# Patient Record
Sex: FEMALE | Race: WHITE | NOT HISPANIC OR LATINO | Employment: OTHER | URBAN - METROPOLITAN AREA
[De-identification: names, ages, dates, MRNs, and addresses within clinical notes are randomized per-mention and may not be internally consistent; named-entity substitution may affect disease eponyms.]

---

## 2017-05-12 ENCOUNTER — OFFICE VISIT (OUTPATIENT)
Dept: CARDIOLOGY | Facility: CLINIC | Age: 77
End: 2017-05-12

## 2017-05-12 VITALS
DIASTOLIC BLOOD PRESSURE: 68 MMHG | SYSTOLIC BLOOD PRESSURE: 164 MMHG | BODY MASS INDEX: 34.84 KG/M2 | WEIGHT: 203 LBS | HEART RATE: 59 BPM

## 2017-05-12 DIAGNOSIS — I10 BENIGN HYPERTENSION: Primary | ICD-10-CM

## 2017-05-12 DIAGNOSIS — I25.10 CHRONIC CORONARY ARTERY DISEASE: ICD-10-CM

## 2017-05-12 PROCEDURE — 99213 OFFICE O/P EST LOW 20 MIN: CPT | Performed by: INTERNAL MEDICINE

## 2017-05-12 PROCEDURE — 93000 ELECTROCARDIOGRAM COMPLETE: CPT | Performed by: INTERNAL MEDICINE

## 2017-05-12 RX ORDER — ALBUTEROL SULFATE 90 UG/1
1 AEROSOL, METERED RESPIRATORY (INHALATION) AS NEEDED
COMMUNITY
Start: 2017-04-27 | End: 2022-10-28

## 2017-12-12 ENCOUNTER — CONVERSION ENCOUNTER (OUTPATIENT)
Dept: GENERAL RADIOLOGY | Facility: HOSPITAL | Age: 77
End: 2017-12-12

## 2019-02-19 ENCOUNTER — HOSPITAL ENCOUNTER (OUTPATIENT)
Dept: GENERAL RADIOLOGY | Facility: HOSPITAL | Age: 79
Discharge: HOME | End: 2019-02-19

## 2019-02-22 ENCOUNTER — OFFICE VISIT CONVERTED (OUTPATIENT)
Dept: CARDIOLOGY | Facility: CLINIC | Age: 79
End: 2019-02-22
Attending: SPECIALIST

## 2019-02-22 ENCOUNTER — CONVERSION ENCOUNTER (OUTPATIENT)
Dept: CARDIOLOGY | Facility: CLINIC | Age: 79
End: 2019-02-22

## 2019-09-13 ENCOUNTER — HOSPITAL ENCOUNTER (OUTPATIENT)
Dept: OTHER | Facility: HOSPITAL | Age: 79
Discharge: HOME | End: 2019-09-13
Attending: SPECIALIST

## 2019-11-01 ENCOUNTER — CONVERSION ENCOUNTER (OUTPATIENT)
Dept: CARDIOLOGY | Facility: CLINIC | Age: 79
End: 2019-11-01

## 2019-11-01 ENCOUNTER — OFFICE VISIT CONVERTED (OUTPATIENT)
Dept: CARDIOLOGY | Facility: CLINIC | Age: 79
End: 2019-11-01
Attending: SPECIALIST

## 2020-04-30 ENCOUNTER — TELEMEDICINE CONVERTED (OUTPATIENT)
Dept: CARDIOLOGY | Facility: CLINIC | Age: 80
End: 2020-04-30
Attending: SPECIALIST

## 2020-10-02 ENCOUNTER — OFFICE VISIT CONVERTED (OUTPATIENT)
Dept: CARDIOLOGY | Facility: CLINIC | Age: 80
End: 2020-10-02
Attending: SPECIALIST

## 2020-10-12 ENCOUNTER — HOSPITAL ENCOUNTER (OUTPATIENT)
Dept: OTHER | Facility: HOSPITAL | Age: 80
Discharge: HOME | End: 2020-10-12
Attending: NURSE PRACTITIONER

## 2020-10-15 ENCOUNTER — OFFICE VISIT CONVERTED (OUTPATIENT)
Dept: OTOLARYNGOLOGY | Facility: CLINIC | Age: 80
End: 2020-10-15
Attending: OTOLARYNGOLOGY
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
## 2021-05-13 NOTE — PROGRESS NOTES
Quick Note      Patient Name: Mallory Franco   Patient ID: [id number]   Sex: Female   YOB: 1940    Primary Care Provider: Johan Escudero MD   Referring Provider: Parris Serrato    Visit Date: April 30, 2020    Provider: Migue Nguyen MD   Location: [facility]   Location Address: [address] [place] [address], [address]   [place]  [id number]   Location Phone: [phone]          History Of Present Illness  TELEHEALTH TELEPHONE VISIT  Chief Complaint: Shortness of breath, Coronary artery disease   Mallory Franco is an 79 year old /White female with a history of coronary artery disease status post PTCA/stent. Denies any chest pain. She was recently in the hospital with vertigo and dizziness. Blood pressure was running high at that time, has since normalized. She is presenting for evaluation via telehealth telephone visit. Verbal consent obtained before beginning visit. Telehealth visit due to COVID-19.   Provider spent 5 minutes with the patient during telehealth visit.   The following staff were present during this visit: Provider & Hoda Arnold MA   Past Medical History/Overview of Patient Symptoms     CURRENT MEDICATIONS:  Trelegy 100-62.5-25 mg daily; Coreg 12.5 mg b.i.d.; hydrochlorothiazide 25 mg daily; simvastatin 20 mg daily; Mobic 15 mg p.r.n.; Singulair 10 mg daily; Zoloft 50 mg daily; aspirin 81 mg b.i.d.; meclizine 25 mg daily; vitamin B12; Protonix 40 mg daily; Flonase.  Dosage and frequency of the medications were reviewed with the patient.      PAST MEDICAL HISTORY:  Coronary artery disease status post PTCA/stent; Essential Hypertension; GERD.      FAMILY HISTORY:  Negative for diabetes mellitus, hypertension, or heart disease.      PSYCHOSOCIAL HISTORY:  Denies mood changes or depression.  Denies alcohol or tobacco use.  Does not get much exercise.      REVIEW OF SYSTEMS:   Cardiovascular:  Admits shortness of breath while walking or lying flat, swelling  (feet, ankles, hands); Denies palpitations (fast, fluttering, or skipping beats), chest pain or angina pectoris   Respiratory:  Admits chronic or frequent cough, asthma or wheezing       Vitals     Per patient, at-home vitals:   Blood pressure 139/69.  Heart rate 59.           Assessment     ASSESSMENT & PLAN:    1.  Coronary artery disease status post PTCA/stent, stable.  Continue current dose of aspirin.  Continue current        dose of carvedilol.    2.  Essential hypertension, controlled.  Continue current dose of carvedilol.  3.  Shortness of breath.  Continue current dose of bronchodilators, managed by her PMD.  4.  See me back in 6 months.             Electronically Signed by: Shandra Velazquez-, Other -Author on May 5, 2020 08:23:34 AM  Electronically Co-signed by: Migue Nguyen MD -Reviewer on May 7, 2020 08:51:01 AM

## 2021-05-13 NOTE — PROGRESS NOTES
"   Progress Note      Patient Name: Mallory Franco   Patient ID: [id number]   Sex: Female   YOB: 1940    Primary Care Provider: Johan Escudero MD   Referring Provider: Parris Serrato    Visit Date: October 2, 2020    Provider: Migue Nguyen MD   Location: [facility]   Location Address: [address] [place] [address]  [id number]   Location Phone: [phone]          Chief Complaint  · Coronary artery disease       History Of Present Illness  Mallory Franco is an 80 year old /White female with a history of coronary artery disease, s/p PTCA/stent, who denies any chest pain or shortness of breath.   CURRENT MEDICATIONS: include HCTZ 25 mg daily; Simvastatin 20 mg daily; ASA 81 mg daily; Coreg b.i.d.; Protonix 40 mg daily; Singulair 10 mg daily; Zoloft 50 mg daily; Mobic 15 mg daily; Trilogy. The dosage and frequency of the medications were reviewed with the patient.   PAST MEDICAL HISTORY: Positive for coronary artery disease, s/p PTCA/stent; hyperlipidemia.   FAMILY HISTORY: Positive for hypertension and heart disease. Negative for diabetes.   PSYCHOSOCIAL HISTORY: No history of mood changes or depression. She never used alcohol or tobacco.       Review of Systems  · Cardiovascular  o Admits  o : swelling (feet, ankles, hands), shortness of breath while walking or lying flat  o Denies  o : palpitations (fast, fluttering, or skipping beats), chest pain or angina pectoris   · Respiratory  o Admits  o : chronic or frequent cough, asthma or wheezing      Vitals  Date Time BP Position Site L\R Cuff Size HR RR TEMP (F) WT  HT  BMI kg/m2 BSA m2 O2 Sat FR L/min FiO2 HC       10/02/2020 10:10 /74 Sitting    66 - R   205lbs 6oz 5'  4\" 35.25 2.05             Physical Examination  · Constitutional  o Appearance  o : Awake, alert, cooperative, pleasant.  · Respiratory  o Inspection of Chest  o : No chest wall deformities, moving equal.  o Auscultation of Lungs  o : Good air " entry with vesicular breath sounds.  · Cardiovascular  o Heart  o :   § Auscultation of Heart  § : S1 and S2 regular. No S3. No S4. No murmurs.  o Peripheral Vascular System  o :   § Extremities  § : Peripheral pulses were well felt. No edema. No cyanosis.  · Gastrointestinal  o Abdominal Examination  o : No masses or organomegaly noted.          Assessment     ASSESSMENT AND PLAN:    1.  Coronary artery disease, s/p PTCA/stent, stable:  Continue current dose of ASA and Carvedilol.  2.  Essential hypertension controlled:  Blood pressure is well controlled at home.  Continue current dose of        Carvedilol.  3.  See me back in 6 months.    Migue Nguyen MD, [facility]  DAYANARA/adama           This note was transcribed by Graciela Carr.  adama/DAYANARA  The above service was transcribed by Graciela Carr, and I attest to the accuracy of the note.  DAYANARA               Electronically Signed by: Graciela Carr-, -Author on October 7, 2020 06:58:39 AM  Electronically Co-signed by: Migue Nguyen MD -Reviewer on October 7, 2020 11:35:23 AM

## 2021-05-14 VITALS — BODY MASS INDEX: 35 KG/M2 | HEIGHT: 64 IN | WEIGHT: 205 LBS | TEMPERATURE: 97.7 F

## 2021-05-14 VITALS
SYSTOLIC BLOOD PRESSURE: 145 MMHG | BODY MASS INDEX: 35.06 KG/M2 | DIASTOLIC BLOOD PRESSURE: 74 MMHG | WEIGHT: 205.37 LBS | HEIGHT: 64 IN | HEART RATE: 66 BPM

## 2021-05-15 VITALS
SYSTOLIC BLOOD PRESSURE: 157 MMHG | HEIGHT: 64 IN | WEIGHT: 202 LBS | BODY MASS INDEX: 34.49 KG/M2 | DIASTOLIC BLOOD PRESSURE: 88 MMHG | HEART RATE: 60 BPM

## 2021-05-16 VITALS
DIASTOLIC BLOOD PRESSURE: 75 MMHG | HEIGHT: 64 IN | WEIGHT: 207 LBS | BODY MASS INDEX: 35.34 KG/M2 | SYSTOLIC BLOOD PRESSURE: 150 MMHG | HEART RATE: 67 BPM

## 2021-05-16 VITALS — SYSTOLIC BLOOD PRESSURE: 166 MMHG | HEART RATE: 57 BPM | DIASTOLIC BLOOD PRESSURE: 84 MMHG

## 2021-07-20 ENCOUNTER — OFFICE VISIT (OUTPATIENT)
Dept: ORTHOPEDIC SURGERY | Facility: CLINIC | Age: 81
End: 2021-07-20

## 2021-07-20 VITALS — BODY MASS INDEX: 34.15 KG/M2 | TEMPERATURE: 96.9 F | WEIGHT: 200 LBS | HEIGHT: 64 IN

## 2021-07-20 DIAGNOSIS — Z96.651 PAIN DUE TO TOTAL RIGHT KNEE REPLACEMENT, INITIAL ENCOUNTER (HCC): ICD-10-CM

## 2021-07-20 DIAGNOSIS — T84.84XA PAIN DUE TO TOTAL RIGHT KNEE REPLACEMENT, INITIAL ENCOUNTER (HCC): ICD-10-CM

## 2021-07-20 DIAGNOSIS — R52 PAIN: Primary | ICD-10-CM

## 2021-07-20 PROCEDURE — 99204 OFFICE O/P NEW MOD 45 MIN: CPT | Performed by: ORTHOPAEDIC SURGERY

## 2021-07-20 PROCEDURE — 73562 X-RAY EXAM OF KNEE 3: CPT | Performed by: ORTHOPAEDIC SURGERY

## 2021-07-20 RX ORDER — MELOXICAM 15 MG/1
15 TABLET ORAL ONCE
Status: CANCELLED | OUTPATIENT
Start: 2021-07-28 | End: 2021-07-20

## 2021-07-20 RX ORDER — CHLORHEXIDINE GLUCONATE 500 MG/1
CLOTH TOPICAL 2 TIMES DAILY
Status: CANCELLED | OUTPATIENT
Start: 2021-07-20

## 2021-07-20 RX ORDER — CEFAZOLIN SODIUM 2 G/100ML
2 INJECTION, SOLUTION INTRAVENOUS ONCE
Status: CANCELLED | OUTPATIENT
Start: 2021-07-28 | End: 2021-07-20

## 2021-07-20 RX ORDER — PREGABALIN 75 MG/1
150 CAPSULE ORAL ONCE
Status: CANCELLED | OUTPATIENT
Start: 2021-07-28 | End: 2021-07-20

## 2021-07-20 NOTE — PROGRESS NOTES
"Mallory Franco : 1940 MRN: [id number] DATE: 2021    Chief Complaint:  Follow up right total knee      SUBJECTIVE:Patient returns today for  follow up of right total knee replacement. Patient reports Dr. Avila did a right total knee replacement on approximately 15 years ago.  Patient states she has been doing well up until the last month when she rolled over in the bed and felt a pop in her knee.  Patient states when she put her foot on the floor her knee gave out and she fell onto the floor.  Patient states she has been unable to fully put weight on this knee ever since.  Patient is currently ambulating around with a walker.  Patient denies any significant pain with the knee but does report some discomfort with going from extension to flexion of the knee.  Patient states her knee feels like it shifts out of place when she goes past midline of her knee flexed.  Patient denies any known injury to her knee.  Patient is without any other significant complaints today.    OBJECTIVE:    Temp 96.9 °F (36.1 °C) (Temporal)   Ht 162.6 cm (64\")   Wt 90.7 kg (200 lb)   BMI 34.33 kg/m²   Family History   Problem Relation Age of Onset   • Breast cancer Mother    • Bone cancer Father    • Breast cancer Sister    • Bone cancer Sister    • Lung cancer Brother      Past Medical History:   Diagnosis Date   • BPPV (benign paroxysmal positional vertigo), right    • Coronary artery disease    • Ear itching    • Enlarged heart    • Gastroesophageal reflux disease    • Hyperlipidemia    • Hypertension    • Tinnitus of left ear    • Vertigo      Past Surgical History:   Procedure Laterality Date   • BREAST BIOPSY Bilateral    •  SECTION     • CHOLECYSTECTOMY     • CORONARY STENT PLACEMENT     • EYE SURGERY     • GALLBLADDER SURGERY     • KNEE SURGERY       Social History     Socioeconomic History   • Marital status:      Spouse name: Not on file   • Number of children: Not on file   • Years of education: Not on " file   • Highest education level: Not on file   Tobacco Use   • Smoking status: Never Smoker   Substance and Sexual Activity   • Alcohol use: No       Review of Systems: 14 point review of systems performed pertinent positives and negatives discussed above, all other systems are negative    Exam:. The incision is well healed. Range of motion is measured at 5 to 125. The calf is soft and nontender with a negative Homans sign. Alignment is neutral. Good quad strength. There is evidence of flexion instability, with a palpable shift in the knee going from extension to flexion. No effusion. Intact to light touch with palpable distal pulses.     DIAGNOSTIC STUDIES  Xrays: 3 views(AP bilateral knees, lateral right, and sunrise bilateral knees) were ordered and reviewed for evaluation of right knee replacement. They demonstrate a well positioned, well aligned knee replacement without complicating factors noted. In comparison with previous films there has been no change.    ASSESSMENT:   General follow up right knee replacement       PLAN:    Treatment options as well as imaging results were discussed in detail with the patient.  After evaluation it is likely the patient is having a malfunction of her posterior stabilization polyethylene liner.  Dr. Avila is also evaluated the patient and agrees that the patient needs to proceed forward with having a polyethylene exchange.  The risk benefits and alternatives of surgery have been discussed with the patient and she is agreeing to proceed forward with surgery.  We will arrange all the necessary preoperative testing and proceed forward with surgery in the near future.    Continuation of conservative management vs. polyethylene exchange was discussed.  The patient wishes to proceed with right knee polyethylene exchange with possible revision.  At this point the patient has failed the full compliment of conservative treatment and stating complete understanding of the  risks/benefits/ anternatives wishes to proceed with surgical treatment.    Risk and benefits of surgery were reviewed.  Including, but not limited to, blood clots or pulmonary embolism, anesthesia risk, infection, fracture, skin/leg numbness, persistent pain/crepitance/popping/catching, failure of the implant, need for future surgeries, hematoma, possible nerve or blood vessel injury, need for transfusion, and potential risk of stroke,heart attack or death, among others.  The patient understands and wishes to proceed.     It was explained that if tissue has been repaired or reconstructed, there is also an increased chance of failure which may require further management.  Following the completion of the discussion, the patient expressed understanding of this planned course of care, all their questions were answered and consent will be obtained preoperatively.    Operative Plan: [employer] polyethylene polymer change an overnight staywith home health rehab.        YOVANA Hernandez  7/20/2021     This patient was seen in conjunction today with Dr. Ky Avila.  Dr. Avila agrees with the above-stated assessment and plan.

## 2021-07-20 NOTE — H&P
Patient: Mallory Franco    Date of Admission: 7/28/2021    YOB: 1940    Medical Record Number: [id number]    Admitting Physician: Dr. Ky Avila    Reason for Admission: Painful right total knee replacement    History of Present Illness: 80 y.o. female presents with complaints of instability and pain from a right total knee replacement which has not been responsive to the full compliment of conservative measures. Despite conservative attempts, there is still severe, constant activity limiting pain. Given the severity of the pain, the patient has elected to proceed with right knee polyethylene swap with possible revision.    Allergies:   Allergies   Allergen Reactions   • Codeine    • Morphine          Current Medications:  Home Medications:    Current Outpatient Medications on File Prior to Visit   Medication Sig   • aspirin 81 MG tablet Take  by mouth.   • carvedilol (COREG) 12.5 MG tablet Take  by mouth 2 (Two) Times a Day.   • fluticasone (FLONASE) 50 MCG/ACT nasal spray into each nostril.   • hydrochlorothiazide (HYDRODIURIL) 25 MG tablet Take  by mouth.   • loratadine (LORATADINE ALLERGY RELIEF) 10 MG disintegrating tablet Take  by mouth.   • meclizine (ANTIVERT) 25 MG tablet Take  by mouth.   • montelukast (SINGULAIR) 10 MG tablet Take  by mouth.   • Multiple Vitamins-Minerals (EYE VITAMINS) capsule Take  by mouth.   • pantoprazole (PROTONIX) 40 MG EC tablet Take  by mouth.   • sertraline (ZOLOFT) 50 MG tablet Take  by mouth Daily.   • simvastatin (ZOCOR) 20 MG tablet Take  by mouth.   • VENTOLIN  (90 BASE) MCG/ACT inhaler      No current facility-administered medications on file prior to visit.     PRN Meds:.    PMH:     Past Medical History:   Diagnosis Date   • BPPV (benign paroxysmal positional vertigo), right    • Coronary artery disease    • Ear itching    • Enlarged heart    • Gastroesophageal reflux disease    • Hyperlipidemia    • Hypertension    • Tinnitus of left ear    •  "Vertigo        PF/Surg/Soc Hx:     Past Surgical History:   Procedure Laterality Date   • BREAST BIOPSY Bilateral    •  SECTION     • CHOLECYSTECTOMY     • CORONARY STENT PLACEMENT     • EYE SURGERY     • GALLBLADDER SURGERY     • KNEE SURGERY          Social History     Occupational History   • Not on file   Tobacco Use   • Smoking status: Never Smoker   Substance and Sexual Activity   • Alcohol use: No   • Drug use: Not on file   • Sexual activity: Not on file      Social History     Social History Narrative   • Not on file        Family History   Problem Relation Age of Onset   • Breast cancer Mother    • Bone cancer Father    • Breast cancer Sister    • Bone cancer Sister    • Lung cancer Brother          Review of Systems:   A 14 point review of systems was performed, pertinent positives discussed above, all other systems are negative    Physical Exam: 80 y.o. female  Vital Signs :   Vitals:    21 1419   Temp: 96.9 °F (36.1 °C)   TempSrc: Temporal   Weight: 90.7 kg (200 lb)   Height: 162.6 cm (64\")     General: Alert and Oriented x 3, No acute distress.  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  -A full length AP xray was ordered today for purposes of operative alignment demonstrating end stage arthritic findings. There are no previous full length films for review    Assessment: Painful right total knee replacement. Conservative measures have failed.      Plan:  The plan is to proceed with Right knee polyethylene swap with possible revision. The patient voiced understanding of the " risks, benefits, and alternative forms of treatment that were discussed with Dr Avila at the time of scheduling.  23 hours with home health.    Francesco Grewal, APRN  7/20/2021

## 2021-07-21 ENCOUNTER — TRANSCRIBE ORDERS (OUTPATIENT)
Dept: PREADMISSION TESTING | Facility: HOSPITAL | Age: 81
End: 2021-07-21

## 2021-07-22 ENCOUNTER — PRE-ADMISSION TESTING (OUTPATIENT)
Dept: PREADMISSION TESTING | Facility: HOSPITAL | Age: 81
End: 2021-07-22

## 2021-07-22 VITALS
HEART RATE: 68 BPM | DIASTOLIC BLOOD PRESSURE: 76 MMHG | RESPIRATION RATE: 16 BRPM | TEMPERATURE: 97 F | SYSTOLIC BLOOD PRESSURE: 160 MMHG | OXYGEN SATURATION: 98 % | WEIGHT: 202 LBS | HEIGHT: 64 IN | BODY MASS INDEX: 34.49 KG/M2

## 2021-07-22 DIAGNOSIS — T84.84XA PAIN DUE TO TOTAL RIGHT KNEE REPLACEMENT, INITIAL ENCOUNTER (HCC): ICD-10-CM

## 2021-07-22 DIAGNOSIS — Z96.651 PAIN DUE TO TOTAL RIGHT KNEE REPLACEMENT, INITIAL ENCOUNTER (HCC): ICD-10-CM

## 2021-07-22 LAB
ANION GAP SERPL CALCULATED.3IONS-SCNC: 10.4 MMOL/L (ref 5–15)
BILIRUB UR QL STRIP: NEGATIVE
BUN SERPL-MCNC: 21 MG/DL (ref 8–23)
BUN/CREAT SERPL: 22.8 (ref 7–25)
CALCIUM SPEC-SCNC: 9.8 MG/DL (ref 8.6–10.5)
CHLORIDE SERPL-SCNC: 102 MMOL/L (ref 98–107)
CLARITY UR: CLEAR
CO2 SERPL-SCNC: 25.6 MMOL/L (ref 22–29)
COLOR UR: YELLOW
CREAT SERPL-MCNC: 0.92 MG/DL (ref 0.57–1)
DEPRECATED RDW RBC AUTO: 47.4 FL (ref 37–54)
ERYTHROCYTE [DISTWIDTH] IN BLOOD BY AUTOMATED COUNT: 14.1 % (ref 12.3–15.4)
GFR SERPL CREATININE-BSD FRML MDRD: 59 ML/MIN/1.73
GLUCOSE SERPL-MCNC: 96 MG/DL (ref 65–99)
GLUCOSE UR STRIP-MCNC: NEGATIVE MG/DL
HCT VFR BLD AUTO: 36.6 % (ref 34–46.6)
HGB BLD-MCNC: 11.6 G/DL (ref 12–15.9)
HGB UR QL STRIP.AUTO: NEGATIVE
KETONES UR QL STRIP: NEGATIVE
LEUKOCYTE ESTERASE UR QL STRIP.AUTO: NEGATIVE
MCH RBC QN AUTO: 28.9 PG (ref 26.6–33)
MCHC RBC AUTO-ENTMCNC: 31.7 G/DL (ref 31.5–35.7)
MCV RBC AUTO: 91 FL (ref 79–97)
NITRITE UR QL STRIP: NEGATIVE
PH UR STRIP.AUTO: 6.5 [PH] (ref 5–8)
PLATELET # BLD AUTO: 256 10*3/MM3 (ref 140–450)
PMV BLD AUTO: 10.2 FL (ref 6–12)
POTASSIUM SERPL-SCNC: 4.4 MMOL/L (ref 3.5–5.2)
PROT UR QL STRIP: NEGATIVE
QT INTERVAL: 457 MS
RBC # BLD AUTO: 4.02 10*6/MM3 (ref 3.77–5.28)
SODIUM SERPL-SCNC: 138 MMOL/L (ref 136–145)
SP GR UR STRIP: 1.01 (ref 1–1.03)
UROBILINOGEN UR QL STRIP: NORMAL
WBC # BLD AUTO: 7.37 10*3/MM3 (ref 3.4–10.8)

## 2021-07-22 PROCEDURE — 36415 COLL VENOUS BLD VENIPUNCTURE: CPT

## 2021-07-22 PROCEDURE — 93005 ELECTROCARDIOGRAM TRACING: CPT

## 2021-07-22 PROCEDURE — 93010 ELECTROCARDIOGRAM REPORT: CPT | Performed by: INTERNAL MEDICINE

## 2021-07-22 PROCEDURE — 85027 COMPLETE CBC AUTOMATED: CPT

## 2021-07-22 PROCEDURE — 81003 URINALYSIS AUTO W/O SCOPE: CPT

## 2021-07-22 PROCEDURE — 80048 BASIC METABOLIC PNL TOTAL CA: CPT

## 2021-07-22 RX ORDER — AMLODIPINE BESYLATE 5 MG/1
TABLET ORAL
COMMUNITY
Start: 2021-04-15 | End: 2021-07-22

## 2021-07-22 RX ORDER — MELOXICAM 15 MG/1
TABLET ORAL
COMMUNITY
End: 2021-07-22

## 2021-07-22 RX ORDER — LOSARTAN POTASSIUM 25 MG/1
TABLET ORAL
COMMUNITY
Start: 2021-05-05 | End: 2021-07-22

## 2021-07-22 RX ORDER — MELOXICAM 7.5 MG/1
7.5 TABLET ORAL
COMMUNITY
Start: 2021-07-07 | End: 2021-07-29 | Stop reason: HOSPADM

## 2021-07-22 RX ORDER — CHLORHEXIDINE GLUCONATE 500 MG/1
CLOTH TOPICAL 2 TIMES DAILY
Status: ACTIVE | OUTPATIENT
Start: 2021-07-22

## 2021-07-22 RX ORDER — VALSARTAN 160 MG/1
160 TABLET ORAL
COMMUNITY
Start: 2021-06-02 | End: 2022-10-30 | Stop reason: HOSPADM

## 2021-07-22 RX ORDER — LANOLIN ALCOHOL/MO/W.PET/CERES
1000 CREAM (GRAM) TOPICAL EVERY MORNING
COMMUNITY
End: 2021-07-29 | Stop reason: HOSPADM

## 2021-07-22 RX ORDER — TRAZODONE HYDROCHLORIDE 50 MG/1
TABLET ORAL
COMMUNITY
Start: 2021-06-02 | End: 2021-07-22

## 2021-07-22 RX ORDER — ATORVASTATIN CALCIUM 40 MG/1
TABLET, FILM COATED ORAL
COMMUNITY
Start: 2021-04-22 | End: 2021-07-22

## 2021-07-22 RX ORDER — GABAPENTIN 300 MG/1
300 CAPSULE ORAL NIGHTLY
COMMUNITY
Start: 2021-06-07 | End: 2022-10-28

## 2021-07-22 RX ORDER — ESOMEPRAZOLE MAGNESIUM 40 MG/1
40 CAPSULE, DELAYED RELEASE ORAL
COMMUNITY
End: 2022-10-28

## 2021-07-22 RX ORDER — VALACYCLOVIR HYDROCHLORIDE 1 G/1
TABLET, FILM COATED ORAL
COMMUNITY
Start: 2021-05-05 | End: 2021-07-22

## 2021-07-22 RX ORDER — LORATADINE 10 MG/1
10 CAPSULE, LIQUID FILLED ORAL EVERY MORNING
COMMUNITY
End: 2022-10-28

## 2021-07-22 NOTE — DISCHARGE INSTRUCTIONS
Take the following medications the morning of surgery:  CARVEDILOL, NEXIUM, BREO ELLIPTA, BRING INHALER    ARRIVE 12:00  7/28      If you are on prescription narcotic pain medication to control your pain you may also take that medication the morning of surgery.    General Instructions:  • Do not eat solid food after midnight the night before surgery.  • You may drink clear liquids day of surgery but must stop at least one hour before your hospital arrival time.  • It is beneficial for you to have a clear drink that contains carbohydrates the day of surgery.  We suggest a 12 to 20 ounce bottle of Gatorade or Powerade for non-diabetic patients or a 12 to 20 ounce bottle of G2 or Powerade Zero for diabetic patients. (Pediatric patients, are not advised to drink a 12 to 20 ounce carbohydrate drink)    Clear liquids are liquids you can see through.  Nothing red in color.     Plain water                               Sports drinks  Sodas                                   Gelatin (Jell-O)  Fruit juices without pulp such as white grape juice and apple juice  Popsicles that contain no fruit or yogurt  Tea or coffee (no cream or milk added)  Gatorade / Powerade  G2 / Powerade Zero    • Infants may have breast milk up to four hours before surgery.  • Infants drinking formula may drink formula up to six hours before surgery.   • Patients who avoid smoking, chewing tobacco and alcohol for 4 weeks prior to surgery have a reduced risk of post-operative complications.  Quit smoking as many days before surgery as you can.  • Do not smoke, use chewing tobacco or drink alcohol the day of surgery.   • If applicable bring your C-PAP/ BI-PAP machine.  • Bring any papers given to you in the doctor’s office.  • Wear clean comfortable clothes.  • Do not wear contact lenses, false eyelashes or make-up.  Bring a case for your glasses.   • Bring crutches or walker if applicable.  • Remove all piercings.  Leave jewelry and any other valuables  at home.  • Hair extensions with metal clips must be removed prior to surgery.  • The Pre-Admission Testing nurse will instruct you to bring medications if unable to obtain an accurate list in Pre-Admission Testing.          Preventing a Surgical Site Infection:  • For 2 to 3 days before surgery, avoid shaving with a razor because the razor can irritate skin and make it easier to develop an infection.    • Any areas of open skin can increase the risk of a post-operative wound infection by allowing bacteria to enter and travel throughout the body.  Notify your surgeon if you have any skin wounds / rashes even if it is not near the expected surgical site.  The area will need assessed to determine if surgery should be delayed until it is healed.  • The night prior to surgery shower using a fresh bar of anti-bacterial soap (such as Dial) and clean washcloth.  Sleep in a clean bed with clean clothing.  Do not allow pets to sleep with you.  • Shower on the morning of surgery using a fresh bar of anti-bacterial soap (such as Dial) and clean washcloth.  Dry with a clean towel and dress in clean clothing.  • Ask your surgeon if you will be receiving antibiotics prior to surgery.  • Make sure you, your family, and all healthcare providers clean their hands with soap and water or an alcohol based hand  before caring for you or your wound.    Day of surgery:  Your arrival time is approximately two hours before your scheduled surgery time.  Upon arrival, a Pre-op nurse and Anesthesiologist will review your health history, obtain vital signs, and answer questions you may have.  The only belongings needed at this time will be a list of your home medications and if applicable your C-PAP/BI-PAP machine.  A Pre-op nurse will start an IV and you may receive medication in preparation for surgery, including something to help you relax.     Please be aware that surgery does come with discomfort.  We want to make every effort to  control your discomfort so please discuss any uncontrolled symptoms with your nurse.   Your doctor will most likely have prescribed pain medications.      If you are going home after surgery you will receive individualized written care instructions before being discharged.  A responsible adult must drive you to and from the hospital on the day of your surgery and stay with you for 24 hours.  Discharge prescriptions can be filled by the hospital pharmacy during regular pharmacy hours.  If you are having surgery late in the day/evening your prescription may be e-prescribed to your pharmacy.  Please verify your pharmacy hours or chose a 24 hour pharmacy to avoid not having access to your prescription because your pharmacy has closed for the day.    If you are staying overnight following surgery, you will be transported to your hospital room following the recovery period.  [facility] has all private rooms.    If you have any questions please call Pre-Admission Testing at [phone].  Deductibles and co-payments are collected on the day of service. Please be prepared to pay the required co-pay, deductible or deposit on the day of service as defined by your plan.    Patient Education for Self-Quarantine Process    • Following your COVID testing, we strongly recommend that you wear a mask when you are with other people and practice social distancing.   • Limit your activities to only required outings.  • Wash your hands with soap and water frequently for at least 20 seconds.   • Avoid touching your eyes, nose and mouth with unwashed hands.  • Do not share anything - utensils, drinking glasses, food from the same bowl.   • Sanitize household surfaces daily. Include all high touch areas (door handles, light switches, phones, countertops, etc.)    Call your surgeon immediately if you experience any of the following symptoms:  • Sore Throat  • Shortness of Breath or difficulty  breathing  • Cough  • Chills  • Body soreness or muscle pain  • Headache  • Fever  • New loss of taste or smell  • Do not arrive for your surgery ill.  Your procedure will need to be rescheduled to another time.  You will need to call your physician before the day of surgery to avoid any unnecessary exposure to hospital staff as well as other patients.    CHLORHEXIDINE CLOTH INSTRUCTIONS  The morning of surgery follow these instructions using the Chlorhexidine cloths you've been given.  These steps reduce bacteria on the body.  Do not use the cloths near your eyes, ears mouth, genitalia or on open wounds.  Throw the cloths away after use but do not try to flush them down a toilet.      • Open and remove one cloth at a time from the package.    • Leave the cloth unfolded and begin the bathing.  • Massage the skin with the cloths using gentle pressure to remove bacteria.  Do not scrub harshly.   • Follow the steps below with one 2% CHG cloth per area (6 total cloths).  • One cloth for neck, shoulders and chest.  • One cloth for both arms, hands, fingers and underarms (do underarms last).  • One cloth for the abdomen followed by groin.  • One cloth for right leg and foot including between the toes.  • One cloth for left leg and foot including between the toes.  • The last cloth is to be used for the back of the neck, back and buttocks.    Allow the CHG to air dry 3 minutes on the skin which will give it time to work and decrease the chance of irritation.  The skin may feel sticky until it is dry.  Do not rinse with water or any other liquid or you will lose the beneficial effects of the CHG.  If mild skin irritation occurs, do rinse the skin to remove the CHG.  Report this to the nurse at time of admission.  Do not apply lotions, creams, ointments, deodorants or perfumes after using the clothes. Dress in clean clothes before coming to the hospital.    BACTROBAN NASAL OINTMENT  There are many germs normally in your  nose. Bactroban is an ointment that will help reduce these germs. Please follow these instructions for Bactroban use:      ___1_The day before surgery in the morning  Date__7/27______    ___2_The day before surgery in the evening              Date__7/27______    __3__The day of surgery in the morning    Date___7/28_____    **Squirt ½ package of Bactroban Ointment onto a cotton applicator and apply to inside of 1st nostril.  Squirt the remaining Bactroban and apply to the inside of the other nostril.

## 2021-07-23 ENCOUNTER — TELEPHONE (OUTPATIENT)
Dept: ORTHOPEDIC SURGERY | Facility: CLINIC | Age: 81
End: 2021-07-23

## 2021-07-23 ENCOUNTER — TELEPHONE (OUTPATIENT)
Dept: CARDIOLOGY | Facility: CLINIC | Age: 81
End: 2021-07-23

## 2021-07-23 NOTE — TELEPHONE ENCOUNTER
Patient called asking if she needs cardiac clearance for her upcoming knee surgery.   I advised that decision is left up to the surgeon and they would need to request clearance.  Patient is calling surgeons office.    Patient has not been seen since October 2020.

## 2021-07-23 NOTE — TELEPHONE ENCOUNTER
Caller:   PATIENT    Reason for call:  PATIENT WAS DOING PRE SX TESTING AND WAS INFORMED SHE MIGHT NEED SX CLEARANCE FROM CARDIOLOGIST.       PLEASE ADVISE,     Caller# [phone]

## 2021-07-26 NOTE — TELEPHONE ENCOUNTER
Informed pt of MLL message below. Pt also had some SX questions as far as time she should arrive I told her I will send a message back to RBB sx  and she could expect a call from her at some point today.

## 2021-07-27 ENCOUNTER — LAB (OUTPATIENT)
Dept: LAB | Facility: HOSPITAL | Age: 81
End: 2021-07-27

## 2021-07-27 LAB — SARS-COV-2 ORF1AB RESP QL NAA+PROBE: NOT DETECTED

## 2021-07-27 PROCEDURE — U0004 COV-19 TEST NON-CDC HGH THRU: HCPCS

## 2021-07-27 PROCEDURE — U0005 INFEC AGEN DETEC AMPLI PROBE: HCPCS

## 2021-07-27 PROCEDURE — C9803 HOPD COVID-19 SPEC COLLECT: HCPCS

## 2021-07-28 ENCOUNTER — APPOINTMENT (OUTPATIENT)
Dept: GENERAL RADIOLOGY | Facility: HOSPITAL | Age: 81
End: 2021-07-28

## 2021-07-28 ENCOUNTER — ANESTHESIA (OUTPATIENT)
Dept: PERIOP | Facility: HOSPITAL | Age: 81
End: 2021-07-28

## 2021-07-28 ENCOUNTER — ANESTHESIA EVENT (OUTPATIENT)
Dept: PERIOP | Facility: HOSPITAL | Age: 81
End: 2021-07-28

## 2021-07-28 ENCOUNTER — HOSPITAL ENCOUNTER (OUTPATIENT)
Facility: HOSPITAL | Age: 81
Discharge: HOME HEALTH | End: 2021-07-29
Attending: ORTHOPAEDIC SURGERY | Admitting: ORTHOPAEDIC SURGERY

## 2021-07-28 DIAGNOSIS — T84.84XA PAIN DUE TO TOTAL RIGHT KNEE REPLACEMENT, INITIAL ENCOUNTER (HCC): ICD-10-CM

## 2021-07-28 DIAGNOSIS — Z96.651 PAIN DUE TO TOTAL RIGHT KNEE REPLACEMENT, INITIAL ENCOUNTER (HCC): ICD-10-CM

## 2021-07-28 PROCEDURE — 25010000003 CEFAZOLIN IN DEXTROSE 2-4 GM/100ML-% SOLUTION: Performed by: NURSE PRACTITIONER

## 2021-07-28 PROCEDURE — 25010000002 VANCOMYCIN 10 G RECONSTITUTED SOLUTION: Performed by: NURSE PRACTITIONER

## 2021-07-28 PROCEDURE — 63710000001 POLYETHYLENE GLYCOL 17 G PACK: Performed by: NURSE PRACTITIONER

## 2021-07-28 PROCEDURE — A9270 NON-COVERED ITEM OR SERVICE: HCPCS | Performed by: NURSE PRACTITIONER

## 2021-07-28 PROCEDURE — C1776 JOINT DEVICE (IMPLANTABLE): HCPCS | Performed by: ORTHOPAEDIC SURGERY

## 2021-07-28 PROCEDURE — 63710000001 MELATONIN 1 MG TABLET: Performed by: NURSE PRACTITIONER

## 2021-07-28 PROCEDURE — 25010000002 EPINEPHRINE 30 MG/30ML SOLUTION

## 2021-07-28 PROCEDURE — C1889 IMPLANT/INSERT DEVICE, NOC: HCPCS | Performed by: ORTHOPAEDIC SURGERY

## 2021-07-28 PROCEDURE — 25010000002 NEOSTIGMINE 5 MG/10ML SOLUTION: Performed by: STUDENT IN AN ORGANIZED HEALTH CARE EDUCATION/TRAINING PROGRAM

## 2021-07-28 PROCEDURE — 27486 REVISE/REPLACE KNEE JOINT: CPT | Performed by: ORTHOPAEDIC SURGERY

## 2021-07-28 PROCEDURE — 25010000002 ONDANSETRON PER 1 MG: Performed by: STUDENT IN AN ORGANIZED HEALTH CARE EDUCATION/TRAINING PROGRAM

## 2021-07-28 PROCEDURE — 25010000002 ROPIVACAINE PER 1 MG

## 2021-07-28 PROCEDURE — G0378 HOSPITAL OBSERVATION PER HR: HCPCS

## 2021-07-28 PROCEDURE — 25010000002 DEXAMETHASONE PER 1 MG: Performed by: STUDENT IN AN ORGANIZED HEALTH CARE EDUCATION/TRAINING PROGRAM

## 2021-07-28 PROCEDURE — 63710000001 MUPIROCIN 2 % OINTMENT: Performed by: NURSE PRACTITIONER

## 2021-07-28 PROCEDURE — 63710000001 SERTRALINE 50 MG TABLET: Performed by: NURSE PRACTITIONER

## 2021-07-28 PROCEDURE — 25010000002 KETOROLAC TROMETHAMINE PER 15 MG

## 2021-07-28 PROCEDURE — 25010000002 FENTANYL CITRATE (PF) 50 MCG/ML SOLUTION: Performed by: STUDENT IN AN ORGANIZED HEALTH CARE EDUCATION/TRAINING PROGRAM

## 2021-07-28 PROCEDURE — 63710000001 CARVEDILOL 12.5 MG TABLET: Performed by: NURSE PRACTITIONER

## 2021-07-28 PROCEDURE — 63710000001 ONDANSETRON PER 8 MG: Performed by: NURSE PRACTITIONER

## 2021-07-28 PROCEDURE — 25010000002 PROPOFOL 10 MG/ML EMULSION: Performed by: STUDENT IN AN ORGANIZED HEALTH CARE EDUCATION/TRAINING PROGRAM

## 2021-07-28 PROCEDURE — 73560 X-RAY EXAM OF KNEE 1 OR 2: CPT

## 2021-07-28 PROCEDURE — 25010000002 MORPHINE PER 10 MG: Performed by: ORTHOPAEDIC SURGERY

## 2021-07-28 DEVICE — VIOLET ANTIBACTERIAL POLYDIOXANONE, KNOTLESS TISSUE CONTROL DEVICE
Type: IMPLANTABLE DEVICE | Site: KNEE | Status: FUNCTIONAL
Brand: STRATAFIX

## 2021-07-28 DEVICE — KNOTLESS TISSUE CONTROL DEVICE, UNDYED UNIDIRECTIONAL (ANTIBACTERIAL) SYNTHETIC ABSORBABLE DEVICE
Type: IMPLANTABLE DEVICE | Site: KNEE | Status: FUNCTIONAL
Brand: STRATAFIX

## 2021-07-28 DEVICE — LEGION POSTERIOR STABILIZED HIGH                                    FLEX HIGHLY CROSS LINKED                                    POLYETHYLENE SIZE 3-4 11MM
Type: IMPLANTABLE DEVICE | Site: KNEE | Status: FUNCTIONAL
Brand: LEGION

## 2021-07-28 RX ORDER — HYDROCODONE BITARTRATE AND ACETAMINOPHEN 7.5; 325 MG/1; MG/1
1 TABLET ORAL ONCE AS NEEDED
Status: DISCONTINUED | OUTPATIENT
Start: 2021-07-28 | End: 2021-07-28 | Stop reason: HOSPADM

## 2021-07-28 RX ORDER — ONDANSETRON 4 MG/1
4 TABLET, FILM COATED ORAL EVERY 6 HOURS PRN
Status: DISCONTINUED | OUTPATIENT
Start: 2021-07-28 | End: 2021-07-29 | Stop reason: HOSPADM

## 2021-07-28 RX ORDER — FENTANYL CITRATE 50 UG/ML
50 INJECTION, SOLUTION INTRAMUSCULAR; INTRAVENOUS
Status: DISCONTINUED | OUTPATIENT
Start: 2021-07-28 | End: 2021-07-28 | Stop reason: HOSPADM

## 2021-07-28 RX ORDER — VALSARTAN 160 MG/1
160 TABLET ORAL
Status: DISCONTINUED | OUTPATIENT
Start: 2021-07-29 | End: 2021-07-29 | Stop reason: HOSPADM

## 2021-07-28 RX ORDER — CEFAZOLIN SODIUM 2 G/100ML
2 INJECTION, SOLUTION INTRAVENOUS ONCE
Status: COMPLETED | OUTPATIENT
Start: 2021-07-28 | End: 2021-07-28

## 2021-07-28 RX ORDER — ONDANSETRON 2 MG/ML
4 INJECTION INTRAMUSCULAR; INTRAVENOUS ONCE AS NEEDED
Status: DISCONTINUED | OUTPATIENT
Start: 2021-07-28 | End: 2021-07-28 | Stop reason: HOSPADM

## 2021-07-28 RX ORDER — ROCURONIUM BROMIDE 10 MG/ML
INJECTION, SOLUTION INTRAVENOUS AS NEEDED
Status: DISCONTINUED | OUTPATIENT
Start: 2021-07-28 | End: 2021-07-28 | Stop reason: SURG

## 2021-07-28 RX ORDER — HYDROCHLOROTHIAZIDE 25 MG/1
25 TABLET ORAL EVERY MORNING
Status: DISCONTINUED | OUTPATIENT
Start: 2021-07-29 | End: 2021-07-29 | Stop reason: HOSPADM

## 2021-07-28 RX ORDER — GLYCOPYRROLATE 0.2 MG/ML
INJECTION INTRAMUSCULAR; INTRAVENOUS AS NEEDED
Status: DISCONTINUED | OUTPATIENT
Start: 2021-07-28 | End: 2021-07-28 | Stop reason: SURG

## 2021-07-28 RX ORDER — ASPIRIN 81 MG/1
TABLET ORAL
Qty: 60 TABLET | Refills: 0 | Status: SHIPPED | OUTPATIENT
Start: 2021-07-29 | End: 2021-09-09

## 2021-07-28 RX ORDER — PROMETHAZINE HYDROCHLORIDE 25 MG/1
25 SUPPOSITORY RECTAL ONCE AS NEEDED
Status: DISCONTINUED | OUTPATIENT
Start: 2021-07-28 | End: 2021-07-28 | Stop reason: HOSPADM

## 2021-07-28 RX ORDER — SODIUM CHLORIDE 0.9 % (FLUSH) 0.9 %
3-10 SYRINGE (ML) INJECTION AS NEEDED
Status: DISCONTINUED | OUTPATIENT
Start: 2021-07-28 | End: 2021-07-28 | Stop reason: HOSPADM

## 2021-07-28 RX ORDER — PROPOFOL 10 MG/ML
VIAL (ML) INTRAVENOUS AS NEEDED
Status: DISCONTINUED | OUTPATIENT
Start: 2021-07-28 | End: 2021-07-28 | Stop reason: SURG

## 2021-07-28 RX ORDER — POLYETHYLENE GLYCOL 3350 17 G/17G
17 POWDER, FOR SOLUTION ORAL 2 TIMES DAILY
Status: DISCONTINUED | OUTPATIENT
Start: 2021-07-28 | End: 2021-07-29 | Stop reason: HOSPADM

## 2021-07-28 RX ORDER — SODIUM CHLORIDE, SODIUM LACTATE, POTASSIUM CHLORIDE, CALCIUM CHLORIDE 600; 310; 30; 20 MG/100ML; MG/100ML; MG/100ML; MG/100ML
9 INJECTION, SOLUTION INTRAVENOUS CONTINUOUS
Status: DISCONTINUED | OUTPATIENT
Start: 2021-07-28 | End: 2021-07-28 | Stop reason: HOSPADM

## 2021-07-28 RX ORDER — ASPIRIN 81 MG/1
81 TABLET ORAL EVERY 12 HOURS SCHEDULED
Status: DISCONTINUED | OUTPATIENT
Start: 2021-07-29 | End: 2021-07-29 | Stop reason: HOSPADM

## 2021-07-28 RX ORDER — HYDROCODONE BITARTRATE AND ACETAMINOPHEN 7.5; 325 MG/1; MG/1
TABLET ORAL
Qty: 42 TABLET | Refills: 0 | Status: SHIPPED | OUTPATIENT
Start: 2021-07-28 | End: 2022-04-08

## 2021-07-28 RX ORDER — NEOSTIGMINE METHYLSULFATE 0.5 MG/ML
INJECTION, SOLUTION INTRAVENOUS AS NEEDED
Status: DISCONTINUED | OUTPATIENT
Start: 2021-07-28 | End: 2021-07-28 | Stop reason: SURG

## 2021-07-28 RX ORDER — DIPHENHYDRAMINE HYDROCHLORIDE 50 MG/ML
12.5 INJECTION INTRAMUSCULAR; INTRAVENOUS
Status: DISCONTINUED | OUTPATIENT
Start: 2021-07-28 | End: 2021-07-28 | Stop reason: HOSPADM

## 2021-07-28 RX ORDER — MAGNESIUM HYDROXIDE 1200 MG/15ML
LIQUID ORAL AS NEEDED
Status: DISCONTINUED | OUTPATIENT
Start: 2021-07-28 | End: 2021-07-28 | Stop reason: HOSPADM

## 2021-07-28 RX ORDER — FENTANYL CITRATE 50 UG/ML
INJECTION, SOLUTION INTRAMUSCULAR; INTRAVENOUS AS NEEDED
Status: DISCONTINUED | OUTPATIENT
Start: 2021-07-28 | End: 2021-07-28 | Stop reason: SURG

## 2021-07-28 RX ORDER — ALBUTEROL SULFATE 90 UG/1
1 AEROSOL, METERED RESPIRATORY (INHALATION) AS NEEDED
Status: DISCONTINUED | OUTPATIENT
Start: 2021-07-28 | End: 2021-07-29 | Stop reason: HOSPADM

## 2021-07-28 RX ORDER — PREGABALIN 75 MG/1
150 CAPSULE ORAL ONCE
Status: COMPLETED | OUTPATIENT
Start: 2021-07-28 | End: 2021-07-28

## 2021-07-28 RX ORDER — MULTIPLE VITAMINS W/ MINERALS TAB 9MG-400MCG
1 TAB ORAL DAILY
COMMUNITY
End: 2021-07-29 | Stop reason: HOSPADM

## 2021-07-28 RX ORDER — PROMETHAZINE HYDROCHLORIDE 25 MG/1
25 TABLET ORAL ONCE AS NEEDED
Status: DISCONTINUED | OUTPATIENT
Start: 2021-07-28 | End: 2021-07-28 | Stop reason: HOSPADM

## 2021-07-28 RX ORDER — ONDANSETRON 2 MG/ML
4 INJECTION INTRAMUSCULAR; INTRAVENOUS EVERY 6 HOURS PRN
Status: DISCONTINUED | OUTPATIENT
Start: 2021-07-28 | End: 2021-07-29 | Stop reason: HOSPADM

## 2021-07-28 RX ORDER — FAMOTIDINE 10 MG/ML
20 INJECTION, SOLUTION INTRAVENOUS ONCE
Status: COMPLETED | OUTPATIENT
Start: 2021-07-28 | End: 2021-07-28

## 2021-07-28 RX ORDER — MELOXICAM 7.5 MG/1
7.5 TABLET ORAL DAILY
Qty: 14 TABLET | Refills: 0 | Status: SHIPPED | OUTPATIENT
Start: 2021-07-28 | End: 2021-08-12

## 2021-07-28 RX ORDER — DEXAMETHASONE SODIUM PHOSPHATE 10 MG/ML
INJECTION INTRAMUSCULAR; INTRAVENOUS AS NEEDED
Status: DISCONTINUED | OUTPATIENT
Start: 2021-07-28 | End: 2021-07-28 | Stop reason: SURG

## 2021-07-28 RX ORDER — HYDRALAZINE HYDROCHLORIDE 20 MG/ML
5 INJECTION INTRAMUSCULAR; INTRAVENOUS
Status: DISCONTINUED | OUTPATIENT
Start: 2021-07-28 | End: 2021-07-28 | Stop reason: HOSPADM

## 2021-07-28 RX ORDER — IBUPROFEN 600 MG/1
600 TABLET ORAL ONCE AS NEEDED
Status: DISCONTINUED | OUTPATIENT
Start: 2021-07-28 | End: 2021-07-28 | Stop reason: HOSPADM

## 2021-07-28 RX ORDER — LIDOCAINE HYDROCHLORIDE 20 MG/ML
INJECTION, SOLUTION INFILTRATION; PERINEURAL AS NEEDED
Status: DISCONTINUED | OUTPATIENT
Start: 2021-07-28 | End: 2021-07-28 | Stop reason: SURG

## 2021-07-28 RX ORDER — PANTOPRAZOLE SODIUM 40 MG/1
40 TABLET, DELAYED RELEASE ORAL EVERY MORNING
Status: DISCONTINUED | OUTPATIENT
Start: 2021-07-29 | End: 2021-07-29 | Stop reason: HOSPADM

## 2021-07-28 RX ORDER — SODIUM CHLORIDE 0.9 % (FLUSH) 0.9 %
3 SYRINGE (ML) INJECTION EVERY 12 HOURS SCHEDULED
Status: DISCONTINUED | OUTPATIENT
Start: 2021-07-28 | End: 2021-07-28 | Stop reason: HOSPADM

## 2021-07-28 RX ORDER — MIDAZOLAM HYDROCHLORIDE 1 MG/ML
0.5 INJECTION INTRAMUSCULAR; INTRAVENOUS
Status: DISCONTINUED | OUTPATIENT
Start: 2021-07-28 | End: 2021-07-28 | Stop reason: HOSPADM

## 2021-07-28 RX ORDER — LIDOCAINE HYDROCHLORIDE 10 MG/ML
0.5 INJECTION, SOLUTION EPIDURAL; INFILTRATION; INTRACAUDAL; PERINEURAL ONCE AS NEEDED
Status: DISCONTINUED | OUTPATIENT
Start: 2021-07-28 | End: 2021-07-28 | Stop reason: HOSPADM

## 2021-07-28 RX ORDER — CARVEDILOL 12.5 MG/1
12.5 TABLET ORAL EVERY 12 HOURS SCHEDULED
Status: DISCONTINUED | OUTPATIENT
Start: 2021-07-28 | End: 2021-07-29 | Stop reason: HOSPADM

## 2021-07-28 RX ORDER — EPHEDRINE SULFATE 50 MG/ML
5 INJECTION, SOLUTION INTRAVENOUS ONCE AS NEEDED
Status: DISCONTINUED | OUTPATIENT
Start: 2021-07-28 | End: 2021-07-28 | Stop reason: HOSPADM

## 2021-07-28 RX ORDER — MECLIZINE HYDROCHLORIDE 25 MG/1
25 TABLET ORAL EVERY MORNING
Status: DISCONTINUED | OUTPATIENT
Start: 2021-07-29 | End: 2021-07-29 | Stop reason: HOSPADM

## 2021-07-28 RX ORDER — FLUMAZENIL 0.1 MG/ML
0.2 INJECTION INTRAVENOUS AS NEEDED
Status: DISCONTINUED | OUTPATIENT
Start: 2021-07-28 | End: 2021-07-28 | Stop reason: HOSPADM

## 2021-07-28 RX ORDER — TRANEXAMIC ACID 100 MG/ML
INJECTION, SOLUTION INTRAVENOUS AS NEEDED
Status: DISCONTINUED | OUTPATIENT
Start: 2021-07-28 | End: 2021-07-28 | Stop reason: SURG

## 2021-07-28 RX ORDER — HYDROCODONE BITARTRATE AND ACETAMINOPHEN 7.5; 325 MG/1; MG/1
2 TABLET ORAL EVERY 4 HOURS PRN
Status: DISCONTINUED | OUTPATIENT
Start: 2021-07-28 | End: 2021-07-29 | Stop reason: HOSPADM

## 2021-07-28 RX ORDER — DIPHENHYDRAMINE HCL 25 MG
25 CAPSULE ORAL
Status: DISCONTINUED | OUTPATIENT
Start: 2021-07-28 | End: 2021-07-28 | Stop reason: HOSPADM

## 2021-07-28 RX ORDER — PROMETHAZINE HYDROCHLORIDE 12.5 MG/1
12.5 TABLET ORAL EVERY 4 HOURS PRN
Status: DISCONTINUED | OUTPATIENT
Start: 2021-07-28 | End: 2021-07-29 | Stop reason: HOSPADM

## 2021-07-28 RX ORDER — UREA 10 %
3 LOTION (ML) TOPICAL NIGHTLY PRN
Status: DISCONTINUED | OUTPATIENT
Start: 2021-07-28 | End: 2021-07-29 | Stop reason: HOSPADM

## 2021-07-28 RX ORDER — CEFAZOLIN SODIUM 2 G/100ML
2 INJECTION, SOLUTION INTRAVENOUS EVERY 8 HOURS
Status: COMPLETED | OUTPATIENT
Start: 2021-07-28 | End: 2021-07-29

## 2021-07-28 RX ORDER — BUDESONIDE AND FORMOTEROL FUMARATE DIHYDRATE 160; 4.5 UG/1; UG/1
2 AEROSOL RESPIRATORY (INHALATION)
Status: DISCONTINUED | OUTPATIENT
Start: 2021-07-28 | End: 2021-07-29 | Stop reason: HOSPADM

## 2021-07-28 RX ORDER — NALOXONE HCL 0.4 MG/ML
0.2 VIAL (ML) INJECTION AS NEEDED
Status: DISCONTINUED | OUTPATIENT
Start: 2021-07-28 | End: 2021-07-28 | Stop reason: HOSPADM

## 2021-07-28 RX ORDER — POLYETHYLENE GLYCOL 3350 17 G/17G
17 POWDER, FOR SOLUTION ORAL 2 TIMES DAILY
Qty: 238 G | Refills: 0 | Status: SHIPPED | OUTPATIENT
Start: 2021-07-28 | End: 2021-08-05

## 2021-07-28 RX ORDER — MELOXICAM 15 MG/1
15 TABLET ORAL ONCE
Status: COMPLETED | OUTPATIENT
Start: 2021-07-28 | End: 2021-07-28

## 2021-07-28 RX ORDER — ONDANSETRON 2 MG/ML
INJECTION INTRAMUSCULAR; INTRAVENOUS AS NEEDED
Status: DISCONTINUED | OUTPATIENT
Start: 2021-07-28 | End: 2021-07-28 | Stop reason: SURG

## 2021-07-28 RX ORDER — HYDROMORPHONE HYDROCHLORIDE 1 MG/ML
0.5 INJECTION, SOLUTION INTRAMUSCULAR; INTRAVENOUS; SUBCUTANEOUS
Status: DISCONTINUED | OUTPATIENT
Start: 2021-07-28 | End: 2021-07-28 | Stop reason: HOSPADM

## 2021-07-28 RX ORDER — OXYCODONE AND ACETAMINOPHEN 10; 325 MG/1; MG/1
1 TABLET ORAL EVERY 4 HOURS PRN
Status: DISCONTINUED | OUTPATIENT
Start: 2021-07-28 | End: 2021-07-28 | Stop reason: HOSPADM

## 2021-07-28 RX ORDER — MELOXICAM 7.5 MG/1
7.5 TABLET ORAL DAILY
Status: DISCONTINUED | OUTPATIENT
Start: 2021-07-29 | End: 2021-07-29 | Stop reason: HOSPADM

## 2021-07-28 RX ORDER — ONDANSETRON 4 MG/1
4 TABLET, FILM COATED ORAL EVERY 8 HOURS PRN
Qty: 10 TABLET | Refills: 0 | Status: SHIPPED | OUTPATIENT
Start: 2021-07-28 | End: 2022-04-08

## 2021-07-28 RX ORDER — GABAPENTIN 300 MG/1
300 CAPSULE ORAL NIGHTLY
Status: DISCONTINUED | OUTPATIENT
Start: 2021-07-28 | End: 2021-07-29 | Stop reason: HOSPADM

## 2021-07-28 RX ORDER — HYDROCODONE BITARTRATE AND ACETAMINOPHEN 7.5; 325 MG/1; MG/1
1 TABLET ORAL EVERY 4 HOURS PRN
Status: DISCONTINUED | OUTPATIENT
Start: 2021-07-28 | End: 2021-07-29 | Stop reason: HOSPADM

## 2021-07-28 RX ORDER — LABETALOL HYDROCHLORIDE 5 MG/ML
5 INJECTION, SOLUTION INTRAVENOUS
Status: DISCONTINUED | OUTPATIENT
Start: 2021-07-28 | End: 2021-07-28 | Stop reason: HOSPADM

## 2021-07-28 RX ADMIN — CARVEDILOL 12.5 MG: 12.5 TABLET, FILM COATED ORAL at 21:22

## 2021-07-28 RX ADMIN — SERTRALINE HYDROCHLORIDE 50 MG: 50 TABLET, FILM COATED ORAL at 21:22

## 2021-07-28 RX ADMIN — POLYETHYLENE GLYCOL 3350 17 G: 17 POWDER, FOR SOLUTION ORAL at 21:22

## 2021-07-28 RX ADMIN — TRANEXAMIC ACID 1000 MG: 1 INJECTION, SOLUTION INTRAVENOUS at 15:05

## 2021-07-28 RX ADMIN — MUPIROCIN 1 APPLICATION: 20 OINTMENT TOPICAL at 21:21

## 2021-07-28 RX ADMIN — Medication 3 MG: at 21:22

## 2021-07-28 RX ADMIN — SODIUM CHLORIDE, POTASSIUM CHLORIDE, SODIUM LACTATE AND CALCIUM CHLORIDE 9 ML/HR: 600; 310; 30; 20 INJECTION, SOLUTION INTRAVENOUS at 13:00

## 2021-07-28 RX ADMIN — SUGAMMADEX 200 MG: 100 INJECTION, SOLUTION INTRAVENOUS at 15:37

## 2021-07-28 RX ADMIN — VANCOMYCIN HYDROCHLORIDE 1250 MG: 10 INJECTION, POWDER, LYOPHILIZED, FOR SOLUTION INTRAVENOUS at 13:03

## 2021-07-28 RX ADMIN — FENTANYL CITRATE 25 MCG: 50 INJECTION INTRAMUSCULAR; INTRAVENOUS at 15:00

## 2021-07-28 RX ADMIN — CEFAZOLIN SODIUM 2 G: 2 INJECTION, SOLUTION INTRAVENOUS at 13:39

## 2021-07-28 RX ADMIN — FENTANYL CITRATE 25 MCG: 50 INJECTION INTRAMUSCULAR; INTRAVENOUS at 14:46

## 2021-07-28 RX ADMIN — ONDANSETRON HYDROCHLORIDE 4 MG: 4 TABLET, FILM COATED ORAL at 21:21

## 2021-07-28 RX ADMIN — LIDOCAINE HYDROCHLORIDE 60 MG: 20 INJECTION, SOLUTION INFILTRATION; PERINEURAL at 14:34

## 2021-07-28 RX ADMIN — FAMOTIDINE 20 MG: 10 INJECTION INTRAVENOUS at 13:04

## 2021-07-28 RX ADMIN — ONDANSETRON 4 MG: 2 INJECTION INTRAMUSCULAR; INTRAVENOUS at 15:18

## 2021-07-28 RX ADMIN — MELOXICAM 15 MG: 15 TABLET ORAL at 12:55

## 2021-07-28 RX ADMIN — PREGABALIN 150 MG: 75 CAPSULE ORAL at 12:55

## 2021-07-28 RX ADMIN — GLYCOPYRROLATE 0.6 MG: 0.2 INJECTION INTRAMUSCULAR; INTRAVENOUS at 15:18

## 2021-07-28 RX ADMIN — ROCURONIUM BROMIDE 30 MG: 50 INJECTION INTRAVENOUS at 14:35

## 2021-07-28 RX ADMIN — CEFAZOLIN SODIUM 2 G: 2 INJECTION, SOLUTION INTRAVENOUS at 21:22

## 2021-07-28 RX ADMIN — PROPOFOL 130 MG: 10 INJECTION, EMULSION INTRAVENOUS at 14:34

## 2021-07-28 RX ADMIN — NEOSTIGMINE METHYLSULFATE 4 MG: 0.5 INJECTION INTRAVENOUS at 15:18

## 2021-07-28 RX ADMIN — DEXAMETHASONE SODIUM PHOSPHATE 8 MG: 10 INJECTION INTRAMUSCULAR; INTRAVENOUS at 14:42

## 2021-07-28 RX ADMIN — FENTANYL CITRATE 25 MCG: 50 INJECTION INTRAMUSCULAR; INTRAVENOUS at 15:42

## 2021-07-28 NOTE — ANESTHESIA PREPROCEDURE EVALUATION
Anesthesia Evaluation     Patient summary reviewed and Nursing notes reviewed   history of anesthetic complications:  NPO Solid Status: > 8 hours  NPO Liquid Status: > 4 hours           Airway   Mallampati: II  No difficulty expected  Dental - normal exam     Pulmonary     breath sounds clear to auscultation  (+) COPD, asthma,shortness of breath,   Cardiovascular     Rhythm: regular    (+) hypertension, CAD, cardiac stents hyperlipidemia,       Neuro/Psych  (+) dizziness/light headedness,     GI/Hepatic/Renal/Endo    (+) obesity,  GERD,      Musculoskeletal     Abdominal   (+) obese,    Substance History      OB/GYN          Other   arthritis,                      Anesthesia Plan    ASA 3     spinal       Anesthetic plan, all risks, benefits, and alternatives have been provided, discussed and informed consent has been obtained with: patient.

## 2021-07-28 NOTE — ANESTHESIA PROCEDURE NOTES
Airway  Urgency: elective    Date/Time: 7/28/2021 2:37 PM  Airway not difficult    General Information and Staff    Patient location during procedure: OR  Anesthesiologist: Katina Green MD  CRNA: Jeremiah Ruvalcaba CRNA    Indications and Patient Condition  Indications for airway management: airway protection    Preoxygenated: yes  MILS not maintained throughout  Mask difficulty assessment: 2 - vent by mask + OA or adjuvant +/- NMBA    Final Airway Details  Final airway type: endotracheal airway      Successful airway: ETT  Cuffed: yes   Successful intubation technique: direct laryngoscopy  Facilitating devices/methods: anterior pressure/BURP  Endotracheal tube insertion site: oral  Blade: Claudio  Blade size: 3  ETT size (mm): 7.0  Cormack-Lehane Classification: grade IIb - view of arytenoids or posterior of glottis only  Placement verified by: chest auscultation and capnometry   Cuff volume (mL): 7  Measured from: lips  ETT/EBT  to lips (cm): 22  Number of attempts at approach: 1  Assessment: lips, teeth, and gum same as pre-op and atraumatic intubation

## 2021-07-29 ENCOUNTER — HOME HEALTH ADMISSION (OUTPATIENT)
Dept: HOME HEALTH SERVICES | Facility: HOME HEALTHCARE | Age: 81
End: 2021-07-29

## 2021-07-29 VITALS
HEART RATE: 59 BPM | DIASTOLIC BLOOD PRESSURE: 74 MMHG | OXYGEN SATURATION: 94 % | TEMPERATURE: 97.3 F | BODY MASS INDEX: 33.87 KG/M2 | RESPIRATION RATE: 16 BRPM | WEIGHT: 198.41 LBS | SYSTOLIC BLOOD PRESSURE: 130 MMHG | HEIGHT: 64 IN

## 2021-07-29 LAB
HCT VFR BLD AUTO: 29.9 % (ref 34–46.6)
HGB BLD-MCNC: 9.5 G/DL (ref 12–15.9)

## 2021-07-29 PROCEDURE — A9270 NON-COVERED ITEM OR SERVICE: HCPCS | Performed by: NURSE PRACTITIONER

## 2021-07-29 PROCEDURE — G0378 HOSPITAL OBSERVATION PER HR: HCPCS

## 2021-07-29 PROCEDURE — 63710000001 PANTOPRAZOLE 40 MG TABLET DELAYED-RELEASE: Performed by: NURSE PRACTITIONER

## 2021-07-29 PROCEDURE — 63710000001 MECLIZINE 25 MG TABLET: Performed by: NURSE PRACTITIONER

## 2021-07-29 PROCEDURE — 63710000001 CARVEDILOL 12.5 MG TABLET: Performed by: NURSE PRACTITIONER

## 2021-07-29 PROCEDURE — 63710000001 HYDROCHLOROTHIAZIDE 25 MG TABLET: Performed by: NURSE PRACTITIONER

## 2021-07-29 PROCEDURE — 25010000003 CEFAZOLIN IN DEXTROSE 2-4 GM/100ML-% SOLUTION: Performed by: NURSE PRACTITIONER

## 2021-07-29 PROCEDURE — 63710000001 MELOXICAM 7.5 MG TABLET: Performed by: NURSE PRACTITIONER

## 2021-07-29 PROCEDURE — 63710000001 ASPIRIN 81 MG TABLET DELAYED-RELEASE: Performed by: NURSE PRACTITIONER

## 2021-07-29 PROCEDURE — 85014 HEMATOCRIT: CPT | Performed by: NURSE PRACTITIONER

## 2021-07-29 PROCEDURE — 63710000001 HYDROCODONE-ACETAMINOPHEN 7.5-325 MG TABLET: Performed by: NURSE PRACTITIONER

## 2021-07-29 PROCEDURE — 97530 THERAPEUTIC ACTIVITIES: CPT

## 2021-07-29 PROCEDURE — 27486 REVISE/REPLACE KNEE JOINT: CPT | Performed by: NURSE PRACTITIONER

## 2021-07-29 PROCEDURE — 63710000001 VALSARTAN 160 MG TABLET: Performed by: NURSE PRACTITIONER

## 2021-07-29 PROCEDURE — 97161 PT EVAL LOW COMPLEX 20 MIN: CPT

## 2021-07-29 PROCEDURE — 97110 THERAPEUTIC EXERCISES: CPT

## 2021-07-29 PROCEDURE — 85018 HEMOGLOBIN: CPT | Performed by: NURSE PRACTITIONER

## 2021-07-29 PROCEDURE — 99024 POSTOP FOLLOW-UP VISIT: CPT | Performed by: NURSE PRACTITIONER

## 2021-07-29 PROCEDURE — 63710000001 POLYETHYLENE GLYCOL 17 G PACK: Performed by: NURSE PRACTITIONER

## 2021-07-29 PROCEDURE — 63710000001 MUPIROCIN 2 % OINTMENT: Performed by: NURSE PRACTITIONER

## 2021-07-29 RX ADMIN — VALSARTAN 160 MG: 160 TABLET, FILM COATED ORAL at 12:12

## 2021-07-29 RX ADMIN — CEFAZOLIN SODIUM 2 G: 2 INJECTION, SOLUTION INTRAVENOUS at 06:09

## 2021-07-29 RX ADMIN — POLYETHYLENE GLYCOL 3350 17 G: 17 POWDER, FOR SOLUTION ORAL at 08:15

## 2021-07-29 RX ADMIN — MUPIROCIN 1 APPLICATION: 20 OINTMENT TOPICAL at 08:16

## 2021-07-29 RX ADMIN — HYDROCODONE BITARTRATE AND ACETAMINOPHEN 1 TABLET: 7.5; 325 TABLET ORAL at 00:49

## 2021-07-29 RX ADMIN — PANTOPRAZOLE SODIUM 40 MG: 40 TABLET, DELAYED RELEASE ORAL at 06:09

## 2021-07-29 RX ADMIN — ASPIRIN 81 MG: 81 TABLET, COATED ORAL at 08:15

## 2021-07-29 RX ADMIN — HYDROCODONE BITARTRATE AND ACETAMINOPHEN 1 TABLET: 7.5; 325 TABLET ORAL at 05:34

## 2021-07-29 RX ADMIN — MELOXICAM 7.5 MG: 7.5 TABLET ORAL at 08:15

## 2021-07-29 RX ADMIN — HYDROCHLOROTHIAZIDE 25 MG: 25 TABLET ORAL at 06:09

## 2021-07-29 RX ADMIN — MECLIZINE HYDROCHLORIDE 25 MG: 25 TABLET ORAL at 06:09

## 2021-07-29 RX ADMIN — CARVEDILOL 12.5 MG: 12.5 TABLET, FILM COATED ORAL at 08:15

## 2021-07-29 NOTE — ANESTHESIA POSTPROCEDURE EVALUATION
"Patient: Mallory Franco    Procedure Summary     Date: 07/28/21 Room / Location: [facility] OR  / [facility] MAIN OR    Anesthesia Start: 1411 Anesthesia Stop: 1557    Procedure: KNEE POLY INSERT EXCHANGE (Right Knee) Diagnosis:       Pain due to total right knee replacement, initial encounter (CMS/[facility])      (Pain due to total right knee replacement, initial encounter (CMS/[facility]) [T84.84XA, Z96.651])    Surgeons: Ky Avila MD Provider: Candido Mock MD    Anesthesia Type: spinal ASA Status: 3          Anesthesia Type: spinal    Vitals  Vitals Value Taken Time   /75 07/28/21 1720   Temp 36.4 °C (97.6 °F) 07/28/21 1711   Pulse 50 07/28/21 1721   Resp 16 07/28/21 1720   SpO2 95 % 07/28/21 1721   Vitals shown include unvalidated device data.        Post Anesthesia Care and Evaluation    Pain management: adequate  Airway patency: patent  Anesthetic complications: No anesthetic complications    Cardiovascular status: acceptable  Respiratory status: acceptable  Hydration status: acceptable    Comments: /72 (BP Location: Left arm, Patient Position: Lying)   Pulse 57   Temp 36.4 °C (97.6 °F) (Oral)   Resp 16   Ht 162.6 cm (64\")   Wt 90 kg (198 lb 6.6 oz)   SpO2 96%   BMI 34.06 kg/m²         "

## 2021-07-30 ENCOUNTER — HOME CARE VISIT (OUTPATIENT)
Dept: HOME HEALTH SERVICES | Facility: CLINIC | Age: 81
End: 2021-07-30

## 2021-07-30 PROCEDURE — G0151 HHCP-SERV OF PT,EA 15 MIN: HCPCS

## 2021-08-01 VITALS
DIASTOLIC BLOOD PRESSURE: 60 MMHG | BODY MASS INDEX: 36.82 KG/M2 | OXYGEN SATURATION: 94 % | SYSTOLIC BLOOD PRESSURE: 98 MMHG | WEIGHT: 195 LBS | HEIGHT: 61 IN | HEART RATE: 60 BPM

## 2021-08-02 ENCOUNTER — HOME CARE VISIT (OUTPATIENT)
Dept: HOME HEALTH SERVICES | Facility: CLINIC | Age: 81
End: 2021-08-02

## 2021-08-02 VITALS — HEART RATE: 61 BPM | SYSTOLIC BLOOD PRESSURE: 168 MMHG | DIASTOLIC BLOOD PRESSURE: 75 MMHG | OXYGEN SATURATION: 95 %

## 2021-08-02 PROCEDURE — G0151 HHCP-SERV OF PT,EA 15 MIN: HCPCS

## 2021-08-03 NOTE — HOME HEALTH
PATIENT IS SEEN FOR AFTERCARE FOLLOWING TKR ON RIGHT. SHE HAS 1+ EDEMA IN HER RIGHT LE.  DAUGHTER IS WITH HER AND IS ASSISTING.  SHE IS KEEPING AN ICE PACK ON HER KNEE AND HAS NOT BEEN TAKING HER PAIN PILLS SHE REPORTS

## 2021-08-04 ENCOUNTER — HOME CARE VISIT (OUTPATIENT)
Dept: HOME HEALTH SERVICES | Facility: CLINIC | Age: 81
End: 2021-08-04

## 2021-08-04 PROCEDURE — G0151 HHCP-SERV OF PT,EA 15 MIN: HCPCS

## 2021-08-05 VITALS — HEART RATE: 62 BPM | DIASTOLIC BLOOD PRESSURE: 82 MMHG | SYSTOLIC BLOOD PRESSURE: 158 MMHG | OXYGEN SATURATION: 98 %

## 2021-08-06 ENCOUNTER — HOME CARE VISIT (OUTPATIENT)
Dept: HOME HEALTH SERVICES | Facility: CLINIC | Age: 81
End: 2021-08-06

## 2021-08-06 PROCEDURE — G0151 HHCP-SERV OF PT,EA 15 MIN: HCPCS

## 2021-08-09 ENCOUNTER — HOME CARE VISIT (OUTPATIENT)
Dept: HOME HEALTH SERVICES | Facility: HOME HEALTHCARE | Age: 81
End: 2021-08-09

## 2021-08-09 VITALS — SYSTOLIC BLOOD PRESSURE: 178 MMHG | OXYGEN SATURATION: 97 % | DIASTOLIC BLOOD PRESSURE: 86 MMHG | HEART RATE: 59 BPM

## 2021-08-09 PROCEDURE — G0151 HHCP-SERV OF PT,EA 15 MIN: HCPCS

## 2021-08-09 NOTE — HOME HEALTH
PATIENT IS BEING SEEN FOR STRENGTHENING AND MOBILITY TRAINING FOLLOWING TKR R.  SHE SAYS SHE DOESN'T FEEL WELL TODAY.  HER LEG IS MORE SWELLED AND SHE IS MORE TIRED.  P.T. ASKED IF SHE HAS BEEN EATING MORE SALT AND SHE SAID YES SHE HAS AND WILLNEED TO CUT BACK TO GET THE SWELLING DOWN.

## 2021-08-10 VITALS — HEART RATE: 61 BPM | OXYGEN SATURATION: 97 % | SYSTOLIC BLOOD PRESSURE: 160 MMHG | DIASTOLIC BLOOD PRESSURE: 76 MMHG

## 2021-08-10 PROCEDURE — G0180 MD CERTIFICATION HHA PATIENT: HCPCS | Performed by: ORTHOPAEDIC SURGERY

## 2021-08-10 NOTE — HOME HEALTH
PATIENT IS BEING SEEN FOR AFTERCARE P.T. OF TKR RIGHT.  SHE IS FEELING MUCH BETTER TODAY, NOT EATING SALT, EDEMA IS DOWN AND BLOOD PRESSURE IS DOWN.  SHE IS PLEASED.

## 2021-08-11 ENCOUNTER — HOME CARE VISIT (OUTPATIENT)
Dept: HOME HEALTH SERVICES | Facility: HOME HEALTHCARE | Age: 81
End: 2021-08-11

## 2021-08-11 PROCEDURE — G0151 HHCP-SERV OF PT,EA 15 MIN: HCPCS

## 2021-08-12 ENCOUNTER — OFFICE VISIT (OUTPATIENT)
Dept: ORTHOPEDIC SURGERY | Facility: CLINIC | Age: 81
End: 2021-08-12

## 2021-08-12 VITALS — HEIGHT: 64 IN | BODY MASS INDEX: 34.15 KG/M2 | TEMPERATURE: 97.8 F | WEIGHT: 200 LBS

## 2021-08-12 VITALS — OXYGEN SATURATION: 95 % | SYSTOLIC BLOOD PRESSURE: 162 MMHG | DIASTOLIC BLOOD PRESSURE: 71 MMHG | HEART RATE: 59 BPM

## 2021-08-12 DIAGNOSIS — Z96.651 STATUS POST RIGHT KNEE REPLACEMENT: Primary | ICD-10-CM

## 2021-08-12 PROCEDURE — 99024 POSTOP FOLLOW-UP VISIT: CPT | Performed by: ORTHOPAEDIC SURGERY

## 2021-08-12 RX ORDER — ATORVASTATIN CALCIUM 40 MG/1
TABLET, FILM COATED ORAL
COMMUNITY
Start: 2021-07-23 | End: 2022-04-08

## 2021-08-12 NOTE — HOME HEALTH
PATIENT HAS BEEN SEEN FOR AFTERCARE FOLLOWING R TKR.  SANTOSHAS RECEIVED THERAPEUTIC EXERCISE, HEP, STRETCHING AND MEASURING OF R KNEE, MOBILITY TRAINING AND SAFETY TRAINING..  SHE IS INDEPENDENT WITH HEP, BED MOBILITY, TRANSFERS, GAIT THROUGHOUT HER HOME WITHR/W,  DEGREES OF R KNEE FLEXION AND FULL EXTENSION

## 2021-08-12 NOTE — PROGRESS NOTES
Mallory Franco : 1940 MRN: [id number] DATE: 2021    DIAGNOSIS: 2 week follow up right total knee  Poly change    SUBJECTIVE:Patient returns today for 2 week follow up of right total knee replacement. Patient reports doing well with no unusual complaints. Appears to be progressing appropriately.    OBJECTIVE:   Exam:. The incision is healing appropriately. No sign of infection. Range of motion is progressing as expected. The calf is soft and nontender with a negative Homans sign.    ASSESSMENT: 2 week status post right knee replacement poly change.    PLAN: 1) Staples removed and steri strips applied   2) Order given for PT   3) Discontinue MAINE hose   4) Continue ice PRN   5) aspirin 81 mg orally every day for 1 month   6) Follow up in 6 weeks with repeat Xrays of right knee (3views)    Ky Avila MD  2021

## 2021-08-20 ENCOUNTER — TELEPHONE (OUTPATIENT)
Dept: ORTHOPEDIC SURGERY | Facility: HOSPITAL | Age: 81
End: 2021-08-20

## 2021-08-20 NOTE — TELEPHONE ENCOUNTER
Called and spoke with Ms. Franco as she is SP Knee Poly Exchange. She states she is doing well,. She has graduated from  and is starting OP PT next week. Pain is controlled. Incision looks good. BM's have returned to normal. She is ambulating well. Ms. Franco has no concerns for me at this time. My contact information was given should she need anything. She verbalized understanding.

## 2021-12-06 ENCOUNTER — IMMUNIZATION (OUTPATIENT)
Dept: VACCINE CLINIC | Facility: HOSPITAL | Age: 81
End: 2021-12-06

## 2021-12-06 DIAGNOSIS — Z23 NEED FOR VACCINATION: Primary | ICD-10-CM

## 2021-12-06 PROCEDURE — 91306 HC SARSCOV2 VAC 50MCG/0.25ML IM: CPT | Performed by: INTERNAL MEDICINE

## 2021-12-06 PROCEDURE — 0064A HC ADM SARSCOV2 50MCG/0.25ML BOOSTER: CPT | Performed by: INTERNAL MEDICINE

## 2022-04-07 PROBLEM — Z95.5 HISTORY OF CORONARY ANGIOPLASTY WITH INSERTION OF STENT: Status: ACTIVE | Noted: 2022-04-07

## 2022-04-07 NOTE — PROGRESS NOTES
[facility]  Cardiology progress Note    Patient Name: Mallory Franco  : 1940    CHIEF COMPLAINT  CORONARY ARTERY DISEASE      Subjective   Subjective     HISTORY OF PRESENT ILLNESS    Mallory Franco is a 81 y.o. female CORONARY ARTERY DISEASE s/p PTCA/stent.  No chest pain or shortness of breath.    Review of Systems:   Constitutional no fever,  no weight loss   Skin no rash   Otolaryngeal no difficulty swallowing   Cardiovascular See HPI   Pulmonary no cough, no sputum production   Gastrointestinal no constipation, no diarrhea   Genitourinary no dysuria, no hematuria   Hematologic no easy bruisability, no abnormal bleeding   Musculoskeletal no muscle pain   Neurologic no dizziness, no falls         Personal History     Social History:  reports that she has never smoked. She has never used smokeless tobacco. She reports that she does not drink alcohol and does not use drugs.    Home Medications:  Current Outpatient Medications on File Prior to Visit   Medication Sig   • Breo Ellipta 200-25 MCG/INH inhaler Inhale 1 puff Every Morning.   • carvedilol (COREG) 12.5 MG tablet Take 12.5 mg by mouth 2 (Two) Times a Day.   • cyanocobalamin (VITAMIN B-12) 1000 MCG tablet Take 100 mcg by mouth Daily.   • esomeprazole (nexIUM) 40 MG capsule Take 40 mg by mouth Every Morning Before Breakfast.   • fluticasone (FLONASE) 50 MCG/ACT nasal spray 1 spray into the nostril(s) as directed by provider Every Morning.   • gabapentin (NEURONTIN) 300 MG capsule Take 300 mg by mouth Every Night.   • hydrochlorothiazide (HYDRODIURIL) 25 MG tablet Take 25 mg by mouth Every Morning.   • Loratadine 10 MG capsule Take 10 mg by mouth Every Morning.   • meclizine (ANTIVERT) 25 MG tablet Take 25 mg by mouth Every Morning.   • montelukast (SINGULAIR) 10 MG tablet Take 10 mg by mouth Every Morning.   • sertraline (ZOLOFT) 50 MG tablet Take 50 mg by mouth Every Night.   • simvastatin (ZOCOR) 20 MG tablet Take 20 mg by mouth Every  Night.   • valsartan (DIOVAN) 160 MG tablet Take 160 mg by mouth Daily With Lunch.   • VENTOLIN  (90 BASE) MCG/ACT inhaler Inhale 1 puff As Needed.   • atorvastatin (LIPITOR) 40 MG tablet    • HYDROcodone-acetaminophen (NORCO) 7.5-325 MG per tablet Take 1-2 tablets by mouth every 4-6 hours as needed for pain (Patient taking differently: Take 1-2 tablets by mouth every 4-6 hours as needed for pain)   • ondansetron (Zofran) 4 MG tablet Take 1 tablet by mouth Every 8 (Eight) Hours As Needed for Nausea or Vomiting for up to 10 doses.     Current Facility-Administered Medications on File Prior to Visit   Medication   • Chlorhexidine Gluconate Cloth 2 % pads     Allergies:  Allergies   Allergen Reactions   • Codeine Nausea And Vomiting   • Contrast Dye Hives   • Morphine Nausea And Vomiting       Objective    Objective       Vitals:   Heart Rate:  [] 140  BP: (185-199)/(68-71) 185/68  Body mass index is 32.44 kg/m².     Physical Exam:   Constitutional: Awake, alert, No acute distress    Eyes: PERRLA, sclerae anicteric, no conjunctival injection   HENT: NCAT, mucous membranes moist   Neck: Supple, no thyromegaly, no lymphadenopathy, trachea midline   Respiratory: Clear to auscultation bilaterally, nonlabored respirations    Cardiovascular: RRR, no murmurs or rubs. Palpable pedal pulses bilaterally   Musculoskeletal: No bilateral ankle edema, no cyanosis to extremities   Psychiatric: Appropriate affect, cooperative   Neurologic: Oriented x 3, strength symmetric in all extremities, Cranial Nerves grossly intact to confrontation, speech clear   Skin: No rashes.    Result Review    Result Review:  I have personally reviewed the available results from  [x]  Laboratory  [x]  EKG  [x]  Cardiology  [x]  Medications  [x]  Old records  []  Other:   Procedures  Results for orders placed during the hospital encounter of 11/17/16    Adult Transthoracic Echo Complete    Interpretation Summary  · All left ventricular wall  segments contract normally.  · Left ventricular function is normal.  · Left ventricular diastolic dysfunction (grade I) consistent with impaired relaxation.  · Left atrial cavity size is borderline dilated.  · Left ventricular function is normal. Calculated EF = 70.8%. Estimated EF was in agreement with the calculated EF. Global strain = -20%. Normal left ventricular cavity size and wall thickness noted. All left ventricular wall segments contract normally. Septal wall motion is normal. Left ventricular diastolic dysfunction is noted (grade I) consistent with impaired relaxation.     Impression/Plan:  1.  Coronary artery s/p PTCA/stent stable: Continue aspirin 81 mg a day.  Sestamibi stress test to evaluate for ischemia.  Is been several years since her last stress test.  Echocardiogram to evaluate left ventricular systolic function.  2.  Essential hypertension controlled: Continue carvedilol 12.5 mg twice daily.             Migue Nguyen MD   04/08/22   11:17 EDT

## 2022-04-08 ENCOUNTER — OFFICE VISIT (OUTPATIENT)
Dept: CARDIOLOGY | Facility: CLINIC | Age: 82
End: 2022-04-08

## 2022-04-08 VITALS
BODY MASS INDEX: 32.27 KG/M2 | SYSTOLIC BLOOD PRESSURE: 185 MMHG | DIASTOLIC BLOOD PRESSURE: 68 MMHG | HEART RATE: 140 BPM | HEIGHT: 64 IN | WEIGHT: 189 LBS

## 2022-04-08 DIAGNOSIS — Z95.5 HISTORY OF CORONARY ANGIOPLASTY WITH INSERTION OF STENT: ICD-10-CM

## 2022-04-08 DIAGNOSIS — R06.02 SHORTNESS OF BREATH: ICD-10-CM

## 2022-04-08 DIAGNOSIS — I10 HYPERTENSION, ESSENTIAL: ICD-10-CM

## 2022-04-08 DIAGNOSIS — I25.10 CORONARY ARTERY DISEASE INVOLVING NATIVE CORONARY ARTERY OF NATIVE HEART WITHOUT ANGINA PECTORIS: Primary | ICD-10-CM

## 2022-04-08 PROCEDURE — 99214 OFFICE O/P EST MOD 30 MIN: CPT | Performed by: SPECIALIST

## 2022-05-06 ENCOUNTER — TELEPHONE (OUTPATIENT)
Dept: CARDIOLOGY | Facility: CLINIC | Age: 82
End: 2022-05-06

## 2022-05-06 NOTE — TELEPHONE ENCOUNTER
----- Message from YOVANA Bonilla sent at 5/6/2022 12:57 PM EDT -----  Notify pt echo result: No change from previous echo, EF 63%, mild aortic regurgitation (murmur) will monitor with repeat echo in 1 year. Continue with follow up 11/11/22

## 2022-05-13 ENCOUNTER — HOSPITAL ENCOUNTER (OUTPATIENT)
Dept: NUCLEAR MEDICINE | Facility: HOSPITAL | Age: 82
Discharge: HOME | End: 2022-05-13

## 2022-05-13 DIAGNOSIS — I25.10 CORONARY ARTERY DISEASE INVOLVING NATIVE CORONARY ARTERY OF NATIVE HEART WITHOUT ANGINA PECTORIS: ICD-10-CM

## 2022-05-13 LAB
BH CV IMMEDIATE POST TECH DATA BLOOD PRESSURE: NORMAL MMHG
BH CV IMMEDIATE POST TECH DATA HEART RATE: 78 BPM
BH CV IMMEDIATE POST TECH DATA OXYGEN SATS: 98 %
BH CV REST NUCLEAR ISOTOPE DOSE: 9.6 MCI
BH CV SIX MINUTE RECOVERY TECH DATA BLOOD PRESSURE: NORMAL
BH CV SIX MINUTE RECOVERY TECH DATA HEART RATE: 76 BPM
BH CV SIX MINUTE RECOVERY TECH DATA OXYGEN SATURATION: 97 %
BH CV STRESS BP STAGE 1: NORMAL
BH CV STRESS COMMENTS STAGE 1: NORMAL
BH CV STRESS DOSE REGADENOSON STAGE 1: 0.4
BH CV STRESS DURATION MIN STAGE 1: 0
BH CV STRESS DURATION SEC STAGE 1: 10
BH CV STRESS HR STAGE 1: 76
BH CV STRESS NUCLEAR ISOTOPE DOSE: 37.7 MCI
BH CV STRESS O2 STAGE 1: 98
BH CV STRESS PROTOCOL 1: NORMAL
BH CV STRESS RECOVERY BP: NORMAL MMHG
BH CV STRESS RECOVERY HR: 76 BPM
BH CV STRESS RECOVERY O2: 97 %
BH CV STRESS STAGE 1: 1
BH CV THREE MINUTE POST TECH DATA BLOOD PRESSURE: NORMAL MMHG
BH CV THREE MINUTE POST TECH DATA HEART RATE: 75 BPM
BH CV THREE MINUTE POST TECH DATA OXYGEN SATURATION: 98 %
LV EF NUC BP: 71 %
MAXIMAL PREDICTED HEART RATE: 138 BPM
PERCENT MAX PREDICTED HR: 56.52 %
STRESS BASELINE BP: NORMAL MMHG
STRESS BASELINE HR: 63 BPM
STRESS O2 SAT REST: 96 %
STRESS PERCENT HR: 66 %
STRESS POST O2 SAT PEAK: 96 %
STRESS POST PEAK BP: NORMAL MMHG
STRESS POST PEAK HR: 78 BPM
STRESS TARGET HR: 117 BPM

## 2022-05-13 PROCEDURE — 0 TECHNETIUM TETROFOSMIN KIT: Performed by: SPECIALIST

## 2022-05-13 PROCEDURE — 93017 CV STRESS TEST TRACING ONLY: CPT

## 2022-05-13 PROCEDURE — 25010000002 REGADENOSON 0.4 MG/5ML SOLUTION

## 2022-05-13 PROCEDURE — 93018 CV STRESS TEST I&R ONLY: CPT | Performed by: SPECIALIST

## 2022-05-13 PROCEDURE — A9502 TC99M TETROFOSMIN: HCPCS | Performed by: SPECIALIST

## 2022-05-13 PROCEDURE — 78452 HT MUSCLE IMAGE SPECT MULT: CPT

## 2022-05-13 PROCEDURE — 78452 HT MUSCLE IMAGE SPECT MULT: CPT | Performed by: SPECIALIST

## 2022-05-13 RX ADMIN — TETROFOSMIN 1 DOSE: 1.38 INJECTION, POWDER, LYOPHILIZED, FOR SOLUTION INTRAVENOUS at 07:17

## 2022-05-13 RX ADMIN — TETROFOSMIN 1 DOSE: 1.38 INJECTION, POWDER, LYOPHILIZED, FOR SOLUTION INTRAVENOUS at 08:30

## 2022-05-13 RX ADMIN — REGADENOSON: 0.08 INJECTION, SOLUTION INTRAVENOUS at 08:30

## 2022-05-19 ENCOUNTER — TELEPHONE (OUTPATIENT)
Dept: CARDIOLOGY | Facility: CLINIC | Age: 82
End: 2022-05-19

## 2022-05-19 NOTE — TELEPHONE ENCOUNTER
----- Message from YOVANA Bonilla sent at 5/14/2022  8:47 PM EDT -----  Notify pt stress test is negative for ischemia, continue with follow up in November. Notify office if chest pain persists/worsens so further testing can be done if needed.

## 2022-09-16 ENCOUNTER — TELEPHONE (OUTPATIENT)
Dept: CARDIOLOGY | Facility: CLINIC | Age: 82
End: 2022-09-16

## 2022-09-16 NOTE — TELEPHONE ENCOUNTER
Procedure: Eye surgery    Med Directive: Aspirin 14 days prior    PMH: CAD s/p PTCA/stent, HTN    Last Seen:  04/08/2022

## 2022-10-11 ENCOUNTER — TRANSCRIBE ORDERS (OUTPATIENT)
Dept: ADMINISTRATIVE | Facility: HOSPITAL | Age: 82
End: 2022-10-11

## 2022-10-11 DIAGNOSIS — Z78.0 POST-MENOPAUSAL: ICD-10-CM

## 2022-10-11 DIAGNOSIS — Z12.31 VISIT FOR SCREENING MAMMOGRAM: Primary | ICD-10-CM

## 2022-10-28 ENCOUNTER — HOSPITAL ENCOUNTER (OUTPATIENT)
Facility: HOSPITAL | Age: 82
Setting detail: OBSERVATION
Discharge: HOME | End: 2022-10-29
Attending: EMERGENCY MEDICINE | Admitting: STUDENT IN AN ORGANIZED HEALTH CARE EDUCATION/TRAINING PROGRAM

## 2022-10-28 ENCOUNTER — APPOINTMENT (OUTPATIENT)
Dept: GENERAL RADIOLOGY | Facility: HOSPITAL | Age: 82
End: 2022-10-28

## 2022-10-28 DIAGNOSIS — R07.9 CHEST PAIN, UNSPECIFIED TYPE: Primary | ICD-10-CM

## 2022-10-28 LAB
ALBUMIN SERPL-MCNC: 4.6 G/DL (ref 3.5–5.2)
ALBUMIN/GLOB SERPL: 1.7 G/DL
ALP SERPL-CCNC: 92 U/L (ref 39–117)
ALT SERPL W P-5'-P-CCNC: 14 U/L (ref 1–33)
ANION GAP SERPL CALCULATED.3IONS-SCNC: 10.6 MMOL/L (ref 5–15)
AST SERPL-CCNC: 18 U/L (ref 1–32)
BASOPHILS # BLD AUTO: 0.01 10*3/MM3 (ref 0–0.2)
BASOPHILS NFR BLD AUTO: 0.1 % (ref 0–1.5)
BILIRUB SERPL-MCNC: 0.5 MG/DL (ref 0–1.2)
BUN SERPL-MCNC: 22 MG/DL (ref 8–23)
BUN/CREAT SERPL: 22.4 (ref 7–25)
CALCIUM SPEC-SCNC: 10.2 MG/DL (ref 8.6–10.5)
CHLORIDE SERPL-SCNC: 102 MMOL/L (ref 98–107)
CO2 SERPL-SCNC: 25.4 MMOL/L (ref 22–29)
CREAT SERPL-MCNC: 0.98 MG/DL (ref 0.57–1)
DEPRECATED RDW RBC AUTO: 48 FL (ref 37–54)
EGFRCR SERPLBLD CKD-EPI 2021: 57.4 ML/MIN/1.73
EOSINOPHIL # BLD AUTO: 0.23 10*3/MM3 (ref 0–0.4)
EOSINOPHIL NFR BLD AUTO: 2.7 % (ref 0.3–6.2)
ERYTHROCYTE [DISTWIDTH] IN BLOOD BY AUTOMATED COUNT: 14.7 % (ref 12.3–15.4)
GLOBULIN UR ELPH-MCNC: 2.7 GM/DL
GLUCOSE SERPL-MCNC: 109 MG/DL (ref 65–99)
HCT VFR BLD AUTO: 34 % (ref 34–46.6)
HGB BLD-MCNC: 10.7 G/DL (ref 12–15.9)
HOLD SPECIMEN: NORMAL
IMM GRANULOCYTES # BLD AUTO: 0.02 10*3/MM3 (ref 0–0.05)
IMM GRANULOCYTES NFR BLD AUTO: 0.2 % (ref 0–0.5)
LIPASE SERPL-CCNC: 22 U/L (ref 13–60)
LYMPHOCYTES # BLD AUTO: 2.28 10*3/MM3 (ref 0.7–3.1)
LYMPHOCYTES NFR BLD AUTO: 26.9 % (ref 19.6–45.3)
MAGNESIUM SERPL-MCNC: 1.2 MG/DL (ref 1.6–2.4)
MCH RBC QN AUTO: 28.4 PG (ref 26.6–33)
MCHC RBC AUTO-ENTMCNC: 31.5 G/DL (ref 31.5–35.7)
MCV RBC AUTO: 90.2 FL (ref 79–97)
MONOCYTES # BLD AUTO: 0.65 10*3/MM3 (ref 0.1–0.9)
MONOCYTES NFR BLD AUTO: 7.7 % (ref 5–12)
NEUTROPHILS NFR BLD AUTO: 5.29 10*3/MM3 (ref 1.7–7)
NEUTROPHILS NFR BLD AUTO: 62.4 % (ref 42.7–76)
NRBC BLD AUTO-RTO: 0 /100 WBC (ref 0–0.2)
NT-PROBNP SERPL-MCNC: 1384 PG/ML (ref 0–1800)
PLATELET # BLD AUTO: 292 10*3/MM3 (ref 140–450)
PMV BLD AUTO: 9.7 FL (ref 6–12)
POTASSIUM SERPL-SCNC: 3.9 MMOL/L (ref 3.5–5.2)
PROT SERPL-MCNC: 7.3 G/DL (ref 6–8.5)
RBC # BLD AUTO: 3.77 10*6/MM3 (ref 3.77–5.28)
SODIUM SERPL-SCNC: 138 MMOL/L (ref 136–145)
TROPONIN I SERPL-MCNC: 0.02 NG/ML (ref 0–0.08)
TROPONIN T SERPL-MCNC: <0.01 NG/ML (ref 0–0.03)
WBC NRBC COR # BLD: 8.48 10*3/MM3 (ref 3.4–10.8)
WHOLE BLOOD HOLD COAG: NORMAL
WHOLE BLOOD HOLD SPECIMEN: NORMAL

## 2022-10-28 PROCEDURE — 71045 X-RAY EXAM CHEST 1 VIEW: CPT

## 2022-10-28 PROCEDURE — G0378 HOSPITAL OBSERVATION PER HR: HCPCS

## 2022-10-28 PROCEDURE — 85025 COMPLETE CBC W/AUTO DIFF WBC: CPT

## 2022-10-28 PROCEDURE — 93005 ELECTROCARDIOGRAM TRACING: CPT

## 2022-10-28 PROCEDURE — 99212 OFFICE O/P EST SF 10 MIN: CPT | Performed by: INTERNAL MEDICINE

## 2022-10-28 PROCEDURE — 25010000002 MAGNESIUM SULFATE 2 GM/50ML SOLUTION: Performed by: INTERNAL MEDICINE

## 2022-10-28 PROCEDURE — 93005 ELECTROCARDIOGRAM TRACING: CPT | Performed by: EMERGENCY MEDICINE

## 2022-10-28 PROCEDURE — 96374 THER/PROPH/DIAG INJ IV PUSH: CPT

## 2022-10-28 PROCEDURE — 99220 PR INITIAL OBSERVATION CARE/DAY 70 MINUTES: CPT | Performed by: INTERNAL MEDICINE

## 2022-10-28 PROCEDURE — 83735 ASSAY OF MAGNESIUM: CPT | Performed by: EMERGENCY MEDICINE

## 2022-10-28 PROCEDURE — 80053 COMPREHEN METABOLIC PANEL: CPT | Performed by: EMERGENCY MEDICINE

## 2022-10-28 PROCEDURE — 83690 ASSAY OF LIPASE: CPT | Performed by: EMERGENCY MEDICINE

## 2022-10-28 PROCEDURE — 83880 ASSAY OF NATRIURETIC PEPTIDE: CPT | Performed by: EMERGENCY MEDICINE

## 2022-10-28 PROCEDURE — 99285 EMERGENCY DEPT VISIT HI MDM: CPT

## 2022-10-28 PROCEDURE — 84484 ASSAY OF TROPONIN QUANT: CPT

## 2022-10-28 PROCEDURE — 36415 COLL VENOUS BLD VENIPUNCTURE: CPT

## 2022-10-28 PROCEDURE — 84484 ASSAY OF TROPONIN QUANT: CPT | Performed by: INTERNAL MEDICINE

## 2022-10-28 RX ORDER — HYDROCHLOROTHIAZIDE 25 MG/1
25 TABLET ORAL EVERY MORNING
Status: DISCONTINUED | OUTPATIENT
Start: 2022-10-29 | End: 2022-10-30 | Stop reason: HOSPADM

## 2022-10-28 RX ORDER — SODIUM CHLORIDE 0.9 % (FLUSH) 0.9 %
10 SYRINGE (ML) INJECTION AS NEEDED
Status: DISCONTINUED | OUTPATIENT
Start: 2022-10-28 | End: 2022-10-30 | Stop reason: HOSPADM

## 2022-10-28 RX ORDER — ONDANSETRON 2 MG/ML
4 INJECTION INTRAMUSCULAR; INTRAVENOUS EVERY 6 HOURS PRN
Status: DISCONTINUED | OUTPATIENT
Start: 2022-10-28 | End: 2022-10-30 | Stop reason: HOSPADM

## 2022-10-28 RX ORDER — VALSARTAN 80 MG/1
160 TABLET ORAL
Status: DISCONTINUED | OUTPATIENT
Start: 2022-10-28 | End: 2022-10-29

## 2022-10-28 RX ORDER — CHOLECALCIFEROL (VITAMIN D3) 125 MCG
5 CAPSULE ORAL NIGHTLY PRN
Status: DISCONTINUED | OUTPATIENT
Start: 2022-10-28 | End: 2022-10-30 | Stop reason: HOSPADM

## 2022-10-28 RX ORDER — BISACODYL 5 MG/1
5 TABLET, DELAYED RELEASE ORAL DAILY PRN
Status: DISCONTINUED | OUTPATIENT
Start: 2022-10-28 | End: 2022-10-30 | Stop reason: HOSPADM

## 2022-10-28 RX ORDER — SODIUM CHLORIDE 0.9 % (FLUSH) 0.9 %
10 SYRINGE (ML) INJECTION EVERY 12 HOURS SCHEDULED
Status: DISCONTINUED | OUTPATIENT
Start: 2022-10-28 | End: 2022-10-30 | Stop reason: HOSPADM

## 2022-10-28 RX ORDER — LIDOCAINE HYDROCHLORIDE 20 MG/ML
15 SOLUTION OROPHARYNGEAL ONCE
Status: COMPLETED | OUTPATIENT
Start: 2022-10-28 | End: 2022-10-28

## 2022-10-28 RX ORDER — TRAZODONE HYDROCHLORIDE 50 MG/1
50 TABLET ORAL NIGHTLY
Status: ON HOLD | COMMUNITY
End: 2023-03-16

## 2022-10-28 RX ORDER — CALCIUM CARBONATE 200(500)MG
2 TABLET,CHEWABLE ORAL 3 TIMES DAILY PRN
Status: DISCONTINUED | OUTPATIENT
Start: 2022-10-28 | End: 2022-10-30 | Stop reason: HOSPADM

## 2022-10-28 RX ORDER — ATORVASTATIN CALCIUM 10 MG/1
10 TABLET, FILM COATED ORAL NIGHTLY
Status: DISCONTINUED | OUTPATIENT
Start: 2022-10-28 | End: 2022-10-30 | Stop reason: HOSPADM

## 2022-10-28 RX ORDER — HYDRALAZINE HYDROCHLORIDE 20 MG/ML
10 INJECTION INTRAMUSCULAR; INTRAVENOUS EVERY 6 HOURS PRN
Status: DISCONTINUED | OUTPATIENT
Start: 2022-10-28 | End: 2022-10-30 | Stop reason: HOSPADM

## 2022-10-28 RX ORDER — FAMOTIDINE 10 MG/ML
20 INJECTION, SOLUTION INTRAVENOUS ONCE
Status: COMPLETED | OUTPATIENT
Start: 2022-10-28 | End: 2022-10-28

## 2022-10-28 RX ORDER — ALUMINA, MAGNESIA, AND SIMETHICONE 2400; 2400; 240 MG/30ML; MG/30ML; MG/30ML
15 SUSPENSION ORAL ONCE
Status: COMPLETED | OUTPATIENT
Start: 2022-10-28 | End: 2022-10-28

## 2022-10-28 RX ORDER — BISACODYL 10 MG
10 SUPPOSITORY, RECTAL RECTAL DAILY PRN
Status: DISCONTINUED | OUTPATIENT
Start: 2022-10-28 | End: 2022-10-30 | Stop reason: HOSPADM

## 2022-10-28 RX ORDER — POLYETHYLENE GLYCOL 3350 17 G/17G
17 POWDER, FOR SOLUTION ORAL DAILY PRN
Status: DISCONTINUED | OUTPATIENT
Start: 2022-10-28 | End: 2022-10-30 | Stop reason: HOSPADM

## 2022-10-28 RX ORDER — IPRATROPIUM BROMIDE AND ALBUTEROL SULFATE 2.5; .5 MG/3ML; MG/3ML
3 SOLUTION RESPIRATORY (INHALATION) EVERY 6 HOURS PRN
Status: DISCONTINUED | OUTPATIENT
Start: 2022-10-28 | End: 2022-10-30 | Stop reason: HOSPADM

## 2022-10-28 RX ORDER — PANTOPRAZOLE SODIUM 40 MG/1
40 TABLET, DELAYED RELEASE ORAL DAILY
Status: ON HOLD | COMMUNITY
End: 2022-10-29 | Stop reason: SDUPTHER

## 2022-10-28 RX ORDER — ATORVASTATIN CALCIUM 40 MG/1
40 TABLET, FILM COATED ORAL DAILY
Status: ON HOLD | COMMUNITY
End: 2023-03-16

## 2022-10-28 RX ORDER — MAGNESIUM SULFATE HEPTAHYDRATE 40 MG/ML
2 INJECTION, SOLUTION INTRAVENOUS ONCE
Status: COMPLETED | OUTPATIENT
Start: 2022-10-28 | End: 2022-10-28

## 2022-10-28 RX ORDER — ASPIRIN 81 MG/1
81 TABLET ORAL DAILY
Status: DISCONTINUED | OUTPATIENT
Start: 2022-10-28 | End: 2022-10-30 | Stop reason: HOSPADM

## 2022-10-28 RX ORDER — CARVEDILOL 12.5 MG/1
12.5 TABLET ORAL 2 TIMES DAILY WITH MEALS
Status: DISCONTINUED | OUTPATIENT
Start: 2022-10-28 | End: 2022-10-30 | Stop reason: HOSPADM

## 2022-10-28 RX ORDER — AMOXICILLIN 250 MG
2 CAPSULE ORAL 2 TIMES DAILY
Status: DISCONTINUED | OUTPATIENT
Start: 2022-10-28 | End: 2022-10-30 | Stop reason: HOSPADM

## 2022-10-28 RX ORDER — SODIUM CHLORIDE 9 MG/ML
40 INJECTION, SOLUTION INTRAVENOUS AS NEEDED
Status: DISCONTINUED | OUTPATIENT
Start: 2022-10-28 | End: 2022-10-30 | Stop reason: HOSPADM

## 2022-10-28 RX ORDER — ASPIRIN 81 MG/1
324 TABLET, CHEWABLE ORAL ONCE
Status: DISCONTINUED | OUTPATIENT
Start: 2022-10-28 | End: 2022-10-28

## 2022-10-28 RX ORDER — PANTOPRAZOLE SODIUM 40 MG/1
40 TABLET, DELAYED RELEASE ORAL EVERY MORNING
Status: DISCONTINUED | OUTPATIENT
Start: 2022-10-29 | End: 2022-10-30 | Stop reason: HOSPADM

## 2022-10-28 RX ORDER — MULTIPLE VITAMINS W/ MINERALS TAB 9MG-400MCG
1 TAB ORAL DAILY
Status: DISCONTINUED | OUTPATIENT
Start: 2022-10-28 | End: 2022-10-30 | Stop reason: HOSPADM

## 2022-10-28 RX ADMIN — Medication 10 ML: at 20:13

## 2022-10-28 RX ADMIN — LIDOCAINE HYDROCHLORIDE 15 ML: 20 SOLUTION ORAL at 11:52

## 2022-10-28 RX ADMIN — VALSARTAN 160 MG: 80 TABLET, FILM COATED ORAL at 17:03

## 2022-10-28 RX ADMIN — ATORVASTATIN CALCIUM 10 MG: 10 TABLET, FILM COATED ORAL at 21:39

## 2022-10-28 RX ADMIN — NITROGLYCERIN 0.5 INCH: 20 OINTMENT TOPICAL at 12:11

## 2022-10-28 RX ADMIN — CALCIUM CARBONATE 2 TABLET: 500 TABLET, CHEWABLE ORAL at 20:12

## 2022-10-28 RX ADMIN — ASPIRIN 81 MG: 81 TABLET, COATED ORAL at 20:12

## 2022-10-28 RX ADMIN — ALUMINUM HYDROXIDE, MAGNESIUM HYDROXIDE, AND DIMETHICONE 15 ML: 400; 400; 40 SUSPENSION ORAL at 11:51

## 2022-10-28 RX ADMIN — FAMOTIDINE 20 MG: 10 INJECTION INTRAVENOUS at 11:52

## 2022-10-28 RX ADMIN — MULTIPLE VITAMINS W/ MINERALS TAB 1 TABLET: TAB at 16:23

## 2022-10-28 RX ADMIN — MAGNESIUM SULFATE HEPTAHYDRATE 2 G: 2 INJECTION, SOLUTION INTRAVENOUS at 16:23

## 2022-10-28 RX ADMIN — CARVEDILOL 12.5 MG: 12.5 TABLET, FILM COATED ORAL at 17:04

## 2022-10-29 ENCOUNTER — APPOINTMENT (OUTPATIENT)
Dept: CT IMAGING | Facility: HOSPITAL | Age: 82
End: 2022-10-29

## 2022-10-29 VITALS
TEMPERATURE: 97.7 F | OXYGEN SATURATION: 97 % | HEIGHT: 64 IN | SYSTOLIC BLOOD PRESSURE: 132 MMHG | BODY MASS INDEX: 32.41 KG/M2 | HEART RATE: 59 BPM | DIASTOLIC BLOOD PRESSURE: 52 MMHG | WEIGHT: 189.82 LBS | RESPIRATION RATE: 18 BRPM

## 2022-10-29 LAB
ALBUMIN SERPL-MCNC: 4.1 G/DL (ref 3.5–5.2)
ALBUMIN/GLOB SERPL: 1.3 G/DL
ALP SERPL-CCNC: 90 U/L (ref 39–117)
ALT SERPL W P-5'-P-CCNC: 13 U/L (ref 1–33)
ANION GAP SERPL CALCULATED.3IONS-SCNC: 10.3 MMOL/L (ref 5–15)
AST SERPL-CCNC: 21 U/L (ref 1–32)
BASOPHILS # BLD AUTO: 0.02 10*3/MM3 (ref 0–0.2)
BASOPHILS NFR BLD AUTO: 0.3 % (ref 0–1.5)
BILIRUB SERPL-MCNC: 0.6 MG/DL (ref 0–1.2)
BUN SERPL-MCNC: 19 MG/DL (ref 8–23)
BUN/CREAT SERPL: 20.9 (ref 7–25)
CALCIUM SPEC-SCNC: 9.9 MG/DL (ref 8.6–10.5)
CHLORIDE SERPL-SCNC: 100 MMOL/L (ref 98–107)
CK MB SERPL-CCNC: 3.71 NG/ML
CK SERPL-CCNC: 119 U/L (ref 20–180)
CO2 SERPL-SCNC: 25.7 MMOL/L (ref 22–29)
CREAT SERPL-MCNC: 0.91 MG/DL (ref 0.57–1)
DEPRECATED RDW RBC AUTO: 46.4 FL (ref 37–54)
EGFRCR SERPLBLD CKD-EPI 2021: 62.7 ML/MIN/1.73
EOSINOPHIL # BLD AUTO: 0.18 10*3/MM3 (ref 0–0.4)
EOSINOPHIL NFR BLD AUTO: 2.5 % (ref 0.3–6.2)
ERYTHROCYTE [DISTWIDTH] IN BLOOD BY AUTOMATED COUNT: 14.6 % (ref 12.3–15.4)
GLOBULIN UR ELPH-MCNC: 3.1 GM/DL
GLUCOSE SERPL-MCNC: 114 MG/DL (ref 65–99)
HCT VFR BLD AUTO: 32.7 % (ref 34–46.6)
HGB BLD-MCNC: 10.6 G/DL (ref 12–15.9)
IMM GRANULOCYTES # BLD AUTO: 0.02 10*3/MM3 (ref 0–0.05)
IMM GRANULOCYTES NFR BLD AUTO: 0.3 % (ref 0–0.5)
LYMPHOCYTES # BLD AUTO: 1.94 10*3/MM3 (ref 0.7–3.1)
LYMPHOCYTES NFR BLD AUTO: 27.4 % (ref 19.6–45.3)
MAGNESIUM SERPL-MCNC: 1.6 MG/DL (ref 1.6–2.4)
MCH RBC QN AUTO: 28.6 PG (ref 26.6–33)
MCHC RBC AUTO-ENTMCNC: 32.4 G/DL (ref 31.5–35.7)
MCV RBC AUTO: 88.1 FL (ref 79–97)
MONOCYTES # BLD AUTO: 0.69 10*3/MM3 (ref 0.1–0.9)
MONOCYTES NFR BLD AUTO: 9.8 % (ref 5–12)
NEUTROPHILS NFR BLD AUTO: 4.22 10*3/MM3 (ref 1.7–7)
NEUTROPHILS NFR BLD AUTO: 59.7 % (ref 42.7–76)
NRBC BLD AUTO-RTO: 0 /100 WBC (ref 0–0.2)
PHOSPHATE SERPL-MCNC: 2.8 MG/DL (ref 2.5–4.5)
PLATELET # BLD AUTO: 285 10*3/MM3 (ref 140–450)
PMV BLD AUTO: 9.7 FL (ref 6–12)
POTASSIUM SERPL-SCNC: 3.7 MMOL/L (ref 3.5–5.2)
PROT SERPL-MCNC: 7.2 G/DL (ref 6–8.5)
RBC # BLD AUTO: 3.71 10*6/MM3 (ref 3.77–5.28)
SODIUM SERPL-SCNC: 136 MMOL/L (ref 136–145)
TROPONIN T SERPL-MCNC: <0.01 NG/ML (ref 0–0.03)
WBC NRBC COR # BLD: 7.07 10*3/MM3 (ref 3.4–10.8)

## 2022-10-29 PROCEDURE — 82550 ASSAY OF CK (CPK): CPT | Performed by: INTERNAL MEDICINE

## 2022-10-29 PROCEDURE — 99213 OFFICE O/P EST LOW 20 MIN: CPT | Performed by: INTERNAL MEDICINE

## 2022-10-29 PROCEDURE — 80053 COMPREHEN METABOLIC PANEL: CPT | Performed by: INTERNAL MEDICINE

## 2022-10-29 PROCEDURE — 25010000002 HYDRALAZINE PER 20 MG: Performed by: INTERNAL MEDICINE

## 2022-10-29 PROCEDURE — 83735 ASSAY OF MAGNESIUM: CPT | Performed by: INTERNAL MEDICINE

## 2022-10-29 PROCEDURE — 82553 CREATINE MB FRACTION: CPT | Performed by: INTERNAL MEDICINE

## 2022-10-29 PROCEDURE — G0378 HOSPITAL OBSERVATION PER HR: HCPCS

## 2022-10-29 PROCEDURE — 74176 CT ABD & PELVIS W/O CONTRAST: CPT

## 2022-10-29 PROCEDURE — 85025 COMPLETE CBC W/AUTO DIFF WBC: CPT | Performed by: INTERNAL MEDICINE

## 2022-10-29 PROCEDURE — 96375 TX/PRO/DX INJ NEW DRUG ADDON: CPT

## 2022-10-29 PROCEDURE — 99217 PR OBSERVATION CARE DISCHARGE MANAGEMENT: CPT | Performed by: STUDENT IN AN ORGANIZED HEALTH CARE EDUCATION/TRAINING PROGRAM

## 2022-10-29 PROCEDURE — 84484 ASSAY OF TROPONIN QUANT: CPT | Performed by: INTERNAL MEDICINE

## 2022-10-29 PROCEDURE — 84100 ASSAY OF PHOSPHORUS: CPT | Performed by: INTERNAL MEDICINE

## 2022-10-29 RX ORDER — ACETAMINOPHEN 325 MG/1
650 TABLET ORAL EVERY 6 HOURS PRN
Status: DISCONTINUED | OUTPATIENT
Start: 2022-10-29 | End: 2022-10-30 | Stop reason: HOSPADM

## 2022-10-29 RX ORDER — VALSARTAN 320 MG/1
320 TABLET ORAL
Qty: 30 TABLET | Refills: 0 | Status: ON HOLD | OUTPATIENT
Start: 2022-10-29 | End: 2023-03-16

## 2022-10-29 RX ORDER — VALSARTAN 80 MG/1
320 TABLET ORAL
Status: DISCONTINUED | OUTPATIENT
Start: 2022-10-29 | End: 2022-10-30 | Stop reason: HOSPADM

## 2022-10-29 RX ORDER — PANTOPRAZOLE SODIUM 40 MG/1
40 TABLET, DELAYED RELEASE ORAL DAILY
Qty: 30 TABLET | Refills: 0 | Status: SHIPPED | OUTPATIENT
Start: 2022-10-29 | End: 2022-11-28

## 2022-10-29 RX ORDER — ASPIRIN 81 MG/1
81 TABLET ORAL DAILY
Qty: 30 TABLET | Refills: 0 | Status: SHIPPED | OUTPATIENT
Start: 2022-10-30 | End: 2022-11-29

## 2022-10-29 RX ADMIN — ASPIRIN 81 MG: 81 TABLET, COATED ORAL at 09:42

## 2022-10-29 RX ADMIN — Medication 10 ML: at 09:43

## 2022-10-29 RX ADMIN — PANTOPRAZOLE SODIUM 40 MG: 40 TABLET, DELAYED RELEASE ORAL at 06:13

## 2022-10-29 RX ADMIN — SENNOSIDES AND DOCUSATE SODIUM 2 TABLET: 8.6; 5 TABLET ORAL at 09:42

## 2022-10-29 RX ADMIN — ACETAMINOPHEN 325MG 650 MG: 325 TABLET ORAL at 06:12

## 2022-10-29 RX ADMIN — MULTIPLE VITAMINS W/ MINERALS TAB 1 TABLET: TAB at 09:42

## 2022-10-29 RX ADMIN — HYDROCHLOROTHIAZIDE 25 MG: 25 TABLET ORAL at 06:13

## 2022-10-29 RX ADMIN — CARVEDILOL 12.5 MG: 12.5 TABLET, FILM COATED ORAL at 09:43

## 2022-10-29 RX ADMIN — HYDRALAZINE HYDROCHLORIDE 10 MG: 20 INJECTION INTRAMUSCULAR; INTRAVENOUS at 03:12

## 2022-10-30 ENCOUNTER — READMISSION MANAGEMENT (OUTPATIENT)
Dept: CALL CENTER | Facility: HOSPITAL | Age: 82
End: 2022-10-30

## 2022-10-30 NOTE — OUTREACH NOTE
Prep Survey    Flowsheet Row Responses   [facility] facility patient discharged from? [facility]   Is LACE score < 7 ? Yes   Emergency Room discharge w/ pulse ox? No   Eligibility Not Eligible   What are the reasons patient is not eligible? Other  [Low risk for readmission]   Does the patient have one of the following disease processes/diagnoses(primary or secondary)? Other   Prep survey completed? Yes          ANIYAH GOSS - Registered Nurse

## 2022-11-07 LAB
QT INTERVAL: 441 MS
QT INTERVAL: 458 MS

## 2022-11-09 NOTE — PROGRESS NOTES
[facility]  Cardiology progress Note    Patient Name: Mallory Franco  : 1940    CHIEF COMPLAINT  CORONARY ARTERY DISEASE        Subjective   Subjective     HISTORY OF PRESENT ILLNESS    Mallory Franco is a 82 y.o. female with coronary artery s/p PTCA/stent.  No chest pain or shortness of breath.    REVIEW OF SYSTEMS    Constitutional:    No fever, no weight loss  Skin:     No rash  Otolaryngeal:    No difficulty swallowing  Cardiovascular: See HPI.  Pulmonary:    No cough, no sputum production    Personal History     Social History:    reports that she has never smoked. She has never used smokeless tobacco. She reports that she does not drink alcohol and does not use drugs.    Home Medications:  Current Outpatient Medications on File Prior to Visit   Medication Sig   • aspirin 81 MG EC tablet Take 1 tablet by mouth Daily for 30 days.   • atorvastatin (LIPITOR) 40 MG tablet Take 1 tablet by mouth Daily.   • Breo Ellipta 200-25 MCG/INH inhaler Inhale 1 puff Daily.   • carvedilol (COREG) 25 MG tablet Take 12.5 mg by mouth 2 (Two) Times a Day.   • fluticasone (FLONASE) 50 MCG/ACT nasal spray 1 spray into the nostril(s) as directed by provider Every Morning.   • hydrochlorothiazide (HYDRODIURIL) 25 MG tablet Take 25 mg by mouth Every Morning.   • meclizine (ANTIVERT) 25 MG tablet Take 1 tablet by mouth 2 (Two) Times a Day As Needed.   • montelukast (SINGULAIR) 10 MG tablet Take 1 tablet by mouth Every Night.   • pantoprazole (PROTONIX) 40 MG EC tablet Take 1 tablet by mouth Daily for 30 days.   • sertraline (ZOLOFT) 50 MG tablet Take 1.5 tablets by mouth Every Night.   • traZODone (DESYREL) 50 MG tablet Take 1 tablet by mouth Every Night.   • valsartan (DIOVAN) 320 MG tablet Take 1 tablet by mouth Daily With Lunch for 30 days.     Current Facility-Administered Medications on File Prior to Visit   Medication   • Chlorhexidine Gluconate Cloth 2 % pads       Past Medical History:   Diagnosis Date   •  Arthritis    • Asthma    • BPPV (benign paroxysmal positional vertigo), right    • COPD (chronic obstructive pulmonary disease) ([facility])    • Coronary artery disease    • Coronary artery disease    • Ear itching     HX   • Enlarged heart    • Gastroesophageal reflux disease    • Hyperlipidemia    • Hypertension    • Knee pain    • Macular degeneration    • PONV (postoperative nausea and vomiting)    • Shingles     X2   • SOB (shortness of breath) on exertion    • Tinnitus of left ear    • Urinary frequency    • Vertigo        Allergies:  Allergies   Allergen Reactions   • Codeine Nausea And Vomiting   • Contrast Dye Hives   • Morphine Nausea And Vomiting       Objective    Objective       Vitals:   Heart Rate:  [56-60] 56  BP: (168-192)/(76-80) 168/80  Body mass index is 32.44 kg/m².     PHYSICAL EXAM:    General Appearance:   · well developed  · well nourished  HENT:   · oropharynx moist  · lips not cyanotic  Neck:  · thyroid not enlarged  · supple  Respiratory:  · no respiratory distress  · normal breath sounds  · no rales  Cardiovascular:  · no jugular venous distention  · regular rhythm  · apical impulse normal  · S1 normal, S2 normal  · no S3, no S4   · no murmur  · no rub, no thrill  · carotid pulses normal; no bruit  · pedal pulses normal  · lower extremity edema: none    Skin:   · warm, dry  Psychiatric:  · judgement and insight appropriate  · normal mood and affect        Result Review:  I have personally reviewed the available results from  [x]  Laboratory  [x]  EKG  [x]  Cardiology  [x]  Medications  [x]  Old records  []  Other:     Procedures  Results for orders placed in visit on 05/05/22    Adult Transthoracic Echo Complete W/ Cont if Necessary Per Protocol    Interpretation Summary  Fibrocalcific mitral and aortic valves.  Normal left ventricular systolic function.  Mild aortic regurgitation.  Mild mitral regurgitation.  Mild tricuspid regurgitation.  Mild pulmonary hypertension.     Impression/Plan:  1.   Essential hypertension controlled: Continue carvedilol 12.5 mg twice a day.  Continue valsartan 320 mg a day.  blood pressure controlled at home.  2.  Coronary disease s/p PTCA/stent stable: Continue aspirin 81 mg a day.  No chest pain.  3.  Hyperlipidemia: Continue Lipitor 40 mg a day.  Monitor lipid profile        Migue Nguyen MD   11/11/22   10:46 EST

## 2022-11-11 ENCOUNTER — OFFICE VISIT (OUTPATIENT)
Dept: CARDIOLOGY | Facility: CLINIC | Age: 82
End: 2022-11-11

## 2022-11-11 VITALS
DIASTOLIC BLOOD PRESSURE: 80 MMHG | SYSTOLIC BLOOD PRESSURE: 168 MMHG | HEART RATE: 56 BPM | WEIGHT: 189 LBS | HEIGHT: 64 IN | BODY MASS INDEX: 32.27 KG/M2

## 2022-11-11 DIAGNOSIS — I10 HYPERTENSION, ESSENTIAL: ICD-10-CM

## 2022-11-11 DIAGNOSIS — I25.10 CORONARY ARTERY DISEASE INVOLVING NATIVE CORONARY ARTERY OF NATIVE HEART WITHOUT ANGINA PECTORIS: Primary | ICD-10-CM

## 2022-11-11 DIAGNOSIS — Z95.5 HISTORY OF CORONARY ANGIOPLASTY WITH INSERTION OF STENT: ICD-10-CM

## 2022-11-11 PROCEDURE — 99214 OFFICE O/P EST MOD 30 MIN: CPT | Performed by: SPECIALIST

## 2022-11-14 ENCOUNTER — TRANSCRIBE ORDERS (OUTPATIENT)
Dept: ADMINISTRATIVE | Facility: HOSPITAL | Age: 82
End: 2022-11-14

## 2022-11-14 DIAGNOSIS — Z12.31 ENCOUNTER FOR SCREENING MAMMOGRAM FOR MALIGNANT NEOPLASM OF BREAST: Primary | ICD-10-CM

## 2022-11-22 ENCOUNTER — HOSPITAL ENCOUNTER (OUTPATIENT)
Dept: MAMMOGRAPHY | Facility: HOSPITAL | Age: 82
Discharge: HOME | End: 2022-11-22

## 2022-11-22 ENCOUNTER — HOSPITAL ENCOUNTER (OUTPATIENT)
Dept: BONE DENSITY | Facility: HOSPITAL | Age: 82
Discharge: HOME | End: 2022-11-22

## 2022-11-22 ENCOUNTER — APPOINTMENT (OUTPATIENT)
Dept: MAMMOGRAPHY | Facility: HOSPITAL | Age: 82
End: 2022-11-22

## 2022-11-22 DIAGNOSIS — Z12.31 ENCOUNTER FOR SCREENING MAMMOGRAM FOR MALIGNANT NEOPLASM OF BREAST: ICD-10-CM

## 2022-11-22 DIAGNOSIS — Z78.0 POST-MENOPAUSAL: ICD-10-CM

## 2022-11-22 PROCEDURE — 77067 SCR MAMMO BI INCL CAD: CPT

## 2022-11-22 PROCEDURE — 77063 BREAST TOMOSYNTHESIS BI: CPT

## 2022-11-22 PROCEDURE — 77080 DXA BONE DENSITY AXIAL: CPT

## 2023-03-16 ENCOUNTER — APPOINTMENT (OUTPATIENT)
Dept: GENERAL RADIOLOGY | Facility: HOSPITAL | Age: 83
End: 2023-03-16
Payer: MEDICARE

## 2023-03-16 ENCOUNTER — APPOINTMENT (OUTPATIENT)
Dept: CARDIOLOGY | Facility: HOSPITAL | Age: 83
End: 2023-03-16
Payer: MEDICARE

## 2023-03-16 ENCOUNTER — HOSPITAL ENCOUNTER (OUTPATIENT)
Facility: HOSPITAL | Age: 83
Setting detail: OBSERVATION
Discharge: HOME | End: 2023-03-17
Attending: EMERGENCY MEDICINE | Admitting: STUDENT IN AN ORGANIZED HEALTH CARE EDUCATION/TRAINING PROGRAM
Payer: MEDICARE

## 2023-03-16 DIAGNOSIS — R00.1 BRADYCARDIA: ICD-10-CM

## 2023-03-16 DIAGNOSIS — R55 NEAR SYNCOPE: ICD-10-CM

## 2023-03-16 DIAGNOSIS — R77.8 ELEVATED TROPONIN: Primary | ICD-10-CM

## 2023-03-16 PROBLEM — R42 POSTURAL DIZZINESS WITH PRESYNCOPE: Status: ACTIVE | Noted: 2023-03-16

## 2023-03-16 PROBLEM — R79.89 ELEVATED TROPONIN: Status: ACTIVE | Noted: 2023-03-16

## 2023-03-16 LAB
ALBUMIN SERPL-MCNC: 3.7 G/DL (ref 3.5–5.2)
ALBUMIN/GLOB SERPL: 1.3 G/DL
ALP SERPL-CCNC: 84 U/L (ref 39–117)
ALT SERPL W P-5'-P-CCNC: 20 U/L (ref 1–33)
ANION GAP SERPL CALCULATED.3IONS-SCNC: 13.8 MMOL/L (ref 5–15)
AST SERPL-CCNC: 26 U/L (ref 1–32)
BASOPHILS # BLD AUTO: 0.02 10*3/MM3 (ref 0–0.2)
BASOPHILS NFR BLD AUTO: 0.2 % (ref 0–1.5)
BH CV ECHO MEAS - AO ROOT DIAM: 2.8 CM
BH CV ECHO MEAS - EDV(MOD-SP2): 68 ML
BH CV ECHO MEAS - EDV(MOD-SP4): 66 ML
BH CV ECHO MEAS - EF(MOD-SP2): 62 %
BH CV ECHO MEAS - EF(MOD-SP4): 56 %
BH CV ECHO MEAS - ESV(MOD-SP2): 26 ML
BH CV ECHO MEAS - ESV(MOD-SP4): 29 ML
BH CV ECHO MEAS - IVSD: 1.5 CM
BH CV ECHO MEAS - LA DIMENSION: 3.9 CM
BH CV ECHO MEAS - LAT PEAK E' VEL: 5.2 CM/SEC
BH CV ECHO MEAS - LVIDD: 4.7 CM
BH CV ECHO MEAS - LVIDS: 3.2 CM
BH CV ECHO MEAS - LVOT DIAM: 1.8 CM
BH CV ECHO MEAS - LVPWD: 1.2 CM
BH CV ECHO MEAS - MED PEAK E' VEL: 5.9 CM/SEC
BH CV ECHO MEAS - MR MAX PG: 166 MMHG
BH CV ECHO MEAS - MR MAX VEL: 644 CM/SEC
BH CV ECHO MEAS - MR MEAN PG: 96 MMHG
BH CV ECHO MEAS - MR MEAN VEL: 452 CM/SEC
BH CV ECHO MEAS - MR VTI: 270 CM
BH CV ECHO MEAS - MV A MAX VEL: 116 CM/SEC
BH CV ECHO MEAS - MV DEC TIME: 80 MSEC
BH CV ECHO MEAS - MV E MAX VEL: 81 CM/SEC
BH CV ECHO MEAS - MV E/A: 0.7
BH CV ECHO MEAS - RAP SYSTOLE: 3 MMHG
BH CV ECHO MEAS - RVDD: 2.6 CM
BH CV ECHO MEAS - RVSP: 63 MMHG
BH CV ECHO MEAS - TR MAX PG: 60 MMHG
BH CV ECHO MEAS - TR MAX VEL: 387 CM/SEC
BH CV ECHO MEASUREMENTS AVERAGE E/E' RATIO: 14.59
BILIRUB SERPL-MCNC: 0.3 MG/DL (ref 0–1.2)
BUN SERPL-MCNC: 29 MG/DL (ref 8–23)
BUN/CREAT SERPL: 27.4 (ref 7–25)
CALCIUM SPEC-SCNC: 9.7 MG/DL (ref 8.6–10.5)
CHLORIDE SERPL-SCNC: 103 MMOL/L (ref 98–107)
CK MB SERPL-CCNC: 4.96 NG/ML
CK SERPL-CCNC: 88 U/L (ref 20–180)
CO2 SERPL-SCNC: 22.2 MMOL/L (ref 22–29)
CREAT SERPL-MCNC: 1.06 MG/DL (ref 0.57–1)
DEPRECATED RDW RBC AUTO: 49.6 FL (ref 37–54)
EGFRCR SERPLBLD CKD-EPI 2021: 52.2 ML/MIN/1.73
EOSINOPHIL # BLD AUTO: 0.26 10*3/MM3 (ref 0–0.4)
EOSINOPHIL NFR BLD AUTO: 2.8 % (ref 0.3–6.2)
ERYTHROCYTE [DISTWIDTH] IN BLOOD BY AUTOMATED COUNT: 15.6 % (ref 12.3–15.4)
FERRITIN SERPL-MCNC: 36.97 NG/ML (ref 13–150)
GEN 5 2HR TROPONIN T REFLEX: 166 NG/L
GLOBULIN UR ELPH-MCNC: 2.9 GM/DL
GLUCOSE SERPL-MCNC: 110 MG/DL (ref 65–99)
HCT VFR BLD AUTO: 31.4 % (ref 34–46.6)
HGB BLD-MCNC: 9.9 G/DL (ref 12–15.9)
HOLD SPECIMEN: NORMAL
HOLD SPECIMEN: NORMAL
IMM GRANULOCYTES # BLD AUTO: 0.03 10*3/MM3 (ref 0–0.05)
IMM GRANULOCYTES NFR BLD AUTO: 0.3 % (ref 0–0.5)
IRON 24H UR-MRATE: 75 MCG/DL (ref 37–145)
IRON SATN MFR SERPL: 16 % (ref 20–50)
IVRT: 90 MSEC
LEFT ATRIUM VOLUME INDEX: 26.4 ML/M2
LEFT ATRIUM VOLUME: 53.1 ML
LIPASE SERPL-CCNC: 23 U/L (ref 13–60)
LYMPHOCYTES # BLD AUTO: 2.81 10*3/MM3 (ref 0.7–3.1)
LYMPHOCYTES NFR BLD AUTO: 30.1 % (ref 19.6–45.3)
MAGNESIUM SERPL-MCNC: 1.5 MG/DL (ref 1.6–2.4)
MAXIMAL PREDICTED HEART RATE: 137 BPM
MCH RBC QN AUTO: 27.9 PG (ref 26.6–33)
MCHC RBC AUTO-ENTMCNC: 31.5 G/DL (ref 31.5–35.7)
MCV RBC AUTO: 88.5 FL (ref 79–97)
MONOCYTES # BLD AUTO: 0.97 10*3/MM3 (ref 0.1–0.9)
MONOCYTES NFR BLD AUTO: 10.4 % (ref 5–12)
NEUTROPHILS NFR BLD AUTO: 5.25 10*3/MM3 (ref 1.7–7)
NEUTROPHILS NFR BLD AUTO: 56.2 % (ref 42.7–76)
NRBC BLD AUTO-RTO: 0 /100 WBC (ref 0–0.2)
NT-PROBNP SERPL-MCNC: 785.3 PG/ML (ref 0–1800)
PISA AR MAX VEL: 336 M/S
PLATELET # BLD AUTO: 297 10*3/MM3 (ref 140–450)
PMV BLD AUTO: 10.3 FL (ref 6–12)
POTASSIUM SERPL-SCNC: 4 MMOL/L (ref 3.5–5.2)
PROT SERPL-MCNC: 6.6 G/DL (ref 6–8.5)
QT INTERVAL: 449 MS
QT INTERVAL: 488 MS
QT INTERVAL: 495 MS
RBC # BLD AUTO: 3.55 10*6/MM3 (ref 3.77–5.28)
SODIUM SERPL-SCNC: 139 MMOL/L (ref 136–145)
STRESS TARGET HR: 116 BPM
TIBC SERPL-MCNC: 459 MCG/DL (ref 298–536)
TRANSFERRIN SERPL-MCNC: 308 MG/DL (ref 200–360)
TROPONIN T DELTA: 106 NG/L
TROPONIN T SERPL HS-MCNC: 60 NG/L
WBC NRBC COR # BLD: 9.34 10*3/MM3 (ref 3.4–10.8)
WHOLE BLOOD HOLD COAG: NORMAL
WHOLE BLOOD HOLD SPECIMEN: NORMAL

## 2023-03-16 PROCEDURE — 94799 UNLISTED PULMONARY SVC/PX: CPT

## 2023-03-16 PROCEDURE — 99285 EMERGENCY DEPT VISIT HI MDM: CPT

## 2023-03-16 PROCEDURE — 83690 ASSAY OF LIPASE: CPT

## 2023-03-16 PROCEDURE — 84466 ASSAY OF TRANSFERRIN: CPT | Performed by: STUDENT IN AN ORGANIZED HEALTH CARE EDUCATION/TRAINING PROGRAM

## 2023-03-16 PROCEDURE — 84484 ASSAY OF TROPONIN QUANT: CPT | Performed by: STUDENT IN AN ORGANIZED HEALTH CARE EDUCATION/TRAINING PROGRAM

## 2023-03-16 PROCEDURE — 96372 THER/PROPH/DIAG INJ SC/IM: CPT

## 2023-03-16 PROCEDURE — 25010000002 MAGNESIUM SULFATE 2 GM/50ML SOLUTION: Performed by: STUDENT IN AN ORGANIZED HEALTH CARE EDUCATION/TRAINING PROGRAM

## 2023-03-16 PROCEDURE — 82550 ASSAY OF CK (CPK): CPT | Performed by: INTERNAL MEDICINE

## 2023-03-16 PROCEDURE — 93306 TTE W/DOPPLER COMPLETE: CPT | Performed by: INTERNAL MEDICINE

## 2023-03-16 PROCEDURE — 83880 ASSAY OF NATRIURETIC PEPTIDE: CPT

## 2023-03-16 PROCEDURE — 82553 CREATINE MB FRACTION: CPT | Performed by: INTERNAL MEDICINE

## 2023-03-16 PROCEDURE — 85025 COMPLETE CBC W/AUTO DIFF WBC: CPT

## 2023-03-16 PROCEDURE — 99223 1ST HOSP IP/OBS HIGH 75: CPT | Performed by: FAMILY MEDICINE

## 2023-03-16 PROCEDURE — 84484 ASSAY OF TROPONIN QUANT: CPT

## 2023-03-16 PROCEDURE — 93306 TTE W/DOPPLER COMPLETE: CPT

## 2023-03-16 PROCEDURE — 94640 AIRWAY INHALATION TREATMENT: CPT

## 2023-03-16 PROCEDURE — 25010000002 NA FERRIC GLUC CPLX PER 12.5 MG: Performed by: STUDENT IN AN ORGANIZED HEALTH CARE EDUCATION/TRAINING PROGRAM

## 2023-03-16 PROCEDURE — 93005 ELECTROCARDIOGRAM TRACING: CPT | Performed by: FAMILY MEDICINE

## 2023-03-16 PROCEDURE — G0378 HOSPITAL OBSERVATION PER HR: HCPCS

## 2023-03-16 PROCEDURE — 83735 ASSAY OF MAGNESIUM: CPT

## 2023-03-16 PROCEDURE — 93005 ELECTROCARDIOGRAM TRACING: CPT

## 2023-03-16 PROCEDURE — 36415 COLL VENOUS BLD VENIPUNCTURE: CPT | Performed by: STUDENT IN AN ORGANIZED HEALTH CARE EDUCATION/TRAINING PROGRAM

## 2023-03-16 PROCEDURE — 99222 1ST HOSP IP/OBS MODERATE 55: CPT | Performed by: INTERNAL MEDICINE

## 2023-03-16 PROCEDURE — 83540 ASSAY OF IRON: CPT | Performed by: STUDENT IN AN ORGANIZED HEALTH CARE EDUCATION/TRAINING PROGRAM

## 2023-03-16 PROCEDURE — 71045 X-RAY EXAM CHEST 1 VIEW: CPT

## 2023-03-16 PROCEDURE — 82728 ASSAY OF FERRITIN: CPT | Performed by: STUDENT IN AN ORGANIZED HEALTH CARE EDUCATION/TRAINING PROGRAM

## 2023-03-16 PROCEDURE — 25010000002 HEPARIN (PORCINE) PER 1000 UNITS: Performed by: FAMILY MEDICINE

## 2023-03-16 PROCEDURE — 80053 COMPREHEN METABOLIC PANEL: CPT

## 2023-03-16 RX ORDER — SODIUM CHLORIDE 0.9 % (FLUSH) 0.9 %
10 SYRINGE (ML) INJECTION AS NEEDED
Status: DISCONTINUED | OUTPATIENT
Start: 2023-03-16 | End: 2023-03-17 | Stop reason: HOSPADM

## 2023-03-16 RX ORDER — ASPIRIN 81 MG/1
324 TABLET, CHEWABLE ORAL ONCE
Status: DISCONTINUED | OUTPATIENT
Start: 2023-03-16 | End: 2023-03-16

## 2023-03-16 RX ORDER — ONDANSETRON 2 MG/ML
4 INJECTION INTRAMUSCULAR; INTRAVENOUS EVERY 6 HOURS PRN
Status: DISCONTINUED | OUTPATIENT
Start: 2023-03-16 | End: 2023-03-17 | Stop reason: HOSPADM

## 2023-03-16 RX ORDER — VALSARTAN 80 MG/1
160 TABLET ORAL DAILY
Status: DISCONTINUED | OUTPATIENT
Start: 2023-03-16 | End: 2023-03-17 | Stop reason: HOSPADM

## 2023-03-16 RX ORDER — ASPIRIN 81 MG/1
81 TABLET ORAL DAILY
COMMUNITY

## 2023-03-16 RX ORDER — SODIUM CHLORIDE 0.9 % (FLUSH) 0.9 %
10 SYRINGE (ML) INJECTION EVERY 12 HOURS SCHEDULED
Status: DISCONTINUED | OUTPATIENT
Start: 2023-03-16 | End: 2023-03-17 | Stop reason: HOSPADM

## 2023-03-16 RX ORDER — ACETAMINOPHEN 325 MG/1
650 TABLET ORAL EVERY 4 HOURS PRN
Status: DISCONTINUED | OUTPATIENT
Start: 2023-03-16 | End: 2023-03-17 | Stop reason: HOSPADM

## 2023-03-16 RX ORDER — ALBUTEROL SULFATE 90 UG/1
2 AEROSOL, METERED RESPIRATORY (INHALATION) EVERY 4 HOURS PRN
COMMUNITY
Start: 2023-03-13

## 2023-03-16 RX ORDER — SODIUM CHLORIDE 9 MG/ML
100 INJECTION, SOLUTION INTRAVENOUS CONTINUOUS
Status: DISCONTINUED | OUTPATIENT
Start: 2023-03-16 | End: 2023-03-16

## 2023-03-16 RX ORDER — MAGNESIUM SULFATE HEPTAHYDRATE 40 MG/ML
2 INJECTION, SOLUTION INTRAVENOUS ONCE
Status: COMPLETED | OUTPATIENT
Start: 2023-03-16 | End: 2023-03-16

## 2023-03-16 RX ORDER — FAMOTIDINE 40 MG/1
40 TABLET, FILM COATED ORAL
COMMUNITY
Start: 2023-01-14

## 2023-03-16 RX ORDER — PROCHLORPERAZINE EDISYLATE 5 MG/ML
5 INJECTION INTRAMUSCULAR; INTRAVENOUS EVERY 6 HOURS PRN
Status: DISCONTINUED | OUTPATIENT
Start: 2023-03-16 | End: 2023-03-17 | Stop reason: HOSPADM

## 2023-03-16 RX ORDER — VALSARTAN 160 MG/1
320 TABLET ORAL DAILY
COMMUNITY
Start: 2022-12-07

## 2023-03-16 RX ORDER — ASPIRIN 81 MG/1
81 TABLET ORAL DAILY
Status: DISCONTINUED | OUTPATIENT
Start: 2023-03-16 | End: 2023-03-17 | Stop reason: HOSPADM

## 2023-03-16 RX ORDER — ATORVASTATIN CALCIUM 40 MG/1
40 TABLET, FILM COATED ORAL DAILY
Status: DISCONTINUED | OUTPATIENT
Start: 2023-03-16 | End: 2023-03-17 | Stop reason: HOSPADM

## 2023-03-16 RX ORDER — SODIUM CHLORIDE 9 MG/ML
40 INJECTION, SOLUTION INTRAVENOUS AS NEEDED
Status: DISCONTINUED | OUTPATIENT
Start: 2023-03-16 | End: 2023-03-17 | Stop reason: HOSPADM

## 2023-03-16 RX ORDER — ALBUTEROL SULFATE 2.5 MG/3ML
2.5 SOLUTION RESPIRATORY (INHALATION) EVERY 4 HOURS PRN
COMMUNITY
End: 2023-03-25

## 2023-03-16 RX ORDER — ALBUTEROL SULFATE 2.5 MG/3ML
2.5 SOLUTION RESPIRATORY (INHALATION) EVERY 4 HOURS PRN
Status: DISCONTINUED | OUTPATIENT
Start: 2023-03-16 | End: 2023-03-17 | Stop reason: HOSPADM

## 2023-03-16 RX ORDER — HEPARIN SODIUM 5000 [USP'U]/ML
5000 INJECTION, SOLUTION INTRAVENOUS; SUBCUTANEOUS EVERY 8 HOURS SCHEDULED
Status: DISCONTINUED | OUTPATIENT
Start: 2023-03-16 | End: 2023-03-17 | Stop reason: HOSPADM

## 2023-03-16 RX ORDER — MONTELUKAST SODIUM 10 MG/1
10 TABLET ORAL NIGHTLY
Status: DISCONTINUED | OUTPATIENT
Start: 2023-03-16 | End: 2023-03-17 | Stop reason: HOSPADM

## 2023-03-16 RX ORDER — PANTOPRAZOLE SODIUM 40 MG/1
40 TABLET, DELAYED RELEASE ORAL DAILY
COMMUNITY
Start: 2023-01-06

## 2023-03-16 RX ORDER — HYDROCHLOROTHIAZIDE 25 MG/1
25 TABLET ORAL EVERY MORNING
Status: DISCONTINUED | OUTPATIENT
Start: 2023-03-16 | End: 2023-03-17 | Stop reason: HOSPADM

## 2023-03-16 RX ORDER — ASPIRIN 81 MG/1
81 TABLET ORAL DAILY
Status: DISCONTINUED | OUTPATIENT
Start: 2023-03-16 | End: 2023-03-16

## 2023-03-16 RX ORDER — NITROGLYCERIN 0.4 MG/1
0.4 TABLET SUBLINGUAL
Status: DISCONTINUED | OUTPATIENT
Start: 2023-03-16 | End: 2023-03-17 | Stop reason: HOSPADM

## 2023-03-16 RX ADMIN — ASPIRIN 81 MG: 81 TABLET, COATED ORAL at 21:15

## 2023-03-16 RX ADMIN — Medication 10 ML: at 21:16

## 2023-03-16 RX ADMIN — ALBUTEROL SULFATE 2.5 MG: 2.5 SOLUTION RESPIRATORY (INHALATION) at 21:33

## 2023-03-16 RX ADMIN — HYDROCHLOROTHIAZIDE 25 MG: 25 TABLET ORAL at 09:52

## 2023-03-16 RX ADMIN — HEPARIN SODIUM 5000 UNITS: 5000 INJECTION, SOLUTION INTRAVENOUS; SUBCUTANEOUS at 14:54

## 2023-03-16 RX ADMIN — SODIUM CHLORIDE 125 MG: 9 INJECTION, SOLUTION INTRAVENOUS at 18:02

## 2023-03-16 RX ADMIN — VALSARTAN 160 MG: 80 TABLET, FILM COATED ORAL at 21:15

## 2023-03-16 RX ADMIN — ASPIRIN 81 MG: 81 TABLET, COATED ORAL at 09:52

## 2023-03-16 RX ADMIN — Medication 10 ML: at 10:26

## 2023-03-16 RX ADMIN — MAGNESIUM SULFATE HEPTAHYDRATE 2 G: 2 INJECTION, SOLUTION INTRAVENOUS at 09:52

## 2023-03-16 RX ADMIN — MONTELUKAST 10 MG: 10 TABLET, FILM COATED ORAL at 21:15

## 2023-03-16 RX ADMIN — HEPARIN SODIUM 5000 UNITS: 5000 INJECTION, SOLUTION INTRAVENOUS; SUBCUTANEOUS at 21:15

## 2023-03-16 NOTE — ED NOTES
"Triage Note:     Patient to ed via EMS with c/o \"slow heart rate\"; according to MS patient was found to have a heart beat in the 20's.  Upon their arrival patient was pale, dusky and acting strange.     Patient states that when she woke up and tried to get up everything went dark, she denies any syncope, but states that she could not see.     Patient only admits to having a single stent and other than that no pertinent cardiac history.     No medications were given in route to the ED.   "

## 2023-03-16 NOTE — SIGNIFICANT NOTE
03/16/23 1358   Plan   Plan Patient reports lives by herself.  Son and daughter in law are close by.  Patient reports family provides transportation.  Patient is able to provide own ADL's.  Reports possible pacemaker.  Good family support.  Patient plans to return home at discharge.  Will continue to monitor.

## 2023-03-16 NOTE — PLAN OF CARE
Goal Outcome Evaluation:  Plan of Care Reviewed With: patient        Progress: no change  Outcome Evaluation: Patient denies pain/discomfort at this time. Patient has not had any synopal episodes or c/o dizziness. Patients HR dropped down to 30's x1 this shift, patient was sitting in bed talking to family. Patient had echo done today. Call light within reach, bed in lowest position.

## 2023-03-16 NOTE — PROGRESS NOTES
" [facility]   Hospitalist Progress Note  Date: 3/16/2023  Patient Name: Mallory Franco  : 1940  MRN: [id number]  Date of admission: 3/16/2023      Subjective   Subjective     Chief Complaint: Bradycardia, near syncope    Summary: Patient is a 82-year-old female with past medical history of CAD, GERD, hyperlipidemia, hypertension, and \"enlarged heart\".  Patient says that she woke up from sleep and tried to get up out of a chair when she felt everything \"going dark\".  She denies losing consciousness but said she felt like she could not see out of either eye.  It resolved very quickly however EMS was called.  The EMS workers said that they documented her pulse in the 20s both at her residence as well as on the ambulance ride in.  Since coming into the hospital however her pulse rates have been in the 60s and 70s and she has had no further instances of near syncope or vision disturbances.  Patient does have a slightly elevated troponin and because of this the ER doctor asked that we admit for NSTEMI rule out and further evaluation.    Interval Followup: Patient heart rate has improved.  She remains off beta-blocker therapy.  Received message from nursing that troponin was elevated this morning.  Patient denies any fevers or chills.  She is on room air.    Review of Systems  All systems reviewed and negative septa as above in HPI.    Objective   Objective     Vitals:   Temp:  [98.2 °F (36.8 °C)] 98.2 °F (36.8 °C)  Heart Rate:  [59-73] 63  Resp:  [15] 15  BP: (165-199)/() 171/67  Physical Exam   Constitutional: Alert, awake, laying in bed  HEENT:  PERRLA, EOMI; No Scleral icterus  Neck:  Supple; No Mass, No Lymphadenopathy  Cardiovascular:  No Rubs, No Edema, No JVD  Respiratory: No wheezing, no rhonchi, no rales  Abdomen:  Normal BS all 4 Quadrants; No Guarding, No Tenderness  Musculoskeletal:  Pulses Positive all 4 Ext; No Cyanosis, No Edema  Neurological: Answers questions, follows commands, no slurred " speech   Skin:  No Rash, No Cellulitis, No Erythema    Result Review    Result Review:  I have personally reviewed the results from the time of this admission to 3/16/2023 09:13 EDT and agree with these findings:  []  Laboratory  []  Microbiology  []  Radiology  []  EKG/Telemetry   []  Cardiology/Vascular   []  Pathology  []  Old records  []  Other:    Assessment & Plan   Assessment / Plan     Assessment:  Near syncope suspect secondary to bradycardia  Bradycardia in the setting of beta-blocker use  Hypomagnesemia  Hypertension  COPD exacerbated  GERD  Hyperlipidemia  Vertigo    Plan:  Continue monitor on telemetry  Discussed with cardiology given patient's bradycardia, appreciate assistance with this patient  Continue to hold beta-blocker for now  Echocardiogram reviewed showing ejection fraction 51-55%, grade 1 diastolic dysfunction, mild MR, trace AI  Patient blood pressure elevated, will restart her HCTZ back, titrate as needed  Renal function is stable, continue to monitor CMP, recheck in a.m.  CBC reviewed, hemoglobin is low, will recheck and follow-up in a.m.  Hemoglobin decreased, checked iron panel and shows low ferritin, will give dose of Ferrlecit today, continue monitor CBC  Continue with aspirin and statin therapy daily  Continue with heparin for DVT prophylaxis  Continue patient's Singulair  Added PT/OT when appropriate  Further recommendations pending clinical course        Discussed plan with cardiology, RN.    DVT prophylaxis:  Medical DVT prophylaxis orders are present.    CODE STATUS:   Level Of Support Discussed With: Patient  Code Status (Patient has no pulse and is not breathing): CPR (Attempt to Resuscitate)  Medical Interventions (Patient has pulse or is breathing): Full Support        Electronically signed by Niles Iqbal DO, 03/16/23, 9:13 AM EDT.

## 2023-03-16 NOTE — CONSULTS
"  [facility]   Cardiology Consult Note    Patient Name: Mallory Franco  : 1940  MRN: [id number]  Primary Care Physician:  Johan Escudero MD   Primary cardiologist : Migue Nguyen MD  Referring Physician: Niles Iqbal DO  Date of admission: 3/16/2023    Subjective   Subjective     Reason for Consultation : Bradycardia    Chief Complaint : Presyncope, reported bradycardia    HPI:    Mallory Franco is a 82 y.o. female  with coronary artery disease, previous angioplasty, hypertension, hyperlipidemia.  She was admitted to the hospital overnight because of near syncopal episodes.  Per patient, she woke up from sleep when she suddenly felt dizzy and everything \"going dark \".  Symptoms lasted for several minutes and eventually subsided.  When she tried to get up she felt really weak and tired and was about to fall.  She checked her blood pressure and heart rate.  Heart rate was documented as 20 bpm.  EMS was called who took her to the hospital.    In the emergency room, patient appeared comfortable.  Heart rate was in 60s and 70s.  Blood pressure stable.  Initial EKG showed underlying left bundle branch block with isolated PVCs.  Cardiac markers including troponin was mildly elevated.  Throughout the day today patient remained chest pain-free.  Telemetry showed no significant problems or high-grade conduction blocks.  Patient reports dizziness and per  she has chronic vertigo.      Review of Systems   All systems were reviewed and negative except for dizziness, fatigue, blurry vision.  Negative for chest pain or palpitations    Personal History     Past Medical History:   Diagnosis Date   • Arthritis    • Asthma    • BPPV (benign paroxysmal positional vertigo), right    • COPD (chronic obstructive pulmonary disease) ([facility])    • Coronary artery disease    • Coronary artery disease    • Ear itching     HX   • Enlarged heart    • Gastroesophageal reflux disease    • Hyperlipidemia    • " Hypertension    • Knee pain    • Macular degeneration    • PONV (postoperative nausea and vomiting)    • Shingles     X2   • SOB (shortness of breath) on exertion    • Tinnitus of left ear    • Urinary frequency    • Vertigo             Family History: family history includes Bone cancer in her father and sister; Breast cancer in her mother and sister; Lung cancer in her brother. Otherwise pertinent FHx was reviewed and not pertinent to current issue.    Social History:  reports that she has never smoked. She has never used smokeless tobacco. She reports that she does not drink alcohol and does not use drugs.    Home Medications:  Fluticasone Furoate-Vilanterol, albuterol, albuterol sulfate HFA, aspirin, carvedilol, famotidine, fluticasone, hydroCHLOROthiazide, meclizine, montelukast, pantoprazole, sertraline, and valsartan    Allergies:  Allergies   Allergen Reactions   • Codeine Nausea And Vomiting   • Contrast Dye (Echo Or Unknown Ct/Mr) Hives   • Morphine Nausea And Vomiting       Objective    Objective     Vitals:   Temp:  [97.5 °F (36.4 °C)-98.2 °F (36.8 °C)] 97.7 °F (36.5 °C)  Heart Rate:  [59-75] 64  Resp:  [15-16] 16  BP: (148-199)/() 148/62      Physical Exam:   Constitutional: Awake, alert, No acute distress    Eyes: PERRLA, sclerae anicteric, no conjunctival injection   HENT: NCAT, mucous membranes moist   Neck: Supple, no thyromegaly, no lymphadenopathy, trachea midline   Respiratory: Clear to auscultation bilaterally, nonlabored respirations    Cardiovascular: RRR, no murmurs, rubs, or gallops, palpable pedal pulses bilaterally   Gastrointestinal: Positive bowel sounds, soft, nontender, nondistended   Musculoskeletal: Trace edema bilaterally, no clubbing or cyanosis to extremities   Psychiatric: Appropriate affect, cooperative   Neurologic: Oriented x 3, strength symmetric in all extremities, speech clear   Skin: No rashes     Result Review    Result Review:  I have personally reviewed the  results from the time of this admission to 3/16/2023 18:39 EDT and agree with these findings:  [x]  Laboratory  []  Microbiology  [x]  Radiology  [x]  EKG/Telemetry   [x]  Cardiology/Vascular   []  Pathology  [x]  Old records  []  Other:  Most notable findings include:     CMP    CMP 10/28/22 10/29/22 3/16/23   Glucose 109 (A) 114 (A) 110 (A)   BUN 22 19 29 (A)   Creatinine 0.98 0.91 1.06 (A)   eGFR 57.4 (A) 62.7 52.2 (A)   Sodium 138 136 139   Potassium 3.9 3.7 4.0   Chloride 102 100 103   Calcium 10.2 9.9 9.7   Total Protein 7.3 7.2 6.6   Albumin 4.60 4.10 3.7   Globulin 2.7 3.1 2.9   Total Bilirubin 0.5 0.6 0.3   Alkaline Phosphatase 92 90 84   AST (SGOT) 18 21 26   ALT (SGPT) 14 13 20   Albumin/Globulin Ratio 1.7 1.3 1.3   BUN/Creatinine Ratio 22.4 20.9 27.4 (A)   Anion Gap 10.6 10.3 13.8   (A) Abnormal value       Comments are available for some flowsheets but are not being displayed.            CBC    CBC 10/28/22 10/29/22 3/16/23   WBC 8.48 7.07 9.34   RBC 3.77 3.71 (A) 3.55 (A)   Hemoglobin 10.7 (A) 10.6 (A) 9.9 (A)   Hematocrit 34.0 32.7 (A) 31.4 (A)   MCV 90.2 88.1 88.5   MCH 28.4 28.6 27.9   MCHC 31.5 32.4 31.5   RDW 14.7 14.6 15.6 (A)   Platelets 292 285 297   (A) Abnormal value             Lab Results   Component Value Date    TROPONINT 166 (C) 03/16/2023      Latest Reference Range & Units 03/16/23 03:37 03/16/23 04:49 03/16/23 09:28   Creatine Kinase 20 - 180 U/L   88   CKMB <=5.30 ng/mL   4.96   HS Troponin T <10 ng/L  60 (C) 166 (C)   Troponin T Delta >=-4 - <+4 ng/L   106 (C)   proBNP 0.0 - 1,800.0 pg/mL 785.3       Results for orders placed during the hospital encounter of 03/16/23    Adult Transthoracic Echo Complete W/ Cont if Necessary Per Protocol    Interpretation Summary  •  Left ventricular systolic function is normal. Left ventricular ejection fraction appears to be 51 - 55%.  •  Left ventricular wall thickness is consistent with mild concentric hypertrophy.  •  Left ventricular diastolic  function is consistent with (grade I) impaired relaxation.  •  There is mild to moderate mitral regurgitation.  •  There is trace aortic insufficiency.  •  There is mild tricuspid regurgitation.  Estimated right ventricular systolic pressure from tricuspid regurgitation is markedly elevated (>55 mmHg).      EKG done after admission showed sinus rhythm, left bundle branch block, ventricular premature complexes.      Assessment & Plan   Assessment / Plan     Brief Patient Summary:  Mallory Franco is a 82 y.o. female coronary artery disease, previous angioplasty, hypertension, hyperlipidemia.  She was admitted with presyncopal episode, reported bradycardia.    Active Hospital Problems:  Active Hospital Problems    Diagnosis    • **Postural dizziness with presyncope    • Elevated troponin      Presyncopal episode : Reported presyncopal episode at home, orthostatics negative.  Also reported to have bradycardia.  Currently telemetry shows sinus bradycardia with isolated PVCs with underlying baseline left bundle branch block.    Elevated troponin : No chest pain, mild elevation of high-sensitivity troponin.  Other cardiac markers negative.  Echocardiogram with no regional wall motion abnormalities.  Clinical picture is not suggestive of acute coronary syndrome.    Coronary artery disease : Remote angioplasty and stent placement    Essential hypertension : On multiple antihypertensive medications at home.    Plan:     Continue aspirin  Will hold home carvedilol due to bradycardia  Restarting home valsartan at the lower dose of 160 mg daily    We will continue to trend cardiac enzymes and serial EKGs.  Patient likely need event monitor after discharge.    Patient requesting to be restarted on home bronchodilators.    Further management plans based on clinical course and test results  Dr Nguyen to assume care tomorrow    Electronically signed by Shashank Manuel MD, 03/16/23, 6:23 PM EDT.

## 2023-03-16 NOTE — ED PROVIDER NOTES
"Time: 3:32 AM EDT  Date of encounter:  3/16/2023  Independent Historian/Clinical History and Information was obtained by:   Patient  Chief Complaint: Bradycardia    History is limited by: N/A    History of Present Illness:      Patient is a 82 y.o. year old female who presents to the emergency department for evaluation of bradycardia.  Pt notes that she got up this morning \"feeling funny\" and everything went dark. Pt denies syncope but notes that she couldn't see anything. Pt denies any pain but reports a slight headache, heartburn and dizziness. Pt reports chronic hand numbness. Pt denies any cardiac history except for a stent. Pt denies taking a double dose of her night medication. Pt notes that she was seen by her PCP because of insomnia yesterday. Pt reports that her heart rate was in the 30's when she contacted EMS. Pt also reports chills.      History provided by:  Patient   used: No        Patient Care Team  Primary Care Provider: Johan Escudero MD    Past Medical History:     Allergies   Allergen Reactions   • Codeine Nausea And Vomiting   • Contrast Dye (Echo Or Unknown Ct/Mr) Hives   • Morphine Nausea And Vomiting     Past Medical History:   Diagnosis Date   • Arthritis    • Asthma    • BPPV (benign paroxysmal positional vertigo), right    • COPD (chronic obstructive pulmonary disease) ([facility])    • Coronary artery disease    • Coronary artery disease    • Ear itching     HX   • Enlarged heart    • Gastroesophageal reflux disease    • Hyperlipidemia    • Hypertension    • Knee pain    • Macular degeneration    • PONV (postoperative nausea and vomiting)    • Shingles     X2   • SOB (shortness of breath) on exertion    • Tinnitus of left ear    • Urinary frequency    • Vertigo      Past Surgical History:   Procedure Laterality Date   • BREAST BIOPSY Bilateral    • CARDIAC CATHETERIZATION     •  SECTION     • CHOLECYSTECTOMY     • COLONOSCOPY     • CORONARY STENT PLACEMENT     • " EYE SURGERY     • GALLBLADDER SURGERY     • KNEE MINI REVISION Right 7/28/2021    Procedure: KNEE POLY INSERT EXCHANGE;  Surgeon: Ky Avila MD;  Location: [facility];  Service: Orthopedics;  Laterality: Right;   • KNEE SURGERY     • REPLACEMENT TOTAL KNEE Right 2005   • REPLACEMENT TOTAL KNEE Left 2004     Family History   Problem Relation Age of Onset   • Breast cancer Mother    • Bone cancer Father    • Breast cancer Sister    • Bone cancer Sister    • Lung cancer Brother    • Malig Hyperthermia Neg Hx        Home Medications:  Prior to Admission medications    Medication Sig Start Date End Date Taking? Authorizing Provider   atorvastatin (LIPITOR) 40 MG tablet Take 1 tablet by mouth Daily.    ProviderBrittany MD Breo Ellipta 200-25 MCG/INH inhaler Inhale 1 puff Daily. 7/7/21   Brittany Garner MD   carvedilol (COREG) 25 MG tablet Take 12.5 mg by mouth 2 (Two) Times a Day. 5/18/15   Brittany Garner MD   fluticasone (FLONASE) 50 MCG/ACT nasal spray 1 spray into the nostril(s) as directed by provider Every Morning. 9/30/14   Brittany Garner MD   hydrochlorothiazide (HYDRODIURIL) 25 MG tablet Take 25 mg by mouth Every Morning. 5/18/15   Brittany Garner MD   meclizine (ANTIVERT) 25 MG tablet Take 1 tablet by mouth 2 (Two) Times a Day As Needed. 5/18/15   Brittany Ganrer MD   montelukast (SINGULAIR) 10 MG tablet Take 1 tablet by mouth Every Night. 5/18/15   Brittany Garner MD   sertraline (ZOLOFT) 50 MG tablet Take 1.5 tablets by mouth Every Night. 5/18/15   Brittany Garner MD   traZODone (DESYREL) 50 MG tablet Take 1 tablet by mouth Every Night.    ProviderBrittany MD   valsartan (DIOVAN) 320 MG tablet Take 1 tablet by mouth Daily With Lunch for 30 days. 10/29/22 11/28/22  Niles Iqbal DO        Social History:   Social History     Tobacco Use   • Smoking status: Never   • Smokeless tobacco: Never   Vaping Use   • Vaping Use: Never used   Substance Use  "Topics   • Alcohol use: No   • Drug use: Never         Review of Systems:  Review of Systems   Constitutional: Negative for chills and fever.   HENT: Negative for congestion, rhinorrhea and sore throat.    Eyes: Negative for pain and visual disturbance.   Respiratory: Negative for apnea, cough, chest tightness and shortness of breath.    Cardiovascular: Negative for chest pain and palpitations.   Gastrointestinal: Negative for abdominal pain, diarrhea, nausea and vomiting.   Genitourinary: Negative for difficulty urinating and dysuria.   Musculoskeletal: Negative for joint swelling and myalgias.   Skin: Negative for color change.   Neurological: Positive for dizziness and headaches. Negative for seizures and syncope.   Psychiatric/Behavioral: Negative.    All other systems reviewed and are negative.       Physical Exam:  /70   Pulse 59   Temp 98.2 °F (36.8 °C) (Oral)   Resp 15   Ht 162.6 cm (64\")   Wt 97.5 kg (214 lb 15.2 oz)   SpO2 97%   BMI 36.90 kg/m²     Physical Exam  Vitals and nursing note reviewed.   Constitutional:       General: She is not in acute distress.     Appearance: Normal appearance. She is not toxic-appearing.   HENT:      Head: Normocephalic and atraumatic.      Jaw: There is normal jaw occlusion.   Eyes:      General: Lids are normal.      Extraocular Movements: Extraocular movements intact.      Conjunctiva/sclera: Conjunctivae normal.      Pupils: Pupils are equal, round, and reactive to light.   Cardiovascular:      Rate and Rhythm: Bradycardia present. Rhythm irregular.      Pulses: Normal pulses.      Heart sounds: Normal heart sounds.   Pulmonary:      Effort: Pulmonary effort is normal. No respiratory distress.      Breath sounds: Normal breath sounds. No wheezing or rhonchi.   Abdominal:      General: Abdomen is flat.      Palpations: Abdomen is soft.      Tenderness: There is no abdominal tenderness. There is no guarding or rebound.   Musculoskeletal:         General: " Normal range of motion.      Cervical back: Normal range of motion and neck supple.      Right lower leg: No edema.      Left lower leg: No edema.   Skin:     General: Skin is warm and dry.   Neurological:      Mental Status: She is alert and oriented to person, place, and time. Mental status is at baseline.   Psychiatric:         Mood and Affect: Mood normal.                  Procedures:  Procedures      Medical Decision Making:      Comorbidities that affect care:    GERD, Hyperlipidemia, COPD, Coronary Artery Disease, Hypertension    External Notes reviewed:    None      The following orders were placed and all results were independently analyzed by me:  Orders Placed This Encounter   Procedures   • XR Chest 1 View   • Harlan Draw   • High Sensitivity Troponin T   • Comprehensive Metabolic Panel   • Lipase   • BNP   • Magnesium   • CBC Auto Differential   • High Sensitivity Troponin T 2Hr   • NPO Diet NPO Type: Strict NPO   • Undress & Gown   • Cardiac Monitoring   • Continuous Pulse Oximetry   • Code Status and Medical Interventions:   • Hospitalist (on-call MD unless specified)   • Oxygen Therapy- Nasal Cannula; 2 LPM; Titrate for SPO2: equal to or greater than, 92%   • ECG 12 Lead ED Triage Standing Order; Chest Pain   • ECG 12 Lead ED Triage Standing Order; Chest Pain   • Insert Peripheral IV   • Initiate Observation Status   • CBC & Differential   • Green Top (Gel)   • Lavender Top   • Gold Top - SST   • Light Blue Top       Medications Given in the Emergency Department:  Medications   sodium chloride 0.9 % flush 10 mL (has no administration in time range)   aspirin chewable tablet 324 mg (324 mg Oral Not Given 3/16/23 0319)        ED Course:    ED Course as of 03/16/23 0745   Thu Mar 16, 2023   9752 My interpretation of EKG: Sinus rhythm 61, sinus pause, left bundle branch block, nonspecific ST change, normal QT, unchanged from previous. [JS]      ED Course User Index  [JS] Shashank Powell MD        Labs:    Lab Results (last 24 hours)     Procedure Component Value Units Date/Time    CBC & Differential [[id number]]  (Abnormal) Collected: 03/16/23 0337    Specimen: Blood Updated: 03/16/23 0353    Narrative:      The following orders were created for panel order CBC & Differential.  Procedure                               Abnormality         Status                     ---------                               -----------         ------                     CBC Auto Differential[[id number]]        Abnormal            Final result                 Please view results for these tests on the individual orders.    Lipase [[id number]]  (Normal) Collected: 03/16/23 0337    Specimen: Blood Updated: 03/16/23 0415     Lipase 23 U/L     BNP [[id number]]  (Normal) Collected: 03/16/23 0337    Specimen: Blood Updated: 03/16/23 0413     proBNP 785.3 pg/mL     Narrative:      Among patients with dyspnea, NT-proBNP is highly sensitive for the detection of acute congestive heart failure. In addition NT-proBNP of <300 pg/ml effectively rules out acute congestive heart failure with 99% negative predictive value.      Magnesium [[id number]]  (Abnormal) Collected: 03/16/23 0337    Specimen: Blood Updated: 03/16/23 0415     Magnesium 1.5 mg/dL     CBC Auto Differential [[id number]]  (Abnormal) Collected: 03/16/23 0337    Specimen: Blood Updated: 03/16/23 0353     WBC 9.34 10*3/mm3      RBC 3.55 10*6/mm3      Hemoglobin 9.9 g/dL      Hematocrit 31.4 %      MCV 88.5 fL      MCH 27.9 pg      MCHC 31.5 g/dL      RDW 15.6 %      RDW-SD 49.6 fl      MPV 10.3 fL      Platelets 297 10*3/mm3      Neutrophil % 56.2 %      Lymphocyte % 30.1 %      Monocyte % 10.4 %      Eosinophil % 2.8 %      Basophil % 0.2 %      Immature Grans % 0.3 %      Neutrophils, Absolute 5.25 10*3/mm3      Lymphocytes, Absolute 2.81 10*3/mm3      Monocytes, Absolute 0.97 10*3/mm3      Eosinophils, Absolute 0.26 10*3/mm3      Basophils, Absolute 0.02 10*3/mm3      Immature  Grans, Absolute 0.03 10*3/mm3      nRBC 0.0 /100 WBC     High Sensitivity Troponin T [[id number]]  (Abnormal) Collected: 03/16/23 0449    Specimen: Blood Updated: 03/16/23 0554     HS Troponin T 60 ng/L     Narrative:      High Sensitive Troponin T Reference Range:  <10.0 ng/L- Negative Female for AMI  <15.0 ng/L- Negative Male for AMI  >=10 - Abnormal Female indicating possible myocardial injury.  >=15 - Abnormal Male indicating possible myocardial injury.   Clinicians would have to utilize clinical acumen, EKG, Troponin, and serial changes to determine if it is an Acute Myocardial Infarction or myocardial injury due to an underlying chronic condition.         Comprehensive Metabolic Panel [[id number]]  (Abnormal) Collected: 03/16/23 0449    Specimen: Blood Updated: 03/16/23 0548     Glucose 110 mg/dL      BUN 29 mg/dL      Creatinine 1.06 mg/dL      Sodium 139 mmol/L      Potassium 4.0 mmol/L      Chloride 103 mmol/L      CO2 22.2 mmol/L      Calcium 9.7 mg/dL      Total Protein 6.6 g/dL      Albumin 3.7 g/dL      ALT (SGPT) 20 U/L      AST (SGOT) 26 U/L      Alkaline Phosphatase 84 U/L      Total Bilirubin 0.3 mg/dL      Globulin 2.9 gm/dL      A/G Ratio 1.3 g/dL      BUN/Creatinine Ratio 27.4     Anion Gap 13.8 mmol/L      eGFR 52.2 mL/min/1.73     Narrative:      GFR Normal >60  Chronic Kidney Disease <60  Kidney Failure <15    The GFR formula is only valid for adults with stable renal function between ages 18 and 70.           Imaging:    XR Chest 1 View    Result Date: 3/16/2023  PROCEDURE: XR CHEST 1 VW  COMPARISON: 10/28/2022.  INDICATIONS: Chest pain.  FINDINGS:  A single AP (or PA) upright portable chest radiograph was performed.  Mild cardiac enlargement is seen.  No acute infiltrate is appreciated.  No pleural effusion or pneumothorax is identified.  Chronic calcified granulomatous disease involves the chest.  External artifacts obscure detail.  The thoracic aorta is atherosclerotic.  Surgical clips are  projected over the right breast.  Degenerative changes involve the imaged spine and the bilateral shoulders.  No significant interval change is seen since the prior study (or studies).        No acute infiltrate is appreciated.     Please note that portions of this note were completed with a voice recognition program.  DARIA CERON JR, MD       Electronically Signed and Approved By: DARIA CERON JR, MD on 3/16/2023 at 3:40                  Differential Diagnosis and Discussion:    Bradycardia  Dizziness: Based on the patient's history, signs, and symptoms, the diffential diagnosis includes but is not limited to meningitis, stroke, sepsis, subarachnoid hemorrhage, intracranial bleeding, encephalitis, vertigo, electrolyte imbalance, and metabolic disorders.  Headache: Differential diagnosis includes but is not limited to migraine, cluster headache, hypertension, tumor, subarachnoid bleeding, pseudotumor cerebri, temporal arteritis, infections, tension headache, and TMJ syndrome.    All labs were reviewed and interpreted by me.  All X-rays were independently reviewed by me.  EKG was interpreted by me.    MDM         Patient Care Considerations:    CONSULT: I considered consulting cardiology, however no chest pain at this time      Consultants/Shared Management Plan:    Hospitalist: I have discussed the case with hospitalist who agrees to accept the patient for admission.    Social Determinants of Health:    Patient is independent, reliable, and has access to care.       Disposition and Care Coordination:    Admit:   Through independent evaluation of the patient's history, physical, and imperical data, the patient meets criteria for observation/admission to the hospital.        Final diagnoses:   Elevated troponin   Bradycardia   Near syncope        ED Disposition     ED Disposition   Decision to Admit    Condition   --    Comment   Level of Care: Telemetry [5]   Diagnosis: Elevated troponin [[id number]]   Admitting  Physician: DOMI CHIU [[id number]]               This medical record created using voice recognition software.        Documentation assistance provided by Michael Brito acting as scribe for Shashank Powell MD. Information recorded by the scribe was done at my direction and has been verified and validated by me.        Michael Brito  03/16/23 0427       Shashank Powell MD  03/16/23 5751

## 2023-03-16 NOTE — H&P
"[facility]   HISTORY AND PHYSICAL    Patient Name: Mallory Franco  : 1940  MRN: [id number]  Primary Care Physician:  Johan Escudero MD  Date of admission: 3/16/2023    Subjective   Subjective     Chief Complaint:   Near syncope    History of Present Illness  Patient is a 82-year-old female with past medical history of CAD, GERD, hyperlipidemia, hypertension, and \"enlarged heart\".  Patient says that she woke up from sleep and tried to get up out of a chair when she felt everything \"going dark\".  She denies losing consciousness but said she felt like she could not see out of either eye.  It resolved very quickly however EMS was called.  The EMS workers said that they documented her pulse in the 20s both at her residence as well as on the ambulance ride in.  Since coming into the hospital however her pulse rates have been in the 60s and 70s and she has had no further instances of near syncope or vision disturbances.  Patient does have a slightly elevated troponin and because of this the ER doctor asked that we admit for NSTEMI rule out and further evaluation.  Review of Systems   Constitutional: Positive for fatigue.   Eyes: Positive for visual disturbance.        Personal History     Past Medical History:   Diagnosis Date   • Arthritis    • Asthma    • BPPV (benign paroxysmal positional vertigo), right    • COPD (chronic obstructive pulmonary disease) ([facility])    • Coronary artery disease    • Coronary artery disease    • Ear itching     HX   • Enlarged heart    • Gastroesophageal reflux disease    • Hyperlipidemia    • Hypertension    • Knee pain    • Macular degeneration    • PONV (postoperative nausea and vomiting)    • Shingles     X2   • SOB (shortness of breath) on exertion    • Tinnitus of left ear    • Urinary frequency    • Vertigo        Past Surgical History:   Procedure Laterality Date   • BREAST BIOPSY Bilateral    • CARDIAC CATHETERIZATION     •  SECTION     • CHOLECYSTECTOMY     • " COLONOSCOPY     • CORONARY STENT PLACEMENT     • EYE SURGERY     • GALLBLADDER SURGERY     • KNEE MINI REVISION Right 7/28/2021    Procedure: KNEE POLY INSERT EXCHANGE;  Surgeon: Ky Avila MD;  Location: [facility];  Service: Orthopedics;  Laterality: Right;   • KNEE SURGERY     • REPLACEMENT TOTAL KNEE Right 2005   • REPLACEMENT TOTAL KNEE Left 2004       Family History: family history includes Bone cancer in her father and sister; Breast cancer in her mother and sister; Lung cancer in her brother. Otherwise pertinent FHx was reviewed and not pertinent to current issue.    Social History:  reports that she has never smoked. She has never used smokeless tobacco. She reports that she does not drink alcohol and does not use drugs.    Home Medications:  Fluticasone Furoate-Vilanterol, atorvastatin, carvedilol, fluticasone, hydroCHLOROthiazide, meclizine, montelukast, sertraline, traZODone, and valsartan    Allergies:  Allergies   Allergen Reactions   • Codeine Nausea And Vomiting   • Contrast Dye (Echo Or Unknown Ct/Mr) Hives   • Morphine Nausea And Vomiting       Objective    Objective     Vitals:   Temp:  [98.2 °F (36.8 °C)] 98.2 °F (36.8 °C)  Heart Rate:  [60-70] 60  Resp:  [15] 15  BP: (165-199)/(78-94) 179/78    Physical Exam   Constitutional:  Well-developed and well-nourished.  No acute distress.      HENT:  Head:  Normocephalic and atraumatic.  Mouth:  Moist mucous membranes.    Eyes:  Conjunctivae and EOM are normal. No scleral icterus.    Neck:  Neck supple.  No JVD present.    Cardiovascular:  Normal rate, regular rhythm and normal heart sounds with no murmur.  Pulmonary/Chest:  No respiratory distress, no wheezes, no crackles, with normal breath sounds and good air movement.  Abdominal:  Soft. No distension and no tenderness.   Musculoskeletal:  No tenderness, and no deformity.  No red or swollen joints anywhere.    Neurological:  Alert and oriented to person, place, and time.  No cranial nerve  deficit.    Skin:  Skin is warm and dry. No rash noted. No pallor.   Peripheral vascular:  No clubbing, no cyanosis, no edema.  Psychiatric: Appropriate mood and affect    Result Review    Result Review:  I have personally reviewed the results from the time of this admission to 3/16/2023 06:40 EDT and agree with these findings:  [x]  Laboratory list / accordion  []  Microbiology  [x]  Radiology  []  EKG/Telemetry   []  Cardiology/Vascular   []  Pathology  []  Old records  []  Other:  Most notable findings include: Elevated troponin      Assessment & Plan   Assessment / Plan     Brief Patient Summary:  Mallory Franco is a 82 y.o. female who presented to the emergency department after having darkened vision without syncope at home.  Patient's pulse rate was documented in the 20s by EMS however it has been normal here in the emergency department.  Because of her presenting signs and symptoms as well as her age and past medical history the ER doctor asked that we admit for further evaluation    Active Hospital Problems:  Active Hospital Problems    Diagnosis    • **Elevated troponin    Near syncope  Bradycardia  CAD  Hypertension  Chronic renal insufficiency/failure    Plan:   Admit to the hospitalist service.  We will trend the patient's troponin levels and treat accordingly.  At this point we are unsure if this is a troponin leak versus a true NSTEMI as the patient has no chest pain.  Continue to monitor on telemetry obtaining serial EKGs.  If we do have documented bradycardia the initial thought would be to hold her beta-blocker.  If the patient continues to have bradycardia, especially severe bradycardia with or without symptoms a cardiology consultation would most likely be beneficial    DVT prophylaxis:  No DVT prophylaxis order currently exists.    CODE STATUS:    Level Of Support Discussed With: Patient  Code Status (Patient has no pulse and is not breathing): CPR (Attempt to Resuscitate)  Medical Interventions  (Patient has pulse or is breathing): Full Support    Admission Status:  I believe this patient meets observation status.    Shane Gonzalez, DO

## 2023-03-17 ENCOUNTER — READMISSION MANAGEMENT (OUTPATIENT)
Dept: CALL CENTER | Facility: HOSPITAL | Age: 83
End: 2023-03-17
Payer: MEDICARE

## 2023-03-17 VITALS
BODY MASS INDEX: 36.7 KG/M2 | RESPIRATION RATE: 18 BRPM | SYSTOLIC BLOOD PRESSURE: 156 MMHG | WEIGHT: 214.95 LBS | HEIGHT: 64 IN | TEMPERATURE: 97.6 F | HEART RATE: 57 BPM | OXYGEN SATURATION: 94 % | DIASTOLIC BLOOD PRESSURE: 91 MMHG

## 2023-03-17 LAB
ALBUMIN SERPL-MCNC: 3.5 G/DL (ref 3.5–5.2)
ALBUMIN/GLOB SERPL: 1.3 G/DL
ALP SERPL-CCNC: 77 U/L (ref 39–117)
ALT SERPL W P-5'-P-CCNC: 16 U/L (ref 1–33)
ANION GAP SERPL CALCULATED.3IONS-SCNC: 10.6 MMOL/L (ref 5–15)
AST SERPL-CCNC: 28 U/L (ref 1–32)
BASOPHILS # BLD AUTO: 0.03 10*3/MM3 (ref 0–0.2)
BASOPHILS NFR BLD AUTO: 0.3 % (ref 0–1.5)
BILIRUB SERPL-MCNC: 0.4 MG/DL (ref 0–1.2)
BUN SERPL-MCNC: 23 MG/DL (ref 8–23)
BUN/CREAT SERPL: 24.7 (ref 7–25)
CALCIUM SPEC-SCNC: 9.7 MG/DL (ref 8.6–10.5)
CHLORIDE SERPL-SCNC: 105 MMOL/L (ref 98–107)
CK MB SERPL-CCNC: 5.6 NG/ML
CK SERPL-CCNC: 102 U/L (ref 20–180)
CO2 SERPL-SCNC: 24.4 MMOL/L (ref 22–29)
CREAT SERPL-MCNC: 0.93 MG/DL (ref 0.57–1)
DEPRECATED RDW RBC AUTO: 49.6 FL (ref 37–54)
EGFRCR SERPLBLD CKD-EPI 2021: 61.1 ML/MIN/1.73
EOSINOPHIL # BLD AUTO: 0.28 10*3/MM3 (ref 0–0.4)
EOSINOPHIL NFR BLD AUTO: 3.2 % (ref 0.3–6.2)
ERYTHROCYTE [DISTWIDTH] IN BLOOD BY AUTOMATED COUNT: 15.7 % (ref 12.3–15.4)
GLOBULIN UR ELPH-MCNC: 2.8 GM/DL
GLUCOSE SERPL-MCNC: 107 MG/DL (ref 65–99)
HCT VFR BLD AUTO: 30.1 % (ref 34–46.6)
HGB BLD-MCNC: 9.6 G/DL (ref 12–15.9)
IMM GRANULOCYTES # BLD AUTO: 0.03 10*3/MM3 (ref 0–0.05)
IMM GRANULOCYTES NFR BLD AUTO: 0.3 % (ref 0–0.5)
LYMPHOCYTES # BLD AUTO: 2.97 10*3/MM3 (ref 0.7–3.1)
LYMPHOCYTES NFR BLD AUTO: 33.9 % (ref 19.6–45.3)
MAGNESIUM SERPL-MCNC: 1.7 MG/DL (ref 1.6–2.4)
MCH RBC QN AUTO: 28 PG (ref 26.6–33)
MCHC RBC AUTO-ENTMCNC: 31.9 G/DL (ref 31.5–35.7)
MCV RBC AUTO: 87.8 FL (ref 79–97)
MONOCYTES # BLD AUTO: 0.92 10*3/MM3 (ref 0.1–0.9)
MONOCYTES NFR BLD AUTO: 10.5 % (ref 5–12)
NEUTROPHILS NFR BLD AUTO: 4.54 10*3/MM3 (ref 1.7–7)
NEUTROPHILS NFR BLD AUTO: 51.8 % (ref 42.7–76)
NRBC BLD AUTO-RTO: 0 /100 WBC (ref 0–0.2)
PHOSPHATE SERPL-MCNC: 3.7 MG/DL (ref 2.5–4.5)
PLATELET # BLD AUTO: 259 10*3/MM3 (ref 140–450)
PMV BLD AUTO: 10.1 FL (ref 6–12)
POTASSIUM SERPL-SCNC: 4.3 MMOL/L (ref 3.5–5.2)
PROT SERPL-MCNC: 6.3 G/DL (ref 6–8.5)
RBC # BLD AUTO: 3.43 10*6/MM3 (ref 3.77–5.28)
SODIUM SERPL-SCNC: 140 MMOL/L (ref 136–145)
TROPONIN T SERPL HS-MCNC: 140 NG/L
WBC NRBC COR # BLD: 8.77 10*3/MM3 (ref 3.4–10.8)

## 2023-03-17 PROCEDURE — 84100 ASSAY OF PHOSPHORUS: CPT | Performed by: STUDENT IN AN ORGANIZED HEALTH CARE EDUCATION/TRAINING PROGRAM

## 2023-03-17 PROCEDURE — 99232 SBSQ HOSP IP/OBS MODERATE 35: CPT | Performed by: SPECIALIST

## 2023-03-17 PROCEDURE — G0378 HOSPITAL OBSERVATION PER HR: HCPCS

## 2023-03-17 PROCEDURE — 82553 CREATINE MB FRACTION: CPT | Performed by: INTERNAL MEDICINE

## 2023-03-17 PROCEDURE — 84484 ASSAY OF TROPONIN QUANT: CPT | Performed by: INTERNAL MEDICINE

## 2023-03-17 PROCEDURE — 94799 UNLISTED PULMONARY SVC/PX: CPT

## 2023-03-17 PROCEDURE — 96372 THER/PROPH/DIAG INJ SC/IM: CPT

## 2023-03-17 PROCEDURE — 82550 ASSAY OF CK (CPK): CPT | Performed by: INTERNAL MEDICINE

## 2023-03-17 PROCEDURE — 83735 ASSAY OF MAGNESIUM: CPT | Performed by: STUDENT IN AN ORGANIZED HEALTH CARE EDUCATION/TRAINING PROGRAM

## 2023-03-17 PROCEDURE — 85025 COMPLETE CBC W/AUTO DIFF WBC: CPT | Performed by: STUDENT IN AN ORGANIZED HEALTH CARE EDUCATION/TRAINING PROGRAM

## 2023-03-17 PROCEDURE — 99239 HOSP IP/OBS DSCHRG MGMT >30: CPT | Performed by: STUDENT IN AN ORGANIZED HEALTH CARE EDUCATION/TRAINING PROGRAM

## 2023-03-17 PROCEDURE — 25010000002 HEPARIN (PORCINE) PER 1000 UNITS: Performed by: FAMILY MEDICINE

## 2023-03-17 PROCEDURE — 80053 COMPREHEN METABOLIC PANEL: CPT | Performed by: STUDENT IN AN ORGANIZED HEALTH CARE EDUCATION/TRAINING PROGRAM

## 2023-03-17 RX ADMIN — Medication 10 ML: at 08:47

## 2023-03-17 RX ADMIN — ATORVASTATIN CALCIUM 40 MG: 40 TABLET, FILM COATED ORAL at 08:40

## 2023-03-17 RX ADMIN — HEPARIN SODIUM 5000 UNITS: 5000 INJECTION, SOLUTION INTRAVENOUS; SUBCUTANEOUS at 06:21

## 2023-03-17 RX ADMIN — VALSARTAN 160 MG: 80 TABLET, FILM COATED ORAL at 08:40

## 2023-03-17 RX ADMIN — ASPIRIN 81 MG: 81 TABLET, COATED ORAL at 08:40

## 2023-03-17 RX ADMIN — HYDROCHLOROTHIAZIDE 25 MG: 25 TABLET ORAL at 06:21

## 2023-03-17 NOTE — DISCHARGE SUMMARY
"               [facility]         HOSPITALIST  DISCHARGE SUMMARY    Patient Name: Mallory Franco  : 1940  MRN: [id number]    Date of Admission: 3/16/2023  Date of Discharge: 3/17/2023  Primary Care Physician: Johan Escudero MD    Consults     Date and Time Order Name Status Description    3/16/2023  9:13 AM Inpatient Cardiology Consult Completed     3/16/2023  6:09 AM Hospitalist (on-call MD unless specified)            Active and Resolved Hospital Problems:  Active Hospital Problems    Diagnosis POA   • **Postural dizziness with presyncope [R42, R55] Yes   • Elevated troponin [R77.8] Yes      Resolved Hospital Problems   No resolved problems to display.       Hospital Course     Hospital Course:  Patient is a 82-year-old female with past medical history of CAD, GERD, hyperlipidemia, hypertension, and \"enlarged heart\".  Patient says that she woke up from sleep and tried to get up out of a chair when she felt everything \"going dark\".  She denies losing consciousness but said she felt like she could not see out of either eye.  It resolved very quickly however EMS was called.  The EMS workers said that they documented her pulse in the 20s both at her residence as well as on the ambulance ride in.  Since coming into the hospital however her pulse rates have been in the 60s and 70s and she has had no further instances of near syncope or vision disturbances.  Patient does have a slightly elevated troponin and because of this the ER doctor asked that we admit for NSTEMI rule out and further evaluation.    Ultimately patient admitted and cardiology was consulted.  Cardiology evaluated the patient and her beta-blocker was held.  Ultimately her heart rate improved.  Her valsartan was also decreased to 160 mg daily.  Patient tolerated this change well.  She was not having any chest pain or chest pressure at the time of discharge.  No further cardiac work-up was recommended patient will follow-up with cardiology " as outpatient as scheduled.  Patient will follow-up with her PCP in 1 week.  She was given an updated medication list at discharge.  She was discharged in a stable condition.      Discharge problem list:  Near syncope suspect secondary to bradycardia  Bradycardia in the setting of beta-blocker use  Hypomagnesemia  Hypertension  COPD exacerbated  GERD  Hyperlipidemia  Vertigo         DISCHARGE Follow Up Recommendations for labs and diagnostics: PCP 1 week.  Cardiology as recommended.      Day of Discharge     Vital Signs:  Temp:  [97.5 °F (36.4 °C)-98.3 °F (36.8 °C)] 97.6 °F (36.4 °C)  Heart Rate:  [57-80] 57  Resp:  [16-18] 18  BP: (148-185)/(59-91) 156/91  Physical Exam:   Constitutional: Alert, awake, no acute distress  HEENT:  PERRLA, EOMI; No Scleral icterus  Neck:  Supple; No Mass, No Lymphadenopathy  Cardiovascular:  No Rubs, No Edema, No JVD  Respiratory: No respiratory distress, unlabored breathing, saturating well on room air  Abdomen:  Normal BS all 4 Quadrants; No Guarding, No Tenderness  Musculoskeletal:  Pulses Positive all 4 Ext; No Cyanosis, No Edema  Neurological:  Alert+Ox3, Mental status WNL; No Dysarthria  Skin:  No Rash, No Cellulitis, No Erythema      Discharge Details        Discharge Medications      Continue These Medications      Instructions Start Date   albuterol (2.5 MG/3ML) 0.083% nebulizer solution  Commonly known as: PROVENTIL   2.5 mg, Nebulization, Every 4 Hours PRN      albuterol sulfate  (90 Base) MCG/ACT inhaler  Commonly known as: PROVENTIL HFA;VENTOLIN HFA;PROAIR HFA   2 puffs, Inhalation, Every 4 Hours PRN      aspirin 81 MG EC tablet   81 mg, Oral, Daily      Breo Ellipta 200-25 MCG/ACT inhaler  Generic drug: Fluticasone Furoate-Vilanterol   1 puff, Inhalation, Daily - RT      famotidine 40 MG tablet  Commonly known as: PEPCID   40 mg, Oral, Every Night at Bedtime      fluticasone 50 MCG/ACT nasal spray  Commonly known as: FLONASE   1 spray, Nasal, Every Morning       hydroCHLOROthiazide 25 MG tablet  Commonly known as: HYDRODIURIL   25 mg, Oral, Every Morning      meclizine 25 MG tablet  Commonly known as: ANTIVERT   25 mg, Oral, 2 Times Daily PRN      montelukast 10 MG tablet  Commonly known as: SINGULAIR   10 mg, Oral, Nightly      pantoprazole 40 MG EC tablet  Commonly known as: PROTONIX   40 mg, Oral, Daily      sertraline 50 MG tablet  Commonly known as: ZOLOFT   50 mg, Oral, Nightly      valsartan 160 MG tablet  Commonly known as: DIOVAN   320 mg, Oral, Daily         Stop These Medications    carvedilol 25 MG tablet  Commonly known as: COREG            Allergies   Allergen Reactions   • Codeine Nausea And Vomiting   • Contrast Dye (Echo Or Unknown Ct/Mr) Hives   • Morphine Nausea And Vomiting       Discharge Disposition:  Home or Self Care    Diet:  Hospital:  Diet Order   Procedures   • Diet: Cardiac Diets; Healthy Heart (2-3 Na+); Texture: Regular Texture (IDDSI 7); Fluid Consistency: Thin (IDDSI 0)       Discharge Activity:       CODE STATUS:  Code Status and Medical Interventions:   Ordered at: 03/16/23 0637     Level Of Support Discussed With:    Patient     Code Status (Patient has no pulse and is not breathing):    CPR (Attempt to Resuscitate)     Medical Interventions (Patient has pulse or is breathing):    Full Support         Future Appointments   Date Time Provider Department Center   5/12/2023 10:00 AM Migue Nguyen MD [facility]       Additional Instructions for the Follow-ups that You Need to Schedule     Discharge Follow-up with PCP   As directed       Currently Documented PCP:    Johan Escudero MD    PCP Phone Number:    [phone]     Follow Up Details: 1 wk         Discharge Follow-up with Specified Provider: Cardiology 1-2 wks   As directed      To: Cardiology 1-2 wks               Pertinent  and/or Most Recent Results     PROCEDURES:   Adult Transthoracic Echo Complete W/ Cont if Necessary Per Protocol    Result Date:  3/16/2023  Narrative: •  Left ventricular systolic function is normal. Left ventricular ejection fraction appears to be 51 - 55%. •  Left ventricular wall thickness is consistent with mild concentric hypertrophy. •  Left ventricular diastolic function is consistent with (grade I) impaired relaxation. •  There is mild to moderate mitral regurgitation. •  There is trace aortic insufficiency. •  There is mild tricuspid regurgitation.  Estimated right ventricular systolic pressure from tricuspid regurgitation is markedly elevated (>55 mmHg).     XR Chest 1 View    Result Date: 3/16/2023  Narrative: PROCEDURE: XR CHEST 1 VW  COMPARISON: 10/28/2022.  INDICATIONS: Chest pain.  FINDINGS:  A single AP (or PA) upright portable chest radiograph was performed.  Mild cardiac enlargement is seen.  No acute infiltrate is appreciated.  No pleural effusion or pneumothorax is identified.  Chronic calcified granulomatous disease involves the chest.  External artifacts obscure detail.  The thoracic aorta is atherosclerotic.  Surgical clips are projected over the right breast.  Degenerative changes involve the imaged spine and the bilateral shoulders.  No significant interval change is seen since the prior study (or studies).      Impression:   No acute infiltrate is appreciated.     Please note that portions of this note were completed with a voice recognition program.  DARIA CERON JR, MD       Electronically Signed and Approved By: DARIA CERON JR, MD on 3/16/2023 at 3:40                  LAB RESULTS:      Lab 03/17/23 0411 03/16/23 0337   WBC 8.77 9.34   HEMOGLOBIN 9.6* 9.9*   HEMATOCRIT 30.1* 31.4*   PLATELETS 259 297   NEUTROS ABS 4.54 5.25   IMMATURE GRANS (ABS) 0.03 0.03   LYMPHS ABS 2.97 2.81   MONOS ABS 0.92* 0.97*   EOS ABS 0.28 0.26   MCV 87.8 88.5         Lab 03/17/23  0411 03/16/23  0449 03/16/23 0337   SODIUM 140 139  --    POTASSIUM 4.3 4.0  --    CHLORIDE 105 103  --    CO2 24.4 22.2  --    ANION GAP 10.6 13.8   --    BUN 23 29*  --    CREATININE 0.93 1.06*  --    EGFR 61.1 52.2*  --    GLUCOSE 107* 110*  --    CALCIUM 9.7 9.7  --    MAGNESIUM 1.7  --  1.5*   PHOSPHORUS 3.7  --   --          Lab 03/17/23  0411 03/16/23 0449 03/16/23  0337   TOTAL PROTEIN 6.3 6.6  --    ALBUMIN 3.5 3.7  --    GLOBULIN 2.8 2.9  --    ALT (SGPT) 16 20  --    AST (SGOT) 28 26  --    BILIRUBIN 0.4 0.3  --    ALK PHOS 77 84  --    LIPASE  --   --  23         Lab 03/17/23  0411 03/16/23  0928 03/16/23 0449 03/16/23 0337   PROBNP  --   --   --  785.3   HSTROP T 140* 166* 60*  --              Lab 03/16/23 0928   IRON 75   IRON SATURATION 16*   TIBC 459   TRANSFERRIN 308   FERRITIN 36.97         Brief Urine Lab Results     None        Microbiology Results (last 10 days)     ** No results found for the last 240 hours. **          XR Chest 1 View    Result Date: 3/16/2023  Impression:   No acute infiltrate is appreciated.     Please note that portions of this note were completed with a voice recognition program.  DARIA CERON JR, MD       Electronically Signed and Approved By: DARIA CERON JR, MD on 3/16/2023 at 3:40                        Results for orders placed during the hospital encounter of 03/16/23    Adult Transthoracic Echo Complete W/ Cont if Necessary Per Protocol    Interpretation Summary  •  Left ventricular systolic function is normal. Left ventricular ejection fraction appears to be 51 - 55%.  •  Left ventricular wall thickness is consistent with mild concentric hypertrophy.  •  Left ventricular diastolic function is consistent with (grade I) impaired relaxation.  •  There is mild to moderate mitral regurgitation.  •  There is trace aortic insufficiency.  •  There is mild tricuspid regurgitation.  Estimated right ventricular systolic pressure from tricuspid regurgitation is markedly elevated (>55 mmHg).      Labs Pending at Discharge:        Time spent on Discharge including face to face service:  32 minutes    Electronically  signed by Niles Iqbal DO, 03/17/23, 9:53 AM EDT.

## 2023-03-17 NOTE — PLAN OF CARE
Goal Outcome Evaluation:    Patient to be discharged home today with family. Patient denies c/o pain, discomfort, or dizziness at this time. Patients IV removed. Verbal and written education provided to patient, patient and family verbalized understanding. Follow up appointments made.

## 2023-03-17 NOTE — PROGRESS NOTES
[facility]   Cardiology Progress Note      Patient Name: Mallory Franco  : 1940  MRN: [id number]  Primary Care Physician:  Johan Escudero MD  Referring Physician: No Known Provider  Date of admission: 3/16/2023    Subjective   Subjective     Chief Complaint: Bradycardia    HPI:  Mallory Franco is a 82 y.o. female with history of coronary disease, hypertension admitted with a near syncopal episode no further episodes.  Alert oriented no distress.    REVIEW OF SYSTEMS    Constitutional:    No fever, no weight loss  Skin:     No rash  Otolaryngeal:    No difficulty swallowing  Cardiovascular:  No chest pain or shortness of breath  Pulmonary:    No cough, no sputum production    Objective    Objective     Vitals:   Vitals:    23 2133 23 2305 23 0242 23 0800   BP:  160/67 (!) 185/72 156/91   BP Location:  Right arm Right arm Right arm   Patient Position:  Lying Lying Lying   Pulse: 74 64 70 57   Resp: 18 18 18 18   Temp:  98 °F (36.7 °C) 98.3 °F (36.8 °C) 97.6 °F (36.4 °C)   TempSrc:  Oral Oral Oral   SpO2: 97% 97% 94% 99%   Weight:       Height:                Physical Exam:   Constitutional: Awake, alert, No acute distress    Eyes: PERRLA, sclerae anicteric, no conjunctival injection   HENT: NCAT, mucous membranes moist   Neck: Supple, no thyromegaly, no lymphadenopathy, trachea midline   Respiratory: Clear to auscultation bilaterally, nonlabored respirations    Cardiovascular: RRR, no murmurs, rubs, or gallops, palpable pedal pulses bilaterally   Gastrointestinal: Positive bowel sounds, soft, nontender, nondistended   Musculoskeletal: No bilateral ankle edema, no clubbing or cyanosis to extremities   Psychiatric: Appropriate affect, cooperative   Neurologic: Oriented x 3, strength symmetric in all extremities, Cranial Nerves grossly intact to confrontation, speech clear   Skin: No rashes.      Current medications:  aspirin, 81 mg, Oral, Daily  atorvastatin, 40 mg, Oral,  Daily  heparin (porcine), 5,000 Units, Subcutaneous, Q8H  hydroCHLOROthiazide, 25 mg, Oral, QAM  montelukast, 10 mg, Oral, Nightly  sodium chloride, 10 mL, Intravenous, Q12H  valsartan, 160 mg, Oral, Daily      Current IV drips:       Result Review    Result Review:  I have personally reviewed the results from the time of this admission to 3/17/2023 08:16 EDT and agree with these findings:  []  Laboratory  []  EKG/Telemetry   []  Cardiology/Vascular   []  Radiology         CBC    CBC 10/29/22 3/16/23 3/17/23   WBC 7.07 9.34 8.77   RBC 3.71 (A) 3.55 (A) 3.43 (A)   Hemoglobin 10.6 (A) 9.9 (A) 9.6 (A)   Hematocrit 32.7 (A) 31.4 (A) 30.1 (A)   MCV 88.1 88.5 87.8   MCH 28.6 27.9 28.0   MCHC 32.4 31.5 31.9   RDW 14.6 15.6 (A) 15.7 (A)   Platelets 285 297 259   (A) Abnormal value            CMP    CMP 10/29/22 3/16/23 3/17/23   Glucose 114 (A) 110 (A) 107 (A)   BUN 19 29 (A) 23   Creatinine 0.91 1.06 (A) 0.93   eGFR 62.7 52.2 (A) 61.1   Sodium 136 139 140   Potassium 3.7 4.0 4.3   Chloride 100 103 105   Calcium 9.9 9.7 9.7   Total Protein 7.2 6.6 6.3   Albumin 4.10 3.7 3.5   Globulin 3.1 2.9 2.8   Total Bilirubin 0.6 0.3 0.4   Alkaline Phosphatase 90 84 77   AST (SGOT) 21 26 28   ALT (SGPT) 13 20 16   Albumin/Globulin Ratio 1.3 1.3 1.3   BUN/Creatinine Ratio 20.9 27.4 (A) 24.7   Anion Gap 10.3 13.8 10.6   (A) Abnormal value       Comments are available for some flowsheets but are not being displayed.           Results for orders placed during the hospital encounter of 03/16/23    Adult Transthoracic Echo Complete W/ Cont if Necessary Per Protocol    Interpretation Summary  •  Left ventricular systolic function is normal. Left ventricular ejection fraction appears to be 51 - 55%.  •  Left ventricular wall thickness is consistent with mild concentric hypertrophy.  •  Left ventricular diastolic function is consistent with (grade I) impaired relaxation.  •  There is mild to moderate mitral regurgitation.  •  There is trace  aortic insufficiency.  •  There is mild tricuspid regurgitation.  Estimated right ventricular systolic pressure from tricuspid regurgitation is markedly elevated (>55 mmHg).        Lab Results   Component Value Date    PROBNP 785.3 03/16/2023         Telemetry reviewed shows sinus rhythm.      Assessment / Plan     ASSESSMENT:  1.  Near syncope possibly secondary to bradycardia improved.  2.  CORONARY ARTERY DISEASE stable.        PLAN:  1.  Echocardiogram shows normal left ventricular systolic function.  2.  Hold carvedilol for now.  3. Event monitor as an outpatient.  4.  Continue current home meds.  Electronically signed by Migue Nguyen MD, 03/17/23, 8:16 AM EDT.

## 2023-03-17 NOTE — OUTREACH NOTE
Prep Survey    Flowsheet Row Responses   [facility] facility patient discharged from? [facility]   Is LACE score < 7 ? Yes   Eligibility Not Eligible   What are the reasons patient is not eligible? Other  [Low risk for readmission]   Does the patient have one of the following disease processes/diagnoses(primary or secondary)? Other   Prep survey completed? Yes          Vane GOSS - Registered Nurse

## 2023-03-18 NOTE — PROGRESS NOTES
[facility]  Cardiology progress Note    Patient Name: Mallory Franco  : 1940    CHIEF COMPLAINT  Hypertension        Subjective   Subjective     HISTORY OF PRESENT ILLNESS    Mallory Franco is a 82 y.o. female with history of hypertension recently in the hospital with syncopal episode.  Feels better after stopping carvedilol.    REVIEW OF SYSTEMS    Constitutional:    No fever, no weight loss  Skin:     No rash  Otolaryngeal:    No difficulty swallowing  Cardiovascular: See HPI.  Pulmonary:    No cough, no sputum production    Personal History     Social History:    reports that she has never smoked. She has never used smokeless tobacco. She reports that she does not drink alcohol and does not use drugs.    Home Medications:  Current Outpatient Medications on File Prior to Visit   Medication Sig   • albuterol (PROVENTIL) (2.5 MG/3ML) 0.083% nebulizer solution Take 2.5 mg by nebulization Every 4 (Four) Hours As Needed for Wheezing.   • albuterol sulfate  (90 Base) MCG/ACT inhaler Inhale 2 puffs Every 4 (Four) Hours As Needed.   • aspirin 81 MG EC tablet Take 1 tablet by mouth Daily.   • Breo Ellipta 200-25 MCG/INH inhaler Inhale 1 puff Daily.   • famotidine (PEPCID) 40 MG tablet Take 1 tablet by mouth every night at bedtime.   • fluticasone (FLONASE) 50 MCG/ACT nasal spray 1 spray into the nostril(s) as directed by provider Every Morning.   • hydrochlorothiazide (HYDRODIURIL) 25 MG tablet Take 1 tablet by mouth Every Morning.   • meclizine (ANTIVERT) 25 MG tablet Take 1 tablet by mouth 2 (Two) Times a Day As Needed.   • montelukast (SINGULAIR) 10 MG tablet Take 1 tablet by mouth Every Night.   • pantoprazole (PROTONIX) 40 MG EC tablet Take 1 tablet by mouth Daily.   • sertraline (ZOLOFT) 50 MG tablet Take 1 tablet by mouth Every Night.   • valsartan (DIOVAN) 160 MG tablet Take 2 tablets by mouth Daily.     Current Facility-Administered Medications on File Prior to Visit   Medication   •  Chlorhexidine Gluconate Cloth 2 % pads       Past Medical History:   Diagnosis Date   • Arthritis    • Asthma    • BPPV (benign paroxysmal positional vertigo), right    • COPD (chronic obstructive pulmonary disease) ([facility])    • Coronary artery disease    • Coronary artery disease    • Ear itching     HX   • Enlarged heart    • Gastroesophageal reflux disease    • Hyperlipidemia    • Hypertension    • Knee pain    • Macular degeneration    • PONV (postoperative nausea and vomiting)    • Shingles     X2   • SOB (shortness of breath) on exertion    • Tinnitus of left ear    • Urinary frequency    • Vertigo        Allergies:  Allergies   Allergen Reactions   • Codeine Nausea And Vomiting   • Contrast Dye (Echo Or Unknown Ct/Mr) Hives   • Morphine Nausea And Vomiting       Objective    Objective       Vitals:   Heart Rate:  [88] 88  BP: (201)/(72) 201/72  Body mass index is 32.89 kg/m².     PHYSICAL EXAM:    General Appearance:   · well developed  · well nourished  HENT:   · oropharynx moist  · lips not cyanotic  Neck:  · thyroid not enlarged  · supple  Respiratory:  · no respiratory distress  · normal breath sounds  · no rales  Cardiovascular:  · no jugular venous distention  · regular rhythm  · apical impulse normal  · S1 normal, S2 normal  · no S3, no S4   · no murmur  · no rub, no thrill  · carotid pulses normal; no bruit  · pedal pulses normal  · lower extremity edema: none    Skin:   · warm, dry  Psychiatric:  · judgement and insight appropriate  · normal mood and affect        Result Review:  I have personally reviewed the available results from  [x]  Laboratory  [x]  EKG  [x]  Cardiology  [x]  Medications  [x]  Old records  []  Other:     Procedures  Results for orders placed during the hospital encounter of 03/16/23    Adult Transthoracic Echo Complete W/ Cont if Necessary Per Protocol    Interpretation Summary  •  Left ventricular systolic function is normal. Left ventricular ejection fraction appears to be 51 -  55%.  •  Left ventricular wall thickness is consistent with mild concentric hypertrophy.  •  Left ventricular diastolic function is consistent with (grade I) impaired relaxation.  •  There is mild to moderate mitral regurgitation.  •  There is trace aortic insufficiency.  •  There is mild tricuspid regurgitation.  Estimated right ventricular systolic pressure from tricuspid regurgitation is markedly elevated (>55 mmHg).     Impression/Plan:  1.  Syncopal episode/dizziness: 30-day event monitor to evaluate for any significant bradycardia arrhythmias.  2.  Essential hypertension Uncontrolled: Continue Diovan 320 mg once a day.  Add amlodipine 5 mg once a day.  Monitor blood pressure regularly.  3.  Coronary disease s/p PTCA/stent stable: Continue aspirin 81 mg once a day.           Migue Nguyen MD   03/21/23   14:47 EDT

## 2023-03-21 ENCOUNTER — OFFICE VISIT (OUTPATIENT)
Dept: CARDIOLOGY | Facility: CLINIC | Age: 83
End: 2023-03-21
Payer: MEDICARE

## 2023-03-21 VITALS
DIASTOLIC BLOOD PRESSURE: 72 MMHG | BODY MASS INDEX: 32.71 KG/M2 | HEART RATE: 88 BPM | HEIGHT: 64 IN | WEIGHT: 191.6 LBS | SYSTOLIC BLOOD PRESSURE: 201 MMHG

## 2023-03-21 DIAGNOSIS — R55 SYNCOPE, UNSPECIFIED SYNCOPE TYPE: ICD-10-CM

## 2023-03-21 DIAGNOSIS — I25.10 CORONARY ARTERY DISEASE INVOLVING NATIVE CORONARY ARTERY OF NATIVE HEART WITHOUT ANGINA PECTORIS: Primary | ICD-10-CM

## 2023-03-21 DIAGNOSIS — Z95.5 HISTORY OF CORONARY ANGIOPLASTY WITH INSERTION OF STENT: ICD-10-CM

## 2023-03-21 DIAGNOSIS — I10 HYPERTENSION, ESSENTIAL: ICD-10-CM

## 2023-03-21 PROCEDURE — 1159F MED LIST DOCD IN RCRD: CPT | Performed by: SPECIALIST

## 2023-03-21 PROCEDURE — 3078F DIAST BP <80 MM HG: CPT | Performed by: SPECIALIST

## 2023-03-21 PROCEDURE — 99214 OFFICE O/P EST MOD 30 MIN: CPT | Performed by: SPECIALIST

## 2023-03-21 PROCEDURE — 1160F RVW MEDS BY RX/DR IN RCRD: CPT | Performed by: SPECIALIST

## 2023-03-21 PROCEDURE — 3077F SYST BP >= 140 MM HG: CPT | Performed by: SPECIALIST

## 2023-03-21 RX ORDER — AMLODIPINE BESYLATE 5 MG/1
5 TABLET ORAL DAILY
Qty: 90 TABLET | Refills: 3 | Status: SHIPPED | OUTPATIENT
Start: 2023-03-21

## 2023-03-25 ENCOUNTER — APPOINTMENT (OUTPATIENT)
Dept: GENERAL RADIOLOGY | Facility: HOSPITAL | Age: 83
End: 2023-03-25
Payer: MEDICARE

## 2023-03-25 ENCOUNTER — HOSPITAL ENCOUNTER (OUTPATIENT)
Facility: HOSPITAL | Age: 83
Discharge: HOME | End: 2023-03-28
Attending: EMERGENCY MEDICINE | Admitting: INTERNAL MEDICINE
Payer: MEDICARE

## 2023-03-25 DIAGNOSIS — R26.2 DIFFICULTY IN WALKING: ICD-10-CM

## 2023-03-25 DIAGNOSIS — R00.2 PALPITATIONS: Primary | ICD-10-CM

## 2023-03-25 DIAGNOSIS — I44.1 MOBITZ TYPE 2 SECOND DEGREE ATRIOVENTRICULAR BLOCK: ICD-10-CM

## 2023-03-25 DIAGNOSIS — I49.5 SSS (SICK SINUS SYNDROME): ICD-10-CM

## 2023-03-25 DIAGNOSIS — Z78.9 DECREASED ACTIVITIES OF DAILY LIVING (ADL): ICD-10-CM

## 2023-03-25 LAB
ALBUMIN SERPL-MCNC: 4.3 G/DL (ref 3.5–5.2)
ALBUMIN/GLOB SERPL: 1.5 G/DL
ALP SERPL-CCNC: 89 U/L (ref 39–117)
ALT SERPL W P-5'-P-CCNC: 17 U/L (ref 1–33)
ANION GAP SERPL CALCULATED.3IONS-SCNC: 9.7 MMOL/L (ref 5–15)
AST SERPL-CCNC: 23 U/L (ref 1–32)
BASOPHILS # BLD AUTO: 0.02 10*3/MM3 (ref 0–0.2)
BASOPHILS NFR BLD AUTO: 0.2 % (ref 0–1.5)
BILIRUB SERPL-MCNC: 0.4 MG/DL (ref 0–1.2)
BUN SERPL-MCNC: 38 MG/DL (ref 8–23)
BUN/CREAT SERPL: 35.8 (ref 7–25)
CALCIUM SPEC-SCNC: 9.7 MG/DL (ref 8.6–10.5)
CHLORIDE SERPL-SCNC: 100 MMOL/L (ref 98–107)
CO2 SERPL-SCNC: 25.3 MMOL/L (ref 22–29)
CREAT SERPL-MCNC: 1.06 MG/DL (ref 0.57–1)
DEPRECATED RDW RBC AUTO: 54.6 FL (ref 37–54)
EGFRCR SERPLBLD CKD-EPI 2021: 52.2 ML/MIN/1.73
EOSINOPHIL # BLD AUTO: 0.22 10*3/MM3 (ref 0–0.4)
EOSINOPHIL NFR BLD AUTO: 2.6 % (ref 0.3–6.2)
ERYTHROCYTE [DISTWIDTH] IN BLOOD BY AUTOMATED COUNT: 17 % (ref 12.3–15.4)
GLOBULIN UR ELPH-MCNC: 2.9 GM/DL
GLUCOSE SERPL-MCNC: 95 MG/DL (ref 65–99)
HCT VFR BLD AUTO: 34.5 % (ref 34–46.6)
HGB BLD-MCNC: 10.8 G/DL (ref 12–15.9)
HOLD SPECIMEN: NORMAL
HOLD SPECIMEN: NORMAL
IMM GRANULOCYTES # BLD AUTO: 0.03 10*3/MM3 (ref 0–0.05)
IMM GRANULOCYTES NFR BLD AUTO: 0.3 % (ref 0–0.5)
LYMPHOCYTES # BLD AUTO: 2.26 10*3/MM3 (ref 0.7–3.1)
LYMPHOCYTES NFR BLD AUTO: 26.3 % (ref 19.6–45.3)
MAGNESIUM SERPL-MCNC: 1.5 MG/DL (ref 1.6–2.4)
MCH RBC QN AUTO: 28.1 PG (ref 26.6–33)
MCHC RBC AUTO-ENTMCNC: 31.3 G/DL (ref 31.5–35.7)
MCV RBC AUTO: 89.8 FL (ref 79–97)
MONOCYTES # BLD AUTO: 0.82 10*3/MM3 (ref 0.1–0.9)
MONOCYTES NFR BLD AUTO: 9.6 % (ref 5–12)
NEUTROPHILS NFR BLD AUTO: 5.23 10*3/MM3 (ref 1.7–7)
NEUTROPHILS NFR BLD AUTO: 61 % (ref 42.7–76)
NRBC BLD AUTO-RTO: 0 /100 WBC (ref 0–0.2)
NT-PROBNP SERPL-MCNC: 371.7 PG/ML (ref 0–1800)
PLATELET # BLD AUTO: 300 10*3/MM3 (ref 140–450)
PMV BLD AUTO: 9.3 FL (ref 6–12)
POTASSIUM SERPL-SCNC: 4.4 MMOL/L (ref 3.5–5.2)
PROT SERPL-MCNC: 7.2 G/DL (ref 6–8.5)
RBC # BLD AUTO: 3.84 10*6/MM3 (ref 3.77–5.28)
SODIUM SERPL-SCNC: 135 MMOL/L (ref 136–145)
TROPONIN T SERPL HS-MCNC: 41 NG/L
TSH SERPL DL<=0.05 MIU/L-ACNC: 1.5 UIU/ML (ref 0.27–4.2)
WBC NRBC COR # BLD: 8.58 10*3/MM3 (ref 3.4–10.8)
WHOLE BLOOD HOLD COAG: NORMAL
WHOLE BLOOD HOLD SPECIMEN: NORMAL

## 2023-03-25 PROCEDURE — 85025 COMPLETE CBC W/AUTO DIFF WBC: CPT | Performed by: EMERGENCY MEDICINE

## 2023-03-25 PROCEDURE — 80053 COMPREHEN METABOLIC PANEL: CPT | Performed by: EMERGENCY MEDICINE

## 2023-03-25 PROCEDURE — 99285 EMERGENCY DEPT VISIT HI MDM: CPT

## 2023-03-25 PROCEDURE — 96365 THER/PROPH/DIAG IV INF INIT: CPT

## 2023-03-25 PROCEDURE — G0378 HOSPITAL OBSERVATION PER HR: HCPCS

## 2023-03-25 PROCEDURE — 84484 ASSAY OF TROPONIN QUANT: CPT | Performed by: EMERGENCY MEDICINE

## 2023-03-25 PROCEDURE — 25010000002 MAGNESIUM SULFATE 2 GM/50ML SOLUTION: Performed by: INTERNAL MEDICINE

## 2023-03-25 PROCEDURE — 71045 X-RAY EXAM CHEST 1 VIEW: CPT

## 2023-03-25 PROCEDURE — 99223 1ST HOSP IP/OBS HIGH 75: CPT | Performed by: INTERNAL MEDICINE

## 2023-03-25 PROCEDURE — 25010000002 ENOXAPARIN PER 10 MG: Performed by: INTERNAL MEDICINE

## 2023-03-25 PROCEDURE — 93005 ELECTROCARDIOGRAM TRACING: CPT | Performed by: EMERGENCY MEDICINE

## 2023-03-25 PROCEDURE — 83735 ASSAY OF MAGNESIUM: CPT | Performed by: EMERGENCY MEDICINE

## 2023-03-25 PROCEDURE — 96372 THER/PROPH/DIAG INJ SC/IM: CPT

## 2023-03-25 PROCEDURE — 93010 ELECTROCARDIOGRAM REPORT: CPT | Performed by: INTERNAL MEDICINE

## 2023-03-25 PROCEDURE — 94799 UNLISTED PULMONARY SVC/PX: CPT

## 2023-03-25 PROCEDURE — 83880 ASSAY OF NATRIURETIC PEPTIDE: CPT | Performed by: INTERNAL MEDICINE

## 2023-03-25 PROCEDURE — 84443 ASSAY THYROID STIM HORMONE: CPT | Performed by: EMERGENCY MEDICINE

## 2023-03-25 RX ORDER — ENOXAPARIN SODIUM 100 MG/ML
40 INJECTION SUBCUTANEOUS DAILY
Status: DISCONTINUED | OUTPATIENT
Start: 2023-03-25 | End: 2023-03-28

## 2023-03-25 RX ORDER — ALBUTEROL SULFATE 2.5 MG/3ML
2.5 SOLUTION RESPIRATORY (INHALATION) EVERY 4 HOURS PRN
COMMUNITY

## 2023-03-25 RX ORDER — SODIUM CHLORIDE 0.9 % (FLUSH) 0.9 %
10 SYRINGE (ML) INJECTION AS NEEDED
Status: DISCONTINUED | OUTPATIENT
Start: 2023-03-25 | End: 2023-03-28 | Stop reason: HOSPADM

## 2023-03-25 RX ORDER — ONDANSETRON 4 MG/1
4 TABLET, FILM COATED ORAL EVERY 6 HOURS PRN
Status: DISCONTINUED | OUTPATIENT
Start: 2023-03-25 | End: 2023-03-28 | Stop reason: HOSPADM

## 2023-03-25 RX ORDER — ACETAMINOPHEN 325 MG/1
650 TABLET ORAL EVERY 4 HOURS PRN
Status: DISCONTINUED | OUTPATIENT
Start: 2023-03-25 | End: 2023-03-28 | Stop reason: HOSPADM

## 2023-03-25 RX ORDER — SODIUM CHLORIDE 9 MG/ML
40 INJECTION, SOLUTION INTRAVENOUS AS NEEDED
Status: DISCONTINUED | OUTPATIENT
Start: 2023-03-25 | End: 2023-03-28 | Stop reason: HOSPADM

## 2023-03-25 RX ORDER — MAGNESIUM SULFATE HEPTAHYDRATE 40 MG/ML
2 INJECTION, SOLUTION INTRAVENOUS ONCE
Status: COMPLETED | OUTPATIENT
Start: 2023-03-25 | End: 2023-03-25

## 2023-03-25 RX ORDER — SODIUM CHLORIDE 0.9 % (FLUSH) 0.9 %
10 SYRINGE (ML) INJECTION EVERY 12 HOURS SCHEDULED
Status: DISCONTINUED | OUTPATIENT
Start: 2023-03-25 | End: 2023-03-28 | Stop reason: HOSPADM

## 2023-03-25 RX ADMIN — ENOXAPARIN SODIUM 40 MG: 100 INJECTION SUBCUTANEOUS at 19:57

## 2023-03-25 RX ADMIN — MAGNESIUM SULFATE HEPTAHYDRATE 2 G: 2 INJECTION, SOLUTION INTRAVENOUS at 19:29

## 2023-03-25 RX ADMIN — Medication 10 ML: at 20:58

## 2023-03-25 NOTE — ED PROVIDER NOTES
Time: 1:34 PM EDT  Date of encounter:  3/25/2023  Independent Historian/Clinical History and Information was obtained by:   Patient  Chief Complaint: Rapid Heart Rate    History is limited by: N/A    History of Present Illness:  Patient is a 82 y.o. year old female who presents to the emergency department for evaluation of rapid heart rate onset one week ago. Pt was seen one week ago for similar sx and has also been experiencing some irregularity with her blood pressure. Pt recently started some medication for blood pressure and has been increasingly fatigued in addition to sx. Pt has a hx of COPD, HTN, GERD, and CAD. PT currently denies any dizziness or LOC.     HPI    Patient Care Team  Primary Care Provider: Johan Escudero MD    Past Medical History:     Allergies   Allergen Reactions   • Codeine Nausea And Vomiting   • Contrast Dye (Echo Or Unknown Ct/Mr) Hives   • Morphine Nausea And Vomiting     Past Medical History:   Diagnosis Date   • Arthritis    • Asthma    • BPPV (benign paroxysmal positional vertigo), right    • COPD (chronic obstructive pulmonary disease) (HCC)    • Coronary artery disease    • Coronary artery disease    • Ear itching     HX   • Enlarged heart    • Gastroesophageal reflux disease    • Hyperlipidemia    • Hypertension    • Knee pain    • Macular degeneration    • PONV (postoperative nausea and vomiting)    • Shingles     X2   • SOB (shortness of breath) on exertion    • Tinnitus of left ear    • Urinary frequency    • Vertigo      Past Surgical History:   Procedure Laterality Date   • BREAST BIOPSY Bilateral    • CARDIAC CATHETERIZATION     •  SECTION     • CHOLECYSTECTOMY     • COLONOSCOPY     • CORONARY STENT PLACEMENT     • EYE SURGERY     • GALLBLADDER SURGERY     • KNEE MINI REVISION Right 2021    Procedure: KNEE POLY INSERT EXCHANGE;  Surgeon: Ky Avila MD;  Location: [facility];  Service: Orthopedics;  Laterality: Right;   • KNEE SURGERY     •  REPLACEMENT TOTAL KNEE Right 2005   • REPLACEMENT TOTAL KNEE Left 2004     Family History   Problem Relation Age of Onset   • Breast cancer Mother    • Bone cancer Father    • Breast cancer Sister    • Bone cancer Sister    • Lung cancer Brother    • Malig Hyperthermia Neg Hx        Home Medications:  Prior to Admission medications    Medication Sig Start Date End Date Taking? Authorizing Provider   albuterol (PROVENTIL) (2.5 MG/3ML) 0.083% nebulizer solution Take 2.5 mg by nebulization Every 4 (Four) Hours As Needed for Wheezing.    Brittany Garner MD   albuterol sulfate  (90 Base) MCG/ACT inhaler Inhale 2 puffs Every 4 (Four) Hours As Needed. 3/13/23   Brittany Garner MD   amLODIPine (NORVASC) 5 MG tablet Take 1 tablet by mouth Daily. 3/21/23   Migue Nguyen MD   aspirin 81 MG EC tablet Take 1 tablet by mouth Daily.    Brittany Garner MD   Bresma Ellipta 200-25 MCG/INH inhaler Inhale 1 puff Daily. 7/7/21   Brittany Garner MD   famotidine (PEPCID) 40 MG tablet Take 1 tablet by mouth every night at bedtime. 1/14/23   Brittany Garner MD   fluticasone (FLONASE) 50 MCG/ACT nasal spray 1 spray into the nostril(s) as directed by provider Every Morning. 9/30/14   Brittany Garner MD   hydrochlorothiazide (HYDRODIURIL) 25 MG tablet Take 1 tablet by mouth Every Morning. 5/18/15   Brittany Garner MD   meclizine (ANTIVERT) 25 MG tablet Take 1 tablet by mouth 2 (Two) Times a Day As Needed. 5/18/15   Brittany Garner MD   montelukast (SINGULAIR) 10 MG tablet Take 1 tablet by mouth Every Night. 5/18/15   Brittany Garner MD   pantoprazole (PROTONIX) 40 MG EC tablet Take 1 tablet by mouth Daily. 1/6/23   Brittany Garner MD   sertraline (ZOLOFT) 50 MG tablet Take 1 tablet by mouth Every Night. 5/18/15   Brittany Garner MD   valsartan (DIOVAN) 160 MG tablet Take 2 tablets by mouth Daily. 12/7/22   Brittany Garner MD        Social History:   Social  "History     Tobacco Use   • Smoking status: Never   • Smokeless tobacco: Never   Vaping Use   • Vaping Use: Never used   Substance Use Topics   • Alcohol use: No   • Drug use: Never         Review of Systems:  Review of Systems   Constitutional: Negative for chills and fever.   HENT: Negative for congestion, rhinorrhea and sore throat.    Eyes: Negative for photophobia.   Respiratory: Negative for apnea, cough, chest tightness and shortness of breath.    Cardiovascular: Positive for palpitations. Negative for chest pain.   Gastrointestinal: Negative for abdominal pain, diarrhea, nausea and vomiting.   Endocrine: Negative.    Genitourinary: Negative for difficulty urinating and dysuria.   Musculoskeletal: Negative for back pain, joint swelling and myalgias.   Skin: Negative for color change and wound.   Allergic/Immunologic: Negative.    Neurological: Negative for seizures and headaches.   Psychiatric/Behavioral: Negative.    All other systems reviewed and are negative.       Physical Exam:  /60   Pulse 72   Temp 98.4 °F (36.9 °C)   Resp 18   Ht 162.6 cm (64\")   Wt 88.7 kg (195 lb 8.8 oz)   SpO2 94%   BMI 33.57 kg/m²     Physical Exam  Vitals and nursing note reviewed.   Constitutional:       General: She is awake.      Appearance: Normal appearance.   HENT:      Head: Normocephalic and atraumatic.      Nose: Nose normal.      Mouth/Throat:      Mouth: Mucous membranes are moist.   Eyes:      Extraocular Movements: Extraocular movements intact.      Pupils: Pupils are equal, round, and reactive to light.   Cardiovascular:      Rate and Rhythm: Normal rate and regular rhythm.      Heart sounds: Normal heart sounds.      Comments: Occasional ectopic beats   Pulmonary:      Effort: Pulmonary effort is normal. No respiratory distress.      Breath sounds: Normal breath sounds. No wheezing, rhonchi or rales.   Abdominal:      General: Bowel sounds are normal.      Palpations: Abdomen is soft.      Tenderness: " There is no abdominal tenderness. There is no guarding or rebound.      Comments: No rigidity   Musculoskeletal:         General: No tenderness. Normal range of motion.      Cervical back: Normal range of motion and neck supple.   Skin:     General: Skin is warm and dry.      Coloration: Skin is not jaundiced.   Neurological:      General: No focal deficit present.      Mental Status: She is alert and oriented to person, place, and time. Mental status is at baseline.      Sensory: Sensation is intact.      Motor: Motor function is intact.      Coordination: Coordination is intact.   Psychiatric:         Attention and Perception: Attention and perception normal.         Mood and Affect: Mood and affect normal.         Speech: Speech normal.         Behavior: Behavior normal.         Judgment: Judgment normal.                  Procedures:  Procedures      Medical Decision Making:      Comorbidities that affect care:    GERD, COPD, Coronary Artery Disease, Hypertension    External Notes reviewed:    Admission 3/16/2023 for bradycardia/near syncope.      The following orders were placed and all results were independently analyzed by me:  Orders Placed This Encounter   Procedures   • XR Chest 1 View   • [place] Draw   • Comprehensive Metabolic Panel   • Magnesium   • Single High Sensitivity Troponin T   • CBC Auto Differential   • TSH Rfx On Abnormal To Free T4   • NPO Diet NPO Type: Strict NPO   • Undress & Gown   • Cardiac Monitoring   • Continuous Pulse Oximetry   • Cardiology (on-call MD unless specified)   • Hospitalist (on-call MD unless specified)   • Oxygen Therapy- Nasal Cannula; 2 LPM; Titrate for SPO2: 92%   • ECG 12 Lead ED Triage Standing Order; Dysrhythmia   • ECG 12 Lead ED Triage Standing Order; Chest Pain   • Insert Peripheral IV   • CBC & Differential   • Green Top (Gel)   • Lavender Top   • Gold Top - SST   • Light Blue Top       Medications Given in the Emergency Department:  Medications   sodium  chloride 0.9 % flush 10 mL (has no administration in time range)        ED Course:    ED Course as of 03/25/23 1631   Sat Mar 25, 2023   1332 EKG interpretation: Normal sinus rhythm with PVCs noted every 3-4 beats.  Normal axis.  Borderline first-degree AV block with SD interval of 198, heart rate 70, nonspecific ST segment changes with no acute ischemia. [RP]   1536 Case discussed with Dr. Lema who has interpreted the EKG from today and denies there being any second-degree Mobitz block.  Feels these are PVCs only.  I will send him a rhythm strip as well to ensure there is nothing else going on. [RP]   1628 Case discussed with Dr. Abreu for admission. [RP]      ED Course User Index  [RP] Rajendra Taylor MD       Labs:    Lab Results (last 24 hours)     Procedure Component Value Units Date/Time    CBC & Differential [[id number]]  (Abnormal) Collected: 03/25/23 1327    Specimen: Blood Updated: 03/25/23 1334    Narrative:      The following orders were created for panel order CBC & Differential.  Procedure                               Abnormality         Status                     ---------                               -----------         ------                     CBC Auto Differential[[id number]]        Abnormal            Final result                 Please view results for these tests on the individual orders.    Comprehensive Metabolic Panel [[id number]]  (Abnormal) Collected: 03/25/23 1327    Specimen: Blood Updated: 03/25/23 1401     Glucose 95 mg/dL      BUN 38 mg/dL      Creatinine 1.06 mg/dL      Sodium 135 mmol/L      Potassium 4.4 mmol/L      Chloride 100 mmol/L      CO2 25.3 mmol/L      Calcium 9.7 mg/dL      Total Protein 7.2 g/dL      Albumin 4.3 g/dL      ALT (SGPT) 17 U/L      AST (SGOT) 23 U/L      Alkaline Phosphatase 89 U/L      Total Bilirubin 0.4 mg/dL      Globulin 2.9 gm/dL      A/G Ratio 1.5 g/dL      BUN/Creatinine Ratio 35.8     Anion Gap 9.7 mmol/L      eGFR 52.2 mL/min/1.73      Narrative:      GFR Normal >60  Chronic Kidney Disease <60  Kidney Failure <15    The GFR formula is only valid for adults with stable renal function between ages 18 and 70.    Magnesium [[id number]]  (Abnormal) Collected: 03/25/23 1327    Specimen: Blood Updated: 03/25/23 1401     Magnesium 1.5 mg/dL     Single High Sensitivity Troponin T [[id number]]  (Abnormal) Collected: 03/25/23 1327    Specimen: Blood Updated: 03/25/23 1407     HS Troponin T 41 ng/L     Narrative:      High Sensitive Troponin T Reference Range:  <10.0 ng/L- Negative Female for AMI  <15.0 ng/L- Negative Male for AMI  >=10 - Abnormal Female indicating possible myocardial injury.  >=15 - Abnormal Male indicating possible myocardial injury.   Clinicians would have to utilize clinical acumen, EKG, Troponin, and serial changes to determine if it is an Acute Myocardial Infarction or myocardial injury due to an underlying chronic condition.         CBC Auto Differential [[id number]]  (Abnormal) Collected: 03/25/23 1327    Specimen: Blood Updated: 03/25/23 1334     WBC 8.58 10*3/mm3      RBC 3.84 10*6/mm3      Hemoglobin 10.8 g/dL      Hematocrit 34.5 %      MCV 89.8 fL      MCH 28.1 pg      MCHC 31.3 g/dL      RDW 17.0 %      RDW-SD 54.6 fl      MPV 9.3 fL      Platelets 300 10*3/mm3      Neutrophil % 61.0 %      Lymphocyte % 26.3 %      Monocyte % 9.6 %      Eosinophil % 2.6 %      Basophil % 0.2 %      Immature Grans % 0.3 %      Neutrophils, Absolute 5.23 10*3/mm3      Lymphocytes, Absolute 2.26 10*3/mm3      Monocytes, Absolute 0.82 10*3/mm3      Eosinophils, Absolute 0.22 10*3/mm3      Basophils, Absolute 0.02 10*3/mm3      Immature Grans, Absolute 0.03 10*3/mm3      nRBC 0.0 /100 WBC     TSH Rfx On Abnormal To Free T4 [[id number]]  (Normal) Collected: 03/25/23 1327    Specimen: Blood Updated: 03/25/23 1407     TSH 1.500 uIU/mL            Imaging:    XR Chest 1 View    Result Date: 3/25/2023  PROCEDURE: XR CHEST 1 VW  COMPARISON: [facility]  [facility], CR, CHEST AP/PA 1 VIEW, 1/22/2020, 15:03.  [facility], CR, XR CHEST 1 VW, 10/28/2022, 10:53.  [facility], CR, XR CHEST 1 VW, 3/16/2023, 3:28.  INDICATIONS: Dysrhythmia Triage Protocol  FINDINGS:   Stable mild cardiomegaly.  The lungs are well-expanded. The pulmonary vasculature is within normal limits. No pleural effusions are identified. There are no active appearing infiltrates.  IMPRESSION:  Stable mild cardiomegaly.  No active pulmonary disease.  ESTEFANY MORENO MD       Electronically Signed and Approved By: ESTEFANY MORENO MD on 3/25/2023 at 13:55                 Differential Diagnosis and Discussion:    Palpitations: Differential diagnosis includes but is not limited to anxiety, atrioventricular blocks, mitral valve disease, hypoxia, coronary artery disease, hypokalemia, anemia, fever, COPD, congestive heart failure, pericarditis, Sheng-Parkinson-White syndrome, pulmonary embolism, SVT, atrial fibrillation, atrial flutter, sinus tachycardia, thyrotoxicosis, and pheochromocytoma.    All labs were reviewed and interpreted by me.  All X-rays were independently reviewed by me.  EKG was interpreted by me.    [facility]         Patient Care Considerations:          Consultants/Shared Management Plan:    Hospitalist: I have discussed the case with Dr. Abreu who agrees to accept the patient for admission.  Consultant: I have discussed the case with Dr. Lema who agrees to consult on the patient.    Social Determinants of Health:    Patient has presented with family members who are responsible, reliable and will ensure follow up care.      Disposition and Care Coordination:    Admit:   Through independent evaluation of the patient's history, physical, and imperical data, the patient meets criteria for observation/admission to the hospital.        Final diagnoses:   Palpitations   Mobitz type 2 second degree atrioventricular block        ED Disposition     ED Disposition   Decision to  Admit    Condition   --    Comment   --             This medical record created using voice recognition software.           Mejia Perez Jr.  03/25/23 [id number]       Rajendra Taylor MD  03/25/23 0104

## 2023-03-25 NOTE — H&P
[facility] HISTORY AND PHYSICAL  Date: 3/25/2023   Patient Name: Mallory Franco  : 1940  MRN: [id number]  Primary Care Physician:  Johan Escudero MD  Date of admission: 3/25/2023    Subjective   Subjective     Chief Complaint: Palpitation    HPI:    Mallory Franco is a 82 y.o. female past medical history of COPD/asthma, hypertension, BPV, dyslipidemia, hypertension, coronary disease, diastolic heart failure    Patient states that she was recently admitted to the hospital from 3/16 to 3/17 for similar symptoms.  She feels her heart rate goes fast and then slow.  She feels dizzy and.  Off balance.  She has had presyncope but no actual syncope.  When she was in the hospital previously she was on a beta-blocker but this was discontinued.  Has had no fevers.  No chills.  He has had some nausea.  No vomiting.  As result of her symptoms she came to the ER for further evaluation.      The emergency department the vital signs the temperature was 98.4, pulse 70, respiratory is 18, blood pressure 143/60,98% on room air.  CBC shows hemoglobin of 10.8.  CMP creatinine of 1.06 and a sodium of 135.  TSH was normal.  Magnesium 1.5.  Chest x-ray shows stable Mild cardiomegaly.  No active pulmonary disease.  Rhythm strips and EKG both are suggestive of high-grade heart block.  Patient's been off beta-blocker for 10 days.  Cardiology was consulted.  Patient be admitted telemetry.    All systems reviewed abnormalities noted above    Personal History     Past Medical History:  COPD/asthma  Hypertension  BPPV  GERD  Dyslipidemia   Hypertension  Coronary artery disease  Heart failure with preserved ejection fraction with EF of 51 to 55% grade 1 diastolic dysfunction  Pulmonary hypertension with RVSP of greater than 55 mils per mercury      Past Surgical History:  Breast biopsy  Cardiac catheterization on  section  Cholecystectomy on colonoscopy  Coronary stent placement  Gallbladder surgery  Total knee  replaced    Family History:   Bone cancer and breast cancer    Social History:   No smoking.  No alcohol.    Home Medications:  Fluticasone Furoate-Vilanterol, albuterol, albuterol sulfate HFA, amLODIPine, aspirin, famotidine, fluticasone, hydroCHLOROthiazide, meclizine, montelukast, pantoprazole, sertraline, and valsartan    Allergies:  Allergies   Allergen Reactions   • Codeine Nausea And Vomiting   • Contrast Dye (Echo Or Unknown Ct/Mr) Hives   • Morphine Nausea And Vomiting       Objective   Objective     Vitals:   Temp:  [98.4 °F (36.9 °C)] 98.4 °F (36.9 °C)  Heart Rate:  [58-72] 72  Resp:  [18] 18  BP: (143-175)/(60-83) 143/60    Physical Exam    Constitutional: Awake, alert, no acute distress   Eyes: Pupils equal, sclerae anicteric, no conjunctival injection   HENT: NCAT, mucous membranes moist   Neck: Supple, no thyromegaly, no lymphadenopathy, trachea midline   Respiratory: Clear to auscultation bilaterally, nonlabored respirations    Cardiovascular: Bradycardia, no murmurs, rubs, or gallops, palpable pedal pulses bilaterally   Gastrointestinal: Positive bowel sounds, soft, nontender, nondistended   Musculoskeletal: No bilateral ankle edema, no clubbing or cyanosis to extremities   Psychiatric: Appropriate affect, cooperative   Neurologic: Oriented x 3, strength symmetric in all extremities, Cranial Nerves grossly intact to confrontation, speech clear   Skin: No rashes     Result Review    Result Review:  I have personally reviewed the results from the time of this admission to 3/25/2023 16:30 EDT and agree with these findings:  []  Laboratory  []  Microbiology  []  Radiology  []  EKG/Telemetry   []  Cardiology/Vascular   []  Pathology  []  Old records  []  Other:      Assessment & Plan   Assessment / Plan     Assessment/Plan:  Palpitations  High-grade heart block  CAD  Heart failure with preserved ejection fraction with no exacerbation  Hypomagnesia    Plan:  --Admit to hospitalist service  -- Consult  cardiology  -- Continue telemetry  -- 2 Grams of magnesium  -- Keep potassium and magnesium normal limits  -- Recent echocardiogram so will not repeat  -- We will send BNP although she does not appear to be fluid overloaded    DVT prophylaxis:  Lovenox    CODE STATUS:     Full code    Admission Status:  I believe this patient meets observation status  .    Electronically signed by Mika Abreu MD, 03/25/23, 4:30 PM EDT.

## 2023-03-26 PROBLEM — I49.5 SSS (SICK SINUS SYNDROME): Status: ACTIVE | Noted: 2023-03-26

## 2023-03-26 LAB
ALBUMIN SERPL-MCNC: 3.8 G/DL (ref 3.5–5.2)
ALBUMIN/GLOB SERPL: 1.3 G/DL
ALP SERPL-CCNC: 82 U/L (ref 39–117)
ALT SERPL W P-5'-P-CCNC: 15 U/L (ref 1–33)
ANION GAP SERPL CALCULATED.3IONS-SCNC: 10.4 MMOL/L (ref 5–15)
AST SERPL-CCNC: 24 U/L (ref 1–32)
BASOPHILS # BLD AUTO: 0.02 10*3/MM3 (ref 0–0.2)
BASOPHILS NFR BLD AUTO: 0.3 % (ref 0–1.5)
BILIRUB SERPL-MCNC: 0.4 MG/DL (ref 0–1.2)
BUN SERPL-MCNC: 31 MG/DL (ref 8–23)
BUN/CREAT SERPL: 30.1 (ref 7–25)
CALCIUM SPEC-SCNC: 9.4 MG/DL (ref 8.6–10.5)
CHLORIDE SERPL-SCNC: 105 MMOL/L (ref 98–107)
CO2 SERPL-SCNC: 23.6 MMOL/L (ref 22–29)
CREAT SERPL-MCNC: 1.03 MG/DL (ref 0.57–1)
DEPRECATED RDW RBC AUTO: 53.5 FL (ref 37–54)
EGFRCR SERPLBLD CKD-EPI 2021: 54.1 ML/MIN/1.73
EOSINOPHIL # BLD AUTO: 0.26 10*3/MM3 (ref 0–0.4)
EOSINOPHIL NFR BLD AUTO: 3.8 % (ref 0.3–6.2)
ERYTHROCYTE [DISTWIDTH] IN BLOOD BY AUTOMATED COUNT: 16.6 % (ref 12.3–15.4)
GEN 5 2HR TROPONIN T REFLEX: 47 NG/L
GLOBULIN UR ELPH-MCNC: 2.9 GM/DL
GLUCOSE SERPL-MCNC: 114 MG/DL (ref 65–99)
HCT VFR BLD AUTO: 32.9 % (ref 34–46.6)
HGB BLD-MCNC: 10.6 G/DL (ref 12–15.9)
IMM GRANULOCYTES # BLD AUTO: 0.02 10*3/MM3 (ref 0–0.05)
IMM GRANULOCYTES NFR BLD AUTO: 0.3 % (ref 0–0.5)
LYMPHOCYTES # BLD AUTO: 2.94 10*3/MM3 (ref 0.7–3.1)
LYMPHOCYTES NFR BLD AUTO: 43 % (ref 19.6–45.3)
MAGNESIUM SERPL-MCNC: 2.1 MG/DL (ref 1.6–2.4)
MCH RBC QN AUTO: 28.5 PG (ref 26.6–33)
MCHC RBC AUTO-ENTMCNC: 32.2 G/DL (ref 31.5–35.7)
MCV RBC AUTO: 88.4 FL (ref 79–97)
MONOCYTES # BLD AUTO: 0.65 10*3/MM3 (ref 0.1–0.9)
MONOCYTES NFR BLD AUTO: 9.5 % (ref 5–12)
NEUTROPHILS NFR BLD AUTO: 2.94 10*3/MM3 (ref 1.7–7)
NEUTROPHILS NFR BLD AUTO: 43.1 % (ref 42.7–76)
NRBC BLD AUTO-RTO: 0 /100 WBC (ref 0–0.2)
PLATELET # BLD AUTO: 281 10*3/MM3 (ref 140–450)
PMV BLD AUTO: 9.6 FL (ref 6–12)
POTASSIUM SERPL-SCNC: 4.1 MMOL/L (ref 3.5–5.2)
PROT SERPL-MCNC: 6.7 G/DL (ref 6–8.5)
QT INTERVAL: 459 MS
QT INTERVAL: 470 MS
RBC # BLD AUTO: 3.72 10*6/MM3 (ref 3.77–5.28)
SODIUM SERPL-SCNC: 139 MMOL/L (ref 136–145)
TROPONIN T DELTA: -3 NG/L
TROPONIN T SERPL HS-MCNC: 50 NG/L
WBC NRBC COR # BLD: 6.83 10*3/MM3 (ref 3.4–10.8)

## 2023-03-26 PROCEDURE — 99233 SBSQ HOSP IP/OBS HIGH 50: CPT | Performed by: INTERNAL MEDICINE

## 2023-03-26 PROCEDURE — G0378 HOSPITAL OBSERVATION PER HR: HCPCS

## 2023-03-26 PROCEDURE — 97161 PT EVAL LOW COMPLEX 20 MIN: CPT

## 2023-03-26 PROCEDURE — 25010000002 ENOXAPARIN PER 10 MG: Performed by: INTERNAL MEDICINE

## 2023-03-26 PROCEDURE — 83735 ASSAY OF MAGNESIUM: CPT | Performed by: INTERNAL MEDICINE

## 2023-03-26 PROCEDURE — 99222 1ST HOSP IP/OBS MODERATE 55: CPT | Performed by: INTERNAL MEDICINE

## 2023-03-26 PROCEDURE — 63710000001 ONDANSETRON PER 8 MG: Performed by: INTERNAL MEDICINE

## 2023-03-26 PROCEDURE — 80053 COMPREHEN METABOLIC PANEL: CPT | Performed by: INTERNAL MEDICINE

## 2023-03-26 PROCEDURE — 84484 ASSAY OF TROPONIN QUANT: CPT | Performed by: INTERNAL MEDICINE

## 2023-03-26 PROCEDURE — 94799 UNLISTED PULMONARY SVC/PX: CPT

## 2023-03-26 PROCEDURE — 94640 AIRWAY INHALATION TREATMENT: CPT

## 2023-03-26 PROCEDURE — 85025 COMPLETE CBC W/AUTO DIFF WBC: CPT | Performed by: INTERNAL MEDICINE

## 2023-03-26 PROCEDURE — 96372 THER/PROPH/DIAG INJ SC/IM: CPT

## 2023-03-26 PROCEDURE — 97116 GAIT TRAINING THERAPY: CPT

## 2023-03-26 RX ORDER — ASPIRIN 81 MG/1
81 TABLET ORAL DAILY
Status: DISCONTINUED | OUTPATIENT
Start: 2023-03-26 | End: 2023-03-28 | Stop reason: HOSPADM

## 2023-03-26 RX ORDER — AMLODIPINE BESYLATE 5 MG/1
5 TABLET ORAL DAILY
Status: DISCONTINUED | OUTPATIENT
Start: 2023-03-26 | End: 2023-03-28 | Stop reason: HOSPADM

## 2023-03-26 RX ORDER — VALSARTAN 80 MG/1
160 TABLET ORAL EVERY 12 HOURS SCHEDULED
Status: DISCONTINUED | OUTPATIENT
Start: 2023-03-26 | End: 2023-03-28 | Stop reason: HOSPADM

## 2023-03-26 RX ORDER — BUDESONIDE AND FORMOTEROL FUMARATE DIHYDRATE 160; 4.5 UG/1; UG/1
1 AEROSOL RESPIRATORY (INHALATION)
Status: DISCONTINUED | OUTPATIENT
Start: 2023-03-26 | End: 2023-03-28 | Stop reason: HOSPADM

## 2023-03-26 RX ADMIN — ENOXAPARIN SODIUM 40 MG: 100 INJECTION SUBCUTANEOUS at 08:56

## 2023-03-26 RX ADMIN — ONDANSETRON HYDROCHLORIDE 4 MG: 4 TABLET, FILM COATED ORAL at 22:18

## 2023-03-26 RX ADMIN — VALSARTAN 160 MG: 80 TABLET, FILM COATED ORAL at 13:36

## 2023-03-26 RX ADMIN — Medication 10 ML: at 20:40

## 2023-03-26 RX ADMIN — ACETAMINOPHEN 650 MG: 325 TABLET ORAL at 02:10

## 2023-03-26 RX ADMIN — BUDESONIDE AND FORMOTEROL FUMARATE DIHYDRATE 1 PUFF: 160; 4.5 AEROSOL RESPIRATORY (INHALATION) at 12:52

## 2023-03-26 RX ADMIN — VALSARTAN 160 MG: 80 TABLET, FILM COATED ORAL at 20:39

## 2023-03-26 RX ADMIN — Medication 10 ML: at 08:56

## 2023-03-26 RX ADMIN — BUDESONIDE AND FORMOTEROL FUMARATE DIHYDRATE 1 PUFF: 160; 4.5 AEROSOL RESPIRATORY (INHALATION) at 18:31

## 2023-03-26 RX ADMIN — ASPIRIN 81 MG: 81 TABLET, COATED ORAL at 08:56

## 2023-03-26 RX ADMIN — AMLODIPINE BESYLATE 5 MG: 5 TABLET ORAL at 12:56

## 2023-03-26 NOTE — CONSULTS
Cardiology Consult Note  [facility] CARE UNIT          Patient Identification:  Mallory Franco      [id number]  82 y.o.        female  1940       Date of Consultation:     Reason for Consultation: Bradycardia    PCP: Johan Escudero MD  Primary cardiologist: Dr. Marquez    History of Present Illness:     82-year-old white female.  She has hypertension, and was recently admitted briefly for presyncope.  She was observed to be bradycardic at that time and her beta-blocker was stopped.  An outpatient event monitor was planned.  She came to the emergency room yesterday with ongoing symptoms of intermittent fatigue, presyncope, palpitations.  She was observed in the ER to have 2-1 AV block at times with also some blocked sinus beats after frequent PVCs.  She would have heart rates in the 40s and would be symptomatic.  She was admitted for further treatment.  She continues to have episodic 2-1 AV block here and frequent PVCs.    Past History:  Past Medical History:   Diagnosis Date   • Arthritis    • Asthma    • BPPV (benign paroxysmal positional vertigo), right    • COPD (chronic obstructive pulmonary disease) ([facility])    • Coronary artery disease    • Coronary artery disease    • Ear itching     HX   • Enlarged heart    • Gastroesophageal reflux disease    • Hyperlipidemia    • Hypertension    • Knee pain    • Macular degeneration    • PONV (postoperative nausea and vomiting)    • Shingles     X2   • SOB (shortness of breath) on exertion    • Tinnitus of left ear    • Urinary frequency    • Vertigo      Past Surgical History:   Procedure Laterality Date   • BREAST BIOPSY Bilateral    • CARDIAC CATHETERIZATION     •  SECTION     • CHOLECYSTECTOMY     • COLONOSCOPY     • CORONARY STENT PLACEMENT     • EYE SURGERY     • GALLBLADDER SURGERY     • KNEE MINI REVISION Right 2021    Procedure: KNEE POLY INSERT EXCHANGE;  Surgeon: Ky Avila MD;  Location: [facility] OR;   Service: Orthopedics;  Laterality: Right;   • KNEE SURGERY     • REPLACEMENT TOTAL KNEE Right 2005   • REPLACEMENT TOTAL KNEE Left 2004     Allergies   Allergen Reactions   • Codeine Nausea And Vomiting   • Contrast Dye (Echo Or Unknown Ct/Mr) Hives   • Morphine Nausea And Vomiting     Social History     Socioeconomic History   • Marital status:    Tobacco Use   • Smoking status: Never   • Smokeless tobacco: Never   Vaping Use   • Vaping Use: Never used   Substance and Sexual Activity   • Alcohol use: No   • Drug use: Never   • Sexual activity: Defer     Family History   Problem Relation Age of Onset   • Breast cancer Mother    • Bone cancer Father    • Breast cancer Sister    • Bone cancer Sister    • Lung cancer Brother    • Malig Hyperthermia Neg Hx      Medications:  Medications Prior to Admission   Medication Sig Dispense Refill Last Dose   • albuterol (PROVENTIL) (2.5 MG/3ML) 0.083% nebulizer solution Take 2.5 mg by nebulization Every 4 (Four) Hours As Needed for Wheezing.   3/24/2023   • albuterol sulfate  (90 Base) MCG/ACT inhaler Inhale 2 puffs Every 4 (Four) Hours As Needed.   3/25/2023   • amLODIPine (NORVASC) 5 MG tablet Take 1 tablet by mouth Daily. 90 tablet 3 3/24/2023   • aspirin 81 MG EC tablet Take 1 tablet by mouth Daily.   3/25/2023   • Breo Ellipta 200-25 MCG/INH inhaler Inhale 1 puff Daily.   Past Week   • famotidine (PEPCID) 40 MG tablet Take 1 tablet by mouth every night at bedtime.   3/24/2023   • fluticasone (FLONASE) 50 MCG/ACT nasal spray 1 spray into the nostril(s) as directed by provider Every Morning.   3/25/2023   • hydrochlorothiazide (HYDRODIURIL) 25 MG tablet Take 1 tablet by mouth Every Morning.   3/25/2023   • meclizine (ANTIVERT) 25 MG tablet Take 1 tablet by mouth 2 (Two) Times a Day As Needed.   3/25/2023   • montelukast (SINGULAIR) 10 MG tablet Take 1 tablet by mouth Every Night.   3/24/2023   • pantoprazole (PROTONIX) 40 MG EC tablet Take 1 tablet by mouth  "Daily.   3/25/2023   • sertraline (ZOLOFT) 50 MG tablet Take 1 tablet by mouth Every Night.   3/24/2023   • valsartan (DIOVAN) 160 MG tablet Take 2 tablets by mouth Daily.   3/25/2023     Current medications:  aspirin, 81 mg, Oral, Daily  enoxaparin, 40 mg, Subcutaneous, Daily  sodium chloride, 10 mL, Intravenous, Q12H      Current IV drips:       Review of Systems   Constitutional: Positive for malaise/fatigue.   Cardiovascular: Positive for near-syncope and palpitations.   Neurological: Positive for dizziness and light-headedness.         Physical exam:    /56 (BP Location: Right arm, Patient Position: Lying)   Pulse 51   Temp 97.7 °F (36.5 °C) (Oral)   Resp 17   Ht 162.6 cm (64\")   Wt 86 kg (189 lb 9.5 oz)   SpO2 99%   BMI 32.54 kg/m²  Body mass index is 32.54 kg/m².    SpO2  Min: 93 %  Max: 99 %    General Appearance:   · well developed  · well nourished  HENT:   · oropharynx moist  · lips not cyanotic  Neck:  · thyroid not enlarged  · supple  Respiratory:  · no respiratory distress  · normal breath sounds  · no rales  Cardiovascular:  · no jugular venous distention  · regular rhythm, at times bradycardic  · apical impulse normal  · S1 normal, S2 normal  · no S3, no S4   · Soft parasternal systolic murmur  · no rub, no thrill  · carotid pulses normal; no bruit  · pedal pulses normal  · lower extremity edema: none    Gastrointestinal:   · bowel sounds normal  · non-tender  · no hepatomegaly, no splenomegaly  Musculoskeletal:  · no clubbing of fingers.   · normocephalic, head atraumatic  Skin:   · warm, dry  Neuro/Psychiatric:  · judgement and insight appropriate  · normal mood and affect    Cardiographics:     ECG  (personally reviewed) sinus rhythm, PVCs, first-degree AV block, left bundle branch block, blocked sinus beat   Telemetry:  (personally reviewed) sinus rhythm with 2-1 AV block and frequent PVCs   ECHO: Reviewed recently   CATH:     CARDIOLITE:      Lab Review:       Results from last 7 " days   Lab Units 03/26/23  0406 03/25/23  1327   WBC 10*3/mm3 6.83 8.58   HEMOGLOBIN g/dL 10.6* 10.8*   HEMATOCRIT % 32.9* 34.5            Results from last 7 days   Lab Units 03/26/23  0406 03/25/23  1327   SODIUM mmol/L 139 135*   BUN mg/dL 31* 38*   CREATININE mg/dL 1.03* 1.06*   GLUCOSE mg/dL 114* 95      Estimated Creatinine Clearance: 43.9 mL/min (A) (by C-G formula based on SCr of 1.03 mg/dL (H)).         Invalid input(s): LDLCALC          Lab Results   Component Value Date    TSH 1.500 03/25/2023      No results found for: HGBA1C        No results found for: DIGOXIN   No components found for: DDIMERQUAN     Imaging:   XR Chest 1 View    Result Date: 3/25/2023  PROCEDURE: XR CHEST 1 VW  COMPARISON: [facility], CR, CHEST AP/PA 1 VIEW, 1/22/2020, 15:03.  [facility], CR, XR CHEST 1 VW, 10/28/2022, 10:53.  [facility], CR, XR CHEST 1 VW, 3/16/2023, 3:28.  INDICATIONS: Dysrhythmia Triage Protocol  FINDINGS:   Stable mild cardiomegaly.  The lungs are well-expanded. The pulmonary vasculature is within normal limits. No pleural effusions are identified. There are no active appearing infiltrates.  IMPRESSION:  Stable mild cardiomegaly.  No active pulmonary disease.  ESTEFANY MORENO MD       Electronically Signed and Approved By: ESTEFANY MORENO MD on 3/25/2023 at 13:55                 The ASCVD Risk score (Minda RUEDA, et al., 2019) failed to calculate for the following reasons:    The 2019 ASCVD risk score is only valid for ages 40 to 79      Assessment:      SSS (sick sinus syndrome) (HCC)    Palpitations      Initial cardiac assessment: 82-year-old white female with symptomatic bradycardia, nonreversible with recent stoppage of her beta-blocker she continues to have intermittent bradycardia which is symptomatic.      Recommendations:  1.  We can continue her home amlodipine, valsartan, and HCTZ  2.  The patient needs a dual-chamber pacemaker to correct her symptomatic  bradycardia.  I discussed the risk and benefits with the patient.  She is agreeable to proceed.  We will schedule this for tomorrow with Dr. Rosa  3.  Further recommendations will depend on her hospital course                  Carlos Lema MD  3/26/2023    10:27 EDT

## 2023-03-26 NOTE — PLAN OF CARE
Goal Outcome Evaluation:  Plan of Care Reviewed With: patient        Progress: improving  Outcome Evaluation: Pt presents with decresaed functional mobility secondary to prolonged hospitalization.  Skilled PT intervention is needed to address noted deficits in order to restore to PLOF.

## 2023-03-26 NOTE — PLAN OF CARE
Goal Outcome Evaluation:   Patient planned to have pace maker placed, orders are signed and held.  Day shift to call cath lab in morning and figure out an appt time for ruvalcaba to put pacer in.  NPO after midnight.

## 2023-03-26 NOTE — PLAN OF CARE
Alert and oriented, no acute event during this shift  Magnesium replaced as ordered  Medicated for headache, resolved  Patient concerned about home medications, informed that meds would restart today after verified by the pharmacy  Sinus bradycardia continues, asymptomatic   Call light within reach    Problem: Adult Inpatient Plan of Care  Goal: Plan of Care Review  Outcome: Ongoing, Progressing  Goal: Patient-Specific Goal (Individualized)  Outcome: Ongoing, Progressing  Goal: Absence of Hospital-Acquired Illness or Injury  Outcome: Ongoing, Progressing  Intervention: Identify and Manage Fall Risk  Recent Flowsheet Documentation  Taken 3/26/2023 0400 by Amber Ballard RN  Safety Promotion/Fall Prevention: safety round/check completed  Taken 3/26/2023 0200 by Amber Ballard RN  Safety Promotion/Fall Prevention:   safety round/check completed   activity supervised  Taken 3/26/2023 0000 by Amber Ballard RN  Safety Promotion/Fall Prevention: safety round/check completed  Taken 3/25/2023 2200 by Amber Ballard RN  Safety Promotion/Fall Prevention: safety round/check completed  Taken 3/25/2023 2000 by Amber Ballard RN  Safety Promotion/Fall Prevention: safety round/check completed  Taken 3/25/2023 1915 by Amber Ballard RN  Safety Promotion/Fall Prevention:   safety round/check completed   activity supervised   clutter free environment maintained   lighting adjusted   nonskid shoes/slippers when out of bed  Intervention: Prevent and Manage VTE (Venous Thromboembolism) Risk  Recent Flowsheet Documentation  Taken 3/25/2023 1915 by Amber Ballard RN  VTE Prevention/Management: (LOVENOX) other (see comments)  Goal: Optimal Comfort and Wellbeing  Outcome: Ongoing, Progressing  Intervention: Provide Person-Centered Care  Recent Flowsheet Documentation  Taken 3/25/2023 1915 by Amber Ballard RN  Trust Relationship/Rapport:   care explained   questions answered   reassurance provided   thoughts/feelings  acknowledged  Goal: Readiness for Transition of Care  Outcome: Ongoing, Progressing     Problem: COPD (Chronic Obstructive Pulmonary Disease) Comorbidity  Goal: Maintenance of COPD Symptom Control  Outcome: Ongoing, Progressing  Intervention: Maintain COPD-Symptom Control  Recent Flowsheet Documentation  Taken 3/26/2023 0200 by Amber Ballard RN  Medication Review/Management: medications reviewed     Problem: Heart Failure Comorbidity  Goal: Maintenance of Heart Failure Symptom Control  Outcome: Ongoing, Progressing  Intervention: Maintain Heart Failure-Management  Recent Flowsheet Documentation  Taken 3/26/2023 0200 by Amber Ballard RN  Medication Review/Management: medications reviewed     Problem: Hypertension Comorbidity  Goal: Blood Pressure in Desired Range  Outcome: Ongoing, Progressing  Intervention: Maintain Blood Pressure Management  Recent Flowsheet Documentation  Taken 3/26/2023 0200 by Amber Ballard RN  Medication Review/Management: medications reviewed   Goal Outcome Evaluation:

## 2023-03-26 NOTE — THERAPY EVALUATION
Acute Care - Physical Therapy Initial Evaluation   Deepika     Patient Name: Mallory Franco  : 1940  MRN: [id number]  Today's Date: 3/26/2023   Onset of Illness/Injury or Date of Surgery: 23  Visit Dx:     ICD-10-CM ICD-9-CM   1. Palpitations  R00.2 785.1   2. Mobitz type 2 second degree atrioventricular block  I44.1 426.12   3. Difficulty in walking  R26.2 719.7     Patient Active Problem List   Diagnosis   • Benign hypertension   • Coronary artery disease involving native coronary artery of native heart without angina pectoris   • Chest tightness   • Pain due to total right knee replacement (HCC)   • History of coronary angioplasty with insertion of stent   • Chest pain, unspecified type   • Elevated troponin   • Postural dizziness with presyncope   • Palpitations     Past Medical History:   Diagnosis Date   • Arthritis    • Asthma    • BPPV (benign paroxysmal positional vertigo), right    • COPD (chronic obstructive pulmonary disease) ([facility])    • Coronary artery disease    • Coronary artery disease    • Ear itching     HX   • Enlarged heart    • Gastroesophageal reflux disease    • Hyperlipidemia    • Hypertension    • Knee pain    • Macular degeneration    • PONV (postoperative nausea and vomiting)    • Shingles     X2   • SOB (shortness of breath) on exertion    • Tinnitus of left ear    • Urinary frequency    • Vertigo      Past Surgical History:   Procedure Laterality Date   • BREAST BIOPSY Bilateral    • CARDIAC CATHETERIZATION     •  SECTION     • CHOLECYSTECTOMY     • COLONOSCOPY     • CORONARY STENT PLACEMENT     • EYE SURGERY     • GALLBLADDER SURGERY     • KNEE MINI REVISION Right 2021    Procedure: KNEE POLY INSERT EXCHANGE;  Surgeon: Ky Avila MD;  Location: [facility];  Service: Orthopedics;  Laterality: Right;   • KNEE SURGERY     • REPLACEMENT TOTAL KNEE Right    • REPLACEMENT TOTAL KNEE Left      PT Assessment (last 12 hours)     PT Evaluation and  Treatment     Row Name 03/26/23 0911          Physical Therapy Time and Intention    Subjective Information complains of;fatigue  -DW     Document Type evaluation  -DW     Mode of Treatment individual therapy;physical therapy  -DW     Patient Effort good  -DW     Symptoms Noted During/After Treatment fatigue  -DW     Row Name 03/26/23 0911          General Information    Patient Profile Reviewed yes  -DW     Onset of Illness/Injury or Date of Surgery 03/25/23  -DW     Referring Physician Shane Vallecillo  -DW     Prior Level of Function independent:;all household mobility;community mobility;ADL's  -DW     Equipment Currently Used at Home none  -DW     Risks Reviewed patient:  -DW     Benefits Reviewed patient:;improve function;increase independence;increase strength;increase balance  -DW     Row Name 03/26/23 0911          Previous Level of Function/Home Environm    BADLs, Premorbid Functional Level independent  -DW     IADLs, Premorbid Functional Level independent  -DW     Bed Mobility, Premorbid Functional Level independent  -DW     Transfers, Premorbid Functional Level independent  -DW     Household Ambulation, Premorbid Functional Level independent  -DW     Stairs, Premorbid Functional Level independent  -DW     Community Ambulation, Premorbid Functional Level independent  -DW     Row Name 03/26/23 0911          Living Environment    Current Living Arrangements home  -DW     People in Home alone  -DW     Primary Care Provided by self  -DW     Row Name 03/26/23 0911          Cognition    Affect/Mental Status (Cognition) WNL  -DW     Orientation Status (Cognition) oriented x 3  -DW     Follows Commands (Cognition) WNL  -DW     Cognitive Function WNL  -DW     Row Name 03/26/23 0911          Range of Motion Comprehensive    General Range of Motion no range of motion deficits identified  -DW     Row Name 03/26/23 0911          Strength Comprehensive (MMT)    General Manual Muscle Testing (MMT) Assessment lower extremity  strength deficits identified  -DW     Comment, General Manual Muscle Testing (MMT) Assessment 4-/5 MMT BLE  -DW     Row Name 03/26/23 0911          Bed Mobility    Bed Mobility bed mobility (all) activities  -     All Activities, [place] (Bed Mobility) standby assist  -DW     Row Name 03/26/23 0911          Transfers    Transfers sit-stand transfer;stand-sit transfer  -DW     Row Name 03/26/23 0911          Sit-Stand Transfer    Sit-Stand [place] (Transfers) standby assist  -DW     Row Name 03/26/23 0911          Stand-Sit Transfer    Stand-Sit [place] (Transfers) standby assist  -DW     Row Name 03/26/23 0911          Gait/Stairs (Locomotion)    Gait/Stairs Locomotion gait/ambulation independence;gait/ambulation assistive device  -     [place] Level (Gait) contact guard  -     Distance in Feet (Gait) 300  -     Row Name 03/26/23 0911          Safety Issues, Functional Mobility    Impairments Affecting Function (Mobility) balance;endurance/activity tolerance;strength  -     Row Name 03/26/23 0911          Balance    Balance Assessment standing dynamic balance  -     Dynamic Standing Balance contact guard  -     Comment, Balance LOB x 2 during ambulation  -DW     Row Name 03/26/23 0911          Plan of Care Review    Plan of Care Reviewed With patient  -     Progress improving  -     Outcome Evaluation Pt presents with decresaed functional mobility secondary to prolonged hospitalization.  Skilled PT intervention is needed to address noted deficits in order to restore to PLOF.  -     Row Name 03/26/23 0911          Therapy Assessment/Plan (PT)    PT Diagnosis (PT) Difficulty in walking  -     Rehab Potential (PT) good, to achieve stated therapy goals  -     Criteria for Skilled Interventions Met (PT) yes;meets criteria;skilled treatment is necessary  -     Therapy Frequency (PT) daily  -     Problem List (PT) problems related to;balance;strength  -     Activity  Limitations Related to Problem List (PT) unable to ambulate safely;unable to transfer safely;BADLs not performed adequately or safely;IADLs not performed adequately or safely  -DW     Row Name 03/26/23 0911          PT Evaluation Complexity    History, PT Evaluation Complexity no personal factors and/or comorbidities  -DW     Examination of Body Systems (PT Eval Complexity) 1-2 elements  -DW     Clinical Presentation (PT Evaluation Complexity) stable  -DW     Clinical Decision Making (PT Evaluation Complexity) low complexity  -DW     Overall Complexity (PT Evaluation Complexity) low complexity  -DW     Row Name 03/26/23 0911          Therapy Plan Review/Discharge Plan (PT)    Therapy Plan Review (PT) evaluation/treatment results reviewed  -DW     Row Name 03/26/23 0911          Physical Therapy Goals    Bed Mobility Goal Selection (PT) bed mobility, PT goal 1  -DW     Gait Training Goal Selection (PT) gait training, PT goal 1  -DW     Row Name 03/26/23 0911          Bed Mobility Goal 1 (PT)    Activity/Assistive Device (Bed Mobility Goal 1, PT) bed mobility activities, all  -DW     [place] Level/Cues Needed (Bed Mobility Goal 1, PT) independent  -DW     Time Frame (Bed Mobility Goal 1, PT) long term goal (LTG);10 days  -DW     Row Name 03/26/23 0911          Gait Training Goal 1 (PT)    Activity/Assistive Device (Gait Training Goal 1, PT) gait (walking locomotion)  -DW     [place] Level (Gait Training Goal 1, PT) independent  -DW     Distance (Gait Training Goal 1, PT) 300  -DW     Time Frame (Gait Training Goal 1, PT) long term goal (LTG);10 days  -DW           User Key  (r) = Recorded By, (t) = Taken By, (c) = Cosigned By    Initials Name Provider Type    DW Hans Perez, PT Physical Therapist                Physical Therapy Education     Title: PT OT SLP Therapies (Done)     Topic: Physical Therapy (Done)     Point: Mobility training (Done)     Learning Progress Summary           Patient Acceptance,  E,TB, VU by  at 3/26/2023 0920                   Point: Home exercise program (Done)     Learning Progress Summary           Patient Acceptance, E,TB, VU by  at 3/26/2023 0920                   Point: Body mechanics (Done)     Learning Progress Summary           Patient Acceptance, E,TB, VU by  at 3/26/2023 0920                   Point: Precautions (Done)     Learning Progress Summary           Patient Acceptance, E,TB, VU by  at 3/26/2023 0920                               User Key     Initials Effective Dates Name Provider Type Discipline     04/25/21 -  Hans Perez, PT Physical Therapist PT              PT Recommendation and Plan  Anticipated Discharge Disposition (PT): home with home health, home with 24/7 care  Planned Therapy Interventions (PT): balance training, gait training, strengthening, transfer training  Therapy Frequency (PT): daily  Plan of Care Reviewed With: patient  Progress: improving  Outcome Evaluation: Pt presents with decresaed functional mobility secondary to prolonged hospitalization.  Skilled PT intervention is needed to address noted deficits in order to restore to PLOF.   Outcome Measures     Row Name 03/26/23 0920             How much help from another person do you currently need...    Turning from your back to your side while in flat bed without using bedrails? 4  -DW      Moving from lying on back to sitting on the side of a flat bed without bedrails? 4  -DW      Moving to and from a bed to a chair (including a wheelchair)? 4  -DW      Standing up from a chair using your arms (e.g., wheelchair, bedside chair)? 4  -DW      Climbing 3-5 steps with a railing? 3  -DW      To walk in hospital room? 3  -DW      AM-PAC 6 Clicks Score (PT) 22  -DW         Functional Assessment    Outcome Measure Options AM-PAC 6 Clicks Basic Mobility (PT)  -DW            User Key  (r) = Recorded By, (t) = Taken By, (c) = Cosigned By    Initials Name Provider Type    Hans Fine, PT  Physical Therapist                 Time Calculation:    PT Charges     Row Name 03/26/23 0922             Time Calculation    PT Received On 03/26/23  -DW      PT Goal Re-Cert Due Date 04/04/23  -DW         Timed Charges    36364 - Gait Training Minutes  15  -DW         Untimed Charges    PT Eval/Re-eval Minutes 8  -DW         Total Minutes    Timed Charges Total Minutes 15  -DW      Untimed Charges Total Minutes 8  -DW       Total Minutes 23  -DW            User Key  (r) = Recorded By, (t) = Taken By, (c) = Cosigned By    Initials Name Provider Type    DW Hans Perez, PT Physical Therapist              Therapy Charges for Today     Code Description Service Date Service Provider Modifiers Qty    72067040286 HC GAIT TRAINING EA 15 MIN 3/26/2023 Hans Perez, PT GP 1    [id number] HC PT EVAL LOW COMPLEXITY 2 3/26/2023 Hans Perez, PT GP 1          PT G-Codes  Outcome Measure Options: AM-PAC 6 Clicks Basic Mobility (PT)  AM-PAC 6 Clicks Score (PT): 22    Hans Perez PT  3/26/2023

## 2023-03-26 NOTE — PROGRESS NOTES
[facility]   Hospitalist Progress Note  Date: 3/26/2023  Patient Name: Mallory Franco  : 1940  MRN: [id number]  Date of admission: 3/25/2023      Subjective   Subjective     Chief Complaint:  Palpitations    Summary: 82-year-old presented with palpitations, had just been admitted 10 days prior to this.  Her beta-blocker was discontinued on that hospitalization.  EKG suggestive of high-grade heart block.  Cardiology was contacted by the ED and consult placed.  Planning for pacemaker placement on 3/27/2023.    Interval Followup: Seen by cardiology with plans for pacemaker tomorrow, no chest pain or dizziness today    Review of Systems   All systems were reviewed and negative except for what is outlined above    Objective   Objective     Vitals:   Temp:  [97.7 °F (36.5 °C)-98.4 °F (36.9 °C)] 97.7 °F (36.5 °C)  Heart Rate:  [35-76] 51  Resp:  [17-20] 17  BP: (130-209)/(44-85) 173/56  Physical Exam    Constitutional: Awake, alert, no acute distress   Eyes: Pupils equal, sclerae anicteric, no conjunctival injection   HENT: NCAT, mucous membranes moist   Neck: Supple, no thyromegaly, no lymphadenopathy, trachea midline   Respiratory: Clear to auscultation bilaterally, nonlabored respirations    Cardiovascular: rrr   Gastrointestinal: Positive bowel sounds, soft, nontender, nondistended   Musculoskeletal: No bilateral ankle edema, no clubbing or cyanosis to extremities   Psychiatric: Appropriate affect, cooperative   Neurologic: Oriented x 3, speech clear   Skin: No rashes     Result Review    Result Review:  I have reviewed the following:  [x]  Laboratory   CBC    CBC 3/17/23 3/25/23 3/26/23   WBC 8.77 8.58 6.83   RBC 3.43 (A) 3.84 3.72 (A)   Hemoglobin 9.6 (A) 10.8 (A) 10.6 (A)   Hematocrit 30.1 (A) 34.5 32.9 (A)   MCV 87.8 89.8 88.4   MCH 28.0 28.1 28.5   MCHC 31.9 31.3 (A) 32.2   RDW 15.7 (A) 17.0 (A) 16.6 (A)   Platelets 259 300 281   (A) Abnormal value            CMP    CMP 3/17/23 3/25/23 3/26/23    Glucose 107 (A) 95 114 (A)   BUN 23 38 (A) 31 (A)   Creatinine 0.93 1.06 (A) 1.03 (A)   eGFR 61.1 52.2 (A) 54.1 (A)   Sodium 140 135 (A) 139   Potassium 4.3 4.4 4.1   Chloride 105 100 105   Calcium 9.7 9.7 9.4   Total Protein 6.3 7.2 6.7   Albumin 3.5 4.3 3.8   Globulin 2.8 2.9 2.9   Total Bilirubin 0.4 0.4 0.4   Alkaline Phosphatase 77 89 82   AST (SGOT) 28 23 24   ALT (SGPT) 16 17 15   Albumin/Globulin Ratio 1.3 1.5 1.3   BUN/Creatinine Ratio 24.7 35.8 (A) 30.1 (A)   Anion Gap 10.6 9.7 10.4   (A) Abnormal value              []  Microbiology  []  Radiology  []  EKG/Telemetry   []  Cardiology/Vascular   []  Pathology  []  Old records  []  Other:    Assessment & Plan   Assessment / Plan     Assessment:  • Palpitations  • High-grade heart block  • CAD  • HFpEF  • Hypomagnesemia    Plan:  · Placed in observation  · Magnesium replaced  · Cardiology consulted  · Plan for PPM on 3/27/23  · Just had echo so not repeated     Discussed plan with RN.    DVT prophylaxis:  Medical DVT prophylaxis orders are present.    CODE STATUS:   Level Of Support Discussed With: Patient  Code Status (Patient has no pulse and is not breathing): CPR (Attempt to Resuscitate)  Medical Interventions (Patient has pulse or is breathing): Full Support      Electronically signed by Shane Vallecillo MD, 03/26/23, 1:29 PM EDT.

## 2023-03-27 ENCOUNTER — APPOINTMENT (OUTPATIENT)
Dept: GENERAL RADIOLOGY | Facility: HOSPITAL | Age: 83
End: 2023-03-27
Payer: MEDICARE

## 2023-03-27 PROCEDURE — 99232 SBSQ HOSP IP/OBS MODERATE 35: CPT | Performed by: INTERNAL MEDICINE

## 2023-03-27 PROCEDURE — 94664 DEMO&/EVAL PT USE INHALER: CPT

## 2023-03-27 PROCEDURE — A9270 NON-COVERED ITEM OR SERVICE: HCPCS | Performed by: PHYSICIAN ASSISTANT

## 2023-03-27 PROCEDURE — 25010000002 METHYLPREDNISOLONE PER 125 MG: Performed by: INTERNAL MEDICINE

## 2023-03-27 PROCEDURE — 94799 UNLISTED PULMONARY SVC/PX: CPT

## 2023-03-27 PROCEDURE — 33208 INSRT HEART PM ATRIAL & VENT: CPT | Performed by: INTERNAL MEDICINE

## 2023-03-27 PROCEDURE — 63710000001 PANTOPRAZOLE 40 MG TABLET DELAYED-RELEASE: Performed by: PHYSICIAN ASSISTANT

## 2023-03-27 PROCEDURE — 25010000002 MIDAZOLAM PER 1 MG: Performed by: INTERNAL MEDICINE

## 2023-03-27 PROCEDURE — 63710000001 VALSARTAN 80 MG TABLET: Performed by: INTERNAL MEDICINE

## 2023-03-27 PROCEDURE — 99153 MOD SED SAME PHYS/QHP EA: CPT | Performed by: INTERNAL MEDICINE

## 2023-03-27 PROCEDURE — C1898 LEAD, PMKR, OTHER THAN TRANS: HCPCS | Performed by: INTERNAL MEDICINE

## 2023-03-27 PROCEDURE — G0378 HOSPITAL OBSERVATION PER HR: HCPCS

## 2023-03-27 PROCEDURE — 63710000001 ALUMINUM-MAGNESIUM HYDROXIDE-SIMETHICONE 400-400-40 MG/5ML SUSPENSION: Performed by: PHYSICIAN ASSISTANT

## 2023-03-27 PROCEDURE — C1894 INTRO/SHEATH, NON-LASER: HCPCS | Performed by: INTERNAL MEDICINE

## 2023-03-27 PROCEDURE — A9270 NON-COVERED ITEM OR SERVICE: HCPCS | Performed by: INTERNAL MEDICINE

## 2023-03-27 PROCEDURE — 25010000002 DIPHENHYDRAMINE PER 50 MG: Performed by: INTERNAL MEDICINE

## 2023-03-27 PROCEDURE — 71045 X-RAY EXAM CHEST 1 VIEW: CPT

## 2023-03-27 PROCEDURE — 63710000001 TEMAZEPAM 15 MG CAPSULE: Performed by: INTERNAL MEDICINE

## 2023-03-27 PROCEDURE — 99152 MOD SED SAME PHYS/QHP 5/>YRS: CPT | Performed by: INTERNAL MEDICINE

## 2023-03-27 PROCEDURE — 25010000002 CEFAZOLIN IN DEXTROSE 2-4 GM/100ML-% SOLUTION: Performed by: INTERNAL MEDICINE

## 2023-03-27 PROCEDURE — 25010000002 FENTANYL CITRATE (PF) 50 MCG/ML SOLUTION: Performed by: INTERNAL MEDICINE

## 2023-03-27 PROCEDURE — 99231 SBSQ HOSP IP/OBS SF/LOW 25: CPT | Performed by: INTERNAL MEDICINE

## 2023-03-27 PROCEDURE — 25510000001 IOPAMIDOL PER 1 ML: Performed by: INTERNAL MEDICINE

## 2023-03-27 PROCEDURE — C1892 INTRO/SHEATH,FIXED,PEEL-AWAY: HCPCS | Performed by: INTERNAL MEDICINE

## 2023-03-27 PROCEDURE — C1785 PMKR, DUAL, RATE-RESP: HCPCS | Performed by: INTERNAL MEDICINE

## 2023-03-27 DEVICE — PACEMAKER
Type: IMPLANTABLE DEVICE | Status: FUNCTIONAL
Brand: ACCOLADE™ MRI DR

## 2023-03-27 DEVICE — PACE/SENSE LEAD
Type: IMPLANTABLE DEVICE | Status: FUNCTIONAL
Brand: INGEVITY™+

## 2023-03-27 RX ORDER — METHYLPREDNISOLONE SODIUM SUCCINATE 125 MG/2ML
INJECTION, POWDER, LYOPHILIZED, FOR SOLUTION INTRAMUSCULAR; INTRAVENOUS
Status: DISCONTINUED | OUTPATIENT
Start: 2023-03-27 | End: 2023-03-27 | Stop reason: HOSPADM

## 2023-03-27 RX ORDER — MIDAZOLAM HYDROCHLORIDE 1 MG/ML
INJECTION INTRAMUSCULAR; INTRAVENOUS
Status: DISCONTINUED | OUTPATIENT
Start: 2023-03-27 | End: 2023-03-27 | Stop reason: HOSPADM

## 2023-03-27 RX ORDER — PREDNISONE 50 MG/1
50 TABLET ORAL ONCE
Status: DISCONTINUED | OUTPATIENT
Start: 2023-03-27 | End: 2023-03-27

## 2023-03-27 RX ORDER — PANTOPRAZOLE SODIUM 40 MG/1
40 TABLET, DELAYED RELEASE ORAL
Status: DISCONTINUED | OUTPATIENT
Start: 2023-03-28 | End: 2023-03-28 | Stop reason: HOSPADM

## 2023-03-27 RX ORDER — SODIUM CHLORIDE 0.9 % (FLUSH) 0.9 %
1-10 SYRINGE (ML) INJECTION AS NEEDED
Status: DISCONTINUED | OUTPATIENT
Start: 2023-03-27 | End: 2023-03-28 | Stop reason: HOSPADM

## 2023-03-27 RX ORDER — DIPHENHYDRAMINE HCL 50 MG
50 CAPSULE ORAL
Status: DISCONTINUED | OUTPATIENT
Start: 2023-03-27 | End: 2023-03-27

## 2023-03-27 RX ORDER — LIDOCAINE HYDROCHLORIDE 20 MG/ML
INJECTION, SOLUTION INFILTRATION; PERINEURAL
Status: DISCONTINUED | OUTPATIENT
Start: 2023-03-27 | End: 2023-03-27 | Stop reason: HOSPADM

## 2023-03-27 RX ORDER — ACETAMINOPHEN 325 MG/1
650 TABLET ORAL EVERY 4 HOURS PRN
Status: DISCONTINUED | OUTPATIENT
Start: 2023-03-27 | End: 2023-03-28 | Stop reason: HOSPADM

## 2023-03-27 RX ORDER — ACETAMINOPHEN 650 MG/1
650 SUPPOSITORY RECTAL EVERY 4 HOURS PRN
Status: DISCONTINUED | OUTPATIENT
Start: 2023-03-27 | End: 2023-03-28 | Stop reason: HOSPADM

## 2023-03-27 RX ORDER — CEFAZOLIN SODIUM 2 G/100ML
2 INJECTION, SOLUTION INTRAVENOUS EVERY 8 HOURS
Status: COMPLETED | OUTPATIENT
Start: 2023-03-27 | End: 2023-03-28

## 2023-03-27 RX ORDER — DIPHENHYDRAMINE HYDROCHLORIDE 50 MG/ML
50 INJECTION INTRAMUSCULAR; INTRAVENOUS
Status: DISCONTINUED | OUTPATIENT
Start: 2023-03-27 | End: 2023-03-27

## 2023-03-27 RX ORDER — FENTANYL CITRATE 50 UG/ML
INJECTION, SOLUTION INTRAMUSCULAR; INTRAVENOUS
Status: DISCONTINUED | OUTPATIENT
Start: 2023-03-27 | End: 2023-03-27 | Stop reason: HOSPADM

## 2023-03-27 RX ORDER — SODIUM CHLORIDE 0.9 % (FLUSH) 0.9 %
3 SYRINGE (ML) INJECTION EVERY 12 HOURS SCHEDULED
Status: DISCONTINUED | OUTPATIENT
Start: 2023-03-27 | End: 2023-03-28 | Stop reason: HOSPADM

## 2023-03-27 RX ORDER — TEMAZEPAM 15 MG/1
15 CAPSULE ORAL NIGHTLY PRN
Status: DISCONTINUED | OUTPATIENT
Start: 2023-03-27 | End: 2023-03-28 | Stop reason: HOSPADM

## 2023-03-27 RX ORDER — SODIUM CHLORIDE 9 MG/ML
40 INJECTION, SOLUTION INTRAVENOUS AS NEEDED
Status: DISCONTINUED | OUTPATIENT
Start: 2023-03-27 | End: 2023-03-28 | Stop reason: HOSPADM

## 2023-03-27 RX ORDER — ONDANSETRON 2 MG/ML
4 INJECTION INTRAMUSCULAR; INTRAVENOUS EVERY 6 HOURS PRN
Status: DISCONTINUED | OUTPATIENT
Start: 2023-03-27 | End: 2023-03-28 | Stop reason: HOSPADM

## 2023-03-27 RX ORDER — DIPHENHYDRAMINE HYDROCHLORIDE 50 MG/ML
INJECTION INTRAMUSCULAR; INTRAVENOUS
Status: DISCONTINUED | OUTPATIENT
Start: 2023-03-27 | End: 2023-03-27 | Stop reason: HOSPADM

## 2023-03-27 RX ORDER — ALUMINA, MAGNESIA, AND SIMETHICONE 2400; 2400; 240 MG/30ML; MG/30ML; MG/30ML
15 SUSPENSION ORAL EVERY 6 HOURS PRN
Status: DISCONTINUED | OUTPATIENT
Start: 2023-03-27 | End: 2023-03-28 | Stop reason: HOSPADM

## 2023-03-27 RX ORDER — CEFAZOLIN SODIUM 2 G/100ML
2 INJECTION, SOLUTION INTRAVENOUS ONCE
Status: COMPLETED | OUTPATIENT
Start: 2023-03-27 | End: 2023-03-27

## 2023-03-27 RX ADMIN — BUDESONIDE AND FORMOTEROL FUMARATE DIHYDRATE 1 PUFF: 160; 4.5 AEROSOL RESPIRATORY (INHALATION) at 19:18

## 2023-03-27 RX ADMIN — VALSARTAN 160 MG: 80 TABLET, FILM COATED ORAL at 20:21

## 2023-03-27 RX ADMIN — Medication 10 ML: at 09:19

## 2023-03-27 RX ADMIN — PANTOPRAZOLE SODIUM 40 MG: 40 TABLET, DELAYED RELEASE ORAL at 23:49

## 2023-03-27 RX ADMIN — ALUMINUM HYDROXIDE, MAGNESIUM HYDROXIDE, AND DIMETHICONE 15 ML: 400; 400; 40 SUSPENSION ORAL at 23:41

## 2023-03-27 RX ADMIN — CEFAZOLIN SODIUM 2 G: 2 INJECTION, SOLUTION INTRAVENOUS at 10:57

## 2023-03-27 RX ADMIN — CEFAZOLIN SODIUM 2 G: 2 INJECTION, SOLUTION INTRAVENOUS at 18:31

## 2023-03-27 RX ADMIN — BUDESONIDE AND FORMOTEROL FUMARATE DIHYDRATE 1 PUFF: 160; 4.5 AEROSOL RESPIRATORY (INHALATION) at 07:42

## 2023-03-27 RX ADMIN — Medication 3 ML: at 12:30

## 2023-03-27 RX ADMIN — AMLODIPINE BESYLATE 5 MG: 5 TABLET ORAL at 09:18

## 2023-03-27 RX ADMIN — TEMAZEPAM 15 MG: 15 CAPSULE ORAL at 20:25

## 2023-03-27 RX ADMIN — VALSARTAN 160 MG: 80 TABLET, FILM COATED ORAL at 10:03

## 2023-03-27 NOTE — PLAN OF CARE
Goal Outcome Evaluation:   Patient alert and oriented. Pacemaker placed today. Education on limiting left arm movement done with patient. Patient was receptive to education. Call light in reach, bed alarm on. Plan of care ongoing.

## 2023-03-27 NOTE — PROGRESS NOTES
[facility]   Hospitalist Progress Note  Date: 3/27/2023  Patient Name: Mallory Franco  : 1940  MRN: [id number]  Date of admission: 3/25/2023      Subjective   Subjective     Chief Complaint:  Palpitations    Summary: 82-year-old presented with palpitations, had just been admitted 10 days prior to this.  Her beta-blocker was discontinued on that hospitalization.  EKG suggestive of high-grade heart block.  Cardiology was contacted by the ED and consult placed.  Planning for pacemaker placement on 3/27/2023.    Interval Followup: Underwent PPM today, doing well after    Review of Systems   All systems were reviewed and negative except for what is outlined above    Objective   Objective     Vitals:   Temp:  [97.7 °F (36.5 °C)-98.6 °F (37 °C)] 97.7 °F (36.5 °C)  Heart Rate:  [55-79] 77  Resp:  [16-18] 17  BP: (125-178)/(48-62) 152/62  Physical Exam    Constitutional: Awake, alert, no acute distress   Eyes: Pupils equal, sclerae anicteric, no conjunctival injection   HENT: NCAT, mucous membranes moist   Neck: Supple, no thyromegaly, no lymphadenopathy, trachea midline   Respiratory: Clear to auscultation bilaterally, nonlabored respirations    Cardiovascular: rrr   Gastrointestinal: Positive bowel sounds, soft, nontender, nondistended   Musculoskeletal: left arm in sling   Psychiatric: Appropriate affect, cooperative   Neurologic: Oriented x 3, speech clear   Skin: No rashes     Result Review    Result Review:  I have reviewed the following:  [x]  Laboratory   CBC    CBC 3/17/23 3/25/23 3/26/23   WBC 8.77 8.58 6.83   RBC 3.43 (A) 3.84 3.72 (A)   Hemoglobin 9.6 (A) 10.8 (A) 10.6 (A)   Hematocrit 30.1 (A) 34.5 32.9 (A)   MCV 87.8 89.8 88.4   MCH 28.0 28.1 28.5   MCHC 31.9 31.3 (A) 32.2   RDW 15.7 (A) 17.0 (A) 16.6 (A)   Platelets 259 300 281   (A) Abnormal value            CMP    CMP 3/17/23 3/25/23 3/26/23   Glucose 107 (A) 95 114 (A)   BUN 23 38 (A) 31 (A)   Creatinine 0.93 1.06 (A) 1.03 (A)   eGFR 61.1 52.2  (A) 54.1 (A)   Sodium 140 135 (A) 139   Potassium 4.3 4.4 4.1   Chloride 105 100 105   Calcium 9.7 9.7 9.4   Total Protein 6.3 7.2 6.7   Albumin 3.5 4.3 3.8   Globulin 2.8 2.9 2.9   Total Bilirubin 0.4 0.4 0.4   Alkaline Phosphatase 77 89 82   AST (SGOT) 28 23 24   ALT (SGPT) 16 17 15   Albumin/Globulin Ratio 1.3 1.5 1.3   BUN/Creatinine Ratio 24.7 35.8 (A) 30.1 (A)   Anion Gap 10.6 9.7 10.4   (A) Abnormal value              []  Microbiology  []  Radiology  []  EKG/Telemetry   []  Cardiology/Vascular   []  Pathology  []  Old records  []  Other:    Assessment & Plan   Assessment / Plan     Assessment:  • Palpitations  • High-grade heart block  • CAD  • HFpEF  • Hypomagnesemia    Plan:  · Placed in observation  · Cardiology consulted  · S/p PPM today  · Likely home tomorrow    DVT prophylaxis:  Medical DVT prophylaxis orders are present.    CODE STATUS:   Level Of Support Discussed With: Patient  Code Status (Patient has no pulse and is not breathing): CPR (Attempt to Resuscitate)  Medical Interventions (Patient has pulse or is breathing): Full Support    Electronically signed by Shane Vallecillo MD, 03/27/23, 1:10 PM EDT.

## 2023-03-27 NOTE — PLAN OF CARE
NPO status maintained since midnight  Oncoming RN will call to place on cath schedule  No acute event, rested well, denies pain, call light with in reach  Able to make needs known    Problem: Adult Inpatient Plan of Care  Goal: Plan of Care Review  Outcome: Ongoing, Progressing  Goal: Patient-Specific Goal (Individualized)  Outcome: Ongoing, Progressing  Goal: Absence of Hospital-Acquired Illness or Injury  Outcome: Ongoing, Progressing  Intervention: Identify and Manage Fall Risk  Recent Flowsheet Documentation  Taken 3/27/2023 0400 by Amber Ballard RN  Safety Promotion/Fall Prevention:   toileting scheduled   safety round/check completed   assistive device/personal items within reach  Taken 3/27/2023 0200 by Amber Ballard RN  Safety Promotion/Fall Prevention: safety round/check completed  Taken 3/27/2023 0000 by Amber Ballard RN  Safety Promotion/Fall Prevention: safety round/check completed  Taken 3/26/2023 2200 by Amber Ballard RN  Safety Promotion/Fall Prevention: safety round/check completed  Taken 3/26/2023 2000 by Amber Ballard RN  Safety Promotion/Fall Prevention:   safety round/check completed   activity supervised   clutter free environment maintained   fall prevention program maintained   lighting adjusted  Taken 3/26/2023 1915 by Amber Ballard RN  Safety Promotion/Fall Prevention: safety round/check completed  Intervention: Prevent Infection  Recent Flowsheet Documentation  Taken 3/26/2023 1915 by Amber Ballard RN  Infection Prevention:   hand hygiene promoted   single patient room provided  Goal: Optimal Comfort and Wellbeing  Outcome: Ongoing, Progressing  Intervention: Provide Person-Centered Care  Recent Flowsheet Documentation  Taken 3/26/2023 1915 by Amber Ballard RN  Trust Relationship/Rapport:   care explained   reassurance provided   questions encouraged   questions answered   empathic listening provided  Goal: Readiness for Transition of Care  Outcome: Ongoing,  Progressing     Problem: COPD (Chronic Obstructive Pulmonary Disease) Comorbidity  Goal: Maintenance of COPD Symptom Control  Outcome: Ongoing, Progressing  Intervention: Maintain COPD-Symptom Control  Recent Flowsheet Documentation  Taken 3/26/2023 2000 by Amber Ballard RN  Medication Review/Management: medications reviewed  Taken 3/26/2023 1915 by Amber Ballard RN  Medication Review/Management: medications reviewed     Problem: Heart Failure Comorbidity  Goal: Maintenance of Heart Failure Symptom Control  Outcome: Ongoing, Progressing  Intervention: Maintain Heart Failure-Management  Recent Flowsheet Documentation  Taken 3/26/2023 2000 by Amber Ballard RN  Medication Review/Management: medications reviewed  Taken 3/26/2023 1915 by Amber Ballard RN  Medication Review/Management: medications reviewed     Problem: Hypertension Comorbidity  Goal: Blood Pressure in Desired Range  Outcome: Ongoing, Progressing  Intervention: Maintain Blood Pressure Management  Recent Flowsheet Documentation  Taken 3/26/2023 2000 by Amber Ballard RN  Medication Review/Management: medications reviewed  Taken 3/26/2023 1915 by Amber Ballard RN  Medication Review/Management: medications reviewed   Goal Outcome Evaluation:

## 2023-03-27 NOTE — PROGRESS NOTES
CARDIOLOGY  INPATIENT PROGRESS NOTE         [facility] UNIT    3/27/2023      PATIENT IDENTIFICATION:   Name:  Mallory Franco      MRN:  [id number]     82 y.o.  female             Reason for visit: Symptomatic bradycardia      SUBJECTIVE:    The patient is stable overnight, occasional episodes of 2-1 block, frequent PVCs  OBJECTIVE:  Vitals:    03/27/23 0245 03/27/23 0600 03/27/23 0718 03/27/23 0742   BP: 150/49  152/62    BP Location: Right arm  Right arm    Patient Position: Lying  Sitting    Pulse: 55  79 77   Resp: 16  17    Temp: 97.9 °F (36.6 °C)  97.7 °F (36.5 °C)    TempSrc: Oral  Oral    SpO2: 96%  95% 93%   Weight:  86.6 kg (190 lb 14.7 oz)     Height:               Body mass index is 32.77 kg/m².    Intake/Output Summary (Last 24 hours) at 3/27/2023 0838  Last data filed at 3/26/2023 1915  Gross per 24 hour   Intake 1100 ml   Output 425 ml   Net 675 ml       Telemetry: Sinus rhythm with occasional PVCs    Review of Systems   Constitutional: Positive for fatigue.   Respiratory: Positive for shortness of breath.    Cardiovascular: Positive for palpitations.         Exam:  General appearance no acute distress    well nourished   HEENT sclerae non-icteric    lips not cyanotic   Respiratory rate and depth normal    normal breath sounds    no rales, no wheeze   Cardiovascular JVP normal    regular rhythm    S1 normal, S2 normal    no S3, no S4    no murmur    lower extremity edema:none   Abdominal bowel sounds normal    abdomen soft, non-tender    no hepatosplenomegaly   Neuro-psych oriented to person, place, time    cooperative     Allergies   Allergen Reactions   • Codeine Nausea And Vomiting   • Contrast Dye (Echo Or Unknown Ct/Mr) Hives   • Morphine Nausea And Vomiting     Scheduled meds:  amLODIPine, 5 mg, Oral, Daily  aspirin, 81 mg, Oral, Daily  budesonide-formoterol, 1 puff, Inhalation, BID - RT  enoxaparin, 40 mg, Subcutaneous, Daily  sodium chloride, 10 mL, Intravenous,  Q12H  valsartan, 160 mg, Oral, Q12H      IV meds:                         Data Review:  Results from last 7 days   Lab Units 03/26/23  0406 03/25/23  1327   SODIUM mmol/L 139 135*   BUN mg/dL 31* 38*   CREATININE mg/dL 1.03* 1.06*   GLUCOSE mg/dL 114* 95             Estimated Creatinine Clearance: 44.1 mL/min (A) (by C-G formula based on SCr of 1.03 mg/dL (H)).  Results from last 7 days   Lab Units 03/26/23  0406 03/25/23  1327   WBC 10*3/mm3 6.83 8.58   HEMOGLOBIN g/dL 10.6* 10.8*         Results from last 7 days   Lab Units 03/26/23  0406 03/25/23  1327   ALT (SGPT) U/L 15 17   AST (SGOT) U/L 24 23     No results found for: DIGOXIN   Lab Results   Component Value Date    TSH 1.500 03/25/2023           Invalid input(s): LDLCALC            Imaging (last 24 hr):   Imaging Results (Last 24 Hours)     ** No results found for the last 24 hours. **            ASSESSMENT:     SSS (sick sinus syndrome) (HCC)    Palpitations          PLAN:  1.  Dual-chamber permanent pacemaker today, risks and benefits discussed, Dr. Rosa to the procedure.  Discussed with family.  The patient is agreeable to proceed  2.  After pacemaker implant, can probably start by resuming beta-blocker therapy            Carlos Lema MD  3/27/2023    08:38 EDT

## 2023-03-27 NOTE — PROGRESS NOTES
CARDIOLOGY  INPATIENT PROGRESS NOTE                [facility] UNIT    3/27/2023      PATIENT IDENTIFICATION:   Name:  Mallory Franco      MRN:  [id number]     82 y.o.  female                 SUBJECTIVE:   Patient is resting comfortably this morning no new events overnight    OBJECTIVE:  Vitals:    03/27/23 0245 03/27/23 0600 03/27/23 0718 03/27/23 0742   BP: 150/49  152/62    BP Location: Right arm  Right arm    Patient Position: Lying  Sitting    Pulse: 55  79 77   Resp: 16  17    Temp: 97.9 °F (36.6 °C)  97.7 °F (36.5 °C)    TempSrc: Oral  Oral    SpO2: 96%  95% 93%   Weight:  86.6 kg (190 lb 14.7 oz)     Height:               Body mass index is 32.77 kg/m².    Intake/Output Summary (Last 24 hours) at 3/27/2023 0842  Last data filed at 3/26/2023 1915  Gross per 24 hour   Intake 1100 ml   Output 425 ml   Net 675 ml       Telemetry: Normal sinus rhythm and sinus bradycardia    Physical Exam  General Appearance:   · no acute distress  · Alert and oriented x3  HENT:   · lips not cyanotic  · Atraumatic  Neck:  · No jvd   · supple  Respiratory:  · no respiratory distress  · normal breath sounds  · no rales  Cardiovascular:    · regular rhythm  · no S3, no S4   · no murmur  · no rub  · lower extremity edema: none    Skin:   · warm, dry  · No rashes      Allergies   Allergen Reactions   • Codeine Nausea And Vomiting   • Contrast Dye (Echo Or Unknown Ct/Mr) Hives   • Morphine Nausea And Vomiting     Scheduled meds:  amLODIPine, 5 mg, Oral, Daily  aspirin, 81 mg, Oral, Daily  budesonide-formoterol, 1 puff, Inhalation, BID - RT  enoxaparin, 40 mg, Subcutaneous, Daily  sodium chloride, 10 mL, Intravenous, Q12H  valsartan, 160 mg, Oral, Q12H      IV meds:                         Data Review:  CBC    CBC 3/17/23 3/25/23 3/26/23   WBC 8.77 8.58 6.83   RBC 3.43 (A) 3.84 3.72 (A)   Hemoglobin 9.6 (A) 10.8 (A) 10.6 (A)   Hematocrit 30.1 (A) 34.5 32.9 (A)   MCV 87.8 89.8 88.4   MCH 28.0 28.1 28.5   MCHC  31.9 31.3 (A) 32.2   RDW 15.7 (A) 17.0 (A) 16.6 (A)   Platelets 259 300 281   (A) Abnormal value            CMP    CMP 3/17/23 3/25/23 3/26/23   Glucose 107 (A) 95 114 (A)   BUN 23 38 (A) 31 (A)   Creatinine 0.93 1.06 (A) 1.03 (A)   eGFR 61.1 52.2 (A) 54.1 (A)   Sodium 140 135 (A) 139   Potassium 4.3 4.4 4.1   Chloride 105 100 105   Calcium 9.7 9.7 9.4   Total Protein 6.3 7.2 6.7   Albumin 3.5 4.3 3.8   Globulin 2.8 2.9 2.9   Total Bilirubin 0.4 0.4 0.4   Alkaline Phosphatase 77 89 82   AST (SGOT) 28 23 24   ALT (SGPT) 16 17 15   Albumin/Globulin Ratio 1.3 1.5 1.3   BUN/Creatinine Ratio 24.7 35.8 (A) 30.1 (A)   Anion Gap 10.6 9.7 10.4   (A) Abnormal value             CARDIAC LABS:      Lab 03/26/23  0843 03/26/23  0406 03/25/23  1327   PROBNP  --   --  371.7   HSTROP T 47* 50* 41*        No results found for: DIGOXIN   Lab Results   Component Value Date    TSH 1.500 03/25/2023           Invalid input(s): LDLCALC  Lab Results   Component Value Date    POCTROP 0.02 10/28/2022     Lab Results   Component Value Date    TROPONINT 47 (H) 03/26/2023   (  Lab Results   Component Value Date    MG 2.1 03/26/2023     Results for orders placed during the hospital encounter of 03/16/23    Adult Transthoracic Echo Complete W/ Cont if Necessary Per Protocol    Interpretation Summary  •  Left ventricular systolic function is normal. Left ventricular ejection fraction appears to be 51 - 55%.  •  Left ventricular wall thickness is consistent with mild concentric hypertrophy.  •  Left ventricular diastolic function is consistent with (grade I) impaired relaxation.  •  There is mild to moderate mitral regurgitation.  •  There is trace aortic insufficiency.  •  There is mild tricuspid regurgitation.  Estimated right ventricular systolic pressure from tricuspid regurgitation is markedly elevated (>55 mmHg).           ASSESSMENT:    SSS (sick sinus syndrome) (HCC)    Palpitations    Sick sinus syndrome with symptomatic Bradycardia agree  with recommendation for dual-chamber permanent pacemaker discussed risk benefits alternatives including the risk of bleeding, infection, lead dislodgment, pneumothorax, and cardiac perforation patient was agreeable proceeding    PLAN:  1.  Dual-chamber perm pacemaker implantation today          Shane Rosa MD  3/27/2023    08:42 EDT

## 2023-03-27 NOTE — NURSING NOTE
Sign and held orders for pacemaker placement were failed to release from night shift prior to morning shift on 3/27/3023. Orders were released at aproximately 10:10 on 3/27/2023. Cath lab was contacted regarding missing administration of scheduled prednisone and benadryl prior to procedure. Cleared by cath lab to send patient for procedure without administration of prednisone and benadryl. Safe report was filed.

## 2023-03-28 ENCOUNTER — READMISSION MANAGEMENT (OUTPATIENT)
Dept: CALL CENTER | Facility: HOSPITAL | Age: 83
End: 2023-03-28
Payer: MEDICARE

## 2023-03-28 VITALS
HEART RATE: 65 BPM | WEIGHT: 189.15 LBS | DIASTOLIC BLOOD PRESSURE: 60 MMHG | TEMPERATURE: 98.4 F | RESPIRATION RATE: 16 BRPM | BODY MASS INDEX: 32.29 KG/M2 | HEIGHT: 64 IN | OXYGEN SATURATION: 96 % | SYSTOLIC BLOOD PRESSURE: 148 MMHG

## 2023-03-28 LAB
ALBUMIN SERPL-MCNC: 4 G/DL (ref 3.5–5.2)
ANION GAP SERPL CALCULATED.3IONS-SCNC: 9 MMOL/L (ref 5–15)
BASOPHILS # BLD AUTO: 0 10*3/MM3 (ref 0–0.2)
BASOPHILS NFR BLD AUTO: 0 % (ref 0–1.5)
BUN SERPL-MCNC: 33 MG/DL (ref 8–23)
BUN/CREAT SERPL: 31.4 (ref 7–25)
CALCIUM SPEC-SCNC: 9.7 MG/DL (ref 8.6–10.5)
CHLORIDE SERPL-SCNC: 99 MMOL/L (ref 98–107)
CO2 SERPL-SCNC: 25 MMOL/L (ref 22–29)
CREAT SERPL-MCNC: 1.05 MG/DL (ref 0.57–1)
DEPRECATED RDW RBC AUTO: 52.9 FL (ref 37–54)
EGFRCR SERPLBLD CKD-EPI 2021: 52.8 ML/MIN/1.73
EOSINOPHIL # BLD AUTO: 0 10*3/MM3 (ref 0–0.4)
EOSINOPHIL NFR BLD AUTO: 0 % (ref 0.3–6.2)
ERYTHROCYTE [DISTWIDTH] IN BLOOD BY AUTOMATED COUNT: 16.5 % (ref 12.3–15.4)
GLUCOSE SERPL-MCNC: 155 MG/DL (ref 65–99)
HCT VFR BLD AUTO: 34.5 % (ref 34–46.6)
HGB BLD-MCNC: 11 G/DL (ref 12–15.9)
IMM GRANULOCYTES # BLD AUTO: 0.04 10*3/MM3 (ref 0–0.05)
IMM GRANULOCYTES NFR BLD AUTO: 0.5 % (ref 0–0.5)
LYMPHOCYTES # BLD AUTO: 1.4 10*3/MM3 (ref 0.7–3.1)
LYMPHOCYTES NFR BLD AUTO: 17.8 % (ref 19.6–45.3)
MAGNESIUM SERPL-MCNC: 1.8 MG/DL (ref 1.6–2.4)
MCH RBC QN AUTO: 28.1 PG (ref 26.6–33)
MCHC RBC AUTO-ENTMCNC: 31.9 G/DL (ref 31.5–35.7)
MCV RBC AUTO: 88 FL (ref 79–97)
MONOCYTES # BLD AUTO: 0.3 10*3/MM3 (ref 0.1–0.9)
MONOCYTES NFR BLD AUTO: 3.8 % (ref 5–12)
NEUTROPHILS NFR BLD AUTO: 6.13 10*3/MM3 (ref 1.7–7)
NEUTROPHILS NFR BLD AUTO: 77.9 % (ref 42.7–76)
NRBC BLD AUTO-RTO: 0 /100 WBC (ref 0–0.2)
PHOSPHATE SERPL-MCNC: 2.6 MG/DL (ref 2.5–4.5)
PLATELET # BLD AUTO: 272 10*3/MM3 (ref 140–450)
PMV BLD AUTO: 9.9 FL (ref 6–12)
POTASSIUM SERPL-SCNC: 4.6 MMOL/L (ref 3.5–5.2)
RBC # BLD AUTO: 3.92 10*6/MM3 (ref 3.77–5.28)
SODIUM SERPL-SCNC: 133 MMOL/L (ref 136–145)
WBC NRBC COR # BLD: 7.87 10*3/MM3 (ref 3.4–10.8)

## 2023-03-28 PROCEDURE — 94664 DEMO&/EVAL PT USE INHALER: CPT

## 2023-03-28 PROCEDURE — 63710000001 VALSARTAN 80 MG TABLET: Performed by: INTERNAL MEDICINE

## 2023-03-28 PROCEDURE — A9270 NON-COVERED ITEM OR SERVICE: HCPCS | Performed by: INTERNAL MEDICINE

## 2023-03-28 PROCEDURE — 63710000001 AMLODIPINE 5 MG TABLET: Performed by: INTERNAL MEDICINE

## 2023-03-28 PROCEDURE — 25010000002 CEFAZOLIN IN DEXTROSE 2-4 GM/100ML-% SOLUTION: Performed by: INTERNAL MEDICINE

## 2023-03-28 PROCEDURE — 99239 HOSP IP/OBS DSCHRG MGMT >30: CPT | Performed by: INTERNAL MEDICINE

## 2023-03-28 PROCEDURE — 97165 OT EVAL LOW COMPLEX 30 MIN: CPT

## 2023-03-28 PROCEDURE — 63710000001 PANTOPRAZOLE 40 MG TABLET DELAYED-RELEASE: Performed by: PHYSICIAN ASSISTANT

## 2023-03-28 PROCEDURE — 99231 SBSQ HOSP IP/OBS SF/LOW 25: CPT | Performed by: INTERNAL MEDICINE

## 2023-03-28 PROCEDURE — 25010000002 ENOXAPARIN PER 10 MG: Performed by: INTERNAL MEDICINE

## 2023-03-28 PROCEDURE — 94799 UNLISTED PULMONARY SVC/PX: CPT

## 2023-03-28 PROCEDURE — 85025 COMPLETE CBC W/AUTO DIFF WBC: CPT | Performed by: INTERNAL MEDICINE

## 2023-03-28 PROCEDURE — 63710000001 ASPIRIN 81 MG TABLET DELAYED-RELEASE: Performed by: INTERNAL MEDICINE

## 2023-03-28 PROCEDURE — G0378 HOSPITAL OBSERVATION PER HR: HCPCS

## 2023-03-28 PROCEDURE — 83735 ASSAY OF MAGNESIUM: CPT | Performed by: INTERNAL MEDICINE

## 2023-03-28 PROCEDURE — 80069 RENAL FUNCTION PANEL: CPT | Performed by: INTERNAL MEDICINE

## 2023-03-28 PROCEDURE — A9270 NON-COVERED ITEM OR SERVICE: HCPCS | Performed by: PHYSICIAN ASSISTANT

## 2023-03-28 RX ORDER — CEPHALEXIN 500 MG/1
500 CAPSULE ORAL 3 TIMES DAILY
Qty: 12 CAPSULE | Refills: 0 | Status: SHIPPED | OUTPATIENT
Start: 2023-03-28 | End: 2023-04-01

## 2023-03-28 RX ADMIN — Medication 3 ML: at 08:13

## 2023-03-28 RX ADMIN — ENOXAPARIN SODIUM 40 MG: 100 INJECTION SUBCUTANEOUS at 08:12

## 2023-03-28 RX ADMIN — CEFAZOLIN SODIUM 2 G: 2 INJECTION, SOLUTION INTRAVENOUS at 02:53

## 2023-03-28 RX ADMIN — AMLODIPINE BESYLATE 5 MG: 5 TABLET ORAL at 08:12

## 2023-03-28 RX ADMIN — PANTOPRAZOLE SODIUM 40 MG: 40 TABLET, DELAYED RELEASE ORAL at 08:12

## 2023-03-28 RX ADMIN — BUDESONIDE AND FORMOTEROL FUMARATE DIHYDRATE 1 PUFF: 160; 4.5 AEROSOL RESPIRATORY (INHALATION) at 07:25

## 2023-03-28 RX ADMIN — ASPIRIN 81 MG: 81 TABLET, COATED ORAL at 08:13

## 2023-03-28 RX ADMIN — Medication 10 ML: at 08:13

## 2023-03-28 RX ADMIN — VALSARTAN 160 MG: 80 TABLET, FILM COATED ORAL at 08:12

## 2023-03-28 NOTE — DISCHARGE SUMMARY
[facility]         HOSPITALIST  DISCHARGE SUMMARY    Patient Name: Mallory Franco  : 1940  MRN: [id number]    Date of Admission: 3/25/2023  Date of Discharge:  3/28/26916  Primary Care Physician: Johan Escudero MD   Admitting Service: Medicine  Consultants: Cardiology    Final Diagnoses:  • Sick sinus syndrome, s/p PPM  • CAD  • HFpEF  • Hypomagnesemia      Hospital Course     Hospital Course:  82-year-old presented with palpitations, had just been admitted 10 days prior to this.  Her beta-blocker was discontinued on that hospitalization.  EKG suggestive of high-grade heart block.  Cardiology was contacted by the ED and consult placed.  She underwent PPM by Dr. Rosa on 3/27/23, has tolerated the procedure well, and is stable for discharge home today.    DISCHARGE Follow Up Recommendations for labs and diagnostics: f/u with cardiology      Day of Discharge     Vital Signs:  Temp:  [97.7 °F (36.5 °C)-98.6 °F (37 °C)] 98.4 °F (36.9 °C)  Heart Rate:  [64-88] 65  Resp:  [16-17] 16  BP: (115-159)/() 148/60  Flow (L/min):  [2] 2  Physical Exam: nad, speech clear, LUE in sling      Discharge Details        Discharge Medications      New Medications      Instructions Start Date   cephalexin 500 MG capsule  Commonly known as: Keflex   500 mg, Oral, 3 Times Daily         Continue These Medications      Instructions Start Date   albuterol (2.5 MG/3ML) 0.083% nebulizer solution  Commonly known as: PROVENTIL   2.5 mg, Nebulization, Every 4 Hours PRN      albuterol sulfate  (90 Base) MCG/ACT inhaler  Commonly known as: PROVENTIL HFA;VENTOLIN HFA;PROAIR HFA   2 puffs, Inhalation, Every 4 Hours PRN      amLODIPine 5 MG tablet  Commonly known as: NORVASC   5 mg, Oral, Daily      aspirin 81 MG EC tablet   81 mg, Oral, Daily      Breo Ellipta 200-25 MCG/ACT inhaler  Generic drug: Fluticasone Furoate-Vilanterol   1 puff, Inhalation, Daily - RT      famotidine 40 MG tablet  Commonly known as:  PEPCID   40 mg, Oral, Every Night at Bedtime      fluticasone 50 MCG/ACT nasal spray  Commonly known as: FLONASE   1 spray, Nasal, Every Morning      hydroCHLOROthiazide 25 MG tablet  Commonly known as: HYDRODIURIL   25 mg, Oral, Every Morning      meclizine 25 MG tablet  Commonly known as: ANTIVERT   25 mg, Oral, 2 Times Daily PRN      montelukast 10 MG tablet  Commonly known as: SINGULAIR   10 mg, Oral, Nightly      pantoprazole 40 MG EC tablet  Commonly known as: PROTONIX   40 mg, Oral, Daily      sertraline 50 MG tablet  Commonly known as: ZOLOFT   50 mg, Oral, Nightly      valsartan 160 MG tablet  Commonly known as: DIOVAN   320 mg, Oral, Daily             Allergies   Allergen Reactions   • Codeine Nausea And Vomiting   • Contrast Dye (Echo Or Unknown Ct/Mr) Hives   • Morphine Nausea And Vomiting       Discharge Disposition:  Home or Self Care    Diet:  Hospital:  Diet Order   Procedures   • Diet: Cardiac Diets; Healthy Heart (2-3 Na+); Texture: Regular Texture (IDDSI 7); Fluid Consistency: Thin (IDDSI 0)       Discharge Activity:       CODE STATUS:  Code Status and Medical Interventions:   Ordered at: 03/25/23 170     Level Of Support Discussed With:    Patient     Code Status (Patient has no pulse and is not breathing):    CPR (Attempt to Resuscitate)     Medical Interventions (Patient has pulse or is breathing):    Full Support         Future Appointments   Date Time Provider Department Center   4/4/2023 10:00 AM MYLA CCA [place] HOLTER [facility]   8/22/2023 11:00 AM Migue Nguyen MD [facility]           Pertinent  and/or Most Recent Results     PROCEDURES:   PPM placement    LAB RESULTS:      Lab 03/28/23  0431 03/26/23  0406 03/25/23  1327   WBC 7.87 6.83 8.58   HEMOGLOBIN 11.0* 10.6* 10.8*   HEMATOCRIT 34.5 32.9* 34.5   PLATELETS 272 281 300   NEUTROS ABS 6.13 2.94 5.23   IMMATURE GRANS (ABS) 0.04 0.02 0.03   LYMPHS ABS 1.40 2.94 2.26   MONOS ABS 0.30 0.65 0.82   EOS ABS 0.00 0.26 0.22    MCV 88.0 88.4 89.8         Lab 03/28/23  0431 03/26/23  0406 03/25/23  1327   SODIUM 133* 139 135*   POTASSIUM 4.6 4.1 4.4   CHLORIDE 99 105 100   CO2 25.0 23.6 25.3   ANION GAP 9.0 10.4 9.7   BUN 33* 31* 38*   CREATININE 1.05* 1.03* 1.06*   EGFR 52.8* 54.1* 52.2*   GLUCOSE 155* 114* 95   CALCIUM 9.7 9.4 9.7   MAGNESIUM 1.8 2.1 1.5*   PHOSPHORUS 2.6  --   --    TSH  --   --  1.500         Lab 03/28/23  0431 03/26/23  0406 03/25/23  1327   TOTAL PROTEIN  --  6.7 7.2   ALBUMIN 4.0 3.8 4.3   GLOBULIN  --  2.9 2.9   ALT (SGPT)  --  15 17   AST (SGOT)  --  24 23   BILIRUBIN  --  0.4 0.4   ALK PHOS  --  82 89         Lab 03/26/23  0843 03/26/23  0406 03/25/23  1327   PROBNP  --   --  371.7   HSTROP T 47* 50* 41*                 Brief Urine Lab Results     None        Microbiology Results (last 10 days)     ** No results found for the last 240 hours. **          XR Chest 1 View    Result Date: 3/27/2023  Impression:  New left-sided subclavian pacemaker device.  No pneumothorax.  Leads appear properly positioned.       JUSTIN RAMOS MD       Electronically Signed and Approved By: JUSTIN RAMOS MD on 3/27/2023 at 12:59             XR Chest 1 View    Result Date: 3/16/2023  Impression:   No acute infiltrate is appreciated.     Please note that portions of this note were completed with a voice recognition program.  DARIA CERON JR, MD       Electronically Signed and Approved By: DARIA CERON JR, MD on 3/16/2023 at 3:40                        Results for orders placed during the hospital encounter of 03/16/23    Adult Transthoracic Echo Complete W/ Cont if Necessary Per Protocol    Interpretation Summary  •  Left ventricular systolic function is normal. Left ventricular ejection fraction appears to be 51 - 55%.  •  Left ventricular wall thickness is consistent with mild concentric hypertrophy.  •  Left ventricular diastolic function is consistent with (grade I) impaired relaxation.  •  There is mild to moderate mitral  regurgitation.  •  There is trace aortic insufficiency.  •  There is mild tricuspid regurgitation.  Estimated right ventricular systolic pressure from tricuspid regurgitation is markedly elevated (>55 mmHg).      Labs Pending at Discharge:    Condition at dc: stable for dc    Time spent on Discharge including face to face service:  ~35 minutes    Electronically signed by Shane Vallecillo MD, 03/28/23, 9:40 AM EDT.

## 2023-03-28 NOTE — PLAN OF CARE
Goal Outcome Evaluation:               Pt. Didn't sleep well, restoril was given at bedtime. Pt. Has no complaints of pain, dizziness. She's had Paced rhythm. No complaints other than wishes to be home and in own bed. Ambulating well with standby assist.

## 2023-03-28 NOTE — PROGRESS NOTES
CARDIOLOGY  INPATIENT PROGRESS NOTE                [facility] UNIT    3/28/2023      PATIENT IDENTIFICATION:   Name:  Mallory Franco      MRN:  [id number]     82 y.o.  female                 SUBJECTIVE:   Patient no events overnight or complaints this AM  OBJECTIVE:  Vitals:    03/28/23 0400 03/28/23 0511 03/28/23 0708 03/28/23 0725   BP: 140/65  148/60    BP Location: Right arm  Right arm    Patient Position: Lying  Sitting    Pulse: 68  64 65   Resp: 16  17 16   Temp: 97.7 °F (36.5 °C)  98.4 °F (36.9 °C)    TempSrc: Oral  Oral    SpO2: 97%  96% 96%   Weight:  85.8 kg (189 lb 2.5 oz)     Height:               Body mass index is 32.47 kg/m².    Intake/Output Summary (Last 24 hours) at 3/28/2023 0842  Last data filed at 3/27/2023 1700  Gross per 24 hour   Intake 950 ml   Output 675 ml   Net 275 ml       Telemetry: Normal sinus rhythm with intermittently paced beats    Physical Exam  General Appearance:   · no acute distress  · Alert and oriented x3  HENT:   · lips not cyanotic  · Atraumatic  Neck:  · No jvd   · supple  Respiratory:  · no respiratory distress  · normal breath sounds  · no rales  Cardiovascular:    · regular rhythm  · no S3, no S4   · no murmur  · no rub  · lower extremity edema: none    Skin:   · warm, dry  · No rashes      Allergies   Allergen Reactions   • Codeine Nausea And Vomiting   • Contrast Dye (Echo Or Unknown Ct/Mr) Hives   • Morphine Nausea And Vomiting     Scheduled meds:  amLODIPine, 5 mg, Oral, Daily  aspirin, 81 mg, Oral, Daily  budesonide-formoterol, 1 puff, Inhalation, BID - RT  pantoprazole, 40 mg, Oral, BID AC  sodium chloride, 10 mL, Intravenous, Q12H  sodium chloride, 3 mL, Intravenous, Q12H  valsartan, 160 mg, Oral, Q12H      IV meds:                         Data Review:  CBC    CBC 3/25/23 3/26/23 3/28/23   WBC 8.58 6.83 7.87   RBC 3.84 3.72 (A) 3.92   Hemoglobin 10.8 (A) 10.6 (A) 11.0 (A)   Hematocrit 34.5 32.9 (A) 34.5   MCV 89.8 88.4 88.0   MCH 28.1  28.5 28.1   MCHC 31.3 (A) 32.2 31.9   RDW 17.0 (A) 16.6 (A) 16.5 (A)   Platelets 300 281 272   (A) Abnormal value            CMP    CMP 3/25/23 3/26/23 3/28/23   Glucose 95 114 (A) 155 (A)   BUN 38 (A) 31 (A) 33 (A)   Creatinine 1.06 (A) 1.03 (A) 1.05 (A)   eGFR 52.2 (A) 54.1 (A) 52.8 (A)   Sodium 135 (A) 139 133 (A)   Potassium 4.4 4.1 4.6   Chloride 100 105 99   Calcium 9.7 9.4 9.7   Total Protein 7.2 6.7    Albumin 4.3 3.8 4.0   Globulin 2.9 2.9    Total Bilirubin 0.4 0.4    Alkaline Phosphatase 89 82    AST (SGOT) 23 24    ALT (SGPT) 17 15    Albumin/Globulin Ratio 1.5 1.3    BUN/Creatinine Ratio 35.8 (A) 30.1 (A) 31.4 (A)   Anion Gap 9.7 10.4 9.0   (A) Abnormal value             CARDIAC LABS:      Lab 03/26/23  0843 03/26/23  0406 03/25/23  1327   PROBNP  --   --  371.7   HSTROP T 47* 50* 41*        No results found for: DIGOXIN   Lab Results   Component Value Date    TSH 1.500 03/25/2023           Invalid input(s): LDLCALC  Lab Results   Component Value Date    POCTROP 0.02 10/28/2022     Lab Results   Component Value Date    TROPONINT 47 (H) 03/26/2023   (  Lab Results   Component Value Date    MG 1.8 03/28/2023     Results for orders placed during the hospital encounter of 03/16/23    Adult Transthoracic Echo Complete W/ Cont if Necessary Per Protocol    Interpretation Summary  •  Left ventricular systolic function is normal. Left ventricular ejection fraction appears to be 51 - 55%.  •  Left ventricular wall thickness is consistent with mild concentric hypertrophy.  •  Left ventricular diastolic function is consistent with (grade I) impaired relaxation.  •  There is mild to moderate mitral regurgitation.  •  There is trace aortic insufficiency.  •  There is mild tricuspid regurgitation.  Estimated right ventricular systolic pressure from tricuspid regurgitation is markedly elevated (>55 mmHg).           ASSESSMENT:    SSS (sick sinus syndrome) (HCC)    Palpitations        PLAN:  1.  Patient be discharged  from cardiac standpoint recommend prophylactic Keflex 500 3 times daily for next 4 days  2.  Continue with patient's outpatient antihypertensive medications follow-up for wound check in 1 week          Shane Rosa MD  3/28/2023    08:42 EDT

## 2023-03-28 NOTE — PLAN OF CARE
Goal Outcome Evaluation:  Plan of Care Reviewed With: patient        Progress: no change  Outcome Evaluation: Patient is POD 1 pacemaker placement and is pending discharge. OT evaluation completed to address ADL management status post pacemaker placement for patient safety and independence at time of discharge. Patient provided education and training on all adaptive strategies and pacemanker precautions. Patient will be discharging home today with no further therapy recommended. OT will sign off.

## 2023-03-28 NOTE — THERAPY EVALUATION
Patient Name: Mallory Franco  : 1940    MRN: [id number]                              Today's Date: 3/28/2023       Admit Date: 3/25/2023    Visit Dx:     ICD-10-CM ICD-9-CM   1. Palpitations  R00.2 785.1   2. Mobitz type 2 second degree atrioventricular block  I44.1 426.12   3. Difficulty in walking  R26.2 719.7   4. SSS (sick sinus syndrome) ([facility])  I49.5 427.81   5. Decreased activities of daily living (ADL)  Z78.9 V49.89     Patient Active Problem List   Diagnosis   • Benign hypertension   • Coronary artery disease involving native coronary artery of native heart without angina pectoris   • Chest tightness   • Pain due to total right knee replacement ([facility])   • History of coronary angioplasty with insertion of stent   • Chest pain, unspecified type   • Elevated troponin   • Postural dizziness with presyncope   • Palpitations   • SSS (sick sinus syndrome) ([facility])     Past Medical History:   Diagnosis Date   • Arthritis    • Asthma    • BPPV (benign paroxysmal positional vertigo), right    • COPD (chronic obstructive pulmonary disease) ([facility])    • Coronary artery disease    • Coronary artery disease    • Ear itching     HX   • Enlarged heart    • Gastroesophageal reflux disease    • Hyperlipidemia    • Hypertension    • Knee pain    • Macular degeneration    • PONV (postoperative nausea and vomiting)    • Shingles     X2   • SOB (shortness of breath) on exertion    • Tinnitus of left ear    • Urinary frequency    • Vertigo      Past Surgical History:   Procedure Laterality Date   • BREAST BIOPSY Bilateral    • CARDIAC CATHETERIZATION     •  SECTION     • CHOLECYSTECTOMY     • COLONOSCOPY     • CORONARY STENT PLACEMENT     • EYE SURGERY     • GALLBLADDER SURGERY     • KNEE MINI REVISION Right 2021    Procedure: KNEE POLY INSERT EXCHANGE;  Surgeon: Ky Avila MD;  Location: [facility];  Service: Orthopedics;  Laterality: Right;   • KNEE SURGERY     • REPLACEMENT TOTAL KNEE Right    •  REPLACEMENT TOTAL KNEE Left 2004      General Information     Row Name 03/28/23 1014          OT Time and Intention    Document Type evaluation  -ES     Mode of Treatment individual therapy;occupational therapy  -ES     Row Name 03/28/23 1014          General Information    Patient Profile Reviewed yes  -ES     Prior Level of Function independent:;ADL's;all household mobility;community mobility  Patient reports independent with B and IADLs at baseline. No device for functional mobility. Standing shower engagement. [place] in stance. No home O2 use.  -ES     Existing Precautions/Restrictions pacemaker  pacemaker precautions with LUE in sling  -ES     Row Name 03/28/23 1014          Occupational Profile    Reason for Services/Referral (Occupational Profile) Patient is 82 yr old female admitted to [facility] on 3/25/2023 with rpeorts of syncopal episodes at home. Patient is POD 1 pacemaker placement with pacemaker precautions. OT evaluation and treatment ordered d/t recent decline in ADLs/transfer ability and discharge planning recommendations. No previous OT services for current condition.  -ES     Row Name 03/28/23 1014          Living Environment    People in Home alone  reports good family support  -ES     Row Name 03/28/23 1014          Cognition    Orientation Status (Cognition) oriented x 4  patient pleasant and cooperative, agreeable to therapy evaluation  -ES     Row Name 03/28/23 1014          Safety Issues, Functional Mobility    Safety Issues Affecting Function (Mobility) safety precaution awareness;safety precautions follow-through/compliance  -ES     Impairments Affecting Function (Mobility) strength;range of motion (ROM)  -ES           User Key  (r) = Recorded By, (t) = Taken By, (c) = Cosigned By    Initials Name Provider Type    ES Susie Garcia, OTR/L, CSRS Occupational Therapist                 Mobility/ADL's     Row Name 03/28/23 1018          Bed Mobility    Comment, (Bed Mobility) unable  to assess. Patient met seated in recliner at therapy arrival to room.  -ES     Row Name 03/28/23 1018          Transfers    Transfers sit-stand transfer;stand-sit transfer  -ES     Row Name 03/28/23 1018          Sit-Stand Transfer    Sit-Stand [place] (Transfers) supervision  -ES     Assistive Device (Sit-Stand Transfers) other (see comments)  no assistive device  -ES     Row Name 03/28/23 1018          Stand-Sit Transfer    Stand-Sit [place] (Transfers) supervision  -ES     Assistive Device (Stand-Sit Transfers) other (see comments)  no assistive device  -ES     Row Name 03/28/23 1018          Functional Mobility    Functional Mobility- Ind. Level supervision required  -ES     Functional Mobility- Device other (see comments)  no assistive device  -ES     Row Name 03/28/23 1018          Activities of Daily Living    BADL Assessment/Intervention bathing;upper body dressing;lower body dressing;grooming;feeding;toileting  -ES     Row Name 03/28/23 1018          Bathing Assessment/Intervention    [place] Level (Bathing) bathing skills;minimum assist (75% patient effort)  -ES     Comment, (Bathing) patient provided education and training on adaptive upper body bathing strategies for compliance with pacemaker precautions at time of discharge. Return demonstration provided for ensured understanding.  -ES     Row Name 03/28/23 1018          Upper Body Dressing Assessment/Training    [place] Level (Upper Body Dressing) upper body dressing skills;don;front opening garment;pull-over garment;verbal cues;minimum assist (75% patient effort)  -ES     Comment, (Upper Body Dressing) Patient provided education and training on adaptive upper body dressing strategies for compliance with pacemaker precautions prior to donning gown. Utilizing adaptive strategies, patient dons pull over gown and zipper front robe, min A with verbal cueing.  -ES     Row Name 03/28/23 1018          Grooming Assessment/Training     [place] Level (Grooming) grooming skills;hair care, combing/brushing  -ES     Comment, (Grooming) patient provided education and training on adaptive grooming strategies as patient reports concern with putting up her hair every day as part of her daily routine. Patient provides return demonstration for ensured understanding.  -ES     Row Name 03/28/23 1018          Self-Feeding Assessment/Training    [place] Level (Feeding) feeding skills;set up  -     Row Name 03/28/23 1018          Toileting Assessment/Training    [place] Level (Toileting) toileting skills;contact guard assist  -ES           User Key  (r) = Recorded By, (t) = Taken By, (c) = Cosigned By    Initials Name Provider Type    ES Susie Garcia, OTR/L, CSRS Occupational Therapist               Obj/Interventions     Row Name 03/28/23 1022          Sensory Assessment (Somatosensory)    Sensory Assessment (Somatosensory) sensation intact  -     Row Name 03/28/23 1022          Vision Assessment/Intervention    Visual Impairment/Limitations WFL  -ES     Row Name 03/28/23 1022          Range of Motion Comprehensive    Comment, General Range of Motion RUE ROM WFL. LUE elbow, wrist and digits WFL with proximal testing deferred secondary to pacemaker precautions  -ES     Row Name 03/28/23 1022          Strength Comprehensive (MMT)    Comment, General Manual Muscle Testing (MMT) Assessment bilateral grasps assessed 4/5 with further testing deferred  -ES     Row Name 03/28/23 1022          Motor Skills    Motor Skills coordination;functional endurance  -ES     Coordination WFL;upper extremity  -ES     Functional Endurance good  -ES     Row Name 03/28/23 1022          Balance    Balance Assessment sitting dynamic balance;standing dynamic balance  -ES     Dynamic Sitting Balance independent  -ES     Position, Sitting Balance unsupported;sitting in chair  -ES     Dynamic Standing Balance supervision  -ES     Position/Device Used, Standing Balance  unsupported;other (see comments)  no assistive device  -ES           User Key  (r) = Recorded By, (t) = Taken By, (c) = Cosigned By    Initials Name Provider Type    Susie Deras OTR/L, CEFERINO Occupational Therapist               Goals/Plan    No documentation.                Clinical Impression     Row Name 03/28/23 1024          Plan of Care Review    Plan of Care Reviewed With patient  -ES     Progress no change  -ES     Outcome Evaluation Patient is POD 1 pacemaker placement and is pending discharge. OT evaluation completed to address ADL management status post pacemaker placement for patient safety and independence at time of discharge. Patient provided education and training on all adaptive strategies and pacemanker precautions. Patient will be discharging home today with no further therapy recommended. OT will sign off.  -ES     Row Name 03/28/23 1024          Therapy Assessment/Plan (OT)    Criteria for Skilled Therapeutic Interventions Met (OT) no problems identified which require skilled intervention  -ES     Therapy Frequency (OT) evaluation only  -ES     Row Name 03/28/23 1024          Therapy Plan Review/Discharge Plan (OT)    Anticipated Discharge Disposition (OT) home  -ES           User Key  (r) = Recorded By, (t) = Taken By, (c) = Cosigned By    Initials Name Provider Type    Susie Deras OTR/L, CEFERINO Occupational Therapist               Outcome Measures     Row Name 03/28/23 1026          How much help from another is currently needed...    Putting on and taking off regular lower body clothing? 4  -ES     Bathing (including washing, rinsing, and drying) 4  -ES     Toileting (which includes using toilet bed pan or urinal) 4  -ES     Putting on and taking off regular upper body clothing 4  -ES     Taking care of personal grooming (such as brushing teeth) 4  -ES     Eating meals 4  -ES     AM-PAC 6 Clicks Score (OT) 24  -ES     Row Name 03/28/23 0860          How much help from another person  do you currently need...    Turning from your back to your side while in flat bed without using bedrails? 4  -KS     Moving from lying on back to sitting on the side of a flat bed without bedrails? 4  -KS     Moving to and from a bed to a chair (including a wheelchair)? 3  -KS     Standing up from a chair using your arms (e.g., wheelchair, bedside chair)? 3  -KS     Climbing 3-5 steps with a railing? 3  -KS     To walk in hospital room? 3  -KS     AM-PAC 6 Clicks Score (PT) 20  -KS     Highest level of mobility 6 --> Walked 10 steps or more  -KS     Row Name 03/28/23 1026          Functional Assessment    Outcome Measure Options AM-PAC 6 Clicks Daily Activity (OT);Optimal Instrument  -ES     Row Name 03/28/23 1026          Optimal Instrument    Optimal Instrument Optimal - 3  -ES     Bending/Stooping 2  -ES     Standing 2  -ES     Reaching 1  -ES     From the list, choose the 3 activities you would most like to be able to do without any difficulty Bending/stooping;Standing;Reaching  -ES     Total Score Optimal - 3 5  -ES           User Key  (r) = Recorded By, (t) = Taken By, (c) = Cosigned By    Initials Name Provider Type    Susie Deras, OTR/L, CSRS Occupational Therapist    Shama Weston, RN Registered Nurse                  OT Recommendation and Plan  Therapy Frequency (OT): evaluation only  Plan of Care Review  Plan of Care Reviewed With: patient  Progress: no change  Outcome Evaluation: Patient is POD 1 pacemaker placement and is pending discharge. OT evaluation completed to address ADL management status post pacemaker placement for patient safety and independence at time of discharge. Patient provided education and training on all adaptive strategies and pacemanker precautions. Patient will be discharging home today with no further therapy recommended. OT will sign off.     Time Calculation:    Time Calculation- OT     Row Name 03/28/23 1027             Time Calculation- OT    OT Received On 03/28/23   -ES         Untimed Charges    OT Eval/Re-eval Minutes 36  -ES         Total Minutes    Untimed Charges Total Minutes 36  -ES       Total Minutes 36  -ES            User Key  (r) = Recorded By, (t) = Taken By, (c) = Cosigned By    Initials Name Provider Type    Susie Deras OTR/L, CSRS Occupational Therapist              Therapy Charges for Today     Code Description Service Date Service Provider Modifiers Qty    07051455305 HC OT EVAL LOW COMPLEXITY 3 3/28/2023 Susie Garcia OTR/L, CSRS GO 1               JACIEL Wen/L, CSRS  3/28/2023

## 2023-03-28 NOTE — OUTREACH NOTE
Prep Survey    Flowsheet Row Responses   [facility], [place] facility patient discharged from? [facility]   Is LACE score < 7 ? Yes   Eligibility Not Eligible   What are the reasons patient is not eligible? Other  [Low risk for readmission]   Does the patient have one of the following disease processes/diagnoses(primary or secondary)? Other   Prep survey completed? Yes          Vane GOSS - Registered Nurse

## 2023-04-04 ENCOUNTER — TELEPHONE (OUTPATIENT)
Dept: CARDIOLOGY | Facility: CLINIC | Age: 83
End: 2023-04-04
Payer: MEDICARE

## 2023-04-04 ENCOUNTER — CLINICAL SUPPORT NO REQUIREMENTS (OUTPATIENT)
Dept: CARDIOLOGY | Facility: CLINIC | Age: 83
End: 2023-04-04
Payer: MEDICARE

## 2023-04-04 DIAGNOSIS — I49.5 SSS (SICK SINUS SYNDROME): Primary | ICD-10-CM

## 2023-04-04 DIAGNOSIS — Z95.0 PRESENCE OF CARDIAC PACEMAKER: ICD-10-CM

## 2023-04-04 PROCEDURE — 93280 PM DEVICE PROGR EVAL DUAL: CPT | Performed by: SPECIALIST

## 2023-04-04 RX ORDER — METOPROLOL SUCCINATE 25 MG/1
25 TABLET, EXTENDED RELEASE ORAL DAILY
Qty: 90 TABLET | Refills: 3 | Status: SHIPPED | OUTPATIENT
Start: 2023-04-04

## 2023-04-04 NOTE — PROGRESS NOTES
Normal Dual Chamber Pacemaker Device Interrogation and Device Testing.  Normal evaluation of device function and lead measurements.  No optimization was needed of parameters or maximization of device longevity.  Patient is on automated Home Remote Monitoring.  Patients wound incision is healing and all corners are intact.  There are no signs of infection, no drainage, no swelling and cool to touch.  We reviewed Home remote follow up and the monitoring machine.  We also discussed the six week healing process and the do's and don'ts of activity that it specific to the patient.

## 2023-04-04 NOTE — TELEPHONE ENCOUNTER
Mrs. Franco had a recent pacemaker implanted and as soon as she sat down, she stated that her heart rate was getting up to 110 and 120 bpm and it made her feel dizzy and she must lay down due to it.      I showed Dr. Nguyen her heart rate histograms and he would like to add Toprol XL 25 mg

## 2023-05-18 ENCOUNTER — OFFICE VISIT (OUTPATIENT)
Dept: CARDIOLOGY | Facility: CLINIC | Age: 83
End: 2023-05-18
Payer: MEDICARE

## 2023-05-18 VITALS
HEIGHT: 64 IN | BODY MASS INDEX: 33.12 KG/M2 | SYSTOLIC BLOOD PRESSURE: 164 MMHG | DIASTOLIC BLOOD PRESSURE: 74 MMHG | WEIGHT: 194 LBS | HEART RATE: 68 BPM

## 2023-05-18 DIAGNOSIS — I10 BENIGN HYPERTENSION: ICD-10-CM

## 2023-05-18 DIAGNOSIS — Z95.0 PRESENCE OF CARDIAC PACEMAKER: Primary | ICD-10-CM

## 2023-05-18 PROBLEM — R77.8 ELEVATED TROPONIN: Status: RESOLVED | Noted: 2023-03-16 | Resolved: 2023-05-18

## 2023-05-18 PROBLEM — R79.89 ELEVATED TROPONIN: Status: RESOLVED | Noted: 2023-03-16 | Resolved: 2023-05-18

## 2023-05-18 PROBLEM — R55 POSTURAL DIZZINESS WITH PRESYNCOPE: Status: RESOLVED | Noted: 2023-03-16 | Resolved: 2023-05-18

## 2023-05-18 PROBLEM — I49.5 SSS (SICK SINUS SYNDROME): Status: RESOLVED | Noted: 2023-03-26 | Resolved: 2023-05-18

## 2023-05-18 PROBLEM — R00.2 PALPITATIONS: Status: RESOLVED | Noted: 2023-03-25 | Resolved: 2023-05-18

## 2023-05-18 PROBLEM — R42 POSTURAL DIZZINESS WITH PRESYNCOPE: Status: RESOLVED | Noted: 2023-03-16 | Resolved: 2023-05-18

## 2023-05-18 PROBLEM — R07.9 CHEST PAIN, UNSPECIFIED TYPE: Status: RESOLVED | Noted: 2022-10-28 | Resolved: 2023-05-18

## 2023-05-18 PROBLEM — K21.9 ESOPHAGEAL REFLUX: Status: ACTIVE | Noted: 2023-05-18

## 2023-05-18 PROBLEM — H81.11 BPPV (BENIGN PAROXYSMAL POSITIONAL VERTIGO), RIGHT: Status: ACTIVE | Noted: 2023-05-18

## 2023-05-18 RX ORDER — VALSARTAN 320 MG/1
320 TABLET ORAL DAILY
Qty: 90 TABLET | Refills: 1 | Status: SHIPPED | OUTPATIENT
Start: 2023-05-18

## 2023-05-18 RX ORDER — HYDROCHLOROTHIAZIDE 12.5 MG/1
12.5 TABLET ORAL EVERY MORNING
Qty: 90 TABLET | Refills: 1 | Status: SHIPPED | OUTPATIENT
Start: 2023-05-18

## 2023-05-18 NOTE — PROGRESS NOTES
Chief Complaint  Follow-up and Hypertension    Subjective            History of Present Illness  Mallory Franco is an 82-year-old white/ female patient to presents to the office today for hospital follow-up.  She was admitted to [facility] from 3/25/2023 to 3/28/2023 for palpitations.  She was found to be in high-grade heart block and underwent pacemaker implantation on 3/27/2023 with Dr. Rosa.  Since being discharged she admits that she still experiences some elevated heart rates with activity but is no longer remaining elevated after she takes a break/rests.  She reports compliance with her medications.  She denies any chest pain, shortness of breath, lightheadedness/dizziness, or edema.    PMH  Past Medical History:   Diagnosis Date   • Arthritis    • Asthma    • BPPV (benign paroxysmal positional vertigo), right    • COPD (chronic obstructive pulmonary disease)    • Coronary artery disease    • Coronary artery disease    • Ear itching     HX   • Enlarged heart    • Gastroesophageal reflux disease    • Hyperlipidemia    • Hypertension    • Knee pain    • Macular degeneration    • PONV (postoperative nausea and vomiting)    • Postural dizziness with presyncope 3/16/2023   • Shingles     X2   • SOB (shortness of breath) on exertion    • Tinnitus of left ear    • Urinary frequency    • Vertigo          ALLERGY  Allergies   Allergen Reactions   • Codeine Nausea And Vomiting   • Contrast Dye (Echo Or Unknown Ct/Mr) Hives   • Morphine Nausea And Vomiting          SURGICALHX  Past Surgical History:   Procedure Laterality Date   • BREAST BIOPSY Bilateral    • CARDIAC CATHETERIZATION     • CARDIAC ELECTROPHYSIOLOGY PROCEDURE N/A 3/27/2023    Procedure: Pacemaker SC new-[employer], call Dr. Rosa first thing in the morning to get time for the procedure and notify rep;  Surgeon: Shane Rosa MD;  [facility];  Service: Cardiovascular;  Laterality: N/A;   •   SECTION     • CHOLECYSTECTOMY     • COLONOSCOPY     • CORONARY STENT PLACEMENT     • EYE SURGERY     • GALLBLADDER SURGERY     • KNEE MINI REVISION Right 7/28/2021    Procedure: KNEE POLY INSERT EXCHANGE;  Surgeon: Ky Avila MD;  Location: [facility];  Service: Orthopedics;  Laterality: Right;   • KNEE SURGERY     • REPLACEMENT TOTAL KNEE Right 2005   • REPLACEMENT TOTAL KNEE Left 2004          SOC  Social History     Socioeconomic History   • Marital status:    Tobacco Use   • Smoking status: Never   • Smokeless tobacco: Never   Vaping Use   • Vaping Use: Never used   Substance and Sexual Activity   • Alcohol use: No   • Drug use: Never   • Sexual activity: Defer         FAMHX  Family History   Problem Relation Age of Onset   • Breast cancer Mother    • Bone cancer Father    • Breast cancer Sister    • Bone cancer Sister    • Lung cancer Brother    • Malig Hyperthermia Neg Hx           MEDSIGONLY  Current Outpatient Medications on File Prior to Visit   Medication Sig   • albuterol (PROVENTIL) (2.5 MG/3ML) 0.083% nebulizer solution Take 2.5 mg by nebulization Every 4 (Four) Hours As Needed for Wheezing.   • albuterol sulfate  (90 Base) MCG/ACT inhaler Inhale 2 puffs Every 4 (Four) Hours As Needed.   • amLODIPine (NORVASC) 5 MG tablet Take 1 tablet by mouth Daily.   • aspirin 81 MG EC tablet Take 1 tablet by mouth Daily.   • Breo Ellipta 200-25 MCG/INH inhaler Inhale 1 puff Daily.   • famotidine (PEPCID) 40 MG tablet Take 1 tablet by mouth every night at bedtime.   • fluticasone (FLONASE) 50 MCG/ACT nasal spray 1 spray into the nostril(s) as directed by provider Every Morning.   • hydrochlorothiazide (HYDRODIURIL) 25 MG tablet Take 1 tablet by mouth Every Morning.   • meclizine (ANTIVERT) 25 MG tablet Take 1 tablet by mouth 2 (Two) Times a Day As Needed.   • metoprolol succinate XL (TOPROL-XL) 25 MG 24 hr tablet Take 1 tablet by mouth Daily.   • montelukast (SINGULAIR) 10 MG tablet Take 1  "tablet by mouth Every Night.   • pantoprazole (PROTONIX) 40 MG EC tablet Take 1 tablet by mouth Daily.   • sertraline (ZOLOFT) 50 MG tablet Take 1 tablet by mouth Every Night.   • valsartan (DIOVAN) 160 MG tablet Take 2 tablets by mouth Daily.     Current Facility-Administered Medications on File Prior to Visit   Medication   • Chlorhexidine Gluconate Cloth 2 % pads   • iopamidol (ISOVUE-370) 76 % injection         Objective   /74   Pulse 68   Ht 162.6 cm (64\")   Wt 88 kg (194 lb)   BMI 33.30 kg/m²       Physical Exam  Constitutional:       Appearance: She is obese.   HENT:      Head: Normocephalic.   Neck:      Vascular: No carotid bruit.   Cardiovascular:      Rate and Rhythm: Normal rate and regular rhythm.      Pulses: Normal pulses.      Heart sounds: Normal heart sounds. No murmur heard.  Pulmonary:      Effort: Pulmonary effort is normal.      Breath sounds: Normal breath sounds.   Musculoskeletal:      Cervical back: Neck supple.      Right lower leg: No edema.      Left lower leg: No edema.   Skin:     General: Skin is dry.   Neurological:      Mental Status: She is alert and oriented to person, place, and time.   Psychiatric:         Behavior: Behavior normal.       Result Review :   The following data was reviewed by: YOVANA Edwards on 05/18/2023:  proBNP   Date Value Ref Range Status   03/25/2023 371.7 0.0 - 1,800.0 pg/mL Final     CMP        3/28/2023    04:31   CMP   Glucose 155     BUN 33     Creatinine 1.05     EGFR 52.8     Sodium 133     Potassium 4.6     Chloride 99     Calcium 9.7     Total Protein    Albumin 4.0     Globulin    Total Bilirubin    Alkaline Phosphatase    AST (SGOT)    ALT (SGPT)    Albumin/Globulin Ratio    BUN/Creatinine Ratio 31.4     Anion Gap 9.0       CBC w/diff        3/28/2023    04:31   CBC w/Diff   WBC 7.87     RBC 3.92     Hemoglobin 11.0     Hematocrit 34.5     MCV 88.0     MCH 28.1     MCHC 31.9     RDW 16.5     Platelets 272     Neutrophil Rel % 77.9 "     Immature Granulocyte Rel % 0.5     Lymphocyte Rel % 17.8     Monocyte Rel % 3.8     Eosinophil Rel % 0.0     Basophil Rel % 0.0        Lab Results   Component Value Date    TSH 1.500 03/25/2023      No results found for: FREET4   No results found for: DDIMERQUANT  Magnesium   Date Value Ref Range Status   03/28/2023 1.8 1.6 - 2.4 mg/dL Final      No results found for: DIGOXIN   Lab Results   Component Value Date    TROPONINT 47 (H) 03/26/2023           Results for orders placed during the hospital encounter of 03/16/23    Adult Transthoracic Echo Complete W/ Cont if Necessary Per Protocol    Interpretation Summary  •  Left ventricular systolic function is normal. Left ventricular ejection fraction appears to be 51 - 55%.  •  Left ventricular wall thickness is consistent with mild concentric hypertrophy.  •  Left ventricular diastolic function is consistent with (grade I) impaired relaxation.  •  There is mild to moderate mitral regurgitation.  •  There is trace aortic insufficiency.  •  There is mild tricuspid regurgitation.  Estimated right ventricular systolic pressure from tricuspid regurgitation is markedly elevated (>55 mmHg).         Assessment and Plan    Diagnoses and all orders for this visit:    1. Presence of cardiac pacemaker (Primary)  She had device checked in office on 4/4/2023 which showed normal functioning device.    2. Benign hypertension  Her blood pressure is elevated in office today, we talked about ways to change how she takes her medication during the day and in the evening.  She will start taking her valsartan and HCTZ in the morning time and her amlodipine and metoprolol in the evening time. Check blood pressure twice a day for the next two weeks, blood pressure log provided for patient.  Will review log once available to me and will make any necessary medication adjustments needed at that time.    Other orders  -     valsartan (DIOVAN) 320 MG tablet; Take 1 tablet by mouth Daily.   Dispense: 90 tablet; Refill: 1  -     hydroCHLOROthiazide (HYDRODIURIL) 12.5 MG tablet; Take 1 tablet by mouth Every Morning.  Dispense: 90 tablet; Refill: 1        Follow Up   Return for Follow up with Dr Nguyen as scheduled, will need device check.    Patient was given instructions and counseling regarding her condition or for health maintenance advice. Please see specific information pulled into the AVS if appropriate.     Mallory MESSIAN Salvador  reports that she has never smoked. She has never used smokeless tobacco.         Lydia Escudero, YOVANA  05/18/23  11:50 EDT    Dictated Utilizing Dragon Dictation

## 2023-06-09 ENCOUNTER — TELEPHONE (OUTPATIENT)
Dept: CARDIOLOGY | Facility: CLINIC | Age: 83
End: 2023-06-09
Payer: MEDICARE

## 2023-06-09 RX ORDER — AMLODIPINE BESYLATE 10 MG/1
10 TABLET ORAL DAILY
Qty: 90 TABLET | Refills: 3 | Status: SHIPPED | OUTPATIENT
Start: 2023-06-09

## 2023-06-09 NOTE — TELEPHONE ENCOUNTER
----- Message from YOVANA Bonilla sent at 6/9/2023  3:10 PM EDT -----  Increase amlodipine to 10 mg daily, continue to monitor  ----- Message -----  From: Hoda Ruvalcaba RegSched Rep  Sent: 6/9/2023   9:46 AM EDT  To: YOVAAN Bonilla

## 2023-07-24 ENCOUNTER — TRANSCRIBE ORDERS (OUTPATIENT)
Dept: ADMINISTRATIVE | Facility: HOSPITAL | Age: 83
End: 2023-07-24
Payer: MEDICARE

## 2023-07-24 ENCOUNTER — HOSPITAL ENCOUNTER (OUTPATIENT)
Dept: GENERAL RADIOLOGY | Facility: HOSPITAL | Age: 83
Discharge: HOME | End: 2023-07-24
Admitting: NURSE PRACTITIONER
Payer: MEDICARE

## 2023-07-24 DIAGNOSIS — M25.571 CHRONIC PAIN OF RIGHT ANKLE: Primary | ICD-10-CM

## 2023-07-24 DIAGNOSIS — G89.29 CHRONIC PAIN OF RIGHT ANKLE: Primary | ICD-10-CM

## 2023-07-24 DIAGNOSIS — G89.29 CHRONIC PAIN OF RIGHT ANKLE: ICD-10-CM

## 2023-07-24 DIAGNOSIS — M25.571 CHRONIC PAIN OF RIGHT ANKLE: ICD-10-CM

## 2023-07-24 PROCEDURE — 73610 X-RAY EXAM OF ANKLE: CPT

## 2023-08-20 NOTE — PROGRESS NOTES
[facility]  Cardiology progress Note    Patient Name: Mallory Franco  : 1940    CHIEF COMPLAINT  CAD        Subjective   Subjective     HISTORY OF PRESENT ILLNESS    Mallory Franco is a 82 y.o. female with CAD s/p PTCA/stent.  No chest pain.    REVIEW OF SYSTEMS    Constitutional:    No fever, no weight loss  Skin:     No rash  Otolaryngeal:    No difficulty swallowing  Cardiovascular: See HPI.  Pulmonary:    No cough, no sputum production    Personal History     Social History:    reports that she has never smoked. She has never used smokeless tobacco. She reports that she does not drink alcohol and does not use drugs.    Home Medications:  Current Outpatient Medications on File Prior to Visit   Medication Sig    albuterol (PROVENTIL) (2.5 MG/3ML) 0.083% nebulizer solution Take 2.5 mg by nebulization Every 4 (Four) Hours As Needed for Wheezing.    albuterol sulfate  (90 Base) MCG/ACT inhaler Inhale 2 puffs Every 4 (Four) Hours As Needed.    aspirin 81 MG EC tablet Take 1 tablet by mouth Daily.    atorvastatin (LIPITOR) 40 MG tablet Take 1 tablet by mouth Daily.    Breo Ellipta 100-25 MCG/ACT aerosol powder  Inhale 1 puff Daily.    carvedilol (COREG) 25 MG tablet Take 1 tablet by mouth 2 (Two) Times a Day With Meals.    famotidine (PEPCID) 40 MG tablet Take 1 tablet by mouth every night at bedtime.    fluticasone (FLONASE) 50 MCG/ACT nasal spray 1 spray into the nostril(s) as directed by provider Every Morning.    hydroCHLOROthiazide (HYDRODIURIL) 12.5 MG tablet Take 1 tablet by mouth Every Morning.    meclizine (ANTIVERT) 25 MG tablet Take 1 tablet by mouth 2 (Two) Times a Day As Needed.    metoprolol succinate XL (TOPROL-XL) 25 MG 24 hr tablet Take 1 tablet by mouth Daily.    montelukast (SINGULAIR) 10 MG tablet Take 1 tablet by mouth Every Night.    pantoprazole (PROTONIX) 40 MG EC tablet Take 1 tablet by mouth Daily.    sertraline (ZOLOFT) 50 MG tablet Take 1 tablet by mouth Every  Night.    traZODone (DESYREL) 50 MG tablet Take 1 tablet by mouth Every Night.    valsartan (DIOVAN) 320 MG tablet Take 1 tablet by mouth Daily.    amLODIPine (NORVASC) 10 MG tablet Take 1 tablet by mouth Daily. (Patient not taking: Reported on 8/22/2023)    Breo Ellipta 200-25 MCG/INH inhaler Inhale 1 puff Daily. (Patient not taking: Reported on 8/22/2023)    sertraline (ZOLOFT) 100 MG tablet Take 1 tablet by mouth Daily. (Patient not taking: Reported on 8/22/2023)     Current Facility-Administered Medications on File Prior to Visit   Medication    Chlorhexidine Gluconate Cloth 2 % pads    iopamidol (ISOVUE-370) 76 % injection       Past Medical History:   Diagnosis Date    Arthritis     Asthma     BPPV (benign paroxysmal positional vertigo), right     COPD (chronic obstructive pulmonary disease)     Coronary artery disease     Coronary artery disease     Ear itching     HX    Enlarged heart     Gastroesophageal reflux disease     Hyperlipidemia     Hypertension     Knee pain     Macular degeneration     PONV (postoperative nausea and vomiting)     Postural dizziness with presyncope 3/16/2023    Shingles     X2    SOB (shortness of breath) on exertion     Tinnitus of left ear     Urinary frequency     Vertigo        Allergies:  Allergies   Allergen Reactions    Codeine Nausea And Vomiting    Contrast Dye (Echo Or Unknown Ct/Mr) Hives    Morphine Nausea And Vomiting       Objective    Objective       Vitals:      Body mass index is 33.9 kg/mý.     PHYSICAL EXAM:    General Appearance:   well developed  well nourished  HENT:   oropharynx moist  lips not cyanotic  Neck:  thyroid not enlarged  supple  Respiratory:  no respiratory distress  normal breath sounds  no rales  Cardiovascular:  no jugular venous distention  regular rhythm  apical impulse normal  S1 normal, S2 normal  no S3, no S4   no murmur  no rub, no thrill  carotid pulses normal; no bruit  pedal pulses normal  lower extremity edema: none    Skin:   warm,  dry  Psychiatric:  judgement and insight appropriate  normal mood and affect        Result Review:  I have personally reviewed the available results from  [x]  Laboratory  [x]  EKG  [x]  Cardiology  [x]  Medications  [x]  Old records  []  Other:     Procedures    Results for orders placed during the hospital encounter of 03/16/23    Adult Transthoracic Echo Complete W/ Cont if Necessary Per Protocol    Interpretation Summary    Left ventricular systolic function is normal. Left ventricular ejection fraction appears to be 51 - 55%.    Left ventricular wall thickness is consistent with mild concentric hypertrophy.    Left ventricular diastolic function is consistent with (grade I) impaired relaxation.    There is mild to moderate mitral regurgitation.    There is trace aortic insufficiency.    There is mild tricuspid regurgitation.  Estimated right ventricular systolic pressure from tricuspid regurgitation is markedly elevated (>55 mmHg).     Impression/Plan:  1.  Essential hypertension Uncontrolled: Continue Diovan 320 mg once a day.  Amlodipine was stopped because of pedal edema.  We will restart carvedilol 25 mg twice a day and see if she can tolerate it and blood pressure improves.  Monitor blood pressure regularly.  2.  CAD s/p PTCA/stent: Continue aspirin 81 mg once a day.  No chest pain.  3.  Syncope/dizziness: Work-up negative.  Feels better.           Migue Nguyen MD   08/22/23   10:52 EDT

## 2023-08-22 ENCOUNTER — OFFICE VISIT (OUTPATIENT)
Dept: CARDIOLOGY | Facility: CLINIC | Age: 83
End: 2023-08-22
Payer: MEDICARE

## 2023-08-22 ENCOUNTER — CLINICAL SUPPORT NO REQUIREMENTS (OUTPATIENT)
Dept: CARDIOLOGY | Facility: CLINIC | Age: 83
End: 2023-08-22
Payer: MEDICARE

## 2023-08-22 VITALS — BODY MASS INDEX: 33.73 KG/M2 | HEIGHT: 64 IN | WEIGHT: 197.6 LBS

## 2023-08-22 DIAGNOSIS — R55 SYNCOPE, UNSPECIFIED SYNCOPE TYPE: ICD-10-CM

## 2023-08-22 DIAGNOSIS — I10 HYPERTENSION, ESSENTIAL: Primary | ICD-10-CM

## 2023-08-22 DIAGNOSIS — I25.10 CORONARY ARTERY DISEASE INVOLVING NATIVE CORONARY ARTERY OF NATIVE HEART WITHOUT ANGINA PECTORIS: ICD-10-CM

## 2023-08-22 DIAGNOSIS — Z95.0 PRESENCE OF CARDIAC PACEMAKER: Primary | ICD-10-CM

## 2023-08-22 DIAGNOSIS — Z95.5 HISTORY OF CORONARY ANGIOPLASTY WITH INSERTION OF STENT: ICD-10-CM

## 2023-08-22 DIAGNOSIS — I49.5 SSS (SICK SINUS SYNDROME): ICD-10-CM

## 2023-08-22 PROCEDURE — 1160F RVW MEDS BY RX/DR IN RCRD: CPT | Performed by: SPECIALIST

## 2023-08-22 PROCEDURE — 99214 OFFICE O/P EST MOD 30 MIN: CPT | Performed by: SPECIALIST

## 2023-08-22 PROCEDURE — 93280 PM DEVICE PROGR EVAL DUAL: CPT | Performed by: SPECIALIST

## 2023-08-22 PROCEDURE — 1159F MED LIST DOCD IN RCRD: CPT | Performed by: SPECIALIST

## 2023-08-22 RX ORDER — CARVEDILOL 25 MG/1
25 TABLET ORAL 2 TIMES DAILY WITH MEALS
Qty: 180 TABLET | Refills: 6 | Status: SHIPPED | OUTPATIENT
Start: 2023-08-22

## 2023-08-22 RX ORDER — CARVEDILOL 25 MG/1
25 TABLET ORAL 2 TIMES DAILY WITH MEALS
COMMUNITY
End: 2023-08-22 | Stop reason: SDUPTHER

## 2023-08-22 RX ORDER — ATORVASTATIN CALCIUM 40 MG/1
40 TABLET, FILM COATED ORAL DAILY
COMMUNITY

## 2023-08-22 RX ORDER — TRAZODONE HYDROCHLORIDE 50 MG/1
50 TABLET ORAL NIGHTLY
COMMUNITY

## 2023-08-30 ENCOUNTER — TRANSCRIBE ORDERS (OUTPATIENT)
Dept: ADMINISTRATIVE | Facility: HOSPITAL | Age: 83
End: 2023-08-30
Payer: MEDICARE

## 2023-08-30 DIAGNOSIS — Z12.31 VISIT FOR SCREENING MAMMOGRAM: Primary | ICD-10-CM

## 2023-08-31 ENCOUNTER — APPOINTMENT (OUTPATIENT)
Dept: GENERAL RADIOLOGY | Facility: HOSPITAL | Age: 83
End: 2023-08-31
Payer: MEDICARE

## 2023-08-31 ENCOUNTER — HOSPITAL ENCOUNTER (EMERGENCY)
Facility: HOSPITAL | Age: 83
Discharge: HOME | End: 2023-08-31
Attending: EMERGENCY MEDICINE
Payer: MEDICARE

## 2023-08-31 VITALS
WEIGHT: 211.86 LBS | HEART RATE: 63 BPM | DIASTOLIC BLOOD PRESSURE: 56 MMHG | RESPIRATION RATE: 21 BRPM | OXYGEN SATURATION: 94 % | BODY MASS INDEX: 36.17 KG/M2 | SYSTOLIC BLOOD PRESSURE: 117 MMHG | TEMPERATURE: 100.1 F | HEIGHT: 64 IN

## 2023-08-31 DIAGNOSIS — J18.9 PNEUMONIA OF RIGHT UPPER LOBE DUE TO INFECTIOUS ORGANISM: Primary | ICD-10-CM

## 2023-08-31 LAB
ALBUMIN SERPL-MCNC: 4.2 G/DL (ref 3.5–5.2)
ALBUMIN/GLOB SERPL: 1.4 G/DL
ALP SERPL-CCNC: 92 U/L (ref 39–117)
ALT SERPL W P-5'-P-CCNC: 12 U/L (ref 1–33)
ANION GAP SERPL CALCULATED.3IONS-SCNC: 11.1 MMOL/L (ref 5–15)
AST SERPL-CCNC: 18 U/L (ref 1–32)
BASOPHILS # BLD AUTO: 0.04 10*3/MM3 (ref 0–0.2)
BASOPHILS NFR BLD AUTO: 0.3 % (ref 0–1.5)
BILIRUB SERPL-MCNC: 0.8 MG/DL (ref 0–1.2)
BILIRUB UR QL STRIP: NEGATIVE
BUN SERPL-MCNC: 25 MG/DL (ref 8–23)
BUN/CREAT SERPL: 23.1 (ref 7–25)
CALCIUM SPEC-SCNC: 9.7 MG/DL (ref 8.6–10.5)
CHLORIDE SERPL-SCNC: 102 MMOL/L (ref 98–107)
CLARITY UR: CLEAR
CO2 SERPL-SCNC: 24.9 MMOL/L (ref 22–29)
COLOR UR: YELLOW
CREAT SERPL-MCNC: 1.08 MG/DL (ref 0.57–1)
DEPRECATED RDW RBC AUTO: 48.2 FL (ref 37–54)
EGFRCR SERPLBLD CKD-EPI 2021: 51.1 ML/MIN/1.73
EOSINOPHIL # BLD AUTO: 0.05 10*3/MM3 (ref 0–0.4)
EOSINOPHIL NFR BLD AUTO: 0.3 % (ref 0.3–6.2)
ERYTHROCYTE [DISTWIDTH] IN BLOOD BY AUTOMATED COUNT: 14.7 % (ref 12.3–15.4)
GLOBULIN UR ELPH-MCNC: 3.1 GM/DL
GLUCOSE SERPL-MCNC: 147 MG/DL (ref 65–99)
GLUCOSE UR STRIP-MCNC: NEGATIVE MG/DL
HCT VFR BLD AUTO: 32.4 % (ref 34–46.6)
HGB BLD-MCNC: 10 G/DL (ref 12–15.9)
HGB UR QL STRIP.AUTO: NEGATIVE
HOLD SPECIMEN: NORMAL
HOLD SPECIMEN: NORMAL
IMM GRANULOCYTES # BLD AUTO: 0.05 10*3/MM3 (ref 0–0.05)
IMM GRANULOCYTES NFR BLD AUTO: 0.3 % (ref 0–0.5)
KETONES UR QL STRIP: NEGATIVE
LEUKOCYTE ESTERASE UR QL STRIP.AUTO: NEGATIVE
LYMPHOCYTES # BLD AUTO: 1.84 10*3/MM3 (ref 0.7–3.1)
LYMPHOCYTES NFR BLD AUTO: 12.5 % (ref 19.6–45.3)
MAGNESIUM SERPL-MCNC: 1.4 MG/DL (ref 1.6–2.4)
MCH RBC QN AUTO: 27.8 PG (ref 26.6–33)
MCHC RBC AUTO-ENTMCNC: 30.9 G/DL (ref 31.5–35.7)
MCV RBC AUTO: 90 FL (ref 79–97)
MONOCYTES # BLD AUTO: 1.43 10*3/MM3 (ref 0.1–0.9)
MONOCYTES NFR BLD AUTO: 9.7 % (ref 5–12)
NEUTROPHILS NFR BLD AUTO: 11.28 10*3/MM3 (ref 1.7–7)
NEUTROPHILS NFR BLD AUTO: 76.9 % (ref 42.7–76)
NITRITE UR QL STRIP: NEGATIVE
NRBC BLD AUTO-RTO: 0 /100 WBC (ref 0–0.2)
PH UR STRIP.AUTO: 5.5 [PH] (ref 5–8)
PLATELET # BLD AUTO: 247 10*3/MM3 (ref 140–450)
PMV BLD AUTO: 9.7 FL (ref 6–12)
POTASSIUM SERPL-SCNC: 4.1 MMOL/L (ref 3.5–5.2)
PROT SERPL-MCNC: 7.3 G/DL (ref 6–8.5)
PROT UR QL STRIP: NEGATIVE
QT INTERVAL: 445 MS
QTC INTERVAL: 451 MS
RBC # BLD AUTO: 3.6 10*6/MM3 (ref 3.77–5.28)
SARS-COV-2 RNA RESP QL NAA+PROBE: NOT DETECTED
SODIUM SERPL-SCNC: 138 MMOL/L (ref 136–145)
SP GR UR STRIP: 1.02 (ref 1–1.03)
TROPONIN T SERPL HS-MCNC: 38 NG/L
UROBILINOGEN UR QL STRIP: NORMAL
WBC NRBC COR # BLD: 14.69 10*3/MM3 (ref 3.4–10.8)
WHOLE BLOOD HOLD COAG: NORMAL
WHOLE BLOOD HOLD SPECIMEN: NORMAL

## 2023-08-31 PROCEDURE — 81003 URINALYSIS AUTO W/O SCOPE: CPT | Performed by: EMERGENCY MEDICINE

## 2023-08-31 PROCEDURE — 80053 COMPREHEN METABOLIC PANEL: CPT | Performed by: EMERGENCY MEDICINE

## 2023-08-31 PROCEDURE — 71045 X-RAY EXAM CHEST 1 VIEW: CPT

## 2023-08-31 PROCEDURE — 99284 EMERGENCY DEPT VISIT MOD MDM: CPT

## 2023-08-31 PROCEDURE — 93005 ELECTROCARDIOGRAM TRACING: CPT | Performed by: EMERGENCY MEDICINE

## 2023-08-31 PROCEDURE — 87635 SARS-COV-2 COVID-19 AMP PRB: CPT | Performed by: NURSE PRACTITIONER

## 2023-08-31 PROCEDURE — 85025 COMPLETE CBC W/AUTO DIFF WBC: CPT | Performed by: EMERGENCY MEDICINE

## 2023-08-31 PROCEDURE — 83735 ASSAY OF MAGNESIUM: CPT | Performed by: EMERGENCY MEDICINE

## 2023-08-31 PROCEDURE — 84484 ASSAY OF TROPONIN QUANT: CPT | Performed by: EMERGENCY MEDICINE

## 2023-08-31 RX ORDER — SODIUM CHLORIDE 0.9 % (FLUSH) 0.9 %
10 SYRINGE (ML) INJECTION AS NEEDED
Status: DISCONTINUED | OUTPATIENT
Start: 2023-08-31 | End: 2023-08-31 | Stop reason: HOSPADM

## 2023-08-31 RX ORDER — DOXYCYCLINE 100 MG/1
100 CAPSULE ORAL 2 TIMES DAILY
Qty: 10 CAPSULE | Refills: 0 | Status: SHIPPED | OUTPATIENT
Start: 2023-08-31 | End: 2023-09-05

## 2023-08-31 RX ORDER — ONDANSETRON 4 MG/1
4 TABLET, ORALLY DISINTEGRATING ORAL 4 TIMES DAILY PRN
Qty: 20 TABLET | Refills: 0 | Status: SHIPPED | OUTPATIENT
Start: 2023-08-31

## 2023-08-31 RX ORDER — DOXYCYCLINE 100 MG/1
100 CAPSULE ORAL ONCE
Status: COMPLETED | OUTPATIENT
Start: 2023-08-31 | End: 2023-08-31

## 2023-08-31 RX ADMIN — DOXYCYCLINE 100 MG: 100 CAPSULE ORAL at 14:36

## 2023-08-31 NOTE — ED PROVIDER NOTES
Time: 10:54 AM EDT  Date of encounter:  8/31/2023  Independent Historian/Clinical History and Information was obtained by:   Patient    History is limited by: N/A    Chief Complaint: lightheadedness, nausea      History of Present Illness:  Patient is a 82 y.o. year old female who presents to the emergency department for evaluation of lightheadedness, nausea vomiting.  Patient states she has had dizziness x3 days.  States that when she stands up she feels lightheaded like she may pass out.  States she thought it was may be her vertigo but exercises did not help.  States normally the room is spinning but now she just feels really lightheaded.  She has had some nausea and vomiting.  States for the past month every time she eats she has to have a bowel movement.  States she has had 4 bowel movements today.  Patient had temp of 100.1 at triage, but denies any history of fevers.  States she has had some increased urination but denies any cough, shortness of breath or any other symptoms.        Patient Care Team  Primary Care Provider: Johan Escudero MD    Past Medical History:     Allergies   Allergen Reactions    Codeine Nausea And Vomiting    Contrast Dye (Echo Or Unknown Ct/Mr) Hives    Morphine Nausea And Vomiting     Past Medical History:   Diagnosis Date    Arthritis     Asthma     BPPV (benign paroxysmal positional vertigo), right     COPD (chronic obstructive pulmonary disease)     Coronary artery disease     Coronary artery disease     Ear itching     HX    Enlarged heart     Gastroesophageal reflux disease     Hyperlipidemia     Hypertension     Knee pain     Macular degeneration     PONV (postoperative nausea and vomiting)     Postural dizziness with presyncope 3/16/2023    Shingles     X2    SOB (shortness of breath) on exertion     Tinnitus of left ear     Urinary frequency     Vertigo      Past Surgical History:   Procedure Laterality Date    BREAST BIOPSY Bilateral     CARDIAC CATHETERIZATION       CARDIAC ELECTROPHYSIOLOGY PROCEDURE N/A 3/27/2023    Procedure: Pacemaker SC new-Spry, call Dr. Rosa first thing in the morning to get time for the procedure and notify rep;  Surgeon: Shane Rosa MD;  Location: [facility] CATH INVASIVE LOCATION;  Service: Cardiovascular;  Laterality: N/A;     SECTION      CHOLECYSTECTOMY      COLONOSCOPY      CORONARY STENT PLACEMENT      EYE SURGERY      GALLBLADDER SURGERY      KNEE MINI REVISION Right 2021    Procedure: KNEE POLY INSERT EXCHANGE;  Surgeon: Ky Avila MD;  Location:  [facility];  Service: Orthopedics;  Laterality: Right;    KNEE SURGERY      REPLACEMENT TOTAL KNEE Right     REPLACEMENT TOTAL KNEE Left      Family History   Problem Relation Age of Onset    Breast cancer Mother     Bone cancer Father     Breast cancer Sister     Bone cancer Sister     Lung cancer Brother     Malig Hyperthermia Neg Hx        Home Medications:  Prior to Admission medications    Medication Sig Start Date End Date Taking? Authorizing Provider   albuterol (PROVENTIL) (2.5 MG/3ML) 0.083% nebulizer solution Take 2.5 mg by nebulization Every 4 (Four) Hours As Needed for Wheezing.    Brittany Garner MD   albuterol sulfate  (90 Base) MCG/ACT inhaler Inhale 2 puffs Every 4 (Four) Hours As Needed. 3/13/23   Brittany Garner MD   aspirin 81 MG EC tablet Take 1 tablet by mouth Daily.    ProviderBrittany MD   atorvastatin (LIPITOR) 40 MG tablet Take 1 tablet by mouth Daily.    ProviderBrittany MD   Breo Ellipta 100-25 MCG/ACT aerosol powder  Inhale 1 puff Daily. 23   Brittany Garner MD   carvedilol (COREG) 25 MG tablet Take 1 tablet by mouth 2 (Two) Times a Day With Meals. 23   Migue Nguyen MD   famotidine (PEPCID) 40 MG tablet Take 1 tablet by mouth every night at bedtime. 23   Brittany Garner MD   fluticasone (FLONASE) 50 MCG/ACT nasal spray 1 spray into the nostril(s) as directed by provider  "Every Morning. 9/30/14   Brittany Garner MD   hydroCHLOROthiazide (HYDRODIURIL) 12.5 MG tablet Take 1 tablet by mouth Every Morning. 5/18/23   Lydia Escudero APRN   meclizine (ANTIVERT) 25 MG tablet Take 1 tablet by mouth 2 (Two) Times a Day As Needed. 5/18/15   Brittany Garner MD   montelukast (SINGULAIR) 10 MG tablet Take 1 tablet by mouth Every Night. 5/18/15   Brittany Garner MD   pantoprazole (PROTONIX) 40 MG EC tablet Take 1 tablet by mouth Daily. 1/6/23   Brittany Garner MD   sertraline (ZOLOFT) 50 MG tablet Take 1 tablet by mouth Every Night. 5/18/15   Brittany Garner MD   traZODone (DESYREL) 50 MG tablet Take 1 tablet by mouth Every Night.    Brittany Garner MD   valsartan (DIOVAN) 320 MG tablet Take 1 tablet by mouth Daily. 5/18/23   Lydia Escudero APRN        Social History:   Social History     Tobacco Use    Smoking status: Never    Smokeless tobacco: Never   Vaping Use    Vaping Use: Never used   Substance Use Topics    Alcohol use: No    Drug use: Never         Review of Systems:  Review of Systems   Constitutional:  Negative for chills and fever.   HENT:  Negative for congestion, ear pain and sore throat.    Eyes:  Negative for pain.   Respiratory:  Negative for cough, chest tightness and shortness of breath.    Cardiovascular:  Negative for chest pain.   Gastrointestinal:  Positive for nausea and vomiting. Negative for abdominal pain and diarrhea.   Genitourinary:  Positive for frequency. Negative for flank pain and hematuria.   Musculoskeletal:  Negative for joint swelling.   Skin:  Negative for pallor.   Neurological:  Positive for dizziness and light-headedness. Negative for seizures and headaches.   All other systems reviewed and are negative.     Physical Exam:  /56   Pulse 63   Temp 100.1 °F (37.8 °C) (Oral)   Resp 21   Ht 162.6 cm (64\")   Wt 96.1 kg (211 lb 13.8 oz)   SpO2 94%   BMI 36.37 kg/m²     Physical Exam  Vitals and " nursing note reviewed.   Constitutional:       General: She is not in acute distress.     Appearance: Normal appearance. She is not toxic-appearing.   HENT:      Head: Normocephalic and atraumatic.      Nose: Nose normal.      Mouth/Throat:      Mouth: Mucous membranes are moist.   Eyes:      Conjunctiva/sclera: Conjunctivae normal.   Cardiovascular:      Rate and Rhythm: Normal rate and regular rhythm.      Pulses: Normal pulses.      Heart sounds: Normal heart sounds.   Pulmonary:      Effort: Pulmonary effort is normal.      Breath sounds: Normal breath sounds.   Abdominal:      General: Bowel sounds are normal.      Palpations: Abdomen is soft.      Tenderness: There is no abdominal tenderness.   Musculoskeletal:         General: Normal range of motion.      Cervical back: Normal range of motion.   Skin:     General: Skin is warm and dry.   Neurological:      General: No focal deficit present.      Mental Status: She is alert and oriented to person, place, and time.   Psychiatric:         Mood and Affect: Mood normal.         Behavior: Behavior normal.         Thought Content: Thought content normal.         Judgment: Judgment normal.         Medical Decision Making:      Comorbidities that affect care:    COPD, Coronary Artery Disease, Hypertension    External Notes reviewed:    Previous Clinic Note: Cardiology      The following orders were placed and all results were independently analyzed by me:  Orders Placed This Encounter   Procedures    COVID PRE-OP / PRE-PROCEDURE SCREENING ORDER (NO ISOLATION) - Swab, Nasopharynx    COVID-19,CEPHEID/AMANDA,COR/CHACHA/PAD/MYLA/MAD IN-HOUSE(OR EMERGENT/ADD-ON),NP SWAB IN TRANSPORT MEDIA 3-4 HR TAT, RT-PCR - Swab, Nasopharynx    XR Chest 1 View    [facility] Draw    Comprehensive Metabolic Panel    Single High Sensitivity Troponin T    Magnesium    Urinalysis With Microscopic If Indicated (No Culture) - Urine, Clean Catch    CBC Auto Differential    Undress & Gown    Continuous  Pulse Oximetry    Vital Signs    Orthostatic Blood Pressure    ECG 12 Lead ED Triage Standing Order; Weak / Dizzy / AMS    CBC & Differential    Green Top (Gel)    Lavender Top    Gold Top - SST    Light Blue Top       Medications Given in the Emergency Department:  Medications   doxycycline (MONODOX) capsule 100 mg (100 mg Oral Given 8/31/23 1436)        ED Course:  Patient remains alert oriented and appropriate throughout ED visit.  Oxygen saturations mid to high 90s on room air.  Discussed ED findings including x-ray EKG and lab results.  Discussed treatment plan and plan for discharge.  Patient verbalized understanding agrees with plan.       Labs:    Lab Results (last 24 hours)       Procedure Component Value Units Date/Time    CBC & Differential [[id number]]  (Abnormal) Collected: 08/31/23 1048    Specimen: Blood Updated: 08/31/23 1058    Narrative:      The following orders were created for panel order CBC & Differential.  Procedure                               Abnormality         Status                     ---------                               -----------         ------                     CBC Auto Differential[[id number]]        Abnormal            Final result                 Please view results for these tests on the individual orders.    Comprehensive Metabolic Panel [[id number]]  (Abnormal) Collected: 08/31/23 1048    Specimen: Blood Updated: 08/31/23 1118     Glucose 147 mg/dL      BUN 25 mg/dL      Creatinine 1.08 mg/dL      Sodium 138 mmol/L      Potassium 4.1 mmol/L      Chloride 102 mmol/L      CO2 24.9 mmol/L      Calcium 9.7 mg/dL      Total Protein 7.3 g/dL      Albumin 4.2 g/dL      ALT (SGPT) 12 U/L      AST (SGOT) 18 U/L      Alkaline Phosphatase 92 U/L      Total Bilirubin 0.8 mg/dL      Globulin 3.1 gm/dL      A/G Ratio 1.4 g/dL      BUN/Creatinine Ratio 23.1     Anion Gap 11.1 mmol/L      eGFR 51.1 mL/min/1.73     Narrative:      GFR Normal >60  Chronic Kidney Disease <60  Kidney Failure  <15    The GFR formula is only valid for adults with stable renal function between ages 18 and 70.    Single High Sensitivity Troponin T [[id number]]  (Abnormal) Collected: 08/31/23 1048    Specimen: Blood Updated: 08/31/23 1118     HS Troponin T 38 ng/L     Narrative:      High Sensitive Troponin T Reference Range:  <10.0 ng/L- Negative Female for AMI  <15.0 ng/L- Negative Male for AMI  >=10 - Abnormal Female indicating possible myocardial injury.  >=15 - Abnormal Male indicating possible myocardial injury.   Clinicians would have to utilize clinical acumen, EKG, Troponin, and serial changes to determine if it is an Acute Myocardial Infarction or myocardial injury due to an underlying chronic condition.         Magnesium [[id number]]  (Abnormal) Collected: 08/31/23 1048    Specimen: Blood Updated: 08/31/23 1118     Magnesium 1.4 mg/dL     CBC Auto Differential [[id number]]  (Abnormal) Collected: 08/31/23 1048    Specimen: Blood Updated: 08/31/23 1058     WBC 14.69 10*3/mm3      RBC 3.60 10*6/mm3      Hemoglobin 10.0 g/dL      Hematocrit 32.4 %      MCV 90.0 fL      MCH 27.8 pg      MCHC 30.9 g/dL      RDW 14.7 %      RDW-SD 48.2 fl      MPV 9.7 fL      Platelets 247 10*3/mm3      Neutrophil % 76.9 %      Lymphocyte % 12.5 %      Monocyte % 9.7 %      Eosinophil % 0.3 %      Basophil % 0.3 %      Immature Grans % 0.3 %      Neutrophils, Absolute 11.28 10*3/mm3      Lymphocytes, Absolute 1.84 10*3/mm3      Monocytes, Absolute 1.43 10*3/mm3      Eosinophils, Absolute 0.05 10*3/mm3      Basophils, Absolute 0.04 10*3/mm3      Immature Grans, Absolute 0.05 10*3/mm3      nRBC 0.0 /100 WBC     Urinalysis With Microscopic If Indicated (No Culture) - Urine, Clean Catch [[id number]]  (Normal) Collected: 08/31/23 1246    Specimen: Urine, Clean Catch Updated: 08/31/23 1302     Color, UA Yellow     Appearance, UA Clear     pH, UA 5.5     Specific Gravity, UA 1.019     Glucose, UA Negative     Ketones, UA Negative     Bilirubin, UA  Negative     Blood, UA Negative     Protein, UA Negative     Leuk Esterase, UA Negative     Nitrite, UA Negative     Urobilinogen, UA 0.2 E.U./dL    Narrative:      Urine microscopic not indicated.    COVID PRE-OP / PRE-PROCEDURE SCREENING ORDER (NO ISOLATION) - Swab, Nasopharynx [[id number]]  (Normal) Collected: 08/31/23 1332    Specimen: Swab from Nasopharynx Updated: 08/31/23 1415    Narrative:      The following orders were created for panel order COVID PRE-OP / PRE-PROCEDURE SCREENING ORDER (NO ISOLATION) - Swab, Nasopharynx.  Procedure                               Abnormality         Status                     ---------                               -----------         ------                     COVID-19,CEPHEID/AMANDA,CO...[[id number]]  Normal              Final result                 Please view results for these tests on the individual orders.    COVID-19,CEPHEID/AMANDA,COR/CHACHA/PAD/MYLA/MAD IN-HOUSE(OR EMERGENT/ADD-ON),NP SWAB IN TRANSPORT MEDIA 3-4 HR TAT, RT-PCR - Swab, Nasopharynx [[id number]]  (Normal) Collected: 08/31/23 1332    Specimen: Swab from Nasopharynx Updated: 08/31/23 1415     COVID19 Not Detected    Narrative:      Fact sheet for providers: https://www.fda.gov/media/932431/download     Fact sheet for patients: https://www.fda.gov/media/770456/download  Fact sheet for providers: https://www.fda.gov/media/438992/download     Fact sheet for patients: https://www.fda.gov/media/917257/download             Imaging:    XR Chest 1 View    Result Date: 8/31/2023  PROCEDURE: XR CHEST 1 VW  COMPARISON: [facility], CR, XR CHEST 1 VW, 3/27/2023, 12:33.  INDICATIONS: Dizziness and SOA on exertion x 3 days  FINDINGS:  Left subclavian transvenous pacemaker is unchanged.  The heart size is at the upper limits of normal.  Pulmonary vessels are normal.  There is new right upper lobe airspace disease likely secondary to pneumonia.  Left lung is clear.  No pleural effusion.  No evidence pneumothorax.         1. New right upper lobe airspace disease suspicious for pneumonia.  Follow-up to complete radiographic resolution would be recommended.       JULIANN JERONIMO MD       Electronically Signed and Approved By: JULIANN JERONIMO MD on 8/31/2023 at 12:10                Differential Diagnosis and Discussion:    Dyspnea: Differential diagnosis includes but is not limited to metabolic acidosis, neurological disorders, psychogenic, asthma, pneumothorax, upper airway obstruction, COPD, pneumonia, noncardiogenic pulmonary edema, interstitial lung disease, anemia, congestive heart failure, and pulmonary embolism    All labs were reviewed and interpreted by me.  All X-rays impressions were independently interpreted by me.  EKG was interpreted by me.    MDM     Amount and/or Complexity of Data Reviewed  Clinical lab tests: reviewed  Tests in the radiology section of CPT®: reviewed  Tests in the medicine section of CPT®: reviewed             Patient Care Considerations:    CT CHEST: I considered ordering a CT scan of the chest, however pneumonia found on x-ray.      Consultants/Shared Management Plan:    None    Social Determinants of Health:    Patient is independent, reliable, and has access to care.       Disposition and Care Coordination:    Discharged: I considered escalation of care by admitting this patient to the hospital, however the patient has improved and is suitable and stable for discharge.    [unfilled]  I have explained discharge medications and the need for follow up with the patient/caretakers. This was also printed in the discharge instructions. Patient was discharged with the following medications and follow up:      Medication List        New Prescriptions      doxycycline 100 MG capsule  Commonly known as: MONODOX  Take 1 capsule by mouth 2 (Two) Times a Day for 5 days.     ondansetron ODT 4 MG disintegrating tablet  Commonly known as: ZOFRAN-ODT  Place 1 tablet on the tongue 4 (Four) Times a Day As  Needed for Nausea or Vomiting.               Where to Get Your Medications        These medications were sent to [id number] - [place], [place] - [address] AT [facility] [place] - [phone]  - [phone] fx  [address], [place] [address]      Phone: [phone]   doxycycline 100 MG capsule  ondansetron ODT 4 MG disintegrating tablet      Johan Escudero MD  [address] [place] [address] [phone]             Final diagnoses:   Pneumonia of right upper lobe due to infectious organism        ED Disposition       ED Disposition   Discharge    Condition   Stable    Comment   --               This medical record created using voice recognition software.             Hoda Vallejo, APRN  08/31/23 4855

## 2023-08-31 NOTE — DISCHARGE INSTRUCTIONS
Continue your home medications as directed.  Take antibiotics as directed.  See your PCP in 1 week for follow-up.  Return to ED for any new or worsening symptoms.

## 2023-09-05 LAB
QT INTERVAL: 445 MS
QTC INTERVAL: 451 MS

## 2023-10-04 ENCOUNTER — TRANSCRIBE ORDERS (OUTPATIENT)
Dept: ADMINISTRATIVE | Facility: HOSPITAL | Age: 83
End: 2023-10-04
Payer: MEDICARE

## 2023-10-04 DIAGNOSIS — R91.8 PULMONARY MASS: Primary | ICD-10-CM

## 2023-10-24 RX ORDER — VALSARTAN 320 MG/1
320 TABLET ORAL DAILY
Qty: 90 TABLET | Refills: 1 | Status: SHIPPED | OUTPATIENT
Start: 2023-10-24

## 2023-10-26 ENCOUNTER — HOSPITAL ENCOUNTER (OUTPATIENT)
Dept: CT IMAGING | Facility: HOSPITAL | Age: 83
Discharge: HOME | End: 2023-10-26
Admitting: FAMILY MEDICINE
Payer: MEDICARE

## 2023-10-26 DIAGNOSIS — R91.8 PULMONARY MASS: ICD-10-CM

## 2023-10-26 PROCEDURE — 71250 CT THORAX DX C-: CPT

## 2023-11-07 RX ORDER — HYDROCHLOROTHIAZIDE 12.5 MG/1
12.5 TABLET ORAL EVERY MORNING
Qty: 90 TABLET | Refills: 1 | Status: SHIPPED | OUTPATIENT
Start: 2023-11-07

## 2023-11-22 ENCOUNTER — TELEPHONE (OUTPATIENT)
Dept: PULMONOLOGY | Facility: CLINIC | Age: 83
End: 2023-11-22

## 2023-11-22 ENCOUNTER — OFFICE VISIT (OUTPATIENT)
Dept: PULMONOLOGY | Facility: CLINIC | Age: 83
End: 2023-11-22
Payer: MEDICARE

## 2023-11-22 VITALS
BODY MASS INDEX: 33.49 KG/M2 | SYSTOLIC BLOOD PRESSURE: 136 MMHG | OXYGEN SATURATION: 97 % | HEART RATE: 53 BPM | HEIGHT: 64 IN | RESPIRATION RATE: 16 BRPM | WEIGHT: 196.2 LBS | DIASTOLIC BLOOD PRESSURE: 51 MMHG

## 2023-11-22 DIAGNOSIS — J98.11 ATELECTASIS: ICD-10-CM

## 2023-11-22 DIAGNOSIS — J44.9 CHRONIC OBSTRUCTIVE PULMONARY DISEASE, UNSPECIFIED COPD TYPE: ICD-10-CM

## 2023-11-22 DIAGNOSIS — R91.8 ABNORMAL CT SCAN OF LUNG: Primary | ICD-10-CM

## 2023-11-22 RX ORDER — AMLODIPINE BESYLATE 5 MG/1
1 TABLET ORAL DAILY
COMMUNITY
Start: 2023-09-03

## 2023-11-22 RX ORDER — ROPINIROLE 0.25 MG/1
0.25 TABLET, FILM COATED ORAL
COMMUNITY
Start: 2023-10-25

## 2023-11-22 NOTE — PROGRESS NOTES
Primary Care Provider  Johan Escudero MD     Referring Provider  Johan Escudero MD     Chief Complaint  new patient , Asthma, COPD, Cough, Shortness of Breath, and Wheezing    Subjective          Mallory Fracno presents to [facility] PULMONARY & CRITICAL CARE MEDICINE  History of Present Illness  Mallory Franco is a 83 y.o. female patient here to establish care as a new patient.    Patient recently had a chest CT on 10/26/2023.  This did show patchy airspace disease in the anterior right upper lobe with consolidation and crowding of bronchi consistent with atelectasis and findings are concerning for a central obstruction.  There is also coronary artery calcifications.  Dr. Houston has reviewed patient's chest CT and believes that she would benefit from a bronchoscopy with BAL.  We will set patient up for next week.      Patient is a never smoker, but does admit to having a significant amount of secondhand smoke exposure.  Patient states that her  did smoke around her for 57 years.  She is currently on Breo and albuterol as needed.  She is also taking Singulair and Flonase for seasonal allergies and allergic rhinitis.  There are no anticoagulants that will need to be held prior to her procedure.  She does have a personal history of COPD but has never had a pulmonary function test.  She states that she also had childhood asthma until the age of 8.  She is unsure if some of her breathing issues are due to asthma or COPD.  She states that most of her discomfort comes from shortness of breath with exertion.  This is mild in severity and improves with rest.  She does have an albuterol inhaler and nebulizer treatments to use if needed.  She denies daily history of COPD or asthma.  She did have a brother with lung cancer.  Overall, patient states that she is doing well and has no concerns at this time.  She is agreeable to undergoing a bronchoscopy with BAL.  She is up-to-date with her flu and  COVID vaccines.  We will address patient's pneumonia vaccine status at her next office visit     Her history of smoking is   Tobacco Use: Low Risk  (11/22/2023)    Patient History     Smoking Tobacco Use: Never     Smokeless Tobacco Use: Never     Passive Exposure: Past   .    Review of Systems   Constitutional:  Negative for chills, fatigue, fever, unexpected weight gain and unexpected weight loss.   HENT:  Congestion: Nasal.    Respiratory:  Positive for cough, shortness of breath and wheezing. Negative for apnea.         Negative for Hemoptysis     Cardiovascular:  Negative for chest pain, palpitations and leg swelling.   Skin:         Negative for cyanosis      Sleep: Negative for Excessive daytime sleepiness  Negative for morning headaches  Negative for Snoring    Family History   Problem Relation Age of Onset    Breast cancer Mother     Bone cancer Father     Breast cancer Sister     Bone cancer Sister     Lung cancer Brother     Malig Hyperthermia Neg Hx         Social History     Socioeconomic History    Marital status:    Tobacco Use    Smoking status: Never     Passive exposure: Past    Smokeless tobacco: Never   Vaping Use    Vaping Use: Never used   Substance and Sexual Activity    Alcohol use: No    Drug use: Never    Sexual activity: Defer        Past Medical History:   Diagnosis Date    Arthritis     Asthma     BPPV (benign paroxysmal positional vertigo), right     COPD (chronic obstructive pulmonary disease)     Coronary artery disease     Coronary artery disease     Ear itching     HX    Enlarged heart     Gastroesophageal reflux disease     Hyperlipidemia     Hypertension     Knee pain     Macular degeneration     PONV (postoperative nausea and vomiting)     Postural dizziness with presyncope 3/16/2023    Shingles     X2    SOB (shortness of breath) on exertion     Tinnitus of left ear     Urinary frequency     Vertigo         Immunization History   Administered Date(s) Administered     COVID-19 (MODERNA) 1st,2nd,3rd Dose Monovalent 03/18/2021, 04/22/2021    COVID-19 (MODERNA) Monovalent Original Booster 12/06/2021    FLUAD TRI 65YR+ 10/02/2023    Fluzone High-Dose 65+yrs 10/15/2021         Allergies   Allergen Reactions    Codeine Nausea And Vomiting    Contrast Dye (Echo Or Unknown Ct/Mr) Hives    Morphine Nausea And Vomiting          Current Outpatient Medications:     albuterol (PROVENTIL) (2.5 MG/3ML) 0.083% nebulizer solution, Take 2.5 mg by nebulization Every 4 (Four) Hours As Needed for Wheezing., Disp: , Rfl:     albuterol sulfate  (90 Base) MCG/ACT inhaler, Inhale 2 puffs Every 4 (Four) Hours As Needed., Disp: , Rfl:     amLODIPine (NORVASC) 5 MG tablet, Take 1 tablet by mouth Daily., Disp: , Rfl:     aspirin 81 MG EC tablet, Take 1 tablet by mouth Daily., Disp: , Rfl:     atorvastatin (LIPITOR) 40 MG tablet, Take 1 tablet by mouth Daily., Disp: , Rfl:     Breo Ellipta 100-25 MCG/ACT aerosol powder , Inhale 1 puff Daily., Disp: , Rfl:     carvedilol (COREG) 25 MG tablet, Take 1 tablet by mouth 2 (Two) Times a Day With Meals., Disp: 180 tablet, Rfl: 6    famotidine (PEPCID) 40 MG tablet, Take 1 tablet by mouth every night at bedtime., Disp: , Rfl:     fluticasone (FLONASE) 50 MCG/ACT nasal spray, 1 spray into the nostril(s) as directed by provider Every Morning., Disp: , Rfl:     hydroCHLOROthiazide (HYDRODIURIL) 12.5 MG tablet, TAKE 1 TABLET BY MOUTH EVERY MORNING, Disp: 90 tablet, Rfl: 1    meclizine (ANTIVERT) 25 MG tablet, Take 1 tablet by mouth 2 (Two) Times a Day As Needed., Disp: , Rfl:     montelukast (SINGULAIR) 10 MG tablet, Take 1 tablet by mouth Every Night., Disp: , Rfl:     ondansetron ODT (ZOFRAN-ODT) 4 MG disintegrating tablet, Place 1 tablet on the tongue 4 (Four) Times a Day As Needed for Nausea or Vomiting., Disp: 20 tablet, Rfl: 0    pantoprazole (PROTONIX) 40 MG EC tablet, Take 1 tablet by mouth Daily., Disp: , Rfl:     rOPINIRole (REQUIP) 0.25 MG tablet, Take 1  tablet by mouth every night at bedtime., Disp: , Rfl:     sertraline (ZOLOFT) 50 MG tablet, Take 1 tablet by mouth Every Night., Disp: , Rfl:     traZODone (DESYREL) 50 MG tablet, Take 1 tablet by mouth Every Night., Disp: , Rfl:     valsartan (DIOVAN) 320 MG tablet, TAKE 1 TABLET BY MOUTH DAILY (Patient not taking: Reported on 11/22/2023), Disp: 90 tablet, Rfl: 1  No current facility-administered medications for this visit.    Facility-Administered Medications Ordered in Other Visits:     Chlorhexidine Gluconate Cloth 2 % pads, , Apply externally, BID, Francesco Grewal APRN    iopamidol (ISOVUE-370) 76 % injection, , , Code / Trauma / Sedation Medication, Shane Rosa MD, 15 mL at 03/27/23 1202     Objective   Physical Exam  Constitutional:       General: She is not in acute distress.     Appearance: Normal appearance. She is normal weight.   HENT:      Right Ear: Hearing normal.      Left Ear: Hearing normal.      Nose: No nasal tenderness or congestion.      Mouth/Throat:      Mouth: Mucous membranes are moist. No oral lesions.   Eyes:      Extraocular Movements: Extraocular movements intact.      Pupils: Pupils are equal, round, and reactive to light.   Neck:      Thyroid: No thyroid mass or thyromegaly.   Cardiovascular:      Rate and Rhythm: Normal rate and regular rhythm.      Pulses: Normal pulses.      Heart sounds: Normal heart sounds. No murmur heard.  Pulmonary:      Effort: Pulmonary effort is normal.      Breath sounds: Normal breath sounds. No wheezing, rhonchi or rales.   Musculoskeletal:      Cervical back: Neck supple.      Right lower leg: No edema.      Left lower leg: No edema.   Lymphadenopathy:      Cervical: No cervical adenopathy.      Upper Body:      Right upper body: No axillary adenopathy.   Skin:     General: Skin is warm and dry.      Findings: No lesion or rash.   Neurological:      General: No focal deficit present.      Mental Status: She is alert and oriented to person, place, and  "time.   Psychiatric:         Mood and Affect: Affect normal. Mood is not anxious or depressed.         Vital Signs:   /51 (BP Location: Left arm, Patient Position: Sitting, Cuff Size: Large Adult)   Pulse 53   Resp 16   Ht 162.7 cm (64.05\")   Wt 89 kg (196 lb 3.2 oz)   SpO2 97% Comment: room air  BMI 33.62 kg/m²        Result Review :   The following data was reviewed by: YOVANA Starr on 11/22/2023:  CMP          3/26/2023    04:06 3/28/2023    04:31 8/31/2023    10:48   CMP   Glucose 114  155  147    BUN 31  33  25    Creatinine 1.03  1.05  1.08    EGFR 54.1  52.8  51.1    Sodium 139  133  138    Potassium 4.1  4.6  4.1    Chloride 105  99  102    Calcium 9.4  9.7  9.7    Total Protein 6.7   7.3    Albumin 3.8  4.0  4.2    Globulin 2.9   3.1    Total Bilirubin 0.4   0.8    Alkaline Phosphatase 82   92    AST (SGOT) 24   18    ALT (SGPT) 15   12    Albumin/Globulin Ratio 1.3   1.4    BUN/Creatinine Ratio 30.1  31.4  23.1    Anion Gap 10.4  9.0  11.1      CBC w/diff          3/26/2023    04:06 3/28/2023    04:31 8/31/2023    10:48   CBC w/Diff   WBC 6.83  7.87  14.69    RBC 3.72  3.92  3.60    Hemoglobin 10.6  11.0  10.0    Hematocrit 32.9  34.5  32.4    MCV 88.4  88.0  90.0    MCH 28.5  28.1  27.8    MCHC 32.2  31.9  30.9    RDW 16.6  16.5  14.7    Platelets 281  272  247    Neutrophil Rel % 43.1  77.9  76.9    Immature Granulocyte Rel % 0.3  0.5  0.3    Lymphocyte Rel % 43.0  17.8  12.5    Monocyte Rel % 9.5  3.8  9.7    Eosinophil Rel % 3.8  0.0  0.3    Basophil Rel % 0.3  0.0  0.3      Data reviewed : Radiologic studies chest CT 10/26/2023    Procedures        Assessment and Plan    Diagnoses and all orders for this visit:    1. Abnormal CT scan of lung (Primary)  -     Case Request; Standing  -     Follow Anesthesia Guidlines / Standing Orders; Standing  -     Obtain Informed Consent; Standing  -     Case Request    2. Atelectasis  -     Case Request; Standing  -     Follow Anesthesia " Guidlines / Standing Orders; Standing  -     Obtain Informed Consent; Standing  -     Case Request    3. Chronic obstructive pulmonary disease, unspecified COPD type    4.  Continue Breo as prescribed.  Rinse mouth after each use.  5.  Continue albuterol inhaler or breathing treatments as needed.  6. I have obtained consent for bronchoscopy with brushings, biopsies, and bronchioloalveolar lavage.  Risks and benefits of bronchoscopy discussed with patient today.  Risks include pneumothorax, hemothorax, bleeding, hypoxia, required mechanical ventilation and death.  The patient recognizes these findings, acknowledges these findings and is agreeable to the procedure.  Patient's procedure will be on 11/28/2023  7.  Follow-up with me 1 week after bronchoscopy, sooner if needed    I spent 40 minutes caring for Mallory on this date of service. This time includes time spent by me in the following activities:preparing for the visit, reviewing tests, obtaining and/or reviewing a separately obtained history, performing a medically appropriate examination and/or evaluation , counseling and educating the patient/family/caregiver, ordering medications, tests, or procedures, referring and communicating with other health care professionals , documenting information in the medical record, and care coordination    Follow Up   Return in about 2 weeks (around 12/6/2023) for bronch follow up.  Patient was given instructions and counseling regarding her condition or for health maintenance advice. Please see specific information pulled into the AVS if appropriate.

## 2023-11-22 NOTE — H&P (VIEW-ONLY)
Primary Care Provider  Johan Escudero MD     Referring Provider  Johan Escudero MD     Chief Complaint  new patient , Asthma, COPD, Cough, Shortness of Breath, and Wheezing    Subjective          Mallory Franco presents to [facility] PULMONARY & CRITICAL CARE MEDICINE  History of Present Illness  Mallory Franco is a 83 y.o. female patient here to establish care as a new patient.    Patient recently had a chest CT on 10/26/2023.  This did show patchy airspace disease in the anterior right upper lobe with consolidation and crowding of bronchi consistent with atelectasis and findings are concerning for a central obstruction.  There is also coronary artery calcifications.  Dr. Houston has reviewed patient's chest CT and believes that she would benefit from a bronchoscopy with BAL.  We will set patient up for next week.      Patient is a never smoker, but does admit to having a significant amount of secondhand smoke exposure.  Patient states that her  did smoke around her for 57 years.  She is currently on Breo and albuterol as needed.  She is also taking Singulair and Flonase for seasonal allergies and allergic rhinitis.  There are no anticoagulants that will need to be held prior to her procedure.  She does have a personal history of COPD but has never had a pulmonary function test.  She states that she also had childhood asthma until the age of 8.  She is unsure if some of her breathing issues are due to asthma or COPD.  She states that most of her discomfort comes from shortness of breath with exertion.  This is mild in severity and improves with rest.  She does have an albuterol inhaler and nebulizer treatments to use if needed.  She denies daily history of COPD or asthma.  She did have a brother with lung cancer.  Overall, patient states that she is doing well and has no concerns at this time.  She is agreeable to undergoing a bronchoscopy with BAL.  She is up-to-date with her flu and  COVID vaccines.  We will address patient's pneumonia vaccine status at her next office visit     Her history of smoking is   Tobacco Use: Low Risk  (11/22/2023)    Patient History     Smoking Tobacco Use: Never     Smokeless Tobacco Use: Never     Passive Exposure: Past   .    Review of Systems   Constitutional:  Negative for chills, fatigue, fever, unexpected weight gain and unexpected weight loss.   HENT:  Congestion: Nasal.    Respiratory:  Positive for cough, shortness of breath and wheezing. Negative for apnea.         Negative for Hemoptysis     Cardiovascular:  Negative for chest pain, palpitations and leg swelling.   Skin:         Negative for cyanosis      Sleep: Negative for Excessive daytime sleepiness  Negative for morning headaches  Negative for Snoring    Family History   Problem Relation Age of Onset    Breast cancer Mother     Bone cancer Father     Breast cancer Sister     Bone cancer Sister     Lung cancer Brother     Malig Hyperthermia Neg Hx         Social History     Socioeconomic History    Marital status:    Tobacco Use    Smoking status: Never     Passive exposure: Past    Smokeless tobacco: Never   Vaping Use    Vaping Use: Never used   Substance and Sexual Activity    Alcohol use: No    Drug use: Never    Sexual activity: Defer        Past Medical History:   Diagnosis Date    Arthritis     Asthma     BPPV (benign paroxysmal positional vertigo), right     COPD (chronic obstructive pulmonary disease)     Coronary artery disease     Coronary artery disease     Ear itching     HX    Enlarged heart     Gastroesophageal reflux disease     Hyperlipidemia     Hypertension     Knee pain     Macular degeneration     PONV (postoperative nausea and vomiting)     Postural dizziness with presyncope 3/16/2023    Shingles     X2    SOB (shortness of breath) on exertion     Tinnitus of left ear     Urinary frequency     Vertigo         Immunization History   Administered Date(s) Administered     COVID-19 (MODERNA) 1st,2nd,3rd Dose Monovalent 03/18/2021, 04/22/2021    COVID-19 (MODERNA) Monovalent Original Booster 12/06/2021    FLUAD TRI 65YR+ 10/02/2023    Fluzone High-Dose 65+yrs 10/15/2021         Allergies   Allergen Reactions    Codeine Nausea And Vomiting    Contrast Dye (Echo Or Unknown Ct/Mr) Hives    Morphine Nausea And Vomiting          Current Outpatient Medications:     albuterol (PROVENTIL) (2.5 MG/3ML) 0.083% nebulizer solution, Take 2.5 mg by nebulization Every 4 (Four) Hours As Needed for Wheezing., Disp: , Rfl:     albuterol sulfate  (90 Base) MCG/ACT inhaler, Inhale 2 puffs Every 4 (Four) Hours As Needed., Disp: , Rfl:     amLODIPine (NORVASC) 5 MG tablet, Take 1 tablet by mouth Daily., Disp: , Rfl:     aspirin 81 MG EC tablet, Take 1 tablet by mouth Daily., Disp: , Rfl:     atorvastatin (LIPITOR) 40 MG tablet, Take 1 tablet by mouth Daily., Disp: , Rfl:     Breo Ellipta 100-25 MCG/ACT aerosol powder , Inhale 1 puff Daily., Disp: , Rfl:     carvedilol (COREG) 25 MG tablet, Take 1 tablet by mouth 2 (Two) Times a Day With Meals., Disp: 180 tablet, Rfl: 6    famotidine (PEPCID) 40 MG tablet, Take 1 tablet by mouth every night at bedtime., Disp: , Rfl:     fluticasone (FLONASE) 50 MCG/ACT nasal spray, 1 spray into the nostril(s) as directed by provider Every Morning., Disp: , Rfl:     hydroCHLOROthiazide (HYDRODIURIL) 12.5 MG tablet, TAKE 1 TABLET BY MOUTH EVERY MORNING, Disp: 90 tablet, Rfl: 1    meclizine (ANTIVERT) 25 MG tablet, Take 1 tablet by mouth 2 (Two) Times a Day As Needed., Disp: , Rfl:     montelukast (SINGULAIR) 10 MG tablet, Take 1 tablet by mouth Every Night., Disp: , Rfl:     ondansetron ODT (ZOFRAN-ODT) 4 MG disintegrating tablet, Place 1 tablet on the tongue 4 (Four) Times a Day As Needed for Nausea or Vomiting., Disp: 20 tablet, Rfl: 0    pantoprazole (PROTONIX) 40 MG EC tablet, Take 1 tablet by mouth Daily., Disp: , Rfl:     rOPINIRole (REQUIP) 0.25 MG tablet, Take 1  tablet by mouth every night at bedtime., Disp: , Rfl:     sertraline (ZOLOFT) 50 MG tablet, Take 1 tablet by mouth Every Night., Disp: , Rfl:     traZODone (DESYREL) 50 MG tablet, Take 1 tablet by mouth Every Night., Disp: , Rfl:     valsartan (DIOVAN) 320 MG tablet, TAKE 1 TABLET BY MOUTH DAILY (Patient not taking: Reported on 11/22/2023), Disp: 90 tablet, Rfl: 1  No current facility-administered medications for this visit.    Facility-Administered Medications Ordered in Other Visits:     Chlorhexidine Gluconate Cloth 2 % pads, , Apply externally, BID, Francesco Grewal APRN    iopamidol (ISOVUE-370) 76 % injection, , , Code / Trauma / Sedation Medication, Shane Rosa MD, 15 mL at 03/27/23 1202     Objective   Physical Exam  Constitutional:       General: She is not in acute distress.     Appearance: Normal appearance. She is normal weight.   HENT:      Right Ear: Hearing normal.      Left Ear: Hearing normal.      Nose: No nasal tenderness or congestion.      Mouth/Throat:      Mouth: Mucous membranes are moist. No oral lesions.   Eyes:      Extraocular Movements: Extraocular movements intact.      Pupils: Pupils are equal, round, and reactive to light.   Neck:      Thyroid: No thyroid mass or thyromegaly.   Cardiovascular:      Rate and Rhythm: Normal rate and regular rhythm.      Pulses: Normal pulses.      Heart sounds: Normal heart sounds. No murmur heard.  Pulmonary:      Effort: Pulmonary effort is normal.      Breath sounds: Normal breath sounds. No wheezing, rhonchi or rales.   Musculoskeletal:      Cervical back: Neck supple.      Right lower leg: No edema.      Left lower leg: No edema.   Lymphadenopathy:      Cervical: No cervical adenopathy.      Upper Body:      Right upper body: No axillary adenopathy.   Skin:     General: Skin is warm and dry.      Findings: No lesion or rash.   Neurological:      General: No focal deficit present.      Mental Status: She is alert and oriented to person, place, and  "time.   Psychiatric:         Mood and Affect: Affect normal. Mood is not anxious or depressed.         Vital Signs:   /51 (BP Location: Left arm, Patient Position: Sitting, Cuff Size: Large Adult)   Pulse 53   Resp 16   Ht 162.7 cm (64.05\")   Wt 89 kg (196 lb 3.2 oz)   SpO2 97% Comment: room air  BMI 33.62 kg/m²        Result Review :   The following data was reviewed by: YOVANA Starr on 11/22/2023:  CMP          3/26/2023    04:06 3/28/2023    04:31 8/31/2023    10:48   CMP   Glucose 114  155  147    BUN 31  33  25    Creatinine 1.03  1.05  1.08    EGFR 54.1  52.8  51.1    Sodium 139  133  138    Potassium 4.1  4.6  4.1    Chloride 105  99  102    Calcium 9.4  9.7  9.7    Total Protein 6.7   7.3    Albumin 3.8  4.0  4.2    Globulin 2.9   3.1    Total Bilirubin 0.4   0.8    Alkaline Phosphatase 82   92    AST (SGOT) 24   18    ALT (SGPT) 15   12    Albumin/Globulin Ratio 1.3   1.4    BUN/Creatinine Ratio 30.1  31.4  23.1    Anion Gap 10.4  9.0  11.1      CBC w/diff          3/26/2023    04:06 3/28/2023    04:31 8/31/2023    10:48   CBC w/Diff   WBC 6.83  7.87  14.69    RBC 3.72  3.92  3.60    Hemoglobin 10.6  11.0  10.0    Hematocrit 32.9  34.5  32.4    MCV 88.4  88.0  90.0    MCH 28.5  28.1  27.8    MCHC 32.2  31.9  30.9    RDW 16.6  16.5  14.7    Platelets 281  272  247    Neutrophil Rel % 43.1  77.9  76.9    Immature Granulocyte Rel % 0.3  0.5  0.3    Lymphocyte Rel % 43.0  17.8  12.5    Monocyte Rel % 9.5  3.8  9.7    Eosinophil Rel % 3.8  0.0  0.3    Basophil Rel % 0.3  0.0  0.3      Data reviewed : Radiologic studies chest CT 10/26/2023    Procedures        Assessment and Plan    Diagnoses and all orders for this visit:    1. Abnormal CT scan of lung (Primary)  -     Case Request; Standing  -     Follow Anesthesia Guidlines / Standing Orders; Standing  -     Obtain Informed Consent; Standing  -     Case Request    2. Atelectasis  -     Case Request; Standing  -     Follow Anesthesia " Guidlines / Standing Orders; Standing  -     Obtain Informed Consent; Standing  -     Case Request    3. Chronic obstructive pulmonary disease, unspecified COPD type    4.  Continue Breo as prescribed.  Rinse mouth after each use.  5.  Continue albuterol inhaler or breathing treatments as needed.  6. I have obtained consent for bronchoscopy with brushings, biopsies, and bronchioloalveolar lavage.  Risks and benefits of bronchoscopy discussed with patient today.  Risks include pneumothorax, hemothorax, bleeding, hypoxia, required mechanical ventilation and death.  The patient recognizes these findings, acknowledges these findings and is agreeable to the procedure.  Patient's procedure will be on 11/28/2023  7.  Follow-up with me 1 week after bronchoscopy, sooner if needed    I spent 40 minutes caring for Mallory on this date of service. This time includes time spent by me in the following activities:preparing for the visit, reviewing tests, obtaining and/or reviewing a separately obtained history, performing a medically appropriate examination and/or evaluation , counseling and educating the patient/family/caregiver, ordering medications, tests, or procedures, referring and communicating with other health care professionals , documenting information in the medical record, and care coordination    Follow Up   Return in about 2 weeks (around 12/6/2023) for bronch follow up.  Patient was given instructions and counseling regarding her condition or for health maintenance advice. Please see specific information pulled into the AVS if appropriate.

## 2023-11-22 NOTE — TELEPHONE ENCOUNTER
Spoke with Silke rhodes to get patient scheduled for 11/28 arrive at 9:30am, hold ASA 2 days prior. Patient received instruction sheet at check out.

## 2023-11-27 ENCOUNTER — ANESTHESIA EVENT (OUTPATIENT)
Dept: GASTROENTEROLOGY | Facility: HOSPITAL | Age: 83
End: 2023-11-27
Payer: MEDICARE

## 2023-11-27 NOTE — ANESTHESIA PREPROCEDURE EVALUATION
Anesthesia Evaluation     Patient summary reviewed and Nursing notes reviewed   history of anesthetic complications:  PONV  NPO Solid Status: > 8 hours  NPO Liquid Status: > 2 hours           Airway   Mallampati: II  TM distance: >3 FB  Neck ROM: full  No difficulty expected  Dental    (+) edentulous    Pulmonary - normal exam    breath sounds clear to auscultation  (+) COPD mild, asthma (uses a rescue inhaler and breo daily),shortness of breath  Cardiovascular - normal exam  Exercise tolerance: poor (<4 METS)    ECG reviewed  Rhythm: regular  Rate: normal    (+) pacemaker pacemaker interrogated 6-12 months ago, hypertension well controlled 2 medications or greater, CAD, hyperlipidemia      Neuro/Psych  (+) dizziness/light headedness  GI/Hepatic/Renal/Endo    (+) obesity, GERD well controlled    Musculoskeletal     Abdominal    Substance History      OB/GYN          Other   arthritis,     ROS/Med Hx Other: ABNORMAL ECG -  Sinus rhythm  Borderline prolonged WA interval  Left bundle branch block  When compared with ECG of 25-Mar-2023 13:24:45,  Significant repolarization change  Electronically Signed By: Shashank Manuel ([facility]) 05-Sep-2023 21:37:52  Date and Time of Study: 2023-08-31 10:45:17    3/16/23- Echo   Left ventricular systolic function is normal. Left ventricular ejection fraction appears to be 51 - 55%.  ·  Left ventricular wall thickness is consistent with mild concentric hypertrophy.  ·  Left ventricular diastolic function is consistent with (grade I) impaired relaxation.  ·  There is mild to moderate mitral regurgitation.  ·  There is trace aortic insufficiency.  ·  There is mild tricuspid regurgitation.  Estimated right ventricular systolic pressure from tricuspid regurgitation is markedly elevated (>55 mmHg).    Last dose ASA 81 mg 2 days ago                   Anesthesia Plan    ASA 3     general   total IV anesthesia  intravenous induction     Anesthetic plan, risks, benefits, and alternatives have  been provided, discussed and informed consent has been obtained with: patient and child (daughter).  Pre-procedure education provided  Plan discussed with CRNA.      CODE STATUS:

## 2023-11-28 ENCOUNTER — ANESTHESIA (OUTPATIENT)
Dept: GASTROENTEROLOGY | Facility: HOSPITAL | Age: 83
End: 2023-11-28
Payer: MEDICARE

## 2023-11-28 ENCOUNTER — HOSPITAL ENCOUNTER (OUTPATIENT)
Facility: HOSPITAL | Age: 83
Setting detail: HOSPITAL OUTPATIENT SURGERY
Discharge: HOME | End: 2023-11-28
Attending: INTERNAL MEDICINE | Admitting: INTERNAL MEDICINE
Payer: MEDICARE

## 2023-11-28 VITALS
BODY MASS INDEX: 32.79 KG/M2 | RESPIRATION RATE: 16 BRPM | OXYGEN SATURATION: 97 % | TEMPERATURE: 98 F | HEART RATE: 63 BPM | WEIGHT: 191.36 LBS | DIASTOLIC BLOOD PRESSURE: 68 MMHG | SYSTOLIC BLOOD PRESSURE: 156 MMHG

## 2023-11-28 DIAGNOSIS — J98.11 ATELECTASIS: ICD-10-CM

## 2023-11-28 DIAGNOSIS — R91.8 ABNORMAL CT SCAN OF LUNG: ICD-10-CM

## 2023-11-28 LAB
ACB CMPLX DNA BAL NAA+NON-PRB-NCNCRNG: NOT DETECTED
BLACTX-M ISLT/SPM QL: NOT DETECTED
BLAIMP ISLT/SPM QL: NOT DETECTED
BLAKPC ISLT/SPM QL: NOT DETECTED
BLAOXA-48-LIKE ISLT/SPM QL: NOT DETECTED
BLAVIM ISLT/SPM QL: NOT DETECTED
C PNEUM DNA NPH QL NAA+NON-PROBE: NOT DETECTED
E CLOAC COMP DNA BAL NAA+NON-PRB-NCNCRNG: NOT DETECTED
E COLI DNA BAL NAA+NON-PRB-NCNCRNG: DETECTED
FLUAV SUBTYP SPEC NAA+PROBE: NOT DETECTED
FLUBV RNA ISLT QL NAA+PROBE: NOT DETECTED
GP B STREP DNA BAL NAA+NON-PRB-NCNCRNG: NOT DETECTED
HADV DNA SPEC NAA+PROBE: NOT DETECTED
HAEM INFLU DNA BAL NAA+NON-PRB-NCNCRNG: NOT DETECTED
HCOV RNA LOWER RESP QL NAA+NON-PROBE: NOT DETECTED
HMPV RNA NPH QL NAA+NON-PROBE: NOT DETECTED
HPIV RNA LOWER RESP QL NAA+NON-PROBE: NOT DETECTED
K AEROGENES DNA BAL NAA+NON-PRB-NCNCRNG: NOT DETECTED
K OXYTOCA DNA BAL NAA+NON-PRB-NCNCRNG: NOT DETECTED
K PNEU GRP DNA BAL NAA+NON-PRB-NCNCRNG: NOT DETECTED
L PNEUMO DNA LOWER RESP QL NAA+NON-PROBE: NOT DETECTED
M CATARRHALIS DNA BAL NAA+NON-PRB-NCNCRNG: NOT DETECTED
M PNEUMO IGG SER IA-ACNC: NOT DETECTED
MECA+MECC ISLT/SPM QL: ABNORMAL
NDM GENE: NOT DETECTED
P AERUGINOSA DNA BAL NAA+NON-PRB-NCNCRNG: NOT DETECTED
PROTEUS SP DNA BAL NAA+NON-PRB-NCNCRNG: NOT DETECTED
RHINOVIRUS RNA SPEC NAA+PROBE: NOT DETECTED
RSV RNA NPH QL NAA+NON-PROBE: NOT DETECTED
S AUREUS DNA BAL NAA+NON-PRB-NCNCRNG: NOT DETECTED
S MARCESCENS DNA BAL NAA+NON-PRB-NCNCRNG: NOT DETECTED
S PNEUM DNA BAL NAA+NON-PRB-NCNCRNG: NOT DETECTED
S PYO DNA BAL NAA+NON-PRB-NCNCRNG: NOT DETECTED

## 2023-11-28 PROCEDURE — 25010000002 PROPOFOL 10 MG/ML EMULSION: Performed by: NURSE ANESTHETIST, CERTIFIED REGISTERED

## 2023-11-28 PROCEDURE — 25810000003 LACTATED RINGERS PER 1000 ML

## 2023-11-28 PROCEDURE — 87071 CULTURE AEROBIC QUANT OTHER: CPT | Performed by: INTERNAL MEDICINE

## 2023-11-28 PROCEDURE — 87116 MYCOBACTERIA CULTURE: CPT | Performed by: INTERNAL MEDICINE

## 2023-11-28 PROCEDURE — 87633 RESP VIRUS 12-25 TARGETS: CPT | Performed by: INTERNAL MEDICINE

## 2023-11-28 PROCEDURE — 88104 CYTOPATH FL NONGYN SMEARS: CPT | Performed by: INTERNAL MEDICINE

## 2023-11-28 PROCEDURE — 87102 FUNGUS ISOLATION CULTURE: CPT | Performed by: INTERNAL MEDICINE

## 2023-11-28 PROCEDURE — 88108 CYTOPATH CONCENTRATE TECH: CPT | Performed by: INTERNAL MEDICINE

## 2023-11-28 PROCEDURE — 88305 TISSUE EXAM BY PATHOLOGIST: CPT | Performed by: INTERNAL MEDICINE

## 2023-11-28 PROCEDURE — 87205 SMEAR GRAM STAIN: CPT | Performed by: INTERNAL MEDICINE

## 2023-11-28 PROCEDURE — 87206 SMEAR FLUORESCENT/ACID STAI: CPT | Performed by: INTERNAL MEDICINE

## 2023-11-28 RX ORDER — PROPOFOL 10 MG/ML
VIAL (ML) INTRAVENOUS AS NEEDED
Status: DISCONTINUED | OUTPATIENT
Start: 2023-11-28 | End: 2023-11-28 | Stop reason: SURG

## 2023-11-28 RX ORDER — SODIUM CHLORIDE, SODIUM LACTATE, POTASSIUM CHLORIDE, CALCIUM CHLORIDE 600; 310; 30; 20 MG/100ML; MG/100ML; MG/100ML; MG/100ML
30 INJECTION, SOLUTION INTRAVENOUS CONTINUOUS
Status: DISCONTINUED | OUTPATIENT
Start: 2023-11-28 | End: 2023-11-28 | Stop reason: HOSPADM

## 2023-11-28 RX ORDER — MAGNESIUM HYDROXIDE 1200 MG/15ML
LIQUID ORAL AS NEEDED
Status: DISCONTINUED | OUTPATIENT
Start: 2023-11-28 | End: 2023-11-28 | Stop reason: HOSPADM

## 2023-11-28 RX ORDER — LIDOCAINE HYDROCHLORIDE 40 MG/ML
INJECTION, SOLUTION RETROBULBAR; TOPICAL AS NEEDED
Status: DISCONTINUED | OUTPATIENT
Start: 2023-11-28 | End: 2023-11-28 | Stop reason: HOSPADM

## 2023-11-28 RX ORDER — ONDANSETRON 2 MG/ML
4 INJECTION INTRAMUSCULAR; INTRAVENOUS ONCE
Status: DISCONTINUED | OUTPATIENT
Start: 2023-11-28 | End: 2023-11-28 | Stop reason: HOSPADM

## 2023-11-28 RX ORDER — LIDOCAINE HYDROCHLORIDE 20 MG/ML
INJECTION, SOLUTION EPIDURAL; INFILTRATION; INTRACAUDAL; PERINEURAL AS NEEDED
Status: DISCONTINUED | OUTPATIENT
Start: 2023-11-28 | End: 2023-11-28 | Stop reason: SURG

## 2023-11-28 RX ORDER — LABETALOL HYDROCHLORIDE 5 MG/ML
INJECTION, SOLUTION INTRAVENOUS AS NEEDED
Status: DISCONTINUED | OUTPATIENT
Start: 2023-11-28 | End: 2023-11-28 | Stop reason: SURG

## 2023-11-28 RX ADMIN — LABETALOL HYDROCHLORIDE 5 MG: 5 INJECTION, SOLUTION INTRAVENOUS at 11:20

## 2023-11-28 RX ADMIN — PROPOFOL 150 MCG/KG/MIN: 10 INJECTION, EMULSION INTRAVENOUS at 11:06

## 2023-11-28 RX ADMIN — SODIUM CHLORIDE, POTASSIUM CHLORIDE, SODIUM LACTATE AND CALCIUM CHLORIDE 30 ML/HR: 600; 310; 30; 20 INJECTION, SOLUTION INTRAVENOUS at 10:01

## 2023-11-28 RX ADMIN — LIDOCAINE HYDROCHLORIDE 60 MG: 20 INJECTION, SOLUTION EPIDURAL; INFILTRATION; INTRACAUDAL; PERINEURAL at 11:06

## 2023-11-28 RX ADMIN — PROPOFOL 60 MG: 10 INJECTION, EMULSION INTRAVENOUS at 11:06

## 2023-11-28 NOTE — ANESTHESIA POSTPROCEDURE EVALUATION
Patient: Mallory Franco    Procedure Summary       Date: 11/28/23 Room / Location: [facility] ENDOSCOPY 3 / [facility] ENDOSCOPY    Anesthesia Start: 1104 Anesthesia Stop: 1143    Procedure: BRONCHOSCOPY WITH BIOPSIES, BRUSHINGS, AND BRONCHOALVEOLAR LAVAGE (Right: Bronchus) Diagnosis:       Abnormal CT scan of lung      Atelectasis      (Abnormal CT scan of lung [R91.8])      (Atelectasis [J98.11])    Surgeons: Aleksandr Houston DO Provider: Mirian Jenkins CRNA    Anesthesia Type: general ASA Status: 3            Anesthesia Type: general    Vitals  Vitals Value Taken Time   /84 11/28/23 1143   Temp 36.7 °C (98 °F) 11/28/23 1142   Pulse 73 11/28/23 1143   Resp 19 11/28/23 1142   SpO2 97 % 11/28/23 1143   Vitals shown include unfiled device data.        Post Anesthesia Care and Evaluation    Patient location during evaluation: PACU  Patient participation: complete - patient participated  Level of consciousness: awake  Pain scale: See nurse's notes for pain score.  Pain management: adequate    Airway patency: patent  Anesthetic complications: No anesthetic complications  PONV Status: none  Cardiovascular status: acceptable  Respiratory status: acceptable and spontaneous ventilation  Hydration status: acceptable    Comments: Patient seen and examined postoperatively; vital signs stable; SpO2 greater than or equal to 90%; cardiopulmonary status stable; nausea/vomiting adequately controlled; pain adequately controlled; no apparent anesthesia complications; patient discharged from anesthesia care when discharge criteria were met

## 2023-11-28 NOTE — INTERVAL H&P NOTE
Allergies   Allergen Reactions    Codeine Nausea And Vomiting    Contrast Dye (Echo Or Unknown Ct/Mr) Hives    Morphine Nausea And Vomiting      H&P reviewed. The patient was examined and there are no changes to the H&P.

## 2023-11-28 NOTE — OP NOTE
Procedure name: bronchoscopy with bronchoalveolar lavage right upper lobe endobronchial biopsy endobronchial brushing and BAL     Indication: Recurrent nonresolving pneumonia     Sedation-IV MAC per anesthesia service     Procedure details:  Patient was brought back to the bronchoscopy suite, a bite block was placed in the oral cavity, and a therapeutic bronchoscope was then used to intubate the trachea. The vocal cords inspected and appeared to have normal motion with inhalation and ventilation.  Inspection was performed of the left tracheobronchial tree and there was no endobronchial lesion seen to the segmental level.  There were no endobronchial lesion seen to the segmental level on the left, the anterior segment of the right upper lobe was occluded it was hard to tell if this was from tumor or if this was from scarring of the right upper lobe, next numerous endobronchial biopsies obtained from the right upper lobe bronchus, next numerous endobronchial brushings obtained from the right upper lobe bronchus, next a BAL was obtained from the right upper lobe bronchus.  A bronchoalveolar lavage was obtained from [].        Estimated blood loss:  none     Postoperative diagnosis: Right upper lobe endobronchial occlusion    Patient tolerated procedure well, [no complications        Plan  Patient is to follow up in my office as scheduled to go over the results of the pathology/cytology and microbiology

## 2023-11-30 LAB
BACTERIA SPEC AEROBE CULT: NORMAL
CYTO UR: NORMAL
CYTO UR: NORMAL
GRAM STN SPEC: NORMAL
LAB AP CASE REPORT: NORMAL
LAB AP CASE REPORT: NORMAL
LAB AP CLINICAL INFORMATION: NORMAL
LAB AP CLINICAL INFORMATION: NORMAL
PATH REPORT.FINAL DX SPEC: NORMAL
PATH REPORT.FINAL DX SPEC: NORMAL
PATH REPORT.GROSS SPEC: NORMAL
PATH REPORT.GROSS SPEC: NORMAL

## 2023-12-03 LAB
FUNGUS WND CULT: NORMAL
MYCOBACTERIUM SPEC CULT: NORMAL
NIGHT BLUE STAIN TISS: NORMAL
NIGHT BLUE STAIN TISS: NORMAL

## 2023-12-06 ENCOUNTER — OFFICE VISIT (OUTPATIENT)
Dept: PULMONOLOGY | Facility: CLINIC | Age: 83
End: 2023-12-06
Payer: MEDICARE

## 2023-12-06 VITALS
BODY MASS INDEX: 32.98 KG/M2 | HEIGHT: 64 IN | HEART RATE: 59 BPM | SYSTOLIC BLOOD PRESSURE: 152 MMHG | WEIGHT: 193.2 LBS | TEMPERATURE: 97.6 F | OXYGEN SATURATION: 97 % | RESPIRATION RATE: 16 BRPM | DIASTOLIC BLOOD PRESSURE: 64 MMHG

## 2023-12-06 DIAGNOSIS — R06.09 DYSPNEA ON EXERTION: ICD-10-CM

## 2023-12-06 DIAGNOSIS — J15.5: Primary | ICD-10-CM

## 2023-12-06 DIAGNOSIS — J45.40 MODERATE PERSISTENT ASTHMA WITHOUT COMPLICATION: ICD-10-CM

## 2023-12-06 DIAGNOSIS — R91.8 ABNORMAL CT SCAN OF LUNG: ICD-10-CM

## 2023-12-06 DIAGNOSIS — J44.9 CHRONIC OBSTRUCTIVE PULMONARY DISEASE, UNSPECIFIED COPD TYPE: ICD-10-CM

## 2023-12-06 DIAGNOSIS — J98.11 ATELECTASIS: ICD-10-CM

## 2023-12-06 RX ORDER — CEFDINIR 300 MG/1
300 CAPSULE ORAL 2 TIMES DAILY
Qty: 14 CAPSULE | Refills: 0 | Status: SHIPPED | OUTPATIENT
Start: 2023-12-06

## 2023-12-06 NOTE — PROGRESS NOTES
Primary Care Provider  Johan Escudero MD     Referring Provider  No ref. provider found     Chief Complaint  Shortness of Breath (No more than normal ), Cough (Clear/brown thick; patient states its normally brown after a breathing treatment. ), and Follow-up (Post bronch )    Subjective          Mallory Franco presents to [facility]  History of Present Illness  Mallory Franco is a 83 y.o. female patient recently seen by myself as a new patient for an abnormal CT scan.  She also has asthma/COPD and a productive cough.    Patient recently had a bronchoscopy with Dr. Houston on 11/28/2023.  Pneumonia panel was positive for E. coli.  All other cultures are negative to date.  Patient's cytology is also negative for any malignant cells.  These findings were discussed with patient today.  She states that she did have a small amount of hemoptysis post procedure but this resolved fairly quickly.  She does continue to have a productive cough with sputum that is sometimes brown in color.  She states that this is after her breathing treatments.  Her shortness breath is mild in severity, worse with exertion and improved with rest.  She does have intermittent wheezing.  She continues to use Breo as scheduled.  She is also intermittently using her albuterol.  She continues to take Singulair.  Patient has been managed by her primary care for her asthma/COPD.  At this time, I will treat patient's E. coli infection and repeat her chest CT in January to ensure resolution of pneumonia.  Otherwise, she is doing well and has no additional concerns at this time.  She is able to perform her ADLs without difficulty.  She is up-to-date with her COVID and flu vaccine.  She states that she has received a pneumonia vaccine by her primary care provider     Her history of smoking is   Tobacco Use: Low Risk  (12/6/2023)    Patient History     Smoking Tobacco Use: Never     Smokeless Tobacco  Use: Never     Passive Exposure: Past   .    Review of Systems   Constitutional:  Negative for chills, fatigue, fever, unexpected weight gain and unexpected weight loss.   HENT:  Congestion: Nasal.    Respiratory:  Positive for cough, shortness of breath and wheezing. Negative for apnea.         Negative for Hemoptysis     Cardiovascular:  Negative for chest pain, palpitations and leg swelling.   Skin:         Negative for cyanosis      Sleep: Negative for Excessive daytime sleepiness  Negative for morning headaches  Negative for Snoring    Family History   Problem Relation Age of Onset    Breast cancer Mother     Bone cancer Father     Breast cancer Sister     Bone cancer Sister     Lung cancer Brother     Malig Hyperthermia Neg Hx         Social History     Socioeconomic History    Marital status:    Tobacco Use    Smoking status: Never     Passive exposure: Past    Smokeless tobacco: Never   Vaping Use    Vaping Use: Never used   Substance and Sexual Activity    Alcohol use: No    Drug use: Never    Sexual activity: Defer        Past Medical History:   Diagnosis Date    Arthritis     Asthma     BPPV (benign paroxysmal positional vertigo), right     COPD (chronic obstructive pulmonary disease)     Coronary artery disease     Coronary artery disease     Ear itching     HX    Enlarged heart     Gastroesophageal reflux disease     Hyperlipidemia     Hypertension     Knee pain     Macular degeneration     PONV (postoperative nausea and vomiting)     Postural dizziness with presyncope 3/16/2023    Shingles     X2    SOB (shortness of breath) on exertion     Tinnitus of left ear     Urinary frequency     Vertigo         Immunization History   Administered Date(s) Administered    COVID-19 (MODERNA) 1st,2nd,3rd Dose Monovalent 03/18/2021, 04/22/2021    COVID-19 (MODERNA) Monovalent Original Booster 12/06/2021    FLUAD TRI 65YR+ 10/02/2023    Fluzone High-Dose 65+yrs 10/15/2021         Allergies   Allergen Reactions     Codeine Nausea And Vomiting    Contrast Dye (Echo Or Unknown Ct/Mr) Hives    Morphine Nausea And Vomiting          Current Outpatient Medications:     albuterol (PROVENTIL) (2.5 MG/3ML) 0.083% nebulizer solution, Take 2.5 mg by nebulization Every 4 (Four) Hours As Needed for Wheezing., Disp: , Rfl:     albuterol sulfate  (90 Base) MCG/ACT inhaler, Inhale 2 puffs Every 4 (Four) Hours As Needed., Disp: , Rfl:     amLODIPine (NORVASC) 5 MG tablet, Take 1 tablet by mouth Daily., Disp: , Rfl:     aspirin 81 MG EC tablet, Take 1 tablet by mouth Daily., Disp: , Rfl:     atorvastatin (LIPITOR) 40 MG tablet, Take 1 tablet by mouth Daily., Disp: , Rfl:     Breo Ellipta 100-25 MCG/ACT aerosol powder , Inhale 1 puff Daily., Disp: , Rfl:     carvedilol (COREG) 25 MG tablet, Take 1 tablet by mouth 2 (Two) Times a Day With Meals., Disp: 180 tablet, Rfl: 6    famotidine (PEPCID) 40 MG tablet, Take 1 tablet by mouth every night at bedtime., Disp: , Rfl:     fluticasone (FLONASE) 50 MCG/ACT nasal spray, 1 spray into the nostril(s) as directed by provider Every Morning., Disp: , Rfl:     hydroCHLOROthiazide (HYDRODIURIL) 12.5 MG tablet, TAKE 1 TABLET BY MOUTH EVERY MORNING (Patient taking differently: Take 2 tablets by mouth Every Morning.), Disp: 90 tablet, Rfl: 1    meclizine (ANTIVERT) 25 MG tablet, Take 1 tablet by mouth 2 (Two) Times a Day As Needed., Disp: , Rfl:     montelukast (SINGULAIR) 10 MG tablet, Take 1 tablet by mouth Every Night., Disp: , Rfl:     ondansetron ODT (ZOFRAN-ODT) 4 MG disintegrating tablet, Place 1 tablet on the tongue 4 (Four) Times a Day As Needed for Nausea or Vomiting., Disp: 20 tablet, Rfl: 0    pantoprazole (PROTONIX) 40 MG EC tablet, Take 1 tablet by mouth Daily., Disp: , Rfl:     rOPINIRole (REQUIP) 0.25 MG tablet, Take 1 tablet by mouth every night at bedtime., Disp: , Rfl:     sertraline (ZOLOFT) 50 MG tablet, Take 1 tablet by mouth Every Night., Disp: , Rfl:     traZODone (DESYREL) 50  MG tablet, Take 1 tablet by mouth Every Night., Disp: , Rfl:     cefdinir (OMNICEF) 300 MG capsule, Take 1 capsule by mouth 2 (Two) Times a Day., Disp: 14 capsule, Rfl: 0  No current facility-administered medications for this visit.    Facility-Administered Medications Ordered in Other Visits:     Chlorhexidine Gluconate Cloth 2 % pads, , Apply externally, BID, Grewal, Francesco, YOVANA    iopamidol (ISOVUE-370) 76 % injection, , , Code / Trauma / Sedation Medication, Shane Rosa MD, 15 mL at 03/27/23 1202     Objective   Physical Exam  Constitutional:       General: She is not in acute distress.     Appearance: Normal appearance. She is normal weight.   HENT:      Right Ear: Hearing normal.      Left Ear: Hearing normal.      Nose: No nasal tenderness or congestion.      Mouth/Throat:      Mouth: Mucous membranes are moist. No oral lesions.   Eyes:      Extraocular Movements: Extraocular movements intact.      Pupils: Pupils are equal, round, and reactive to light.   Neck:      Thyroid: No thyroid mass or thyromegaly.   Cardiovascular:      Rate and Rhythm: Normal rate and regular rhythm.      Pulses: Normal pulses.      Heart sounds: Normal heart sounds. No murmur heard.  Pulmonary:      Effort: Pulmonary effort is normal.      Breath sounds: Decreased breath sounds present. No wheezing, rhonchi or rales.   Musculoskeletal:      Cervical back: Neck supple.      Right lower leg: No edema.      Left lower leg: No edema.   Lymphadenopathy:      Cervical: No cervical adenopathy.      Upper Body:      Right upper body: No axillary adenopathy.   Skin:     General: Skin is warm and dry.      Findings: No lesion or rash.   Neurological:      General: No focal deficit present.      Mental Status: She is alert and oriented to person, place, and time.   Psychiatric:         Mood and Affect: Affect normal. Mood is not anxious or depressed.         Vital Signs:   /64 (BP Location: Right arm, Patient Position: Sitting, Cuff  "Size: Large Adult)   Pulse 59   Temp 97.6 °F (36.4 °C) (Temporal)   Resp 16   Ht 162.6 cm (64\")   Wt 87.6 kg (193 lb 3.2 oz)   SpO2 97% Comment: RA  BMI 33.16 kg/m²        Result Review :   The following data was reviewed by: YOVANA Starr on 12/06/2023:  CMP          3/26/2023    04:06 3/28/2023    04:31 8/31/2023    10:48   CMP   Glucose 114  155  147    BUN 31  33  25    Creatinine 1.03  1.05  1.08    EGFR 54.1  52.8  51.1    Sodium 139  133  138    Potassium 4.1  4.6  4.1    Chloride 105  99  102    Calcium 9.4  9.7  9.7    Total Protein 6.7   7.3    Albumin 3.8  4.0  4.2    Globulin 2.9   3.1    Total Bilirubin 0.4   0.8    Alkaline Phosphatase 82   92    AST (SGOT) 24   18    ALT (SGPT) 15   12    Albumin/Globulin Ratio 1.3   1.4    BUN/Creatinine Ratio 30.1  31.4  23.1    Anion Gap 10.4  9.0  11.1      CBC w/diff          3/26/2023    04:06 3/28/2023    04:31 8/31/2023    10:48   CBC w/Diff   WBC 6.83  7.87  14.69    RBC 3.72  3.92  3.60    Hemoglobin 10.6  11.0  10.0    Hematocrit 32.9  34.5  32.4    MCV 88.4  88.0  90.0    MCH 28.5  28.1  27.8    MCHC 32.2  31.9  30.9    RDW 16.6  16.5  14.7    Platelets 281  272  247    Neutrophil Rel % 43.1  77.9  76.9    Immature Granulocyte Rel % 0.3  0.5  0.3    Lymphocyte Rel % 43.0  17.8  12.5    Monocyte Rel % 9.5  3.8  9.7    Eosinophil Rel % 3.8  0.0  0.3    Basophil Rel % 0.3  0.0  0.3    Personally reviewed pneumonia panel, BAL culture, AFB culture, fungus culture and cytology from 11/28/2023    Data reviewed : Radiologic studies chest CT 10/26/2023, Recent hospitalization notes [facility] bronchoscopy operative note 11/28/2023, and my last office note    Procedures        Assessment and Plan    Diagnoses and all orders for this visit:    1. Pneumonia of right upper lobe due to Escherichia coli (Primary)  -     cefdinir (OMNICEF) 300 MG capsule; Take 1 capsule by mouth 2 (Two) Times a Day.  Dispense: 14 capsule; Refill: 0  -     CT Chest Without " Contrast; Future    2. Abnormal CT scan of lung    3. Atelectasis    4. Dyspnea on exertion    5. Moderate persistent asthma without complication    6. Chronic obstructive pulmonary disease, unspecified COPD type    7.  Continue Breo as prescribed.  Rinse mouth after each use.  8.  Continue albuterol or nebulizer treatments as needed.  9.  Cefdinir given for E. coli seen on pneumonia panel from bronchoscopy.  10.  Repeat chest CT in January 2024 to assess resolution of pneumonia.  11.  We will follow-up with patient after her CT scan to see if any other follow-up is necessary.        Follow Up   No follow-ups on file.  Patient was given instructions and counseling regarding her condition or for health maintenance advice. Please see specific information pulled into the AVS if appropriate.

## 2024-01-02 LAB
MYCOBACTERIUM SPEC CULT: NORMAL
NIGHT BLUE STAIN TISS: NORMAL
NIGHT BLUE STAIN TISS: NORMAL

## 2024-01-08 RX ORDER — METOPROLOL SUCCINATE 25 MG/1
25 TABLET, EXTENDED RELEASE ORAL DAILY
Qty: 90 TABLET | Refills: 3 | OUTPATIENT
Start: 2024-01-08

## 2024-01-09 LAB
MYCOBACTERIUM SPEC CULT: NORMAL
NIGHT BLUE STAIN TISS: NORMAL
NIGHT BLUE STAIN TISS: NORMAL

## 2024-01-15 ENCOUNTER — HOSPITAL ENCOUNTER (OUTPATIENT)
Dept: CT IMAGING | Facility: HOSPITAL | Age: 84
Discharge: HOME | End: 2024-01-15
Admitting: NURSE PRACTITIONER
Payer: MEDICARE

## 2024-01-15 DIAGNOSIS — J15.5: ICD-10-CM

## 2024-01-15 PROCEDURE — 71250 CT THORAX DX C-: CPT

## 2024-03-18 RX ORDER — METOPROLOL SUCCINATE 25 MG/1
25 TABLET, EXTENDED RELEASE ORAL DAILY
Qty: 90 TABLET | Refills: 3 | OUTPATIENT
Start: 2024-03-18

## 2024-06-05 ENCOUNTER — TELEPHONE (OUTPATIENT)
Dept: CARDIOLOGY | Facility: CLINIC | Age: 84
End: 2024-06-05
Payer: MEDICARE

## 2024-06-05 NOTE — TELEPHONE ENCOUNTER
Caller: Mallory Franco    Relationship to patient: Self    Best call back number: [phone]     Chief complaint: NEEDS ROUTINE FOLLOW UP - DIRECTION NOT FOUND IN LAST PROGRESS NOTE    Type of visit: FOLLOW UP    Additional notes: PATIENT PREFERS [facility] OFFICE IN THE MORNINGS AROUND 1000.

## 2024-06-05 NOTE — TELEPHONE ENCOUNTER
Called pt. To address concerns and made an appointment pt. Will call back with any other questions

## 2024-07-10 NOTE — PROGRESS NOTES
[facility]  Cardiology progress Note    Patient Name: Mallory Franco  : 1940    CHIEF COMPLAINT  CAD        Subjective   Subjective     HISTORY OF PRESENT ILLNESS    Mallory Franco is a 83 y.o. female with history of CAD.  No chest pain.    REVIEW OF SYSTEMS    Constitutional:    No fever, no weight loss  Skin:     No rash  Otolaryngeal:    No difficulty swallowing  Cardiovascular: See HPI.  Pulmonary:    No cough, no sputum production    Personal History     Social History:    reports that she has never smoked. She has been exposed to tobacco smoke. She has never used smokeless tobacco. She reports that she does not drink alcohol and does not use drugs.    Home Medications:  Current Outpatient Medications on File Prior to Visit   Medication Sig    albuterol (PROVENTIL) (2.5 MG/3ML) 0.083% nebulizer solution Take 2.5 mg by nebulization Every 4 (Four) Hours As Needed for Wheezing.    albuterol sulfate  (90 Base) MCG/ACT inhaler Inhale 2 puffs Every 4 (Four) Hours As Needed.    amLODIPine (NORVASC) 5 MG tablet Take 1 tablet by mouth Daily.    aspirin 81 MG EC tablet Take 1 tablet by mouth Daily.    atorvastatin (LIPITOR) 40 MG tablet Take 1 tablet by mouth Daily.    Breo Ellipta 100-25 MCG/ACT aerosol powder  Inhale 1 puff Daily.    carvedilol (COREG) 25 MG tablet Take 1 tablet by mouth 2 (Two) Times a Day With Meals.    cefdinir (OMNICEF) 300 MG capsule Take 1 capsule by mouth 2 (Two) Times a Day.    famotidine (PEPCID) 40 MG tablet Take 1 tablet by mouth every night at bedtime.    fluticasone (FLONASE) 50 MCG/ACT nasal spray 1 spray into the nostril(s) as directed by provider Every Morning.    hydroCHLOROthiazide (HYDRODIURIL) 12.5 MG tablet TAKE 1 TABLET BY MOUTH EVERY MORNING (Patient taking differently: Take 2 tablets by mouth Every Morning.)    meclizine (ANTIVERT) 25 MG tablet Take 1 tablet by mouth 2 (Two) Times a Day As Needed.    montelukast (SINGULAIR) 10 MG tablet Take 1 tablet by  mouth Every Night.    ondansetron ODT (ZOFRAN-ODT) 4 MG disintegrating tablet Place 1 tablet on the tongue 4 (Four) Times a Day As Needed for Nausea or Vomiting.    pantoprazole (PROTONIX) 40 MG EC tablet Take 1 tablet by mouth Daily.    rOPINIRole (REQUIP) 0.25 MG tablet Take 1 tablet by mouth every night at bedtime.    sertraline (ZOLOFT) 50 MG tablet Take 1 tablet by mouth Every Night.    traZODone (DESYREL) 50 MG tablet Take 1 tablet by mouth Every Night.     Current Facility-Administered Medications on File Prior to Visit   Medication    Chlorhexidine Gluconate Cloth 2 % pads    iopamidol (ISOVUE-370) 76 % injection       Past Medical History:   Diagnosis Date    Arthritis     Asthma     BPPV (benign paroxysmal positional vertigo), right     COPD (chronic obstructive pulmonary disease)     Coronary artery disease     Coronary artery disease     Ear itching     HX    Enlarged heart     Gastroesophageal reflux disease     Hyperlipidemia     Hypertension     Knee pain     Macular degeneration     PONV (postoperative nausea and vomiting)     Postural dizziness with presyncope 3/16/2023    Shingles     X2    SOB (shortness of breath) on exertion     Tinnitus of left ear     Urinary frequency     Vertigo        Allergies:  Allergies   Allergen Reactions    Codeine Nausea And Vomiting    Contrast Dye (Echo Or Unknown Ct/Mr) Hives    Morphine Nausea And Vomiting       Objective    Objective       Vitals:   Heart Rate:  [70] 70  BP: (146)/(70) 146/70  Body mass index is 34.5 kg/m².     PHYSICAL EXAM:    General Appearance:   well developed  well nourished  HENT:   oropharynx moist  lips not cyanotic  Neck:  thyroid not enlarged  supple  Respiratory:  no respiratory distress  normal breath sounds  no rales  Cardiovascular:  no jugular venous distention  regular rhythm  apical impulse normal  S1 normal, S2 normal  no S3, no S4   no murmur  no rub, no thrill  carotid pulses normal; no bruit  pedal pulses normal  lower  extremity edema: none    Skin:   warm, dry  Psychiatric:  judgement and insight appropriate  normal mood and affect        Result Review:  I have personally reviewed the available results from  [x]  Laboratory  [x]  EKG  [x]  Cardiology  [x]  Medications  [x]  Old records  []  Other:     Procedures    Results for orders placed during the hospital encounter of 03/16/23    Adult Transthoracic Echo Complete W/ Cont if Necessary Per Protocol    Interpretation Summary    Left ventricular systolic function is normal. Left ventricular ejection fraction appears to be 51 - 55%.    Left ventricular wall thickness is consistent with mild concentric hypertrophy.    Left ventricular diastolic function is consistent with (grade I) impaired relaxation.    There is mild to moderate mitral regurgitation.    There is trace aortic insufficiency.    There is mild tricuspid regurgitation.  Estimated right ventricular systolic pressure from tricuspid regurgitation is markedly elevated (>55 mmHg).     Impression/Plan:  1.  CAD s/p PTCA/stent stable: Continue aspirin 81 mg once a day.  No chest pain.  2.  Essential hypertension controlled: Continue amlodipine 5 mg once a day.  Continue carvedilol 25 mg twice a day.  Monitor blood pressure regularly.  3.  Pedal edema: Continue hydrochlorothiazide 25 mg once a day.  4.  Mixed hyperlipidemia: Continue Lipitor 40 mg once a day.  Monitor lipid and hepatic profile.      Migue Nguyen MD   07/12/24   10:46 EDT

## 2024-07-12 ENCOUNTER — OFFICE VISIT (OUTPATIENT)
Dept: CARDIOLOGY | Facility: CLINIC | Age: 84
End: 2024-07-12
Payer: MEDICARE

## 2024-07-12 VITALS
WEIGHT: 201 LBS | DIASTOLIC BLOOD PRESSURE: 70 MMHG | HEIGHT: 64 IN | SYSTOLIC BLOOD PRESSURE: 146 MMHG | BODY MASS INDEX: 34.31 KG/M2 | HEART RATE: 70 BPM

## 2024-07-12 DIAGNOSIS — I25.10 CORONARY ARTERY DISEASE INVOLVING NATIVE CORONARY ARTERY OF NATIVE HEART WITHOUT ANGINA PECTORIS: Primary | ICD-10-CM

## 2024-07-12 DIAGNOSIS — Z95.5 HISTORY OF CORONARY ANGIOPLASTY WITH INSERTION OF STENT: ICD-10-CM

## 2024-07-12 DIAGNOSIS — I50.32 DIASTOLIC CHF, CHRONIC: ICD-10-CM

## 2024-07-12 DIAGNOSIS — I10 HYPERTENSION, ESSENTIAL: ICD-10-CM

## 2024-07-16 RX ORDER — HYDROCHLOROTHIAZIDE 25 MG/1
25 TABLET ORAL EVERY MORNING
Qty: 90 TABLET | Refills: 1 | Status: SHIPPED | OUTPATIENT
Start: 2024-07-16

## 2024-10-07 ENCOUNTER — HOSPITAL ENCOUNTER (INPATIENT)
Facility: HOSPITAL | Age: 84
LOS: 5 days | Discharge: ANOTHER HOSPITAL | DRG: 193 | End: 2024-10-12
Attending: EMERGENCY MEDICINE | Admitting: STUDENT IN AN ORGANIZED HEALTH CARE EDUCATION/TRAINING PROGRAM
Payer: MEDICARE

## 2024-10-07 ENCOUNTER — APPOINTMENT (OUTPATIENT)
Dept: GENERAL RADIOLOGY | Facility: HOSPITAL | Age: 84
DRG: 193 | End: 2024-10-07
Payer: MEDICARE

## 2024-10-07 DIAGNOSIS — R26.2 DIFFICULTY WALKING: ICD-10-CM

## 2024-10-07 DIAGNOSIS — J96.01 ACUTE RESPIRATORY FAILURE WITH HYPOXIA: ICD-10-CM

## 2024-10-07 DIAGNOSIS — J18.9 PNEUMONIA OF RIGHT UPPER LOBE DUE TO INFECTIOUS ORGANISM: Primary | ICD-10-CM

## 2024-10-07 LAB
ALBUMIN SERPL-MCNC: 3.5 G/DL (ref 3.5–5.2)
ALBUMIN/GLOB SERPL: 0.9 G/DL
ALP SERPL-CCNC: 108 U/L (ref 39–117)
ALT SERPL W P-5'-P-CCNC: 12 U/L (ref 1–33)
ANION GAP SERPL CALCULATED.3IONS-SCNC: 12.5 MMOL/L (ref 5–15)
AST SERPL-CCNC: 19 U/L (ref 1–32)
BASOPHILS # BLD AUTO: 0.04 10*3/MM3 (ref 0–0.2)
BASOPHILS NFR BLD AUTO: 0.3 % (ref 0–1.5)
BILIRUB SERPL-MCNC: 1 MG/DL (ref 0–1.2)
BUN SERPL-MCNC: 15 MG/DL (ref 8–23)
BUN/CREAT SERPL: 17 (ref 7–25)
CALCIUM SPEC-SCNC: 9.4 MG/DL (ref 8.6–10.5)
CHLORIDE SERPL-SCNC: 96 MMOL/L (ref 98–107)
CO2 SERPL-SCNC: 27.5 MMOL/L (ref 22–29)
CREAT SERPL-MCNC: 0.88 MG/DL (ref 0.57–1)
D-LACTATE SERPL-SCNC: 1.3 MMOL/L (ref 0.5–2)
DEPRECATED RDW RBC AUTO: 47.5 FL (ref 37–54)
EGFRCR SERPLBLD CKD-EPI 2021: 64.5 ML/MIN/1.73
EOSINOPHIL # BLD AUTO: 0.08 10*3/MM3 (ref 0–0.4)
EOSINOPHIL NFR BLD AUTO: 0.5 % (ref 0.3–6.2)
ERYTHROCYTE [DISTWIDTH] IN BLOOD BY AUTOMATED COUNT: 14.9 % (ref 12.3–15.4)
GLOBULIN UR ELPH-MCNC: 3.7 GM/DL
GLUCOSE SERPL-MCNC: 127 MG/DL (ref 65–99)
HCT VFR BLD AUTO: 30.7 % (ref 34–46.6)
HGB BLD-MCNC: 9.9 G/DL (ref 12–15.9)
HOLD SPECIMEN: NORMAL
HOLD SPECIMEN: NORMAL
IMM GRANULOCYTES # BLD AUTO: 0.08 10*3/MM3 (ref 0–0.05)
IMM GRANULOCYTES NFR BLD AUTO: 0.5 % (ref 0–0.5)
LYMPHOCYTES # BLD AUTO: 2.01 10*3/MM3 (ref 0.7–3.1)
LYMPHOCYTES NFR BLD AUTO: 12.7 % (ref 19.6–45.3)
MCH RBC QN AUTO: 28.3 PG (ref 26.6–33)
MCHC RBC AUTO-ENTMCNC: 32.2 G/DL (ref 31.5–35.7)
MCV RBC AUTO: 87.7 FL (ref 79–97)
MONOCYTES # BLD AUTO: 1.61 10*3/MM3 (ref 0.1–0.9)
MONOCYTES NFR BLD AUTO: 10.2 % (ref 5–12)
NEUTROPHILS NFR BLD AUTO: 11.95 10*3/MM3 (ref 1.7–7)
NEUTROPHILS NFR BLD AUTO: 75.8 % (ref 42.7–76)
NRBC BLD AUTO-RTO: 0 /100 WBC (ref 0–0.2)
NT-PROBNP SERPL-MCNC: 1395 PG/ML (ref 0–1800)
PLATELET # BLD AUTO: 268 10*3/MM3 (ref 140–450)
PMV BLD AUTO: 9.7 FL (ref 6–12)
POTASSIUM SERPL-SCNC: 3.1 MMOL/L (ref 3.5–5.2)
PROT SERPL-MCNC: 7.2 G/DL (ref 6–8.5)
QT INTERVAL: 431 MS
QTC INTERVAL: 470 MS
RBC # BLD AUTO: 3.5 10*6/MM3 (ref 3.77–5.28)
SARS-COV-2 RNA RESP QL NAA+PROBE: NOT DETECTED
SODIUM SERPL-SCNC: 136 MMOL/L (ref 136–145)
TROPONIN T SERPL HS-MCNC: 39 NG/L
WBC NRBC COR # BLD AUTO: 15.77 10*3/MM3 (ref 3.4–10.8)
WHOLE BLOOD HOLD COAG: NORMAL
WHOLE BLOOD HOLD SPECIMEN: NORMAL

## 2024-10-07 PROCEDURE — 93005 ELECTROCARDIOGRAM TRACING: CPT | Performed by: EMERGENCY MEDICINE

## 2024-10-07 PROCEDURE — 87154 CUL TYP ID BLD PTHGN 6+ TRGT: CPT | Performed by: EMERGENCY MEDICINE

## 2024-10-07 PROCEDURE — 71045 X-RAY EXAM CHEST 1 VIEW: CPT

## 2024-10-07 PROCEDURE — 88312 SPECIAL STAINS GROUP 1: CPT | Performed by: EMERGENCY MEDICINE

## 2024-10-07 PROCEDURE — 83880 ASSAY OF NATRIURETIC PEPTIDE: CPT | Performed by: EMERGENCY MEDICINE

## 2024-10-07 PROCEDURE — 87040 BLOOD CULTURE FOR BACTERIA: CPT | Performed by: EMERGENCY MEDICINE

## 2024-10-07 PROCEDURE — 25010000002 AZITHROMYCIN PER 500 MG: Performed by: EMERGENCY MEDICINE

## 2024-10-07 PROCEDURE — 25010000002 CEFTRIAXONE PER 250 MG: Performed by: EMERGENCY MEDICINE

## 2024-10-07 PROCEDURE — 36415 COLL VENOUS BLD VENIPUNCTURE: CPT

## 2024-10-07 PROCEDURE — 83605 ASSAY OF LACTIC ACID: CPT | Performed by: EMERGENCY MEDICINE

## 2024-10-07 PROCEDURE — 84484 ASSAY OF TROPONIN QUANT: CPT | Performed by: EMERGENCY MEDICINE

## 2024-10-07 PROCEDURE — 99223 1ST HOSP IP/OBS HIGH 75: CPT | Performed by: STUDENT IN AN ORGANIZED HEALTH CARE EDUCATION/TRAINING PROGRAM

## 2024-10-07 PROCEDURE — 25810000003 SODIUM CHLORIDE 0.9 % SOLUTION: Performed by: EMERGENCY MEDICINE

## 2024-10-07 PROCEDURE — 87186 SC STD MICRODIL/AGAR DIL: CPT | Performed by: EMERGENCY MEDICINE

## 2024-10-07 PROCEDURE — 25810000003 LACTATED RINGERS SOLUTION: Performed by: EMERGENCY MEDICINE

## 2024-10-07 PROCEDURE — 87147 CULTURE TYPE IMMUNOLOGIC: CPT | Performed by: EMERGENCY MEDICINE

## 2024-10-07 PROCEDURE — 99285 EMERGENCY DEPT VISIT HI MDM: CPT

## 2024-10-07 PROCEDURE — 80053 COMPREHEN METABOLIC PANEL: CPT | Performed by: EMERGENCY MEDICINE

## 2024-10-07 PROCEDURE — 85025 COMPLETE CBC W/AUTO DIFF WBC: CPT

## 2024-10-07 PROCEDURE — 87635 SARS-COV-2 COVID-19 AMP PRB: CPT | Performed by: EMERGENCY MEDICINE

## 2024-10-07 RX ORDER — SODIUM CHLORIDE 0.9 % (FLUSH) 0.9 %
10 SYRINGE (ML) INJECTION AS NEEDED
Status: DISCONTINUED | OUTPATIENT
Start: 2024-10-07 | End: 2024-10-12 | Stop reason: HOSPADM

## 2024-10-07 RX ORDER — SERTRALINE HYDROCHLORIDE 100 MG/1
50 TABLET, FILM COATED ORAL DAILY
COMMUNITY
Start: 2024-09-22

## 2024-10-07 RX ADMIN — SODIUM CHLORIDE 2000 MG: 9 INJECTION, SOLUTION INTRAMUSCULAR; INTRAVENOUS; SUBCUTANEOUS at 21:02

## 2024-10-07 RX ADMIN — AZITHROMYCIN 500 MG: 500 INJECTION, POWDER, LYOPHILIZED, FOR SOLUTION INTRAVENOUS at 21:42

## 2024-10-07 RX ADMIN — SODIUM CHLORIDE, POTASSIUM CHLORIDE, SODIUM LACTATE AND CALCIUM CHLORIDE 1000 ML: 600; 310; 30; 20 INJECTION, SOLUTION INTRAVENOUS at 21:02

## 2024-10-08 LAB
ALBUMIN SERPL-MCNC: 3.1 G/DL (ref 3.5–5.2)
ALBUMIN/GLOB SERPL: 0.9 G/DL
ALP SERPL-CCNC: 100 U/L (ref 39–117)
ALT SERPL W P-5'-P-CCNC: 10 U/L (ref 1–33)
ANION GAP SERPL CALCULATED.3IONS-SCNC: 12.4 MMOL/L (ref 5–15)
AST SERPL-CCNC: 22 U/L (ref 1–32)
BACTERIA UR QL AUTO: ABNORMAL /HPF
BASOPHILS # BLD AUTO: 0.03 10*3/MM3 (ref 0–0.2)
BASOPHILS NFR BLD AUTO: 0.2 % (ref 0–1.5)
BILIRUB SERPL-MCNC: 0.8 MG/DL (ref 0–1.2)
BILIRUB UR QL STRIP: NEGATIVE
BUN SERPL-MCNC: 14 MG/DL (ref 8–23)
BUN/CREAT SERPL: 15.6 (ref 7–25)
CALCIUM SPEC-SCNC: 8.9 MG/DL (ref 8.6–10.5)
CHLORIDE SERPL-SCNC: 96 MMOL/L (ref 98–107)
CLARITY UR: CLEAR
CO2 SERPL-SCNC: 25.6 MMOL/L (ref 22–29)
COLOR UR: ABNORMAL
CREAT SERPL-MCNC: 0.9 MG/DL (ref 0.57–1)
DEPRECATED RDW RBC AUTO: 49 FL (ref 37–54)
EGFRCR SERPLBLD CKD-EPI 2021: 62.8 ML/MIN/1.73
EOSINOPHIL # BLD AUTO: 0.1 10*3/MM3 (ref 0–0.4)
EOSINOPHIL NFR BLD AUTO: 0.7 % (ref 0.3–6.2)
ERYTHROCYTE [DISTWIDTH] IN BLOOD BY AUTOMATED COUNT: 15 % (ref 12.3–15.4)
GLOBULIN UR ELPH-MCNC: 3.6 GM/DL
GLUCOSE SERPL-MCNC: 120 MG/DL (ref 65–99)
GLUCOSE UR STRIP-MCNC: NEGATIVE MG/DL
HCT VFR BLD AUTO: 29 % (ref 34–46.6)
HGB BLD-MCNC: 9.2 G/DL (ref 12–15.9)
HGB UR QL STRIP.AUTO: NEGATIVE
HYALINE CASTS UR QL AUTO: ABNORMAL /LPF
IMM GRANULOCYTES # BLD AUTO: 0.07 10*3/MM3 (ref 0–0.05)
IMM GRANULOCYTES NFR BLD AUTO: 0.5 % (ref 0–0.5)
KETONES UR QL STRIP: NEGATIVE
LEUKOCYTE ESTERASE UR QL STRIP.AUTO: ABNORMAL
LYMPHOCYTES # BLD AUTO: 2.07 10*3/MM3 (ref 0.7–3.1)
LYMPHOCYTES NFR BLD AUTO: 14.3 % (ref 19.6–45.3)
MAGNESIUM SERPL-MCNC: 1.1 MG/DL (ref 1.6–2.4)
MAGNESIUM SERPL-MCNC: 1.8 MG/DL (ref 1.6–2.4)
MCH RBC QN AUTO: 28 PG (ref 26.6–33)
MCHC RBC AUTO-ENTMCNC: 31.7 G/DL (ref 31.5–35.7)
MCV RBC AUTO: 88.4 FL (ref 79–97)
MONOCYTES # BLD AUTO: 1.74 10*3/MM3 (ref 0.1–0.9)
MONOCYTES NFR BLD AUTO: 12 % (ref 5–12)
NEUTROPHILS NFR BLD AUTO: 10.49 10*3/MM3 (ref 1.7–7)
NEUTROPHILS NFR BLD AUTO: 72.3 % (ref 42.7–76)
NITRITE UR QL STRIP: NEGATIVE
NRBC BLD AUTO-RTO: 0 /100 WBC (ref 0–0.2)
PH UR STRIP.AUTO: 6 [PH] (ref 5–8)
PHOSPHATE SERPL-MCNC: 2.4 MG/DL (ref 2.5–4.5)
PLATELET # BLD AUTO: 268 10*3/MM3 (ref 140–450)
PMV BLD AUTO: 10.2 FL (ref 6–12)
POTASSIUM SERPL-SCNC: 3.1 MMOL/L (ref 3.5–5.2)
PROT SERPL-MCNC: 6.7 G/DL (ref 6–8.5)
PROT UR QL STRIP: ABNORMAL
RBC # BLD AUTO: 3.28 10*6/MM3 (ref 3.77–5.28)
RBC # UR STRIP: ABNORMAL /HPF
REF LAB TEST METHOD: ABNORMAL
SODIUM SERPL-SCNC: 134 MMOL/L (ref 136–145)
SP GR UR STRIP: 1.02 (ref 1–1.03)
SQUAMOUS #/AREA URNS HPF: ABNORMAL /HPF
UROBILINOGEN UR QL STRIP: ABNORMAL
WBC # UR STRIP: ABNORMAL /HPF
WBC NRBC COR # BLD AUTO: 14.5 10*3/MM3 (ref 3.4–10.8)

## 2024-10-08 PROCEDURE — 85025 COMPLETE CBC W/AUTO DIFF WBC: CPT | Performed by: STUDENT IN AN ORGANIZED HEALTH CARE EDUCATION/TRAINING PROGRAM

## 2024-10-08 PROCEDURE — 97161 PT EVAL LOW COMPLEX 20 MIN: CPT

## 2024-10-08 PROCEDURE — 94618 PULMONARY STRESS TESTING: CPT

## 2024-10-08 PROCEDURE — 25010000002 MAGNESIUM SULFATE 2 GM/50ML SOLUTION: Performed by: STUDENT IN AN ORGANIZED HEALTH CARE EDUCATION/TRAINING PROGRAM

## 2024-10-08 PROCEDURE — 83735 ASSAY OF MAGNESIUM: CPT | Performed by: STUDENT IN AN ORGANIZED HEALTH CARE EDUCATION/TRAINING PROGRAM

## 2024-10-08 PROCEDURE — 94664 DEMO&/EVAL PT USE INHALER: CPT

## 2024-10-08 PROCEDURE — 94799 UNLISTED PULMONARY SVC/PX: CPT

## 2024-10-08 PROCEDURE — 84100 ASSAY OF PHOSPHORUS: CPT | Performed by: STUDENT IN AN ORGANIZED HEALTH CARE EDUCATION/TRAINING PROGRAM

## 2024-10-08 PROCEDURE — 81001 URINALYSIS AUTO W/SCOPE: CPT | Performed by: STUDENT IN AN ORGANIZED HEALTH CARE EDUCATION/TRAINING PROGRAM

## 2024-10-08 PROCEDURE — 25010000002 POTASSIUM CHLORIDE 10 MEQ/100ML SOLUTION: Performed by: STUDENT IN AN ORGANIZED HEALTH CARE EDUCATION/TRAINING PROGRAM

## 2024-10-08 PROCEDURE — 94760 N-INVAS EAR/PLS OXIMETRY 1: CPT

## 2024-10-08 PROCEDURE — 25010000002 HEPARIN (PORCINE) PER 1000 UNITS: Performed by: STUDENT IN AN ORGANIZED HEALTH CARE EDUCATION/TRAINING PROGRAM

## 2024-10-08 PROCEDURE — 94640 AIRWAY INHALATION TREATMENT: CPT

## 2024-10-08 PROCEDURE — 25010000002 CEFTRIAXONE PER 250 MG: Performed by: STUDENT IN AN ORGANIZED HEALTH CARE EDUCATION/TRAINING PROGRAM

## 2024-10-08 PROCEDURE — 94761 N-INVAS EAR/PLS OXIMETRY MLT: CPT

## 2024-10-08 PROCEDURE — 99232 SBSQ HOSP IP/OBS MODERATE 35: CPT | Performed by: STUDENT IN AN ORGANIZED HEALTH CARE EDUCATION/TRAINING PROGRAM

## 2024-10-08 PROCEDURE — 80053 COMPREHEN METABOLIC PANEL: CPT | Performed by: STUDENT IN AN ORGANIZED HEALTH CARE EDUCATION/TRAINING PROGRAM

## 2024-10-08 RX ORDER — HYDROCHLOROTHIAZIDE 25 MG/1
25 TABLET ORAL EVERY MORNING
Status: DISCONTINUED | OUTPATIENT
Start: 2024-10-08 | End: 2024-10-12 | Stop reason: HOSPADM

## 2024-10-08 RX ORDER — CARVEDILOL 25 MG/1
25 TABLET ORAL 2 TIMES DAILY WITH MEALS
Status: DISCONTINUED | OUTPATIENT
Start: 2024-10-08 | End: 2024-10-12 | Stop reason: HOSPADM

## 2024-10-08 RX ORDER — AZITHROMYCIN 250 MG/1
500 TABLET, FILM COATED ORAL
Status: COMPLETED | OUTPATIENT
Start: 2024-10-08 | End: 2024-10-09

## 2024-10-08 RX ORDER — ATORVASTATIN CALCIUM 40 MG/1
40 TABLET, FILM COATED ORAL DAILY
Status: DISCONTINUED | OUTPATIENT
Start: 2024-10-08 | End: 2024-10-12 | Stop reason: HOSPADM

## 2024-10-08 RX ORDER — VANCOMYCIN 2 GRAM/500 ML IN 0.9 % SODIUM CHLORIDE INTRAVENOUS
2000 ONCE
Status: COMPLETED | OUTPATIENT
Start: 2024-10-09 | End: 2024-10-09

## 2024-10-08 RX ORDER — HEPARIN SODIUM 5000 [USP'U]/ML
5000 INJECTION, SOLUTION INTRAVENOUS; SUBCUTANEOUS EVERY 8 HOURS SCHEDULED
Status: DISCONTINUED | OUTPATIENT
Start: 2024-10-08 | End: 2024-10-12 | Stop reason: HOSPADM

## 2024-10-08 RX ORDER — SODIUM CHLORIDE 0.9 % (FLUSH) 0.9 %
10 SYRINGE (ML) INJECTION AS NEEDED
Status: DISCONTINUED | OUTPATIENT
Start: 2024-10-08 | End: 2024-10-12 | Stop reason: HOSPADM

## 2024-10-08 RX ORDER — ROPINIROLE 0.25 MG/1
0.25 TABLET, FILM COATED ORAL NIGHTLY
Status: DISCONTINUED | OUTPATIENT
Start: 2024-10-08 | End: 2024-10-12 | Stop reason: HOSPADM

## 2024-10-08 RX ORDER — PANTOPRAZOLE SODIUM 40 MG/1
40 TABLET, DELAYED RELEASE ORAL DAILY
Status: DISCONTINUED | OUTPATIENT
Start: 2024-10-08 | End: 2024-10-12 | Stop reason: HOSPADM

## 2024-10-08 RX ORDER — IPRATROPIUM BROMIDE AND ALBUTEROL SULFATE 2.5; .5 MG/3ML; MG/3ML
3 SOLUTION RESPIRATORY (INHALATION) EVERY 6 HOURS PRN
Status: DISCONTINUED | OUTPATIENT
Start: 2024-10-08 | End: 2024-10-12 | Stop reason: HOSPADM

## 2024-10-08 RX ORDER — VANCOMYCIN/0.9 % SOD CHLORIDE 1.5G/250ML
1500 PLASTIC BAG, INJECTION (ML) INTRAVENOUS EVERY 24 HOURS
Status: DISCONTINUED | OUTPATIENT
Start: 2024-10-10 | End: 2024-10-09

## 2024-10-08 RX ORDER — NITROGLYCERIN 0.4 MG/1
0.4 TABLET SUBLINGUAL
Status: DISCONTINUED | OUTPATIENT
Start: 2024-10-08 | End: 2024-10-12 | Stop reason: HOSPADM

## 2024-10-08 RX ORDER — POTASSIUM CHLORIDE 7.45 MG/ML
10 INJECTION INTRAVENOUS
Status: DISPENSED | OUTPATIENT
Start: 2024-10-08 | End: 2024-10-08

## 2024-10-08 RX ORDER — POTASSIUM CHLORIDE 750 MG/1
40 CAPSULE, EXTENDED RELEASE ORAL ONCE
Status: COMPLETED | OUTPATIENT
Start: 2024-10-08 | End: 2024-10-08

## 2024-10-08 RX ORDER — SODIUM CHLORIDE 0.9 % (FLUSH) 0.9 %
10 SYRINGE (ML) INJECTION EVERY 12 HOURS SCHEDULED
Status: DISCONTINUED | OUTPATIENT
Start: 2024-10-08 | End: 2024-10-12 | Stop reason: HOSPADM

## 2024-10-08 RX ORDER — MAGNESIUM SULFATE HEPTAHYDRATE 40 MG/ML
2 INJECTION, SOLUTION INTRAVENOUS ONCE
Status: COMPLETED | OUTPATIENT
Start: 2024-10-08 | End: 2024-10-08

## 2024-10-08 RX ORDER — AMLODIPINE BESYLATE 5 MG/1
5 TABLET ORAL DAILY
Status: DISCONTINUED | OUTPATIENT
Start: 2024-10-08 | End: 2024-10-12 | Stop reason: HOSPADM

## 2024-10-08 RX ORDER — ASPIRIN 81 MG/1
81 TABLET ORAL DAILY
Status: DISCONTINUED | OUTPATIENT
Start: 2024-10-08 | End: 2024-10-12 | Stop reason: HOSPADM

## 2024-10-08 RX ORDER — BUDESONIDE 0.5 MG/2ML
0.5 INHALANT ORAL
Status: DISCONTINUED | OUTPATIENT
Start: 2024-10-08 | End: 2024-10-12 | Stop reason: HOSPADM

## 2024-10-08 RX ORDER — ARFORMOTEROL TARTRATE 15 UG/2ML
15 SOLUTION RESPIRATORY (INHALATION)
Status: DISCONTINUED | OUTPATIENT
Start: 2024-10-08 | End: 2024-10-12 | Stop reason: HOSPADM

## 2024-10-08 RX ORDER — MONTELUKAST SODIUM 10 MG/1
10 TABLET ORAL NIGHTLY
Status: DISCONTINUED | OUTPATIENT
Start: 2024-10-08 | End: 2024-10-12 | Stop reason: HOSPADM

## 2024-10-08 RX ADMIN — ARFORMOTEROL TARTRATE 15 MCG: 15 SOLUTION RESPIRATORY (INHALATION) at 09:46

## 2024-10-08 RX ADMIN — POTASSIUM CHLORIDE 40 MEQ: 750 CAPSULE, EXTENDED RELEASE ORAL at 03:33

## 2024-10-08 RX ADMIN — ARFORMOTEROL TARTRATE 15 MCG: 15 SOLUTION RESPIRATORY (INHALATION) at 19:03

## 2024-10-08 RX ADMIN — HEPARIN SODIUM 5000 UNITS: 5000 INJECTION INTRAVENOUS; SUBCUTANEOUS at 22:05

## 2024-10-08 RX ADMIN — Medication 10 ML: at 07:54

## 2024-10-08 RX ADMIN — HEPARIN SODIUM 5000 UNITS: 5000 INJECTION INTRAVENOUS; SUBCUTANEOUS at 14:41

## 2024-10-08 RX ADMIN — MONTELUKAST 10 MG: 10 TABLET, FILM COATED ORAL at 03:33

## 2024-10-08 RX ADMIN — BUDESONIDE 0.5 MG: 0.5 INHALANT RESPIRATORY (INHALATION) at 19:02

## 2024-10-08 RX ADMIN — SERTRALINE HYDROCHLORIDE 50 MG: 50 TABLET ORAL at 08:19

## 2024-10-08 RX ADMIN — ROPINIROLE HYDROCHLORIDE 0.25 MG: 0.25 TABLET, FILM COATED ORAL at 03:33

## 2024-10-08 RX ADMIN — POTASSIUM CHLORIDE 10 MEQ: 7.46 INJECTION, SOLUTION INTRAVENOUS at 10:42

## 2024-10-08 RX ADMIN — IPRATROPIUM BROMIDE AND ALBUTEROL SULFATE 3 ML: .5; 3 SOLUTION RESPIRATORY (INHALATION) at 03:57

## 2024-10-08 RX ADMIN — SODIUM CHLORIDE 1000 MG: 9 INJECTION, SOLUTION INTRAMUSCULAR; INTRAVENOUS; SUBCUTANEOUS at 18:10

## 2024-10-08 RX ADMIN — Medication 10 ML: at 08:19

## 2024-10-08 RX ADMIN — AMLODIPINE BESYLATE 5 MG: 5 TABLET ORAL at 08:18

## 2024-10-08 RX ADMIN — PANTOPRAZOLE SODIUM 40 MG: 40 TABLET, DELAYED RELEASE ORAL at 08:18

## 2024-10-08 RX ADMIN — CARVEDILOL 25 MG: 25 TABLET, FILM COATED ORAL at 18:11

## 2024-10-08 RX ADMIN — MONTELUKAST 10 MG: 10 TABLET, FILM COATED ORAL at 22:05

## 2024-10-08 RX ADMIN — ROPINIROLE HYDROCHLORIDE 0.25 MG: 0.25 TABLET, FILM COATED ORAL at 22:05

## 2024-10-08 RX ADMIN — AZITHROMYCIN DIHYDRATE 500 MG: 250 TABLET ORAL at 18:11

## 2024-10-08 RX ADMIN — Medication 10 ML: at 22:05

## 2024-10-08 RX ADMIN — BUDESONIDE 0.5 MG: 0.5 INHALANT RESPIRATORY (INHALATION) at 09:46

## 2024-10-08 RX ADMIN — HYDROCHLOROTHIAZIDE 25 MG: 25 TABLET ORAL at 06:17

## 2024-10-08 RX ADMIN — HEPARIN SODIUM 5000 UNITS: 5000 INJECTION INTRAVENOUS; SUBCUTANEOUS at 06:17

## 2024-10-08 RX ADMIN — ASPIRIN 81 MG: 81 TABLET, COATED ORAL at 08:19

## 2024-10-08 RX ADMIN — MAGNESIUM SULFATE HEPTAHYDRATE 2 G: 40 INJECTION, SOLUTION INTRAVENOUS at 08:17

## 2024-10-08 RX ADMIN — CARVEDILOL 25 MG: 25 TABLET, FILM COATED ORAL at 08:19

## 2024-10-08 RX ADMIN — ATORVASTATIN CALCIUM 40 MG: 40 TABLET, FILM COATED ORAL at 08:18

## 2024-10-08 NOTE — H&P
[facility]  Date: 10/8/2024   Patient Name: Mallory Franco  : 1940  MRN: [id number]  Primary Care Physician:  Amauri Kearney MD  Date of admission: 10/7/2024    Subjective   Subjective     Chief Complaint: Shortness of breath    HPI:    Mallory Franco is a 84 y.o. female with past medical history of hypertension, CAD s/p stent, sick sinus syndrome status post pacemaker, hyperlipidemia, COPD, restless leg syndrome, presented to the ED for evaluation of nausea, vomiting, diarrhea, cough, shortness of breath.  Patient noted to have shortness of breath with cough since Saturday associated with intermittent sputum production.  Patient states that she has a long history of intermittent episodes of diarrhea, when she eats food that does not suit her she develops mild diffuse abdominal pain with associated diarrhea.  Noted to have diarrhea since last few days.      Upon arrival, vital signs temperature 98.9, pulse 78, respiratory 20, blood pressure 172/65 on 2 L of nasal cannula saturating around 96% was saturating around 88% on room air.  Troponin 39, proBNP 1395, potassium 3.1, rest of the CMP with no significant findings, normal lactic acid, WBC elevated 15.77, hemoglobin 9.9, normal platelets.  Urinalysis noninfectious.  Chest x-ray showed dense airspace opacity in the right upper lung which may reflect pneumonia.  Received IV fluids, started on ceftriaxone azithromycin for community-acquired pneumonia.      Personal History     Past Medical History:   Diagnosis Date    Arthritis     Asthma     BPPV (benign paroxysmal positional vertigo), right     COPD (chronic obstructive pulmonary disease)     Coronary artery disease     Coronary artery disease     Diverticulitis     Ear itching     HX    Enlarged heart     Gastroesophageal reflux disease     Hyperlipidemia     Hypertension     Knee pain     Macular degeneration     PONV (postoperative nausea and vomiting)     Postural  dizziness with presyncope 2023    Shingles     X2    SOB (shortness of breath) on exertion     Tinnitus of left ear     Urinary frequency     Vertigo          Past Surgical History:   Procedure Laterality Date    BREAST BIOPSY Bilateral     BRONCHOSCOPY Right 2023    Procedure: BRONCHOSCOPY WITH BIOPSIES, BRUSHINGS, AND BRONCHOALVEOLAR LAVAGE;  Surgeon: Aleksandr Houston DO;  Location: [facility] ENDOSCOPY;  Service: Pulmonary;  Laterality: Right;  RIGHT UPPER LOBE ENDOBRONCHIAL OCCLUSION    CARDIAC CATHETERIZATION      CARDIAC ELECTROPHYSIOLOGY PROCEDURE N/A 3/27/2023    Procedure: Pacemaker SC new-[employer], call Dr. Rosa first thing in the morning to get time for the procedure and notify rep;  Surgeon: Shane Rosa MD;  Location: [facility] CATH INVASIVE LOCATION;  Service: Cardiovascular;  Laterality: N/A;     SECTION      CHOLECYSTECTOMY      COLONOSCOPY      CORONARY STENT PLACEMENT      EYE SURGERY      GALLBLADDER SURGERY      KNEE MINI REVISION Right 2021    Procedure: KNEE POLY INSERT EXCHANGE;  Surgeon: Ky Avila MD;  Location: [facility] OR;  Service: Orthopedics;  Laterality: Right;    KNEE SURGERY      REPLACEMENT TOTAL KNEE Right     REPLACEMENT TOTAL KNEE Left          Family History   Problem Relation Age of Onset    Breast cancer Mother     Bone cancer Father     Breast cancer Sister     Bone cancer Sister     Lung cancer Brother     Malig Hyperthermia Neg Hx          Social History     Socioeconomic History    Marital status:    Tobacco Use    Smoking status: Never     Passive exposure: Past    Smokeless tobacco: Never   Vaping Use    Vaping status: Never Used   Substance and Sexual Activity    Alcohol use: No    Drug use: Never    Sexual activity: Defer         Home Medications:  Fluticasone Furoate-Vilanterol, albuterol, albuterol sulfate HFA, amLODIPine, aspirin, atorvastatin, carvedilol, famotidine, fluticasone, hydroCHLOROthiazide,  meclizine, montelukast, pantoprazole, rOPINIRole, and sertraline    Allergies:  Allergies   Allergen Reactions    Codeine Nausea And Vomiting    Contrast Dye (Echo Or Unknown Ct/Mr) Hives    Morphine Nausea And Vomiting       Review of Systems     Objective   Objective     Vitals:   Temp:  [98.7 °F (37.1 °C)-98.9 °F (37.2 °C)] 98.7 °F (37.1 °C)  Heart Rate:  [67-78] 67  Resp:  [20-24] 21  BP: (143-172)/() 168/54  Flow (L/min):  [2] 2    Physical Exam    Constitutional: Awake, alert, no acute distress   Eyes: Pupils equal, sclerae anicteric, no conjunctival injection   HENT: NCAT, mucous membranes moist   Respiratory: Clear to auscultation bilaterally, nonlabored respirations    Cardiovascular: RRR, no murmurs   Gastrointestinal: Positive bowel sounds, soft, nontender, nondistended   Musculoskeletal: No bilateral ankle edema, no clubbing or cyanosis to extremities   Neurologic: Oriented x 3, strength symmetric in all extremities, Cranial Nerves grossly intact to confrontation, speech clear    Result Review    Result Review:  I have personally reviewed the results from the time of this admission to 10/8/2024 02:06 EDT and agree with these findings:  [x]  Laboratory  []  Microbiology  [x]  Radiology  [x]  EKG/Telemetry   []  Cardiology/Vascular   []  Pathology  []  Old records  []  Other:        Imaging Results (Last 24 Hours)       Procedure Component Value Units Date/Time    XR Chest 1 View [[id number]] Collected: 10/07/24 1911     Updated: 10/07/24 1915    Narrative:      XR CHEST 1 VW    Date of Exam: 10/7/2024 6:49 PM EDT    Indication: SOA Triage Protocol    Comparison: 1/15/2024 CT    Findings:  Left chest wall cardiac device in unchanged position. Enlarged cardiac silhouette. There is a dense airspace opacity in the right upper lung. No pleural effusion or pneumothorax. Calcified mediastinal lymph nodes. No acute osseous abnormality. Surgical   clips along the right breast.      Impression:       Impression:  Dense airspace opacity in the right upper lung which may reflect pneumonia.      Electronically Signed: Nura Gupta MD    10/7/2024 7:13 PM EDT    Workstation ID: [id number]             amLODIPine, 5 mg, Oral, Daily  arformoterol, 15 mcg, Nebulization, BID - RT  aspirin, 81 mg, Oral, Daily  atorvastatin, 40 mg, Oral, Daily  azithromycin, 500 mg, Oral, Q24H  budesonide, 0.5 mg, Nebulization, BID - RT  carvedilol, 25 mg, Oral, BID With Meals  cefTRIAXone, 1,000 mg, Intravenous, Q24H  heparin (porcine), 5,000 Units, Subcutaneous, Q8H  hydroCHLOROthiazide, 25 mg, Oral, QAM  montelukast, 10 mg, Oral, Nightly  pantoprazole, 40 mg, Oral, Daily  potassium chloride, 40 mEq, Oral, Once  rOPINIRole, 0.25 mg, Oral, Nightly  sertraline, 50 mg, Oral, Daily  sodium chloride, 10 mL, Intravenous, Q12H         Assessment & Plan   Assessment / Plan     Assessment/Plan:   Acute hypoxic respiratory failure  Right upper lobe pneumonia  Hypokalemia  Hypertension  CAD s/p stent  Status post pacemaker  Hyperlipidemia  COPD  Asthma  Restless leg syndrome    Plan  Admit to inpatient, telemetry  Continue ceftriaxone azithromycin  Follow-up on urine Legionella, urine strep, MRSA swab, sputum cultures  Follow-up on enteric bacterial panel, enteric parasite panel, stool C. difficile, urinalysis  Regular diet  Zofran every 6 hours as needed  Brovana, Pulmicort  Bronchodilator protocol  Bronchopulmonary hygiene  Intensive spirometry  Potassium 40 meq PO once   Resume other appropriate home medications once reconciled  PT consult    VTE Prophylaxis:  Pharmacologic VTE prophylaxis orders are present.        CODE STATUS:    Level Of Support Discussed With: Patient  Code Status (Patient has no pulse and is not breathing): CPR (Attempt to Resuscitate)  Medical Interventions (Patient has pulse or is breathing): Full Support      Admission Status:  I believe this patient meets inpatient status.    Part of this note may be an electronic  transcription/translation of spoken language to printed text using the Dragon Dictation System    Stefanie Hunter MD

## 2024-10-08 NOTE — PLAN OF CARE
Goal Outcome Evaluation:              Outcome Evaluation: A&Ox4, low appetite (norm per family), 2L NC, ambulated in hallway with walker, no issues or complaints at this time

## 2024-10-08 NOTE — PROGRESS NOTES
Walking Oximetry Progress Note      Patient Name:  Mallory Franco  YOB: 1940  Date of Procedure: 10/08/24              ROOM AIR BASELINE   SpO2% 96   Heart Rate 60     EXERCISE ON ROOM AIR SpO2% EXERCISE ON O2 LPM SpO2%   1 MINUTE 94 1 MINUTE     2 MINUTES 95 2 MINUTES     3 MINUTES 96 3 MINUTES     4 MINUTES 95 4 MINUTES     5 MINUTES 96 5 MINUTES     6 MINUTES 97 6 MINUTES                Time to Recovery  2 minutes   SpO2% Post Exercise  96 on room air.    HR Post Exercise  72     Comments: Patient performed the six minute walking oximetry test well. Patient did not require supplemental oxygen throughout the six minute walk. Patient did state they felt tired, and required two short rest periods during the test.           Electronically signed by Juani Velazquez, REGI, 10/08/24, 11:43 AM EDT.

## 2024-10-08 NOTE — ED PROVIDER NOTES
Time: 9:46 PM EDT  Date of encounter:  10/7/2024  Independent Historian/Clinical History and Information was obtained by:   Patient and EMS  Chief Complaint: Fever, cough, vomiting    History is limited by: N/A    History of Present Illness:  Patient is a 84 y.o. year old female who presents to the emergency department for evaluation of fever, cough, vomiting.  Patient presents to the ER via EMS.  Patient states her symptoms began this past Sunday.  She states she has felt weak and is hard for her to get out of bed.  Yesterday she started having nausea but no vomiting.  She has developed diarrhea as well.  She denies any abdominal pain but occasional cramp.  Patient also reports a subjective fever along with a cough.    HPI    Patient Care Team  Primary Care Provider: Amauri Kearney MD    Past Medical History:     Allergies   Allergen Reactions    Codeine Nausea And Vomiting    Contrast Dye (Echo Or Unknown Ct/Mr) Hives    Morphine Nausea And Vomiting     Past Medical History:   Diagnosis Date    Arthritis     Asthma     BPPV (benign paroxysmal positional vertigo), right     COPD (chronic obstructive pulmonary disease)     Coronary artery disease     Coronary artery disease     Ear itching     HX    Enlarged heart     Gastroesophageal reflux disease     Hyperlipidemia     Hypertension     Knee pain     Macular degeneration     PONV (postoperative nausea and vomiting)     Postural dizziness with presyncope 3/16/2023    Shingles     X2    SOB (shortness of breath) on exertion     Tinnitus of left ear     Urinary frequency     Vertigo      Past Surgical History:   Procedure Laterality Date    BREAST BIOPSY Bilateral     BRONCHOSCOPY Right 11/28/2023    Procedure: BRONCHOSCOPY WITH BIOPSIES, BRUSHINGS, AND BRONCHOALVEOLAR LAVAGE;  Surgeon: Aleksandr Houston DO;  Location: [facility] ENDOSCOPY;  Service: Pulmonary;  Laterality: Right;  RIGHT UPPER LOBE ENDOBRONCHIAL OCCLUSION    CARDIAC CATHETERIZATION       CARDIAC ELECTROPHYSIOLOGY PROCEDURE N/A 3/27/2023    Procedure: Pacemaker SC new-[place], call Dr. Rosa first thing in the morning to get time for the procedure and notify rep;  Surgeon: Shane Rosa MD;  Location: [facility] CATH INVASIVE LOCATION;  Service: Cardiovascular;  Laterality: N/A;     SECTION      CHOLECYSTECTOMY      COLONOSCOPY      CORONARY STENT PLACEMENT      EYE SURGERY      GALLBLADDER SURGERY      KNEE MINI REVISION Right 2021    Procedure: KNEE POLY INSERT EXCHANGE;  Surgeon: Ky Avila MD;  Location:  [facility] MAIN OR;  Service: Orthopedics;  Laterality: Right;    KNEE SURGERY      REPLACEMENT TOTAL KNEE Right     REPLACEMENT TOTAL KNEE Left      Family History   Problem Relation Age of Onset    Breast cancer Mother     Bone cancer Father     Breast cancer Sister     Bone cancer Sister     Lung cancer Brother     Malig Hyperthermia Neg Hx        Home Medications:  Prior to Admission medications    Medication Sig Start Date End Date Taking? Authorizing Provider   albuterol (PROVENTIL) (2.5 MG/3ML) 0.083% nebulizer solution Take 2.5 mg by nebulization Every 4 (Four) Hours As Needed for Wheezing.    Brittany Garner MD   albuterol sulfate  (90 Base) MCG/ACT inhaler Inhale 2 puffs Every 4 (Four) Hours As Needed. 3/13/23   Brittany Garner MD   amLODIPine (NORVASC) 5 MG tablet Take 1 tablet by mouth Daily. 9/3/23   Brittany Garner MD   aspirin 81 MG EC tablet Take 1 tablet by mouth Daily.    Brittany Garner MD   atorvastatin (LIPITOR) 40 MG tablet Take 1 tablet by mouth Daily.    Brittany Garner MD   Breo Ellipta 100-25 MCG/ACT aerosol powder  Inhale 1 puff Daily. 23   Brittany Garner MD   carvedilol (COREG) 25 MG tablet Take 1 tablet by mouth 2 (Two) Times a Day With Meals. 23   Migue Nguyen MD   cefdinir (OMNICEF) 300 MG capsule Take 1 capsule by mouth 2 (Two) Times a Day. 23   Dary Muñiz APRN    famotidine (PEPCID) 40 MG tablet Take 1 tablet by mouth every night at bedtime. 1/14/23   Brittany Garner MD   fluticasone (FLONASE) 50 MCG/ACT nasal spray 1 spray into the nostril(s) as directed by provider Every Morning. 9/30/14   Brittany Garner MD   hydroCHLOROthiazide 25 MG tablet Take 1 tablet by mouth Every Morning. 7/16/24   Lydia Escudero APRN   meclizine (ANTIVERT) 25 MG tablet Take 1 tablet by mouth 2 (Two) Times a Day As Needed. 5/18/15   Brittany Garner MD   montelukast (SINGULAIR) 10 MG tablet Take 1 tablet by mouth Every Night. 5/18/15   Brittany Garner MD   ondansetron ODT (ZOFRAN-ODT) 4 MG disintegrating tablet Place 1 tablet on the tongue 4 (Four) Times a Day As Needed for Nausea or Vomiting. 8/31/23   Hoda Vallejo APRN   pantoprazole (PROTONIX) 40 MG EC tablet Take 1 tablet by mouth Daily. 1/6/23   Brittany Garner MD   rOPINIRole (REQUIP) 0.25 MG tablet Take 1 tablet by mouth every night at bedtime. 10/25/23   Brittany Garner MD   sertraline (ZOLOFT) 50 MG tablet Take 1 tablet by mouth Every Night. 5/18/15   Brittany Garner MD   traZODone (DESYREL) 50 MG tablet Take 1 tablet by mouth Every Night.    Brittany Garner MD        Social History:   Social History     Tobacco Use    Smoking status: Never     Passive exposure: Past    Smokeless tobacco: Never   Vaping Use    Vaping status: Never Used   Substance Use Topics    Alcohol use: No    Drug use: Never         Review of Systems:  Review of Systems   Constitutional:  Positive for fever. Negative for chills.   HENT:  Negative for congestion, ear pain and sore throat.    Eyes:  Negative for pain.   Respiratory:  Positive for cough. Negative for chest tightness and shortness of breath.    Cardiovascular:  Negative for chest pain.   Gastrointestinal:  Positive for diarrhea and nausea. Negative for abdominal pain and vomiting.   Genitourinary:  Negative for flank pain and hematuria.  "  Musculoskeletal:  Negative for joint swelling.   Skin:  Negative for pallor.   Neurological:  Negative for seizures and headaches.   All other systems reviewed and are negative.       Physical Exam:  /64 (Patient Position: Lying)   Pulse 67   Temp 98.9 °F (37.2 °C)   Resp 24   Ht 162.6 cm (64\")   Wt 92 kg (202 lb 13.2 oz)   SpO2 98%   BMI 34.81 kg/m²     Physical Exam    Vital signs were reviewed under triage note.  General appearance - Patient appears well-developed and well-nourished.  Patient is in no acute distress.  Patient is ill-appearing.  Head - Normocephalic, atraumatic.  Pupils - Equal, round, reactive to light.  Extraocular muscles are intact.  Conjunctiva is clear.  Nasal - Normal inspection.  No evidence of trauma or epistaxis.  Tympanic membranes - Gray, intact without erythema or retractions.  Oral mucosa - Pink and moist without lesions or erythema.  Uvula is midline.  Chest wall - Atraumatic.  Chest wall is nontender.  There are no vesicular rashes noted.  Neck - Supple.  Trachea was midline.  There is no palpable lymphadenopathy or thyromegaly.  There are no meningeal signs  Lungs - Clear to auscultation and percussion bilaterally.  Heart - Regular rate and rhythm without any murmurs, clicks, or gallops.  Abdomen - Soft.  Bowel sounds are present.  There is no palpable tenderness.  There is no rebound, guarding, or rigidity.  There are no palpable masses.  There are no pulsatile masses.  Back - Spine is straight and midline.  There is no CVA tenderness.  Extremities - Intact x4 with full range of motion.  There is no palpable edema.  Pulses are intact x4 and equal.  Neurologic - Patient is awake, alert, and oriented x3.  Cranial nerves II through XII are grossly intact.  Motor and sensory functions grossly intact.  Cerebellar function was normal.  Integument - There are no rashes.  There are no petechia or purpura lesions noted.  There are no vesicular lesions noted.      "     Procedures:  Procedures      Medical Decision Making:      Comorbidities that affect care:    Hypertension, coronary artery disease, vertigo, GERD, hyperlipidemia, tinnitus, macular degeneration, asthma, COPD    External Notes reviewed:    Previous Clinic Note: Office visit with Dr. Nguyen on 7/12/2024 was reviewed by me.      The following orders were placed and all results were independently analyzed by me:  Orders Placed This Encounter   Procedures    COVID PRE-OP / PRE-PROCEDURE SCREENING ORDER (NO ISOLATION) - Swab, Nasopharynx    COVID-19,CEPHEID/AMANDA,COR/CHACHA/PAD/MYLA/LAG/DIGNA IN-HOUSE,NP SWAB IN TRANSPORT MEDIA 1 HR TAT, RT-PCR - Swab, Nasopharynx    Blood Culture - Blood,    Blood Culture - Blood,    Clostridioides difficile Toxin - Stool, Per Rectum    Enteric Bacterial Panel - Stool, Per Rectum    Enteric Parasite Panel - Stool, Per Rectum    Clostridioides difficile Toxin, PCR - Stool, Per Rectum    XR Chest 1 View    [place] Draw    Comprehensive Metabolic Panel    BNP    Single High Sensitivity Troponin T    CBC Auto Differential    Urinalysis With Culture If Indicated - Urine, Clean Catch    Lactic Acid, Plasma    NPO Diet NPO Type: Strict NPO    Undress & Gown    Continuous Pulse Oximetry    Vital Signs    Hospitalist (on-call MD unless specified)    Oxygen Therapy- Nasal Cannula; Titrate 1-6 LPM Per SpO2; 90 - 95%    ECG 12 Lead ED Triage Standing Order; SOA    Insert Peripheral IV    Inpatient Admission    CBC & Differential    Green Top (Gel)    Lavender Top    Gold Top - SST    Light Blue Top       Medications Given in the Emergency Department:  Medications   sodium chloride 0.9 % flush 10 mL (has no administration in time range)   lactated ringers bolus 1,000 mL (1,000 mL Intravenous New Bag 10/7/24 2102)   cefTRIAXone (ROCEPHIN) in NS 2 GRAMS/20ml IV PUSH syringe (2,000 mg Intravenous Given 10/7/24 2102)   AZITHROMYCIN 500 MG/250 ML 0.9% NS IVPB (vial-mate) (500 mg Intravenous New Bag  10/7/24 2142)        ED Course:    ED Course as of 10/07/24 2255   Mon Oct 07, 2024   2145 EKG performed at 1918 was interpreted by me to show a normal sinus rhythm with a ventricular rate of 71 bpm.  The AL intervals 194 ms.  P waves are normal.  QRS interval is 157 ms.  Patient is a left bundle branch block.  Axis is leftward -26 degrees.  There is no acute ischemic ST or T wave change identified.  QT corrected was 470 ms. [TB]      ED Course User Index  [TB] Leroy Howard DO       The patient was seen and evaluated in the ED by me.  The above history and physical examination was performed as documented.  Diagnostic data was obtained.  Results reviewed.  Findings were discussed with the patient.  The patient is found to have a consolidative pneumonia in the right upper lung.  Patient was treated for community-acquired pneumonia.  Patient is requiring supplemental oxygen due to hypoxia.  I have consulted the hospitalist service for admission.    Labs:    Lab Results (last 24 hours)       Procedure Component Value Units Date/Time    CBC & Differential [[id number]]  (Abnormal) Collected: 10/07/24 1842    Specimen: Blood Updated: 10/07/24 1851    Narrative:      The following orders were created for panel order CBC & Differential.  Procedure                               Abnormality         Status                     ---------                               -----------         ------                     CBC Auto Differential[[id number]]        Abnormal            Final result                 Please view results for these tests on the individual orders.    Comprehensive Metabolic Panel [[id number]]  (Abnormal) Collected: 10/07/24 1842    Specimen: Blood Updated: 10/07/24 1926     Glucose 127 mg/dL      BUN 15 mg/dL      Creatinine 0.88 mg/dL      Sodium 136 mmol/L      Potassium 3.1 mmol/L      Chloride 96 mmol/L      CO2 27.5 mmol/L      Calcium 9.4 mg/dL      Total Protein 7.2 g/dL      Albumin 3.5 g/dL      ALT (SGPT) 12  U/L      AST (SGOT) 19 U/L      Alkaline Phosphatase 108 U/L      Total Bilirubin 1.0 mg/dL      Globulin 3.7 gm/dL      A/G Ratio 0.9 g/dL      BUN/Creatinine Ratio 17.0     Anion Gap 12.5 mmol/L      eGFR 64.5 mL/min/1.73     Narrative:      GFR Normal >60  Chronic Kidney Disease <60  Kidney Failure <15    The GFR formula is only valid for adults with stable renal function between ages 18 and 70.    BNP [[id number]]  (Normal) Collected: 10/07/24 1842    Specimen: Blood Updated: 10/07/24 1920     proBNP 1,395.0 pg/mL     Narrative:      This assay is used as an aid in the diagnosis of individuals suspected of having heart failure. It can be used as an aid in the diagnosis of acute decompensated heart failure (ADHF) in patients presenting with signs and symptoms of ADHF to the emergency department (ED). In addition, NT-proBNP of <300 pg/mL indicates ADHF is not likely.    Age Range Result Interpretation  NT-proBNP Concentration (pg/mL:      <50             Positive            >450                   Gray                 300-450                    Negative             <300    50-75           Positive            >900                  Gray                300-900                  Negative            <300      >75             Positive            >1800                  Gray                300-1800                  Negative            <300    Single High Sensitivity Troponin T [[id number]]  (Abnormal) Collected: 10/07/24 1842    Specimen: Blood Updated: 10/07/24 1926     HS Troponin T 39 ng/L     Narrative:      High Sensitive Troponin T Reference Range:  <14.0 ng/L- Negative Female for AMI  <22.0 ng/L- Negative Male for AMI  >=14 - Abnormal Female indicating possible myocardial injury.  >=22 - Abnormal Male indicating possible myocardial injury.   Clinicians would have to utilize clinical acumen, EKG, Troponin, and serial changes to determine if it is an Acute Myocardial Infarction or myocardial injury due to an underlying  chronic condition.         CBC Auto Differential [[id number]]  (Abnormal) Collected: 10/07/24 1842    Specimen: Blood Updated: 10/07/24 1851     WBC 15.77 10*3/mm3      RBC 3.50 10*6/mm3      Hemoglobin 9.9 g/dL      Hematocrit 30.7 %      MCV 87.7 fL      MCH 28.3 pg      MCHC 32.2 g/dL      RDW 14.9 %      RDW-SD 47.5 fl      MPV 9.7 fL      Platelets 268 10*3/mm3      Neutrophil % 75.8 %      Lymphocyte % 12.7 %      Monocyte % 10.2 %      Eosinophil % 0.5 %      Basophil % 0.3 %      Immature Grans % 0.5 %      Neutrophils, Absolute 11.95 10*3/mm3      Lymphocytes, Absolute 2.01 10*3/mm3      Monocytes, Absolute 1.61 10*3/mm3      Eosinophils, Absolute 0.08 10*3/mm3      Basophils, Absolute 0.04 10*3/mm3      Immature Grans, Absolute 0.08 10*3/mm3      nRBC 0.0 /100 WBC     COVID PRE-OP / PRE-PROCEDURE SCREENING ORDER (NO ISOLATION) - Swab, Nasopharynx [[id number]]  (Normal) Collected: 10/07/24 1843    Specimen: Swab from Nasopharynx Updated: 10/07/24 1931    Narrative:      The following orders were created for panel order COVID PRE-OP / PRE-PROCEDURE SCREENING ORDER (NO ISOLATION) - Swab, Nasopharynx.  Procedure                               Abnormality         Status                     ---------                               -----------         ------                     COVID-19,CEPHEID/AMANDA,CO...[[id number]]  Normal              Final result                 Please view results for these tests on the individual orders.    COVID-19,CEPHEID/AMANDA,COR/CHACHA/PAD/MYLA/LAG/DIGNA IN-HOUSE,NP SWAB IN TRANSPORT MEDIA 1 HR TAT, RT-PCR - Swab, Nasopharynx [[id number]]  (Normal) Collected: 10/07/24 1843    Specimen: Swab from Nasopharynx Updated: 10/07/24 1931     COVID19 Not Detected    Narrative:      Fact sheet for providers: https://www.fda.gov/media/424799/download     Fact sheet for patients: https://www.fda.gov/media/799967/download  Fact sheet for providers: https://www.fda.gov/media/781797/download     Fact sheet for  patients: https://www.fda.gov/media/954525/download    Blood Culture - Blood, Arm, Right [[id number]] Collected: 10/07/24 2038    Specimen: Blood from Arm, Right Updated: 10/07/24 2043    Blood Culture - Blood, Arm, Left [[id number]] Collected: 10/07/24 2038    Specimen: Blood from Arm, Left Updated: 10/07/24 2043    Lactic Acid, Plasma [[id number]]  (Normal) Collected: 10/07/24 2038    Specimen: Blood Updated: 10/07/24 2100     Lactate 1.3 mmol/L              Imaging:    XR Chest 1 View    Result Date: 10/7/2024  XR CHEST 1 VW Date of Exam: 10/7/2024 6:49 PM EDT Indication: SOA Triage Protocol Comparison: 1/15/2024 CT Findings: Left chest wall cardiac device in unchanged position. Enlarged cardiac silhouette. There is a dense airspace opacity in the right upper lung. No pleural effusion or pneumothorax. Calcified mediastinal lymph nodes. No acute osseous abnormality. Surgical clips along the right breast.     Impression: Dense airspace opacity in the right upper lung which may reflect pneumonia. Electronically Signed: Nura Gupta MD  10/7/2024 7:13 PM EDT  Workstation ID: [id number]       Differential Diagnosis and Discussion:    Cough: Differential diagnosis includes but is not limited to pneumonia, acute bronchitis, upper respiratory infection, ACE inhibitor use, allergic reaction, epiglottitis, seasonal allergies, chemical irritants, exercise-induced asthma, viral syndrome.  Weakness: Based on the patient's history, signs, and symptoms, the diffential diagnosis includes but is not limited to meningitis, stroke, sepsis, subarachnoid hemorrhage, intracranial bleeding, encephalitis, acute uti, dehydration, MS, myasthenia gravis, Guillan [place], migraine variant, neuromuscular disorders vertigo, electrolyte imbalance, and metabolic disorders.    All labs were reviewed and interpreted by me.  All X-rays impressions were independently interpreted by me.  EKG was interpreted by me.    MDM     Amount and/or Complexity of  Data Reviewed  Clinical lab tests: reviewed  Tests in the radiology section of CPT®: reviewed  Tests in the medicine section of CPT®: reviewed             Patient Care Considerations:    SEPSIS was considered but is NOT present in the emergency department as SIRS criteria is not present.      Consultants/Shared Management Plan:    Hospitalist: I have discussed the case with Dr. Hunter who agrees to accept the patient for admission.    Social Determinants of Health:    Patient is independent, reliable, and has access to care.       Disposition and Care Coordination:    Admit:   Through independent evaluation of the patient's history, physical, and imperical data, the patient meets criteria for inpatient admission to the hospital.        Final diagnoses:   Pneumonia of right upper lobe due to infectious organism   Acute respiratory failure with hypoxia        ED Disposition       ED Disposition   Decision to Admit    Condition   --    Comment   Level of Care: Telemetry [5]   Diagnosis: Right upper lobe pneumonia [114321]   Certification: I Certify That Inpatient Hospital Services Are Medically Necessary For Greater Than 2 Midnights                 This medical record created using voice recognition software.             Leroy Howard DO  10/11/24 [id number]

## 2024-10-08 NOTE — PROGRESS NOTES
[facility]   Hospitalist Progress Note  Date: 10/8/2024  Patient Name: Mallory Franco  : 1940  MRN: [id number]  Date of admission: 10/7/2024      Subjective   Subjective     Chief Complaint: Shortness of breath  Summary: 84-year-old female past medical history of hypertension, CAD status post PCI, sick sinus syndrome status post PPM, hyperlipidemia, COPD, restless leg syndrome presented to the ER due to nausea vomiting diarrhea cough and shortness of breath.  Upon arrival she was found to have a productive cough as well as some hypoxia down to 88% on room air.  Chest x-ray showed right upper lobe opacity concerning for pneumonia patient was started on Rocephin azithromycin and IV fluids subsequently admitted to the hospital service    Interval Followup: No events overnight.  Patient does state her breathing is improved.  Denies have any chest pain palpitations or pleurisy.  Productive cough seems to improving.  Diarrhea patient states is chronic however C. difficile is pending at this time.        Objective   Objective     Vitals:   Temp:  [97.7 °F (36.5 °C)-99.1 °F (37.3 °C)] 97.7 °F (36.5 °C)  Heart Rate:  [65-78] 65  Resp:  [18-24] 18  BP: (125-172)/() 125/53  Flow (L/min):  [2] 2  Physical Exam    Constitutional: Awake, alert, no acute distress   Eyes: Pupils equal, sclerae anicteric, no conjunctival injection   Respiratory: Clear to auscultation bilaterally, nonlabored respirations    Cardiovascular: RRR, no murmurs, rubs, or gallops, palpable pedal pulses bilaterally   Gastrointestinal: Positive bowel sounds, soft, nontender, nondistended   Neurologic: Oriented x 3, strength symmetric in all extremities, Cranial Nerves grossly intact to confrontation, speech clear   Skin: No rashes     Result Review    Result Review:  I have personally reviewed the following results and agree with these findings:  [x]  Laboratory  [x]  Microbiology  [x]  Radiology  [x]  EKG/Telemetry   [x]   Cardiology/Vascular   []  Pathology  [x]  Old records  []  Other:    Assessment & Plan   Assessment / Plan     Assessment/Plan:  Acute hypoxic respiratory failure secondary to community-acquired pneumonia  Right upper lobe community-acquired pneumonia  Leukocytosis likely reactive to above  Hypokalemia likely second to diarrhea  Hypomagnesemia likely secondary to diarrhea  CAD status post PCI  Status post permanent pacemaker  Hyperlipidemia  Restless leg syndrome  COPD    Plan  - Subjectively patient feels improved on current antibiotics we will continue Rocephin azithromycin for now.  Continue bronchopulmonary hygiene  - Electrolyte abnormalities likely are related to her chronic diarrhea as well as her home HCTZ.  Will hold home HCTZ and continue aggressive replacement.  Will get enteric panel for diarrhea.  No recent antibiotic exposure to suggest C. difficile  - Trend leukocytosis and fever curve  -Continue home antihypertensives  - Continue home aspirin and statin for CAD as well as beta-blocker with Coreg 25 mg  - Continue telemetry monitoring  - Continue Requip for RLS    Dispo: Patient remains admitted for treatment of community-acquired pneumonia.  PT eval is pending to aid in discharge placement.     Discussed plan with RN.    VTE Prophylaxis:  Pharmacologic VTE prophylaxis orders are present.        CODE STATUS:   Level Of Support Discussed With: Patient  Code Status (Patient has no pulse and is not breathing): CPR (Attempt to Resuscitate)  Medical Interventions (Patient has pulse or is breathing): Full Support        Electronically signed by Abilio Puckett MD, 10/08/24, 12:52 PM EDT.

## 2024-10-08 NOTE — THERAPY EVALUATION
Acute Care - Physical Therapy Initial Evaluation   Deepika     Patient Name: Mallory Franco  : 1940  MRN: [id number]  Today's Date: 10/8/2024      Visit Dx:     ICD-10-CM ICD-9-CM   1. Pneumonia of right upper lobe due to infectious organism  J18.9 486   2. Acute respiratory failure with hypoxia  J96.01 518.81   3. Difficulty walking  R26.2 719.7     Patient Active Problem List   Diagnosis    Benign hypertension    Coronary artery disease involving native coronary artery of native heart without angina pectoris    Pain due to total right knee replacement    History of coronary angioplasty with insertion of stent    Presence of cardiac pacemaker    BPPV (benign paroxysmal positional vertigo), right    Esophageal reflux    Abnormal CT scan of lung    Atelectasis    Right upper lobe pneumonia     Past Medical History:   Diagnosis Date    Arthritis     Asthma     BPPV (benign paroxysmal positional vertigo), right     COPD (chronic obstructive pulmonary disease)     Coronary artery disease     Coronary artery disease     Diverticulitis     Ear itching     HX    Enlarged heart     Gastroesophageal reflux disease     Hyperlipidemia     Hypertension     Knee pain     Macular degeneration     PONV (postoperative nausea and vomiting)     Postural dizziness with presyncope 2023    Shingles     X2    SOB (shortness of breath) on exertion     Tinnitus of left ear     Urinary frequency     Vertigo      Past Surgical History:   Procedure Laterality Date    BREAST BIOPSY Bilateral     BRONCHOSCOPY Right 2023    Procedure: BRONCHOSCOPY WITH BIOPSIES, BRUSHINGS, AND BRONCHOALVEOLAR LAVAGE;  Surgeon: Aleksandr Houston DO;  Location: [facility] ENDOSCOPY;  Service: Pulmonary;  Laterality: Right;  RIGHT UPPER LOBE ENDOBRONCHIAL OCCLUSION    CARDIAC CATHETERIZATION      CARDIAC ELECTROPHYSIOLOGY PROCEDURE N/A 3/27/2023    Procedure: Pacemaker SC new-[employer], call Dr. Rosa first thing in the morning to  get time for the procedure and notify rep;  Surgeon: Shane Rosa MD;  Location: [facility] CATH INVASIVE LOCATION;  Service: Cardiovascular;  Laterality: N/A;     SECTION      CHOLECYSTECTOMY      COLONOSCOPY      CORONARY STENT PLACEMENT      EYE SURGERY      GALLBLADDER SURGERY      KNEE MINI REVISION Right 2021    Procedure: KNEE POLY INSERT EXCHANGE;  Surgeon: Ky Avila MD;  Location: [facility] OR;  Service: Orthopedics;  Laterality: Right;    KNEE SURGERY      REPLACEMENT TOTAL KNEE Right     REPLACEMENT TOTAL KNEE Left      PT Assessment (Last 12 Hours)       PT Evaluation and Treatment       Row Name 10/08/24 1300          Physical Therapy Time and Intention    Document Type evaluation  -AV     Mode of Treatment individual therapy;physical therapy  -AV       Row Name 10/08/24 1300          General Information    Patient Profile Reviewed yes  -AV     Patient Observations alert;cooperative;agree to therapy  -AV     Prior Level of Function independent:;all household mobility;gait;transfer;ADL's  Ambulated without an assistive device, has STC to use if needed. No home O2.  -AV     Equipment Currently Used at Home none  Reports having STC to use if needed  -AV     Existing Precautions/Restrictions fall  -AV       Row Name 10/08/24 1300          Living Environment    Current Living Arrangements home  -AV     Home Accessibility stairs to enter home  -AV     People in Home alone  Reports her son comes by to check on her every morning  -AV       Row Name 10/08/24 1300          Home Main Entrance    Number of Stairs, Main Entrance none  -AV       Row Name 10/08/24 1300          Cognition    Orientation Status (Cognition) oriented x 3  -AV       Row Name 10/08/24 1300          Range of Motion (ROM)    Range of Motion bilateral lower extremities;ROM is WFL  -AV       Row Name 10/08/24 1300          Strength (Manual Muscle Testing)    Strength (Manual Muscle Testing) bilateral lower  extremities;strength is WFL  -AV       Row Name 10/08/24 1300          Bed Mobility    Comment, (Bed Mobility) Patient seated edge of bed upon therapist entry  -AV       Row Name 10/08/24 1300          Transfers    Transfers sit-stand transfer;stand-sit transfer  -AV       Row Name 10/08/24 1300          Sit-Stand Transfer    Sit-Stand [place] (Transfers) standby assist  -AV     Assistive Device (Sit-Stand Transfers) --  No AD  -AV       Row Name 10/08/24 1300          Stand-Sit Transfer    Stand-Sit [place] (Transfers) standby assist  -AV     Assistive Device (Stand-Sit Transfers) --  No AD  -AV       Row Name 10/08/24 1300          Gait/Stairs (Locomotion)    Gait/Stairs Locomotion gait/ambulation independence;gait/ambulation assistive device;distance ambulated  -AV     [place] Level (Gait) contact guard  -AV     Assistive Device (Gait) --  IV pole  -AV     Distance in Feet (Gait) 35  -AV     Pattern (Gait) step-through  -AV       Row Name 10/08/24 1300          Safety Issues, Functional Mobility    Impairments Affecting Function (Mobility) balance;endurance/activity tolerance  -AV       Row Name 10/08/24 1300          Balance    Balance Assessment standing dynamic balance  -AV     Dynamic Standing Balance contact guard  -AV     Position/Device Used, Standing Balance other (see comments)  IV pole  -AV       Row Name 10/08/24 1300          Plan of Care Review    Plan of Care Reviewed With patient  -AV     Progress no change  -AV     Outcome Evaluation Patient presents with deficits in balance, endurance, transfers, and ambulation. Patient will benefit from skilled PT services to address these mobility deficits and decrease risk of falls.  -AV       Row Name 10/08/24 1300          Positioning and Restraints    Pre-Treatment Position in bed  -AV     Post Treatment Position bed  -AV     In Bed sitting EOB;call light within reach;encouraged to call for assist  No alarms active upon therapist entry  -AV        Row Name 10/08/24 1300          Therapy Assessment/Plan (PT)    Rehab Potential (PT) good, to achieve stated therapy goals  -AV     Criteria for Skilled Interventions Met (PT) yes;meets criteria  -AV     Therapy Frequency (PT) daily  -AV     Predicted Duration of Therapy Intervention (PT) 10 days  -AV     Problem List (PT) problems related to;balance;mobility  -AV     Activity Limitations Related to Problem List (PT) unable to ambulate safely  -AV       Row Name 10/08/24 1300          PT Evaluation Complexity    History, PT Evaluation Complexity no personal factors and/or comorbidities  -AV     Examination of Body Systems (PT Eval Complexity) total of 4 or more elements  -AV     Clinical Presentation (PT Evaluation Complexity) stable  -AV     Clinical Decision Making (PT Evaluation Complexity) low complexity  -AV     Overall Complexity (PT Evaluation Complexity) low complexity  -AV       Row Name 10/08/24 1300          Therapy Plan Review/Discharge Plan (PT)    Therapy Plan Review (PT) evaluation/treatment results reviewed;patient  -AV       Row Name 10/08/24 1300          Physical Therapy Goals    Transfer Goal Selection (PT) transfer, PT goal 1  -AV     Gait Training Goal Selection (PT) gait training, PT goal 1  -AV       Row Name 10/08/24 1300          Transfer Goal 1 (PT)    Activity/Assistive Device (Transfer Goal 1, PT) sit-to-stand/stand-to-sit;bed-to-chair/chair-to-bed  -AV     [place] Level/Cues Needed (Transfer Goal 1, PT) independent  -AV     Time Frame (Transfer Goal 1, PT) 10 days  -AV       Row Name 10/08/24 1300          Gait Training Goal 1 (PT)    Activity/Assistive Device (Gait Training Goal 1, PT) gait (walking locomotion)  -AV     [place] Level (Gait Training Goal 1, PT) independent  -AV     Distance (Gait Training Goal 1, PT) 200  -AV     Time Frame (Gait Training Goal 1, PT) 10 days  -AV               User Key  (r) = Recorded By, (t) = Taken By, (c) = Cosigned By      Initials  Name Provider Type    AV Kenneth Boucher, PT Physical Therapist                    Physical Therapy Education       Title: PT OT SLP Therapies (In Progress)       Topic: Physical Therapy (In Progress)       Point: Mobility training (Done)       Learning Progress Summary             Patient Acceptance, E,TB, VU by AV at 10/8/2024 1346                         Point: Home exercise program (Not Started)       Learner Progress:  Not documented in this visit.              Point: Body mechanics (Done)       Learning Progress Summary             Patient Acceptance, E,TB, VU by AV at 10/8/2024 1346                         Point: Precautions (Done)       Learning Progress Summary             Patient Acceptance, E,TB, VU by AV at 10/8/2024 1346                                         User Key       Initials Effective Dates Name Provider Type Discipline     06/11/21 -  Kenneth Boucher, DEVENDRA Physical Therapist PT                  PT Recommendation and Plan  Anticipated Discharge Disposition (PT): home with home health  Planned Therapy Interventions (PT): balance training, bed mobility training, gait training, home exercise program, neuromuscular re-education, strengthening, transfer training  Therapy Frequency (PT): daily  Plan of Care Reviewed With: patient  Progress: no change  Outcome Evaluation: Patient presents with deficits in balance, endurance, transfers, and ambulation. Patient will benefit from skilled PT services to address these mobility deficits and decrease risk of falls.   Outcome Measures       Row Name 10/08/24 1300             How much help from another person do you currently need...    Turning from your back to your side while in flat bed without using bedrails? 4  -AV      Moving from lying on back to sitting on the side of a flat bed without bedrails? 4  -AV      Moving to and from a bed to a chair (including a wheelchair)? 3  -AV      Standing up from a chair using your arms (e.g., wheelchair, bedside  chair)? 4  -AV      Climbing 3-5 steps with a railing? 3  -AV      To walk in hospital room? 3  -AV      AM-PAC 6 Clicks Score (PT) 21  -AV      Highest Level of Mobility Goal 6 --> Walk 10 steps or more  -AV         Functional Assessment    Outcome Measure Options AM-PAC 6 Clicks Basic Mobility (PT)  -AV                User Key  (r) = Recorded By, (t) = Taken By, (c) = Cosigned By      Initials Name Provider Type    AV Kenneth Boucher, PT Physical Therapist                     Time Calculation:    PT Charges       Row Name 10/08/24 1345             Time Calculation    PT Received On 10/08/24  -AV      PT Goal Re-Cert Due Date 10/17/24  -AV         Untimed Charges    PT Eval/Re-eval Minutes 32  -AV         Total Minutes    Untimed Charges Total Minutes 32  -AV       Total Minutes 32  -AV                User Key  (r) = Recorded By, (t) = Taken By, (c) = Cosigned By      Initials Name Provider Type    AV Kenneth Boucher, PT Physical Therapist                  Therapy Charges for Today       Code Description Service Date Service Provider Modifiers Qty    [id number] HC PT EVAL LOW COMPLEXITY 3 10/8/2024 Kenneth Boucher, PT GP 1            PT G-Codes  Outcome Measure Options: AM-PAC 6 Clicks Basic Mobility (PT)  AM-PAC 6 Clicks Score (PT): 21    Kenneth Boucher PT  10/8/2024

## 2024-10-08 NOTE — PLAN OF CARE
Goal Outcome Evaluation:  Plan of Care Reviewed With: patient        Progress: no change  Outcome Evaluation: Patient transported to unit on stretcher, ambulated from stretcher to bed. Patient A/Ox4, standby assistance with walker. Patient able to make needs known, no c/o pain or discomfort at this time. Currently on 2L nasal cannula, shortness of air with exertion PRN breathing tx requested. Patient in bed with call light within reach, bed in lowest position, continue current plan of care.Mirian Aparicio RN

## 2024-10-09 ENCOUNTER — APPOINTMENT (OUTPATIENT)
Dept: CARDIOLOGY | Facility: HOSPITAL | Age: 84
DRG: 193 | End: 2024-10-09
Payer: MEDICARE

## 2024-10-09 LAB
ANION GAP SERPL CALCULATED.3IONS-SCNC: 13.3 MMOL/L (ref 5–15)
BACTERIA BLD CULT: ABNORMAL
BASOPHILS # BLD AUTO: 0.02 10*3/MM3 (ref 0–0.2)
BASOPHILS NFR BLD AUTO: 0.2 % (ref 0–1.5)
BH CV ECHO MEAS - AO MAX PG: 6.7 MMHG
BH CV ECHO MEAS - AO MEAN PG: 4 MMHG
BH CV ECHO MEAS - AO ROOT DIAM: 2.4 CM
BH CV ECHO MEAS - AO V2 MAX: 129 CM/SEC
BH CV ECHO MEAS - AO V2 VTI: 29.4 CM
BH CV ECHO MEAS - AVA(I,D): 2.7 CM2
BH CV ECHO MEAS - EDV(CUBED): 132.7 ML
BH CV ECHO MEAS - EDV(MOD-SP2): 61.7 ML
BH CV ECHO MEAS - EDV(MOD-SP4): 69.8 ML
BH CV ECHO MEAS - EF(MOD-BP): 55.8 %
BH CV ECHO MEAS - EF(MOD-SP2): 56.6 %
BH CV ECHO MEAS - EF(MOD-SP4): 51 %
BH CV ECHO MEAS - ESV(CUBED): 39.3 ML
BH CV ECHO MEAS - ESV(MOD-SP2): 26.8 ML
BH CV ECHO MEAS - ESV(MOD-SP4): 34.2 ML
BH CV ECHO MEAS - FS: 33.3 %
BH CV ECHO MEAS - IVS/LVPW: 1 CM
BH CV ECHO MEAS - IVSD: 1.2 CM
BH CV ECHO MEAS - LA DIMENSION: 3.7 CM
BH CV ECHO MEAS - LAT PEAK E' VEL: 7.9 CM/SEC
BH CV ECHO MEAS - LV MASS(C)D: 241.2 GRAMS
BH CV ECHO MEAS - LV MAX PG: 3.6 MMHG
BH CV ECHO MEAS - LV MEAN PG: 2 MMHG
BH CV ECHO MEAS - LV V1 MAX: 95 CM/SEC
BH CV ECHO MEAS - LV V1 VTI: 25.5 CM
BH CV ECHO MEAS - LVIDD: 5.1 CM
BH CV ECHO MEAS - LVIDS: 3.4 CM
BH CV ECHO MEAS - LVOT AREA: 3.1 CM2
BH CV ECHO MEAS - LVOT DIAM: 2 CM
BH CV ECHO MEAS - LVPWD: 1.2 CM
BH CV ECHO MEAS - MED PEAK E' VEL: 10.8 CM/SEC
BH CV ECHO MEAS - MV A MAX VEL: 93.8 CM/SEC
BH CV ECHO MEAS - MV DEC SLOPE: 492 CM/SEC2
BH CV ECHO MEAS - MV DEC TIME: 0.18 SEC
BH CV ECHO MEAS - MV E MAX VEL: 90.7 CM/SEC
BH CV ECHO MEAS - MV E/A: 0.97
BH CV ECHO MEAS - RVDD: 2.1 CM
BH CV ECHO MEAS - SV(LVOT): 80.1 ML
BH CV ECHO MEAS - SV(MOD-SP2): 34.9 ML
BH CV ECHO MEAS - SV(MOD-SP4): 35.6 ML
BH CV ECHO MEAS - TAPSE (>1.6): 2.13 CM
BH CV ECHO MEAS - TR MAX PG: 31.6 MMHG
BH CV ECHO MEAS - TR MAX VEL: 281 CM/SEC
BH CV ECHO MEASUREMENTS AVERAGE E/E' RATIO: 9.7
BOTTLE TYPE: ABNORMAL
BUN SERPL-MCNC: 19 MG/DL (ref 8–23)
BUN/CREAT SERPL: 20.9 (ref 7–25)
CALCIUM SPEC-SCNC: 9.1 MG/DL (ref 8.6–10.5)
CHLORIDE SERPL-SCNC: 97 MMOL/L (ref 98–107)
CO2 SERPL-SCNC: 23.7 MMOL/L (ref 22–29)
CREAT SERPL-MCNC: 0.91 MG/DL (ref 0.57–1)
DEPRECATED RDW RBC AUTO: 47.5 FL (ref 37–54)
EGFRCR SERPLBLD CKD-EPI 2021: 62 ML/MIN/1.73
EOSINOPHIL # BLD AUTO: 0.23 10*3/MM3 (ref 0–0.4)
EOSINOPHIL NFR BLD AUTO: 2.2 % (ref 0.3–6.2)
ERYTHROCYTE [DISTWIDTH] IN BLOOD BY AUTOMATED COUNT: 14.6 % (ref 12.3–15.4)
GLUCOSE BLDC GLUCOMTR-MCNC: 115 MG/DL (ref 70–99)
GLUCOSE SERPL-MCNC: 113 MG/DL (ref 65–99)
HCT VFR BLD AUTO: 28.5 % (ref 34–46.6)
HGB BLD-MCNC: 9.2 G/DL (ref 12–15.9)
IMM GRANULOCYTES # BLD AUTO: 0.05 10*3/MM3 (ref 0–0.05)
IMM GRANULOCYTES NFR BLD AUTO: 0.5 % (ref 0–0.5)
LEFT ATRIUM VOLUME INDEX: 19.2 ML/M2
LYMPHOCYTES # BLD AUTO: 1.77 10*3/MM3 (ref 0.7–3.1)
LYMPHOCYTES NFR BLD AUTO: 17.3 % (ref 19.6–45.3)
MAGNESIUM SERPL-MCNC: 1.6 MG/DL (ref 1.6–2.4)
MCH RBC QN AUTO: 28.7 PG (ref 26.6–33)
MCHC RBC AUTO-ENTMCNC: 32.3 G/DL (ref 31.5–35.7)
MCV RBC AUTO: 88.8 FL (ref 79–97)
MONOCYTES # BLD AUTO: 1.02 10*3/MM3 (ref 0.1–0.9)
MONOCYTES NFR BLD AUTO: 9.9 % (ref 5–12)
MRSA DNA SPEC QL NAA+PROBE: NORMAL
NEUTROPHILS NFR BLD AUTO: 69.9 % (ref 42.7–76)
NEUTROPHILS NFR BLD AUTO: 7.17 10*3/MM3 (ref 1.7–7)
NRBC BLD AUTO-RTO: 0 /100 WBC (ref 0–0.2)
PLATELET # BLD AUTO: 268 10*3/MM3 (ref 140–450)
PMV BLD AUTO: 10.3 FL (ref 6–12)
POTASSIUM SERPL-SCNC: 3.2 MMOL/L (ref 3.5–5.2)
RBC # BLD AUTO: 3.21 10*6/MM3 (ref 3.77–5.28)
SODIUM SERPL-SCNC: 134 MMOL/L (ref 136–145)
WBC NRBC COR # BLD AUTO: 10.26 10*3/MM3 (ref 3.4–10.8)

## 2024-10-09 PROCEDURE — 25810000003 SODIUM CHLORIDE 0.9 % SOLUTION: Performed by: STUDENT IN AN ORGANIZED HEALTH CARE EDUCATION/TRAINING PROGRAM

## 2024-10-09 PROCEDURE — 99232 SBSQ HOSP IP/OBS MODERATE 35: CPT | Performed by: STUDENT IN AN ORGANIZED HEALTH CARE EDUCATION/TRAINING PROGRAM

## 2024-10-09 PROCEDURE — 82948 REAGENT STRIP/BLOOD GLUCOSE: CPT

## 2024-10-09 PROCEDURE — 25010000002 CEFAZOLIN PER 500 MG: Performed by: STUDENT IN AN ORGANIZED HEALTH CARE EDUCATION/TRAINING PROGRAM

## 2024-10-09 PROCEDURE — 94799 UNLISTED PULMONARY SVC/PX: CPT

## 2024-10-09 PROCEDURE — 25010000002 VANCOMYCIN 5 G RECONSTITUTED SOLUTION: Performed by: STUDENT IN AN ORGANIZED HEALTH CARE EDUCATION/TRAINING PROGRAM

## 2024-10-09 PROCEDURE — 93306 TTE W/DOPPLER COMPLETE: CPT | Performed by: INTERNAL MEDICINE

## 2024-10-09 PROCEDURE — 87641 MR-STAPH DNA AMP PROBE: CPT | Performed by: STUDENT IN AN ORGANIZED HEALTH CARE EDUCATION/TRAINING PROGRAM

## 2024-10-09 PROCEDURE — 85025 COMPLETE CBC W/AUTO DIFF WBC: CPT | Performed by: STUDENT IN AN ORGANIZED HEALTH CARE EDUCATION/TRAINING PROGRAM

## 2024-10-09 PROCEDURE — 83735 ASSAY OF MAGNESIUM: CPT | Performed by: STUDENT IN AN ORGANIZED HEALTH CARE EDUCATION/TRAINING PROGRAM

## 2024-10-09 PROCEDURE — 93306 TTE W/DOPPLER COMPLETE: CPT

## 2024-10-09 PROCEDURE — 25010000002 HEPARIN (PORCINE) PER 1000 UNITS: Performed by: STUDENT IN AN ORGANIZED HEALTH CARE EDUCATION/TRAINING PROGRAM

## 2024-10-09 PROCEDURE — 80048 BASIC METABOLIC PNL TOTAL CA: CPT | Performed by: STUDENT IN AN ORGANIZED HEALTH CARE EDUCATION/TRAINING PROGRAM

## 2024-10-09 PROCEDURE — 87040 BLOOD CULTURE FOR BACTERIA: CPT | Performed by: STUDENT IN AN ORGANIZED HEALTH CARE EDUCATION/TRAINING PROGRAM

## 2024-10-09 RX ORDER — POTASSIUM CHLORIDE 750 MG/1
40 CAPSULE, EXTENDED RELEASE ORAL ONCE
Status: COMPLETED | OUTPATIENT
Start: 2024-10-09 | End: 2024-10-09

## 2024-10-09 RX ADMIN — PANTOPRAZOLE SODIUM 40 MG: 40 TABLET, DELAYED RELEASE ORAL at 08:13

## 2024-10-09 RX ADMIN — ASPIRIN 81 MG: 81 TABLET, COATED ORAL at 08:13

## 2024-10-09 RX ADMIN — MONTELUKAST 10 MG: 10 TABLET, FILM COATED ORAL at 21:05

## 2024-10-09 RX ADMIN — SODIUM CHLORIDE 2000 MG: 9 INJECTION, SOLUTION INTRAVENOUS at 16:14

## 2024-10-09 RX ADMIN — Medication 400 MG: at 08:13

## 2024-10-09 RX ADMIN — CARVEDILOL 25 MG: 25 TABLET, FILM COATED ORAL at 08:13

## 2024-10-09 RX ADMIN — ROPINIROLE HYDROCHLORIDE 0.25 MG: 0.25 TABLET, FILM COATED ORAL at 21:05

## 2024-10-09 RX ADMIN — ARFORMOTEROL TARTRATE 15 MCG: 15 SOLUTION RESPIRATORY (INHALATION) at 21:30

## 2024-10-09 RX ADMIN — AZITHROMYCIN DIHYDRATE 500 MG: 250 TABLET ORAL at 08:13

## 2024-10-09 RX ADMIN — HEPARIN SODIUM 5000 UNITS: 5000 INJECTION INTRAVENOUS; SUBCUTANEOUS at 14:08

## 2024-10-09 RX ADMIN — IPRATROPIUM BROMIDE AND ALBUTEROL SULFATE 3 ML: .5; 3 SOLUTION RESPIRATORY (INHALATION) at 14:25

## 2024-10-09 RX ADMIN — HEPARIN SODIUM 5000 UNITS: 5000 INJECTION INTRAVENOUS; SUBCUTANEOUS at 05:47

## 2024-10-09 RX ADMIN — BUDESONIDE 0.5 MG: 0.5 INHALANT RESPIRATORY (INHALATION) at 21:30

## 2024-10-09 RX ADMIN — SODIUM CHLORIDE 2000 MG: 9 INJECTION, SOLUTION INTRAVENOUS at 21:05

## 2024-10-09 RX ADMIN — VANCOMYCIN HYDROCHLORIDE 2000 MG: 5 INJECTION, POWDER, LYOPHILIZED, FOR SOLUTION INTRAVENOUS at 01:14

## 2024-10-09 RX ADMIN — POTASSIUM CHLORIDE 40 MEQ: 750 CAPSULE, EXTENDED RELEASE ORAL at 08:13

## 2024-10-09 RX ADMIN — CARVEDILOL 25 MG: 25 TABLET, FILM COATED ORAL at 17:39

## 2024-10-09 RX ADMIN — Medication 10 ML: at 08:16

## 2024-10-09 RX ADMIN — AMLODIPINE BESYLATE 5 MG: 5 TABLET ORAL at 08:13

## 2024-10-09 RX ADMIN — HEPARIN SODIUM 5000 UNITS: 5000 INJECTION INTRAVENOUS; SUBCUTANEOUS at 21:05

## 2024-10-09 RX ADMIN — SERTRALINE HYDROCHLORIDE 50 MG: 50 TABLET ORAL at 08:13

## 2024-10-09 RX ADMIN — Medication 10 ML: at 21:06

## 2024-10-09 RX ADMIN — ATORVASTATIN CALCIUM 40 MG: 40 TABLET, FILM COATED ORAL at 08:13

## 2024-10-09 NOTE — PLAN OF CARE
Goal Outcome Evaluation:  Plan of Care Reviewed With: patient        Progress: improving  Outcome Evaluation: No acute changes this shift, per patient rested well. Cont. 2L NC, able to make needs known, no s/s of distress or discomfort at this time. In bed with call light within reach, bed in lowest position.Mirian Aparicio RN

## 2024-10-09 NOTE — PROGRESS NOTES
"[facility] Clinical Pharmacy Services: Vancomycin Pharmacokinetic Initial Consult Note    Mallory Franco is a 84 y.o. female who is on day 1 of pharmacy to dose vancomycin for 1/2 CoNS Bacteremia.    Consult Information  Consulting Provider: Elsa  Planned Duration of Therapy: 7 days per consult (TBD)  Was Patient Receiving Prior to Admission/Consult?: No  Loading Dose Ordered or Given: 2000 mg on 10/9 at 0114  PK/PD Target: -600 mg/L.hr   Relevant ID History: permanent pacemaker and surgical clips along the right breast, bilateral TKA; admitted for PNA  Other Antimicrobials: Azithromycin and Ceftriaxone    Imaging Reviewed?: Yes  10/7 CXR: dense airspace opacity in the RUL which may reflect PNA    Microbiology Data  MRSA PCR performed: 10/09/24; Result: Negative  Culture/Source:   10/9 MRSA PCR: negative  10/7 Blood cx 2: CoNS  10/7 Covid PCR: negative    Vitals/Labs  Ht: 162.6 cm (64\"); Wt: 91 kg (200 lb 9.9 oz)  Temp (24hrs), Av.5 °F (36.9 °C), Min:97.7 °F (36.5 °C), Max:99.1 °F (37.3 °C)   Estimated Creatinine Clearance: 49.4 mL/min (by C-G formula based on SCr of 0.91 mg/dL).     Results from last 7 days   Lab Units 10/09/24  0442 10/08/24  0429 10/07/24  1842   CREATININE mg/dL 0.91 0.90 0.88   WBC 10*3/mm3 10.26 14.50* 15.77*     Assessment/Plan:  Vancomycin Dose:  1500 mg IV every 24 hours; which provides the following predicted parameters:  AUC24,ss: 537 mg/L.hr  Probability of AUC24 > 400: 80 %  Ctrough,ss: 16.2 mg/L  Probability of Ctrough,ss > 20: 32 %  Probability of nephrotoxicity (Lodise HIMA ): 12 %  Vanc Random ordered for 10/10 at 0600 with AM labs prior to third dose  Patient has order for Basic Metabolic Panel    Pharmacy will follow patient's kidney function and will adjust doses and obtain levels as necessary. Thank you for involving pharmacy in this patient's care. Please contact pharmacy with any questions or concerns.                           Alessandra Todd RPH  Clinical " Pharmacist

## 2024-10-09 NOTE — PROGRESS NOTES
[facility]   Hospitalist Progress Note  Date: 10/9/2024  Patient Name: Mallory Franco  : 1940  MRN: [id number]  Date of admission: 10/7/2024      Subjective   Subjective     Chief Complaint: Shortness of breath    Summary: 84-year-old female past medical history of hypertension, CAD status post PCI, sick sinus syndrome status post PPM, hyperlipidemia, COPD, restless leg syndrome presented to the ER due to nausea vomiting diarrhea cough and shortness of breath. Upon arrival she was found to have a productive cough as well as some hypoxia down to 88% on room air. Chest x-ray showed right upper lobe opacity concerning for pneumonia patient was started on Rocephin azithromycin and IV fluids subsequently admitted to the hospital service patient clinically improving from a pneumonia standpoint but has positive blood culture for staph species that is not aureus on 10/09/2024.  Given 1 dose of vancomycin.    Interval Followup: No events overnight.  Patient remains afebrile.  His off oxygen even when ambulating without issues.  No longer having diarrhea.  Denies having any shortness of breath, chest pain or palpitations.        Objective   Objective     Vitals:   Temp:  [97.7 °F (36.5 °C)-99 °F (37.2 °C)] 98.2 °F (36.8 °C)  Heart Rate:  [59-73] 59  Resp:  [16-18] 18  BP: (118-164)/(51-73) 118/51  Flow (L/min):  [2] 2  Physical Exam    Constitutional: Awake, alert, no acute distress   Respiratory: Clear to auscultation bilaterally, nonlabored respirations    Cardiovascular: RRR, no murmurs, rubs, or gallops, palpable pedal pulses bilaterally   Gastrointestinal: Positive bowel sounds, soft, nontender, nondistended   Neurologic: Oriented x 3, strength symmetric in all extremities, Cranial Nerves grossly intact to confrontation, speech clear    Result Review    Result Review:  I have personally reviewed the following results and agree with these findings:  [x]  Laboratory  [x]  Microbiology  [x]  Radiology  [x]   EKG/Telemetry   [x]  Cardiology/Vascular   []  Pathology  [x]  Old records  []  Other:    Assessment & Plan   Assessment / Plan     Assessment/Plan:  Acute hypoxic respiratory failure secondary to community-acquired pneumonia  Right upper lobe community-acquired pneumonia  Staph species bacteremia, likely contaminant  Leukocytosis likely reactive to above pneumonia, resolved  Hypokalemia  Hypomagnesemia  CAD status post PCI  Status post permanent pacemaker  Hyperlipidemia  Restless leg syndrome  COPD not in acute exacerbation    Plan  -Pneumonia significantly improved.  Continue antibiotics for total 5 days.  Continue bronchopulmonary hygiene while hospitalized  - Continue to replace potassium and magnesium which are low again.  Holding home HCTZ as a result  - Diarrhea resolved, no need to perform testing for C. difficile for enteric panel.  - Bacteremia seems most likely contaminant however patient has indwelling permanent pacemaker and thus we will repeat culture today and await for clearance before discharging.  MRSA nares is negative and can continue with Rocephin alone for therapy  - Continue home Antivert tensive  Continue home aspirin and statin for CAD as well as Coreg 25 mg  - Continue telemetry monitoring  - Continue Requip  - Continue to monitor daily CBC and BMP     Discussed plan with RN.    Dispo: Patient remains admitted, pneumonia improving.  Home with home health stable, case management following    VTE Prophylaxis:  Pharmacologic VTE prophylaxis orders are present.        CODE STATUS:   Level Of Support Discussed With: Patient  Code Status (Patient has no pulse and is not breathing): CPR (Attempt to Resuscitate)  Medical Interventions (Patient has pulse or is breathing): Full Support      Addendum: Blood cultures corrected to be MSSA.  Started on cefazolin.  Echocardiogram ordered.    Electronically signed by Abilio Puckett MD, 10/09/24, 10:20 AM EDT.

## 2024-10-09 NOTE — PLAN OF CARE
Goal Outcome Evaluation:         VSS. No complaints of pain or nausea. Stand by assist. Alert and oriented. Call light and table within reach. No concerns per patient at this time.

## 2024-10-10 PROBLEM — Z95.0 PRESENCE OF CARDIAC PACEMAKER: Status: ACTIVE | Noted: 2024-10-10

## 2024-10-10 PROBLEM — I25.10 CORONARY ARTERY DISEASE INVOLVING NATIVE CORONARY ARTERY OF NATIVE HEART WITHOUT ANGINA PECTORIS: Status: ACTIVE | Noted: 2024-10-10

## 2024-10-10 PROBLEM — R78.81 BACTEREMIA DUE TO METHICILLIN RESISTANT STAPHYLOCOCCUS AUREUS: Status: ACTIVE | Noted: 2024-10-10

## 2024-10-10 PROBLEM — B95.62 BACTEREMIA DUE TO METHICILLIN RESISTANT STAPHYLOCOCCUS AUREUS: Status: ACTIVE | Noted: 2024-10-10

## 2024-10-10 LAB
ANION GAP SERPL CALCULATED.3IONS-SCNC: 11.6 MMOL/L (ref 5–15)
BASOPHILS # BLD AUTO: 0.02 10*3/MM3 (ref 0–0.2)
BASOPHILS NFR BLD AUTO: 0.2 % (ref 0–1.5)
BUN SERPL-MCNC: 18 MG/DL (ref 8–23)
BUN/CREAT SERPL: 19.4 (ref 7–25)
CALCIUM SPEC-SCNC: 9.2 MG/DL (ref 8.6–10.5)
CHLORIDE SERPL-SCNC: 101 MMOL/L (ref 98–107)
CO2 SERPL-SCNC: 23.4 MMOL/L (ref 22–29)
CREAT SERPL-MCNC: 0.93 MG/DL (ref 0.57–1)
DEPRECATED RDW RBC AUTO: 46.7 FL (ref 37–54)
EGFRCR SERPLBLD CKD-EPI 2021: 60.4 ML/MIN/1.73
EOSINOPHIL # BLD AUTO: 0.25 10*3/MM3 (ref 0–0.4)
EOSINOPHIL NFR BLD AUTO: 2.8 % (ref 0.3–6.2)
ERYTHROCYTE [DISTWIDTH] IN BLOOD BY AUTOMATED COUNT: 14.6 % (ref 12.3–15.4)
GLUCOSE SERPL-MCNC: 102 MG/DL (ref 65–99)
HCT VFR BLD AUTO: 27.9 % (ref 34–46.6)
HGB BLD-MCNC: 8.9 G/DL (ref 12–15.9)
IMM GRANULOCYTES # BLD AUTO: 0.04 10*3/MM3 (ref 0–0.05)
IMM GRANULOCYTES NFR BLD AUTO: 0.5 % (ref 0–0.5)
LYMPHOCYTES # BLD AUTO: 1.68 10*3/MM3 (ref 0.7–3.1)
LYMPHOCYTES NFR BLD AUTO: 18.9 % (ref 19.6–45.3)
MAGNESIUM SERPL-MCNC: 1.5 MG/DL (ref 1.6–2.4)
MCH RBC QN AUTO: 28.4 PG (ref 26.6–33)
MCHC RBC AUTO-ENTMCNC: 31.9 G/DL (ref 31.5–35.7)
MCV RBC AUTO: 89.1 FL (ref 79–97)
MONOCYTES # BLD AUTO: 0.92 10*3/MM3 (ref 0.1–0.9)
MONOCYTES NFR BLD AUTO: 10.4 % (ref 5–12)
NEUTROPHILS NFR BLD AUTO: 5.97 10*3/MM3 (ref 1.7–7)
NEUTROPHILS NFR BLD AUTO: 67.2 % (ref 42.7–76)
NRBC BLD AUTO-RTO: 0 /100 WBC (ref 0–0.2)
PLATELET # BLD AUTO: 277 10*3/MM3 (ref 140–450)
PMV BLD AUTO: 10.2 FL (ref 6–12)
POTASSIUM SERPL-SCNC: 3.6 MMOL/L (ref 3.5–5.2)
RBC # BLD AUTO: 3.13 10*6/MM3 (ref 3.77–5.28)
SODIUM SERPL-SCNC: 136 MMOL/L (ref 136–145)
WBC NRBC COR # BLD AUTO: 8.88 10*3/MM3 (ref 3.4–10.8)

## 2024-10-10 PROCEDURE — 80048 BASIC METABOLIC PNL TOTAL CA: CPT | Performed by: STUDENT IN AN ORGANIZED HEALTH CARE EDUCATION/TRAINING PROGRAM

## 2024-10-10 PROCEDURE — 94799 UNLISTED PULMONARY SVC/PX: CPT

## 2024-10-10 PROCEDURE — 94761 N-INVAS EAR/PLS OXIMETRY MLT: CPT

## 2024-10-10 PROCEDURE — 25010000002 PROCHLORPERAZINE 10 MG/2ML SOLUTION: Performed by: STUDENT IN AN ORGANIZED HEALTH CARE EDUCATION/TRAINING PROGRAM

## 2024-10-10 PROCEDURE — 99232 SBSQ HOSP IP/OBS MODERATE 35: CPT | Performed by: STUDENT IN AN ORGANIZED HEALTH CARE EDUCATION/TRAINING PROGRAM

## 2024-10-10 PROCEDURE — 85025 COMPLETE CBC W/AUTO DIFF WBC: CPT | Performed by: STUDENT IN AN ORGANIZED HEALTH CARE EDUCATION/TRAINING PROGRAM

## 2024-10-10 PROCEDURE — 94664 DEMO&/EVAL PT USE INHALER: CPT

## 2024-10-10 PROCEDURE — 25010000002 HEPARIN (PORCINE) PER 1000 UNITS: Performed by: STUDENT IN AN ORGANIZED HEALTH CARE EDUCATION/TRAINING PROGRAM

## 2024-10-10 PROCEDURE — 25010000002 CEFAZOLIN PER 500 MG: Performed by: STUDENT IN AN ORGANIZED HEALTH CARE EDUCATION/TRAINING PROGRAM

## 2024-10-10 PROCEDURE — 83735 ASSAY OF MAGNESIUM: CPT | Performed by: STUDENT IN AN ORGANIZED HEALTH CARE EDUCATION/TRAINING PROGRAM

## 2024-10-10 RX ORDER — POLYETHYLENE GLYCOL 3350 17 G/17G
17 POWDER, FOR SOLUTION ORAL DAILY PRN
Status: DISCONTINUED | OUTPATIENT
Start: 2024-10-10 | End: 2024-10-12 | Stop reason: HOSPADM

## 2024-10-10 RX ORDER — PROCHLORPERAZINE EDISYLATE 5 MG/ML
5 INJECTION INTRAMUSCULAR; INTRAVENOUS EVERY 6 HOURS PRN
Status: DISCONTINUED | OUTPATIENT
Start: 2024-10-10 | End: 2024-10-12 | Stop reason: HOSPADM

## 2024-10-10 RX ORDER — AMOXICILLIN 250 MG
1 CAPSULE ORAL 2 TIMES DAILY PRN
Status: DISCONTINUED | OUTPATIENT
Start: 2024-10-10 | End: 2024-10-12 | Stop reason: HOSPADM

## 2024-10-10 RX ADMIN — SENNOSIDES AND DOCUSATE SODIUM 1 TABLET: 50; 8.6 TABLET ORAL at 11:05

## 2024-10-10 RX ADMIN — BUDESONIDE 0.5 MG: 0.5 INHALANT RESPIRATORY (INHALATION) at 18:59

## 2024-10-10 RX ADMIN — Medication 10 ML: at 21:38

## 2024-10-10 RX ADMIN — ARFORMOTEROL TARTRATE 15 MCG: 15 SOLUTION RESPIRATORY (INHALATION) at 18:58

## 2024-10-10 RX ADMIN — HEPARIN SODIUM 5000 UNITS: 5000 INJECTION INTRAVENOUS; SUBCUTANEOUS at 21:25

## 2024-10-10 RX ADMIN — CARVEDILOL 25 MG: 25 TABLET, FILM COATED ORAL at 17:38

## 2024-10-10 RX ADMIN — CARVEDILOL 25 MG: 25 TABLET, FILM COATED ORAL at 08:04

## 2024-10-10 RX ADMIN — PROCHLORPERAZINE EDISYLATE 5 MG: 5 INJECTION INTRAMUSCULAR; INTRAVENOUS at 08:04

## 2024-10-10 RX ADMIN — HEPARIN SODIUM 5000 UNITS: 5000 INJECTION INTRAVENOUS; SUBCUTANEOUS at 15:10

## 2024-10-10 RX ADMIN — SERTRALINE HYDROCHLORIDE 50 MG: 50 TABLET ORAL at 08:03

## 2024-10-10 RX ADMIN — SODIUM CHLORIDE 2000 MG: 9 INJECTION, SOLUTION INTRAVENOUS at 05:00

## 2024-10-10 RX ADMIN — MONTELUKAST 10 MG: 10 TABLET, FILM COATED ORAL at 21:25

## 2024-10-10 RX ADMIN — Medication 10 ML: at 08:04

## 2024-10-10 RX ADMIN — SODIUM CHLORIDE 2000 MG: 9 INJECTION, SOLUTION INTRAVENOUS at 15:10

## 2024-10-10 RX ADMIN — ROPINIROLE HYDROCHLORIDE 0.25 MG: 0.25 TABLET, FILM COATED ORAL at 21:25

## 2024-10-10 RX ADMIN — ASPIRIN 81 MG: 81 TABLET, COATED ORAL at 08:03

## 2024-10-10 RX ADMIN — AMLODIPINE BESYLATE 5 MG: 5 TABLET ORAL at 08:03

## 2024-10-10 RX ADMIN — HEPARIN SODIUM 5000 UNITS: 5000 INJECTION INTRAVENOUS; SUBCUTANEOUS at 05:00

## 2024-10-10 RX ADMIN — ATORVASTATIN CALCIUM 40 MG: 40 TABLET, FILM COATED ORAL at 08:03

## 2024-10-10 RX ADMIN — BUDESONIDE 0.5 MG: 0.5 INHALANT RESPIRATORY (INHALATION) at 09:26

## 2024-10-10 RX ADMIN — ARFORMOTEROL TARTRATE 15 MCG: 15 SOLUTION RESPIRATORY (INHALATION) at 09:26

## 2024-10-10 RX ADMIN — PANTOPRAZOLE SODIUM 40 MG: 40 TABLET, DELAYED RELEASE ORAL at 08:03

## 2024-10-10 RX ADMIN — SODIUM CHLORIDE 2000 MG: 9 INJECTION, SOLUTION INTRAVENOUS at 21:25

## 2024-10-10 RX ADMIN — Medication 400 MG: at 11:05

## 2024-10-10 NOTE — PLAN OF CARE
Goal Outcome Evaluation:         VSS. Able to make needs known. No complaints of pain. Complaints of nausea treated with PRN medicine. See MAR. Up to chair. Call light and table within reach. No concerns per patient at this time.

## 2024-10-10 NOTE — PLAN OF CARE
Goal Outcome Evaluation:              Outcome Evaluation: No changes this shift, on RA. No c/o pain

## 2024-10-10 NOTE — PROGRESS NOTES
[facility]   Hospitalist Progress Note  Date: 10/10/2024  Patient Name: Mallory Franco  : 1940  MRN: [id number]  Date of admission: 10/7/2024      Subjective   Subjective     Chief Complaint: Shortness of breath    Summary: 84-year-old female past medical history of hypertension, CAD status post PCI, sick sinus syndrome status post PPM, hyperlipidemia, COPD, restless leg syndrome presented to the ER due to nausea vomiting diarrhea cough and shortness of breath. Upon arrival she was found to have a productive cough as well as some hypoxia down to 88% on room air. Chest x-ray showed right upper lobe opacity concerning for pneumonia patient was started on Rocephin azithromycin and IV fluids subsequently admitted to the hospital service patient clinically improving from a pneumonia standpoint but naylor had positive blood culture with Staph aureus on 10/09/2024.  Repeat cultures were ordered and the TTE ordered as well.    Interval Followup: No events overnight.  Patient remains afebrile.  Shortness of breath is resolved.  Denies having any chest pain palpitations or pleurisy.  No pain at the pacemaker site        Objective   Objective     Vitals:   Temp:  [98.1 °F (36.7 °C)-99.9 °F (37.7 °C)] 99.7 °F (37.6 °C)  Heart Rate:  [59-68] 66  Resp:  [16-18] 16  BP: (114-152)/(51-71) 150/61  Physical Exam    Constitutional: Awake, alert, no acute distress   Respiratory: Clear to auscultation bilaterally, nonlabored respirations    Cardiovascular: RRR, no murmurs, rubs, or gallops, palpable pedal pulses bilaterally.  Pacemaker socket clean dry and intact without any erythema or palpable fluid collection   Gastrointestinal: Positive bowel sounds, soft, nontender, nondistended   Neurologic: Oriented x 3, strength symmetric in all extremities, Cranial Nerves grossly intact to confrontation, speech clear    Result Review    Result Review:  I have personally reviewed the following results and agree with these findings:  [x]   Laboratory  [x]  Microbiology  [x]  Radiology  [x]  EKG/Telemetry   [x]  Cardiology/Vascular   []  Pathology  [x]  Old records  []  Other:    Assessment & Plan   Assessment / Plan     Assessment/Plan:  Acute hypoxic respiratory failure secondary to community-acquired pneumonia  Right upper lobe community-acquired pneumonia  Staph aureus bacteremia (not aureus species was initially reported and corrected on 10/9/2024)  Leukocytosis likely reactive to above pneumonia, resolved  Hypokalemia  Hypomagnesemia  CAD status post PCI  Status post permanent pacemaker  Hyperlipidemia  Restless leg syndrome  COPD not in acute exacerbation  Osteoarthritis status post bilateral TKA    Plan  -Pneumonia significantly improved.  Continue antibiotics for total 5 days.  Continue bronchopulmonary hygiene while hospitalized  - Continue to replace potassium and magnesium.  Will start daily magnesium oxide tablet.  Holding home HCTZ as a result  - Diarrhea resolved, now she is constipated, have started bowel regimen  - Bacteremia working up with a repeat culture that was drawn on 10/9/2024.  TTE reviewed and did not show any vegetations.  Patient does not have any overt signs of endocarditis on exam.  Will get cardiac consultation to assess if patient would need EPI or evaluation of the pacemaker site.  We will continue cefazolin for MSSA  - Continue home aspirin and statin for CAD as well as Coreg 25 mg  - Continue telemetry monitoring  - Continue Requip  - Continue to monitor daily CBC and BMP     Discussed plan with RN.    Dispo: Patient remains admitted, pneumonia seems to have resolved.  Now working up Staph aureus bacteremia.  Home health care discharge when stable    VTE Prophylaxis:  Pharmacologic VTE prophylaxis orders are present.        CODE STATUS:   Level Of Support Discussed With: Patient  Code Status (Patient has no pulse and is not breathing): CPR (Attempt to Resuscitate)  Medical Interventions (Patient has pulse or is  breathing): Full Support      Addendum: Blood cultures corrected to be MSSA.  Started on cefazolin.  Echocardiogram ordered.    Electronically signed by Abilio Puckett MD, 10/09/24, 10:20 AM EDT.

## 2024-10-11 ENCOUNTER — APPOINTMENT (OUTPATIENT)
Dept: CARDIOLOGY | Facility: HOSPITAL | Age: 84
DRG: 193 | End: 2024-10-11
Payer: MEDICARE

## 2024-10-11 VITALS
BODY MASS INDEX: 34.25 KG/M2 | TEMPERATURE: 98.1 F | DIASTOLIC BLOOD PRESSURE: 63 MMHG | SYSTOLIC BLOOD PRESSURE: 121 MMHG | WEIGHT: 200.62 LBS | HEIGHT: 64 IN | OXYGEN SATURATION: 95 % | HEART RATE: 74 BPM | RESPIRATION RATE: 16 BRPM

## 2024-10-11 LAB
ANION GAP SERPL CALCULATED.3IONS-SCNC: 9.8 MMOL/L (ref 5–15)
BACTERIA SPEC AEROBE CULT: ABNORMAL
BASOPHILS # BLD AUTO: 0.02 10*3/MM3 (ref 0–0.2)
BASOPHILS NFR BLD AUTO: 0.3 % (ref 0–1.5)
BUN SERPL-MCNC: 14 MG/DL (ref 8–23)
BUN/CREAT SERPL: 17.7 (ref 7–25)
CALCIUM SPEC-SCNC: 9 MG/DL (ref 8.6–10.5)
CHLORIDE SERPL-SCNC: 100 MMOL/L (ref 98–107)
CO2 SERPL-SCNC: 26.2 MMOL/L (ref 22–29)
CREAT SERPL-MCNC: 0.79 MG/DL (ref 0.57–1)
DEPRECATED RDW RBC AUTO: 47 FL (ref 37–54)
EGFRCR SERPLBLD CKD-EPI 2021: 73.4 ML/MIN/1.73
EOSINOPHIL # BLD AUTO: 0.25 10*3/MM3 (ref 0–0.4)
EOSINOPHIL NFR BLD AUTO: 3.4 % (ref 0.3–6.2)
ERYTHROCYTE [DISTWIDTH] IN BLOOD BY AUTOMATED COUNT: 14.6 % (ref 12.3–15.4)
GLUCOSE SERPL-MCNC: 109 MG/DL (ref 65–99)
GRAM STN SPEC: ABNORMAL
HCT VFR BLD AUTO: 26.6 % (ref 34–46.6)
HGB BLD-MCNC: 8.5 G/DL (ref 12–15.9)
IMM GRANULOCYTES # BLD AUTO: 0.05 10*3/MM3 (ref 0–0.05)
IMM GRANULOCYTES NFR BLD AUTO: 0.7 % (ref 0–0.5)
ISOLATED FROM: ABNORMAL
LYMPHOCYTES # BLD AUTO: 1.92 10*3/MM3 (ref 0.7–3.1)
LYMPHOCYTES NFR BLD AUTO: 25.8 % (ref 19.6–45.3)
MAGNESIUM SERPL-MCNC: 1.4 MG/DL (ref 1.6–2.4)
MCH RBC QN AUTO: 28.2 PG (ref 26.6–33)
MCHC RBC AUTO-ENTMCNC: 32 G/DL (ref 31.5–35.7)
MCV RBC AUTO: 88.4 FL (ref 79–97)
MONOCYTES # BLD AUTO: 0.84 10*3/MM3 (ref 0.1–0.9)
MONOCYTES NFR BLD AUTO: 11.3 % (ref 5–12)
NEUTROPHILS NFR BLD AUTO: 4.35 10*3/MM3 (ref 1.7–7)
NEUTROPHILS NFR BLD AUTO: 58.5 % (ref 42.7–76)
NRBC BLD AUTO-RTO: 0 /100 WBC (ref 0–0.2)
PLATELET # BLD AUTO: 282 10*3/MM3 (ref 140–450)
PMV BLD AUTO: 10.1 FL (ref 6–12)
POTASSIUM SERPL-SCNC: 3.2 MMOL/L (ref 3.5–5.2)
RBC # BLD AUTO: 3.01 10*6/MM3 (ref 3.77–5.28)
SODIUM SERPL-SCNC: 136 MMOL/L (ref 136–145)
WBC NRBC COR # BLD AUTO: 7.43 10*3/MM3 (ref 3.4–10.8)

## 2024-10-11 PROCEDURE — 93325 DOPPLER ECHO COLOR FLOW MAPG: CPT | Performed by: SPECIALIST

## 2024-10-11 PROCEDURE — 25010000002 HEPARIN (PORCINE) PER 1000 UNITS: Performed by: STUDENT IN AN ORGANIZED HEALTH CARE EDUCATION/TRAINING PROGRAM

## 2024-10-11 PROCEDURE — 94799 UNLISTED PULMONARY SVC/PX: CPT

## 2024-10-11 PROCEDURE — 25010000002 MIDAZOLAM PER 1MG: Performed by: SPECIALIST

## 2024-10-11 PROCEDURE — 25010000002 MAGNESIUM SULFATE 2 GM/50ML SOLUTION: Performed by: STUDENT IN AN ORGANIZED HEALTH CARE EDUCATION/TRAINING PROGRAM

## 2024-10-11 PROCEDURE — 97110 THERAPEUTIC EXERCISES: CPT

## 2024-10-11 PROCEDURE — 93312 ECHO TRANSESOPHAGEAL: CPT

## 2024-10-11 PROCEDURE — 93320 DOPPLER ECHO COMPLETE: CPT

## 2024-10-11 PROCEDURE — 94761 N-INVAS EAR/PLS OXIMETRY MLT: CPT

## 2024-10-11 PROCEDURE — 99152 MOD SED SAME PHYS/QHP 5/>YRS: CPT

## 2024-10-11 PROCEDURE — 99239 HOSP IP/OBS DSCHRG MGMT >30: CPT | Performed by: STUDENT IN AN ORGANIZED HEALTH CARE EDUCATION/TRAINING PROGRAM

## 2024-10-11 PROCEDURE — 83735 ASSAY OF MAGNESIUM: CPT | Performed by: STUDENT IN AN ORGANIZED HEALTH CARE EDUCATION/TRAINING PROGRAM

## 2024-10-11 PROCEDURE — 99232 SBSQ HOSP IP/OBS MODERATE 35: CPT | Performed by: SPECIALIST

## 2024-10-11 PROCEDURE — 25010000002 CEFAZOLIN PER 500 MG: Performed by: STUDENT IN AN ORGANIZED HEALTH CARE EDUCATION/TRAINING PROGRAM

## 2024-10-11 PROCEDURE — 93325 DOPPLER ECHO COLOR FLOW MAPG: CPT

## 2024-10-11 PROCEDURE — 93312 ECHO TRANSESOPHAGEAL: CPT | Performed by: SPECIALIST

## 2024-10-11 PROCEDURE — 80048 BASIC METABOLIC PNL TOTAL CA: CPT | Performed by: STUDENT IN AN ORGANIZED HEALTH CARE EDUCATION/TRAINING PROGRAM

## 2024-10-11 PROCEDURE — 94664 DEMO&/EVAL PT USE INHALER: CPT

## 2024-10-11 PROCEDURE — 85025 COMPLETE CBC W/AUTO DIFF WBC: CPT | Performed by: STUDENT IN AN ORGANIZED HEALTH CARE EDUCATION/TRAINING PROGRAM

## 2024-10-11 PROCEDURE — 25010000002 POTASSIUM CHLORIDE 10 MEQ/100ML SOLUTION: Performed by: STUDENT IN AN ORGANIZED HEALTH CARE EDUCATION/TRAINING PROGRAM

## 2024-10-11 RX ORDER — POLYETHYLENE GLYCOL 3350 17 G/17G
17 POWDER, FOR SOLUTION ORAL DAILY PRN
Start: 2024-10-11

## 2024-10-11 RX ORDER — HEPARIN SODIUM 5000 [USP'U]/ML
5000 INJECTION, SOLUTION INTRAVENOUS; SUBCUTANEOUS EVERY 8 HOURS SCHEDULED
Start: 2024-10-11 | End: 2024-10-18 | Stop reason: HOSPADM

## 2024-10-11 RX ORDER — DIPHENHYDRAMINE HYDROCHLORIDE 50 MG/ML
INJECTION INTRAMUSCULAR; INTRAVENOUS
Status: DISCONTINUED
Start: 2024-10-11 | End: 2024-10-11 | Stop reason: WASHOUT

## 2024-10-11 RX ORDER — MIDAZOLAM HYDROCHLORIDE 2 MG/2ML
INJECTION, SOLUTION INTRAMUSCULAR; INTRAVENOUS
Status: COMPLETED | OUTPATIENT
Start: 2024-10-11 | End: 2024-10-11

## 2024-10-11 RX ORDER — POTASSIUM CHLORIDE 7.45 MG/ML
10 INJECTION INTRAVENOUS
Status: COMPLETED | OUTPATIENT
Start: 2024-10-11 | End: 2024-10-11

## 2024-10-11 RX ORDER — AMOXICILLIN 250 MG
1 CAPSULE ORAL 2 TIMES DAILY PRN
Start: 2024-10-11

## 2024-10-11 RX ORDER — MIDAZOLAM HYDROCHLORIDE 2 MG/2ML
INJECTION, SOLUTION INTRAMUSCULAR; INTRAVENOUS
Status: DISPENSED
Start: 2024-10-11 | End: 2024-10-11

## 2024-10-11 RX ORDER — MAGNESIUM SULFATE HEPTAHYDRATE 40 MG/ML
2 INJECTION, SOLUTION INTRAVENOUS ONCE
Status: COMPLETED | OUTPATIENT
Start: 2024-10-11 | End: 2024-10-11

## 2024-10-11 RX ORDER — ACETAMINOPHEN 325 MG/1
650 TABLET ORAL EVERY 6 HOURS PRN
Status: DISCONTINUED | OUTPATIENT
Start: 2024-10-11 | End: 2024-10-12 | Stop reason: HOSPADM

## 2024-10-11 RX ORDER — MEPERIDINE HYDROCHLORIDE 25 MG/ML
INJECTION INTRAMUSCULAR; INTRAVENOUS; SUBCUTANEOUS
Status: DISCONTINUED
Start: 2024-10-11 | End: 2024-10-11 | Stop reason: WASHOUT

## 2024-10-11 RX ADMIN — ATORVASTATIN CALCIUM 40 MG: 40 TABLET, FILM COATED ORAL at 10:58

## 2024-10-11 RX ADMIN — Medication 400 MG: at 10:58

## 2024-10-11 RX ADMIN — ARFORMOTEROL TARTRATE 15 MCG: 15 SOLUTION RESPIRATORY (INHALATION) at 19:37

## 2024-10-11 RX ADMIN — HEPARIN SODIUM 5000 UNITS: 5000 INJECTION INTRAVENOUS; SUBCUTANEOUS at 14:37

## 2024-10-11 RX ADMIN — PANTOPRAZOLE SODIUM 40 MG: 40 TABLET, DELAYED RELEASE ORAL at 10:58

## 2024-10-11 RX ADMIN — SERTRALINE HYDROCHLORIDE 50 MG: 50 TABLET ORAL at 10:57

## 2024-10-11 RX ADMIN — BUDESONIDE 0.5 MG: 0.5 INHALANT RESPIRATORY (INHALATION) at 19:37

## 2024-10-11 RX ADMIN — ROPINIROLE HYDROCHLORIDE 0.25 MG: 0.25 TABLET, FILM COATED ORAL at 21:46

## 2024-10-11 RX ADMIN — HEPARIN SODIUM 5000 UNITS: 5000 INJECTION INTRAVENOUS; SUBCUTANEOUS at 21:46

## 2024-10-11 RX ADMIN — HEPARIN SODIUM 5000 UNITS: 5000 INJECTION INTRAVENOUS; SUBCUTANEOUS at 05:23

## 2024-10-11 RX ADMIN — CARVEDILOL 25 MG: 25 TABLET, FILM COATED ORAL at 10:57

## 2024-10-11 RX ADMIN — Medication 10 ML: at 10:58

## 2024-10-11 RX ADMIN — POTASSIUM CHLORIDE 10 MEQ: 7.45 INJECTION INTRAVENOUS at 09:09

## 2024-10-11 RX ADMIN — BUDESONIDE 0.5 MG: 0.5 INHALANT RESPIRATORY (INHALATION) at 10:28

## 2024-10-11 RX ADMIN — CARVEDILOL 25 MG: 25 TABLET, FILM COATED ORAL at 17:04

## 2024-10-11 RX ADMIN — MIDAZOLAM HYDROCHLORIDE 2 MG: 1 INJECTION, SOLUTION INTRAMUSCULAR; INTRAVENOUS at 08:40

## 2024-10-11 RX ADMIN — AMLODIPINE BESYLATE 5 MG: 5 TABLET ORAL at 10:58

## 2024-10-11 RX ADMIN — ASPIRIN 81 MG: 81 TABLET, COATED ORAL at 10:57

## 2024-10-11 RX ADMIN — TOPICAL ANESTHETIC 1 SPRAY: 200 SPRAY DENTAL; PERIODONTAL at 08:40

## 2024-10-11 RX ADMIN — POTASSIUM CHLORIDE 10 MEQ: 7.45 INJECTION INTRAVENOUS at 13:39

## 2024-10-11 RX ADMIN — ARFORMOTEROL TARTRATE 15 MCG: 15 SOLUTION RESPIRATORY (INHALATION) at 10:28

## 2024-10-11 RX ADMIN — SODIUM CHLORIDE 2000 MG: 9 INJECTION, SOLUTION INTRAVENOUS at 14:37

## 2024-10-11 RX ADMIN — SODIUM CHLORIDE 2000 MG: 9 INJECTION, SOLUTION INTRAVENOUS at 05:21

## 2024-10-11 RX ADMIN — Medication 10 ML: at 21:46

## 2024-10-11 RX ADMIN — POTASSIUM CHLORIDE 10 MEQ: 7.45 INJECTION INTRAVENOUS at 12:07

## 2024-10-11 RX ADMIN — MAGNESIUM SULFATE HEPTAHYDRATE 2 G: 40 INJECTION, SOLUTION INTRAVENOUS at 10:12

## 2024-10-11 RX ADMIN — MIDAZOLAM HYDROCHLORIDE 1 MG: 1 INJECTION, SOLUTION INTRAMUSCULAR; INTRAVENOUS at 08:45

## 2024-10-11 RX ADMIN — MONTELUKAST 10 MG: 10 TABLET, FILM COATED ORAL at 21:46

## 2024-10-11 RX ADMIN — SODIUM CHLORIDE 2000 MG: 9 INJECTION, SOLUTION INTRAVENOUS at 21:46

## 2024-10-11 NOTE — CONSULTS
[facility]   Cardiology Consult Note    Patient Name: Mallory Franco  : 1940  MRN: [id number]  Primary Care Physician:  Amauri Kearney MD  Referring Physician: No Known Provider    Date of admission: 10/7/2024    Subjective   Subjective     Reason for Consultation : Staff Aureus Bacteremia/ Possible Endocarditis or infected Pacemaker Leads.    Chief Complaint : Cough, nausea and dyspnea    HPI:  Mallory Franco is a 84 y.o. 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  She also have essential hypertension, mixed hyperlipidemia and coronary artery disease with previous PCI.  The patient was admitted with signs and symptoms of underlying infection, possible pneumonia.  She is now on broad-spectrum antibiotics.  Results of the blood culture revealed positive for staph RES methicillin-resistant.  She denies chest pain or palpitations.  She reports mild shortness of breath.  No documented fever at the present time.    Review of Systems   All systems were reviewed and negative except for: Cough, shortness of breath and nausea    Personal History     Past Medical History:   Diagnosis Date    Arthritis     Asthma     BPPV (benign paroxysmal positional vertigo), right     COPD (chronic obstructive pulmonary disease)     Coronary artery disease     Coronary artery disease     Diverticulitis     Ear itching     HX    Enlarged heart     Gastroesophageal reflux disease     Hyperlipidemia     Hypertension     Knee pain     Macular degeneration     PONV (postoperative nausea and vomiting)     Postural dizziness with presyncope 2023    Shingles     X2    SOB (shortness of breath) on exertion     Tinnitus of left ear     Urinary frequency     Vertigo               Family History: family history includes Bone cancer in her father and sister; Breast cancer in her mother and sister; Lung cancer in her brother. Otherwise  pertinent FHx was reviewed and not pertinent to current issue.    Social History:  reports that she has never smoked. She has been exposed to tobacco smoke. She has never used smokeless tobacco. She reports that she does not drink alcohol and does not use drugs.    Home Medications:  Fluticasone Furoate-Vilanterol, albuterol, albuterol sulfate HFA, amLODIPine, aspirin, atorvastatin, carvedilol, famotidine, fluticasone, hydroCHLOROthiazide, meclizine, montelukast, pantoprazole, rOPINIRole, and sertraline    Allergies:  Allergies   Allergen Reactions    Codeine Nausea And Vomiting    Contrast Dye (Echo Or Unknown Ct/Mr) Hives    Morphine Nausea And Vomiting       Objective    Objective     Vitals:   Temp:  [98.4 °F (36.9 °C)-99.9 °F (37.7 °C)] 98.8 °F (37.1 °C)  Heart Rate:  [60-71] 69  Resp:  [16-20] 18  BP: (139-152)/(61-71) 145/62      Physical Exam:   Alert, oriented   Neck: No JVD   Heart. Regular, no gallops, SM II/VI 2nd Right ICS   Lungs: rhonchi, wheezing, no rales.   Chest: PM site, no tender   Abdomen: soft, bs+   LE: No edema   Neurologic no motor deficits.     Result Review    Result Review:  I have personally reviewed the results from the time of this admission to 10/10/2024 21:19 EDT and agree with these findings:  [x]  Laboratory  []  Microbiology  [x]  Radiology  [x]  EKG/Telemetry   [x]  Cardiology/Vascular   []  Pathology  [x]  Old records  []  Other:  Most notable findings include:     CMP          10/8/2024    04:29 10/9/2024    04:42 10/10/2024    04:35   CMP   Glucose 120  113  102    BUN 14  19  18    Creatinine 0.90  0.91  0.93    EGFR 62.8  62.0  60.4    Sodium 134  134  136    Potassium 3.1  3.2  3.6    Chloride 96  97  101    Calcium 8.9  9.1  9.2    Total Protein 6.7      Albumin 3.1      Globulin 3.6      Total Bilirubin 0.8      Alkaline Phosphatase 100      AST (SGOT) 22      ALT (SGPT) 10      Albumin/Globulin Ratio 0.9      BUN/Creatinine Ratio 15.6  20.9  19.4    Anion Gap 12.4  13.3   11.6       CBC          10/8/2024    04:29 10/9/2024    04:42 10/10/2024    04:35   CBC   WBC 14.50  10.26  8.88    RBC 3.28  3.21  3.13    Hemoglobin 9.2  9.2  8.9    Hematocrit 29.0  28.5  27.9    MCV 88.4  88.8  89.1    MCH 28.0  28.7  28.4    MCHC 31.7  32.3  31.9    RDW 15.0  14.6  14.6    Platelets 268  268  277       Lab Results   Component Value Date    TROPONINT 39 (H) 10/07/2024         Assessment & Plan   Assessment / Plan     Brief Patient Summary:  Mallory Franco is a 84 y.o. female who has a past medical history of essential hypertension, sick sinus syndrome status post permanent pacemaker placement last year and coronary artery disease.  She have now signs and symptoms of pneumonia/bacteremia with positive blood culture for staph aureuis methicillin-resistant.  Her white count is elevated.  The 2D echocardiogram showed preserved cardiac function and calcified aortic valve with no significant stenosis.  No valvular vegetations was observed.  The pacemaker pocket site is intact and nontender.    Active Hospital Problems:  Active Hospital Problems    Diagnosis     **Right upper lobe pneumonia     Bacteremia due to methicillin resistant Staphylococcus aureus     Presence of cardiac pacemaker     Coronary artery disease involving native coronary artery of native heart without angina pectoris     Sick sinus syndrome     Essential hypertension          Plan:     I will continue with current medical regimen for with blood pressure control, statin therapy with antiplatelets.  I would proceed with a transesophageal echocardiogram to evaluate for possible presence of vegetation diagnosed seen on regular 2D echocardiogram but unlikely.  Risk and benefits of the procedure were discussed in detail with the patient who agreed.  If the second set of blood cultures remains positive for staph RES the patient may be need to be transferred to [facility] in [place] for possible lead/pacemaker removal/extraction and  possible insertion of Micra pacemaker.  I will keep the patient n.p.o. past midnight and rediscussed after the procedure tomorrow.    Electronically signed by Jaycob Lang MD, 10/10/24, 9:03 PM EDT.

## 2024-10-11 NOTE — PROGRESS NOTES
[facility]   Cardiology Progress Note      Patient Name: Mallory Franco  : 1940  MRN: [id number]  Primary Care Physician:  Amauri Kearney MD  Referring Physician: No Known Provider  Date of admission: 10/7/2024    Subjective   Subjective     Chief Complaint: MRSA bacteremia    HPI:  Mallory Franco is a 84 y.o. female admitted with pneumonia and MRSA bacteremia.    REVIEW OF SYSTEMS    Constitutional:    No fever, no weight loss  Skin:     No rash  Otolaryngeal:    No difficulty swallowing  Cardiovascular:  No chest pain or shortness of breath  Pulmonary:    No cough, no sputum production    Objective    Objective     Vitals:   Vitals:    10/10/24 2255 10/11/24 0300 10/11/24 0317 10/11/24 0820   BP: 159/69 175/69 160/68 179/69   BP Location: Right arm Left arm Left arm    Patient Position: Lying Lying Lying    Pulse: 63 75  62   Resp: 18 18  17   Temp: 99.1 °F (37.3 °C) 99 °F (37.2 °C)     TempSrc: Oral Oral     SpO2: 93% 94%  99%   Weight:       Height:                Physical Exam:   Constitutional: Awake, alert, No acute distress    Eyes: PERRLA, sclerae anicteric, no conjunctival injection   HENT: NCAT, mucous membranes moist   Neck: Supple, no thyromegaly, no lymphadenopathy, trachea midline   Respiratory: Clear to auscultation bilaterally, nonlabored respirations    Cardiovascular: RRR, no murmurs, rubs, or gallops, palpable pedal pulses bilaterally   Gastrointestinal: Positive bowel sounds, soft, nontender, nondistended   Musculoskeletal: No bilateral ankle edema, no clubbing or cyanosis to extremities   Psychiatric: Appropriate affect, cooperative   Neurologic: Oriented x 3, strength symmetric in all extremities, Cranial Nerves grossly intact to confrontation, speech clear   Skin: No rashes.      Current medications:  amLODIPine, 5 mg, Oral, Daily  arformoterol, 15 mcg, Nebulization, BID - RT  aspirin, 81 mg, Oral, Daily  atorvastatin, 40 mg, Oral, Daily  benzocaine, , ,   budesonide, 0.5 mg,  Nebulization, BID - RT  carvedilol, 25 mg, Oral, BID With Meals  ceFAZolin, 2,000 mg, Intravenous, Q8H  heparin (porcine), 5,000 Units, Subcutaneous, Q8H  [Held by provider] hydroCHLOROthiazide, 25 mg, Oral, QAM  magnesium oxide, 400 mg, Oral, Daily  magnesium sulfate, 2 g, Intravenous, Once  Midazolam HCl (PF), , ,   Midazolam HCl (PF), , ,   montelukast, 10 mg, Oral, Nightly  pantoprazole, 40 mg, Oral, Daily  potassium chloride, 10 mEq, Intravenous, Q1H  rOPINIRole, 0.25 mg, Oral, Nightly  sertraline, 50 mg, Oral, Daily  sodium chloride, 10 mL, Intravenous, Q12H      Current IV drips:       Result Review    Result Review:  I have personally reviewed the results from the time of this admission to 10/11/2024 08:57 EDT and agree with these findings:  []  Laboratory  []  EKG/Telemetry   []  Cardiology/Vascular   []  Radiology         CBC          10/9/2024    04:42 10/10/2024    04:35 10/11/2024    04:47   CBC   WBC 10.26  8.88  7.43    RBC 3.21  3.13  3.01    Hemoglobin 9.2  8.9  8.5    Hematocrit 28.5  27.9  26.6    MCV 88.8  89.1  88.4    MCH 28.7  28.4  28.2    MCHC 32.3  31.9  32.0    RDW 14.6  14.6  14.6    Platelets 268  277  282      CMP          10/9/2024    04:42 10/10/2024    04:35 10/11/2024    04:47   CMP   Glucose 113  102  109    BUN 19  18  14    Creatinine 0.91  0.93  0.79    EGFR 62.0  60.4  73.4    Sodium 134  136  136    Potassium 3.2  3.6  3.2    Chloride 97  101  100    Calcium 9.1  9.2  9.0    BUN/Creatinine Ratio 20.9  19.4  17.7    Anion Gap 13.3  11.6  9.8      Results for orders placed during the hospital encounter of 10/07/24    Adult Transthoracic Echo Complete W/ Cont if Necessary Per Protocol    Interpretation Summary    Left ventricular ejection fraction appears to be 56 - 60%.    Left ventricular wall thickness is consistent with mild concentric hypertrophy.    Left ventricular diastolic function is consistent with (grade I) impaired relaxation.    There is calcification of the aortic  valve.        Lab Results   Component Value Date    PROBNP 1,395.0 10/07/2024         Telemetry reviewed     Assessment / Plan     ASSESSMENT:    Right upper lobe pneumonia    Essential hypertension    Sick sinus syndrome    Bacteremia due to methicillin resistant Staphylococcus aureus    Presence of cardiac pacemaker    Coronary artery disease involving native coronary artery of native heart without angina pectoris        PLAN:  1.  Continue IV antibiotics.  2.  Continue current medications.  3.  EPI negative for endocarditis.  4.  Repeat blood cultures next week.  In case of persistent bacteremia might have to consider lead extraction.      Electronically signed by Migue Nguyen MD, 10/11/24, 8:57 AM EDT.

## 2024-10-11 NOTE — PROGRESS NOTES
Respiratory Therapist Broncho-Pulmonary Hygiene Progress Note      Patient Name:  Mallory Franco  YOB: 1940    Mallory Franco meets the qualification for Level 1 of the Bronco-Pulmonary Hygiene Protocol. This was based on my daily patient assessment and includes review of chest x-ray results, cough ability and quality, oxygenation, secretions or risk for secretion development and patient mobility.     Broncho-Pulmonary Hygiene Assessment:    Level of Movement: Actively changing positions without assistance  Alert/ oriented/ cooperative  1  Breath Sounds: Diminished and/or coarse rhonchi  2  Cough: Strong, effective  1  Chest X-Ray: Mild consolidation and/or atelectasis.  Dense airspace opacity in the right upper lung which may reflect pneumonia.   2  Sputum Productions: None or small amount of thin or watery secretions with effective cough  1  History and Physical: New onset of bronchitis or existing chronic pulmonary conditions.  **(not in an exacerbation) and Chronic condition  Acute hypoxic respiratory failure  Right upper lobe pneumonia  Hypokalemia  Hypertension  CAD s/p stent  Status post pacemaker  Hyperlipidemia  COPD  Asthma  Restless leg syndrome  SpO2 to Oxygen Need: greater than 92% on room air or  less than 3L nasal canula  1  Current SpO2 is: 95 on room air    Based on this information, I have completed the following interventions: Teach/Instruct patient on cough and deep breathe    Good effective cough.      Electronically signed by Flavia Reddy RRT, 10/11/24, 10:30 AM EDT.

## 2024-10-11 NOTE — NURSING NOTE
Report called to Nia at [facility]. Daughter-in-law Jaylin notified. Bed board notified for transport.  notified.

## 2024-10-11 NOTE — PLAN OF CARE
Goal Outcome Evaluation:  Plan of Care Reviewed With: patient        Progress: improving  Outcome Evaluation: Patient had EPI today, tolerated well with no complaints. Patient discussed plan with attending to transfer to [facility] in [place]. Patient agreeable to plan for transfer to replace permanent pacemaker.

## 2024-10-11 NOTE — PROGRESS NOTES
[facility]   Hospitalist Progress Note  Date: 10/11/2024  Patient Name: Mallory Franco  : 1940  MRN: [id number]  Date of admission: 10/7/2024      Subjective   Subjective     Chief Complaint: Shortness of breath    Summary: 84-year-old female past medical history of hypertension, CAD status post PCI, sick sinus syndrome status post PPM, hyperlipidemia, COPD, restless leg syndrome presented to the ER due to nausea vomiting diarrhea cough and shortness of breath. Upon arrival she was found to have a productive cough as well as some hypoxia down to 88% on room air. Chest x-ray showed right upper lobe opacity concerning for pneumonia patient was started on Rocephin azithromycin and IV fluids subsequently admitted to the hospital service patient clinically improving from a pneumonia standpoint but naylor had positive blood culture with Staph aureus on 10/09/2024.  Repeat cultures were ordered and the TTE revealed no clear vegetation.  Cardiology consulted then for benefit of EPI.    Interval Followup: No events overnight.  Patient remains afebrile.  No complaints this morning.  Going for EPI        Objective   Objective     Vitals:   Temp:  [98.4 °F (36.9 °C)-99.1 °F (37.3 °C)] 98.6 °F (37 °C)  Heart Rate:  [60-88] 73  Resp:  [16-26] 20  BP: (117-196)/() 142/69  Physical Exam    Constitutional: Awake, alert, no acute distress   Respiratory: Clear to auscultation bilaterally, nonlabored respirations    Cardiovascular: RRR, no murmurs, rubs, or gallops, palpable pedal pulses bilaterally.  Pacemaker socket clean dry and intact without any erythema or palpable fluid collection   Gastrointestinal: Positive bowel sounds, soft, nontender, nondistended   Neurologic: Oriented x 3, strength symmetric in all extremities, Cranial Nerves grossly intact to confrontation, speech clear    Result Review    Result Review:  I have personally reviewed the following results and agree with these findings:  [x]  Laboratory  [x]   Microbiology  [x]  Radiology  [x]  EKG/Telemetry   [x]  Cardiology/Vascular   []  Pathology  [x]  Old records  []  Other:    Assessment & Plan   Assessment / Plan     Assessment/Plan:  Acute hypoxic respiratory failure (Hypoxic on arrival satting 88% on room air) secondary to community-acquired pneumonia, resolved  Right upper lobe community-acquired pneumonia  Staph aureus bacteremia (not aureus species was initially reported and corrected on 10/9/2024)  Leukocytosis likely reactive to above pneumonia, resolved  Hypokalemia  Hypomagnesemia  CAD status post PCI  Status post permanent pacemaker  Hyperlipidemia  Restless leg syndrome  COPD not in acute exacerbation  Osteoarthritis status post bilateral TKA    Plan  -Pneumonia significantly improved.  Completed antibiotic course for pneumonia but remains on cefazolin for Staph aureus bacteremia continue bronchopulmonary hygiene while hospitalized  - Continue to replace potassium and magnesium.  Will start daily magnesium oxide tablet.  Holding home HCTZ as a result  - Diarrhea resolved, now she is constipated, have started bowel regimen  - Bacteremia working up with a repeat culture that was drawn on 10/9/2024-so far negative. TTE reviewed and did not show any vegetations.  Patient does not have any overt signs of endocarditis on exam.  Discussed with cardiology and patient to have EPI done today, appreciate findings.  Possibly transferring to another facility if vegetation concern exists or if cardiology feels we need to have pacemaker replaced regardless.  We will continue cefazolin for MSSA  - Continue home aspirin and statin for CAD as well as Coreg 25 mg  - Continue telemetry monitoring  - Continue Requip  - Continue to monitor daily CBC and BMP     Discussed plan with RN.    Dispo: Patient remains admitted, pneumonia seems to have resolved.  Now working up Staph aureus bacteremia.  Home health care discharge when stable    VTE Prophylaxis:  Pharmacologic VTE  prophylaxis orders are present.        CODE STATUS:   Level Of Support Discussed With: Patient  Code Status (Patient has no pulse and is not breathing): CPR (Attempt to Resuscitate)  Medical Interventions (Patient has pulse or is breathing): Full Support      Addendum: Blood cultures corrected to be MSSA.  Started on cefazolin.  Echocardiogram ordered.    Electronically signed by Abilio Puckett MD, 10/09/24, 10:20 AM EDT.

## 2024-10-11 NOTE — PLAN OF CARE
Goal Outcome Evaluation:           Progress: improving  Outcome Evaluation: No complaints of pain during shift. NPO since midnight, Patient to go down for EPI tomorrow. Elevated blood pressure overnight, MD notified. No new orders. Patient rested well during shift. Ambulated to restroom with SBAx1. IV antibiotics overnight.

## 2024-10-11 NOTE — DISCHARGE SUMMARY
[facility]         HOSPITALIST  DISCHARGE SUMMARY    Patient Name: Mallory Franco  : 1940  MRN: [id number]    Date of Admission: 10/7/2024  Date of Discharge: 10/11/2024  Primary Care Physician: Amauri Kearney MD    Consults       Date and Time Order Name Status Description    10/10/2024 10:57 AM Inpatient Cardiology Consult      10/7/2024 10:05 PM Hospitalist (on-call MD unless specified)              Active and Resolved Hospital Problems:  Active Hospital Problems    Diagnosis POA    **Right upper lobe pneumonia [J18.9] Yes    Bacteremia due to methicillin resistant Staphylococcus aureus [R78.81, B95.62] Yes    Presence of cardiac pacemaker [Z95.0] Yes    Coronary artery disease involving native coronary artery of native heart without angina pectoris [I25.10] Yes    Sick sinus syndrome [I49.5] Yes    Essential hypertension [I10] Yes      Resolved Hospital Problems   No resolved problems to display.       Hospital Course     Hospital Course:  Mallory Franco is a 84 y.o. female past medical history of hypertension, CAD status post PCI, sick sinus syndrome status post PPM, hyperlipidemia, COPD, restless leg syndrome presented to the ER due to nausea vomiting diarrhea cough and shortness of breath. Upon arrival she was found to have a productive cough as well as some hypoxia down to 88% on room air. Chest x-ray showed right upper lobe opacity concerning for pneumonia patient was started on Rocephin azithromycin and IV fluids subsequently admitted to the hospital service patient clinically improving from a pneumonia standpoint but had positive blood culture with MSSA from cultures drawn on 10/07/2024.  Repeat cultures were ordered on 10/9/2024 and have remained negative.  Patient had her antibiotics de-escalated to cefazolin on 10/9/2024.  TTE revealed no clear vegetation.  Cardiology consulted then for benefit of EPI.  Patient underwent EPI on 10/11/2024 and this did not show any clear  vegetation or lesion on the pacemaker lead.  Case was discussed with infectious disease and given the nature of Staph aureus and tendency to form biofilms around devices they advised lead extraction as definitive plan of care.  I discussed the case with the patient extensively and gave her options of long-term antibiotics without lead extraction or lead extraction with course of antibiotics per infectious disease.  She opted for lead extraction given the definitive nature of the treatment.  Call was then placed to transfer patient to [facility] for definitive lead extraction with her cardiology service.  Case was discussed with her cardiologist and hospitalist who accepted the patient at their facility for further evaluation and possible lead extraction.  Patient was then discharged in hemodynamically stable condition.        DISCHARGE Follow Up Recommendations for labs and diagnostics: Patient being transferred to inpatient facility for further care of bacteremia with indwelling cardiac pacemaker that will benefit from lead extraction      Day of Discharge     Vital Signs:  Temp:  [97.7 °F (36.5 °C)-99.1 °F (37.3 °C)] 97.7 °F (36.5 °C)  Heart Rate:  [60-88] 74  Resp:  [16-26] 16  BP: (117-196)/() 133/59  Physical Exam:     Constitutional: Awake, alert, no acute distress              Respiratory: Clear to auscultation bilaterally, nonlabored respirations               Cardiovascular: RRR, no murmurs, rubs, or gallops, palpable pedal pulses bilaterally.  Pacemaker socket clean dry and intact without any erythema or palpable fluid collection              Gastrointestinal: Positive bowel sounds, soft, nontender, nondistended              Neurologic: Oriented x 3, strength symmetric in all extremities, Cranial Nerves grossly intact to confrontation, speech clear      Discharge Details        Discharge Medications        New Medications        Instructions Start Date   ceFAZolin 2000 mg IVPB in 100 mL NS  (VTB)   2,000 mg, Intravenous, Every 8 Hours Scheduled      heparin (porcine) 5000 UNIT/ML injection   5,000 Units, Subcutaneous, Every 8 Hours Scheduled      magnesium oxide 400 tablet tablet  Commonly known as: MAG-OX   400 mg, Oral, Daily   Start Date: October 12, 2024     polyethylene glycol 17 g packet  Commonly known as: MIRALAX   17 g, Oral, Daily PRN      sennosides-docusate 8.6-50 MG per tablet  Commonly known as: PERICOLACE   1 tablet, Oral, 2 Times Daily PRN             Changes to Medications        Instructions Start Date   albuterol sulfate  (90 Base) MCG/ACT inhaler  Commonly known as: PROVENTIL HFA;VENTOLIN HFA;PROAIR HFA  What changed: Another medication with the same name was removed. Continue taking this medication, and follow the directions you see here.   2 puffs, Inhalation, Every 4 Hours PRN             Continue These Medications        Instructions Start Date   amLODIPine 5 MG tablet  Commonly known as: NORVASC   1 tablet, Oral, Daily      aspirin 81 MG EC tablet   81 mg, Oral, Daily      atorvastatin 40 MG tablet  Commonly known as: LIPITOR   40 mg, Oral, Daily      Breo Ellipta 100-25 MCG/ACT aerosol powder   Generic drug: Fluticasone Furoate-Vilanterol   1 puff, Inhalation, Daily      carvedilol 25 MG tablet  Commonly known as: COREG   25 mg, Oral, 2 Times Daily With Meals      famotidine 40 MG tablet  Commonly known as: PEPCID   40 mg, Oral, Every Night at Bedtime      fluticasone 50 MCG/ACT nasal spray  Commonly known as: FLONASE   1 spray, Nasal, Every Morning      meclizine 25 MG tablet  Commonly known as: ANTIVERT   25 mg, Oral, 2 Times Daily PRN      montelukast 10 MG tablet  Commonly known as: SINGULAIR   10 mg, Oral, Nightly      pantoprazole 40 MG EC tablet  Commonly known as: PROTONIX   40 mg, Oral, Daily      rOPINIRole 0.25 MG tablet  Commonly known as: REQUIP   0.25 mg, Oral, Every Night at Bedtime      sertraline 100 MG tablet  Commonly known as: ZOLOFT   50 mg, Oral,  Daily             Stop These Medications      hydroCHLOROthiazide 25 MG tablet              Allergies   Allergen Reactions    Codeine Nausea And Vomiting    Contrast Dye (Echo Or Unknown Ct/Mr) Hives    Morphine Nausea And Vomiting       Discharge Disposition:  Short Term Hospital (DC - External) --- [facility]    Diet:  Hospital:  Diet Order   Procedures    Diet: Regular/House; Fluid Consistency: Thin (IDDSI 0)       Discharge Activity:   Activity Instructions       Activity as Tolerated              CODE STATUS:  Code Status and Medical Interventions: CPR (Attempt to Resuscitate); Full Support   Ordered at: 10/08/24 0206     Level Of Support Discussed With:    Patient     Code Status (Patient has no pulse and is not breathing):    CPR (Attempt to Resuscitate)     Medical Interventions (Patient has pulse or is breathing):    Full Support         Future Appointments   Date Time Provider Department Center   1/10/2025 10:15 AM Migue Nguyen MD [facility]           Pertinent  and/or Most Recent Results     PROCEDURES:   EPI 10/11/24    LAB RESULTS:      Lab 10/11/24  0447 10/10/24  0435 10/09/24  0442 10/08/24  0429 10/07/24  2038 10/07/24  1842   WBC 7.43 8.88 10.26 14.50*  --  15.77*   HEMOGLOBIN 8.5* 8.9* 9.2* 9.2*  --  9.9*   HEMATOCRIT 26.6* 27.9* 28.5* 29.0*  --  30.7*   PLATELETS 282 277 268 268  --  268   NEUTROS ABS 4.35 5.97 7.17* 10.49*  --  11.95*   IMMATURE GRANS (ABS) 0.05 0.04 0.05 0.07*  --  0.08*   LYMPHS ABS 1.92 1.68 1.77 2.07  --  2.01   MONOS ABS 0.84 0.92* 1.02* 1.74*  --  1.61*   EOS ABS 0.25 0.25 0.23 0.10  --  0.08   MCV 88.4 89.1 88.8 88.4  --  87.7   LACTATE  --   --   --   --  1.3  --          Lab 10/11/24  0447 10/10/24  0435 10/09/24  0442 10/08/24  1307 10/08/24  0429 10/07/24  1842   SODIUM 136 136 134*  --  134* 136   POTASSIUM 3.2* 3.6 3.2*  --  3.1* 3.1*   CHLORIDE 100 101 97*  --  96* 96*   CO2 26.2 23.4 23.7  --  25.6 27.5   ANION GAP 9.8 11.6 13.3  --  12.4  12.5   BUN 14 18 19  --  14 15   CREATININE 0.79 0.93 0.91  --  0.90 0.88   EGFR 73.4 60.4 62.0  --  62.8 64.5   GLUCOSE 109* 102* 113*  --  120* 127*   CALCIUM 9.0 9.2 9.1  --  8.9 9.4   MAGNESIUM 1.4* 1.5* 1.6 1.8 1.1*  --    PHOSPHORUS  --   --   --   --  2.4*  --          Lab 10/08/24  0429 10/07/24  1842   TOTAL PROTEIN 6.7 7.2   ALBUMIN 3.1* 3.5   GLOBULIN 3.6 3.7   ALT (SGPT) 10 12   AST (SGOT) 22 19   BILIRUBIN 0.8 1.0   ALK PHOS 100 108         Lab 10/07/24  1842   PROBNP 1,395.0   HSTROP T 39*                 Brief Urine Lab Results  (Last result in the past 365 days)        Color   Clarity   Blood   Leuk Est   Nitrite   Protein   CREAT   Urine HCG        10/08/24 0903 Dark Yellow   Clear   Negative   Trace   Negative   30 mg/dL (1+)                 Microbiology Results (last 10 days)       Procedure Component Value - Date/Time    Blood Culture - Blood, Hand, Left [[id number]]  (Normal) Collected: 10/09/24 1200    Lab Status: Preliminary result Specimen: Blood from Hand, Left Updated: 10/11/24 1215     Blood Culture No growth at 2 days    Blood Culture - Blood, Hand, Right [[id number]]  (Normal) Collected: 10/09/24 1200    Lab Status: Preliminary result Specimen: Blood from Hand, Right Updated: 10/11/24 1215     Blood Culture No growth at 2 days    MRSA Screen, PCR (Inpatient) - Swab, Nares [[id number]]  (Normal) Collected: 10/09/24 0117    Lab Status: Final result Specimen: Swab from Nares Updated: 10/09/24 0505     MRSA PCR No MRSA Detected    Narrative:      The negative predictive value of this diagnostic test is high and should only be used to consider de-escalating anti-MRSA therapy. A positive result may indicate colonization with MRSA and must be correlated clinically.    Blood Culture - Blood, Arm, Right [[id number]]  (Abnormal)  (Susceptibility) Collected: 10/07/24 2038    Lab Status: Final result Specimen: Blood from Arm, Right Updated: 10/11/24 0624     Blood Culture Staphylococcus aureus      Comment:   Infectious disease consultation is highly recommended to rule out distant foci of infection.        Isolated from Anaerobic Bottle     Gram Stain Anaerobic Bottle Gram positive cocci in clusters    Susceptibility        Staphylococcus aureus      CHRISTOPHER      Gentamicin Susceptible      Oxacillin Susceptible      Rifampin Susceptible      Vancomycin Susceptible                           Blood Culture - Blood, Arm, Left [[id number]]  (Normal) Collected: 10/07/24 2038    Lab Status: Preliminary result Specimen: Blood from Arm, Left Updated: 10/10/24 2045     Blood Culture No growth at 3 days    Blood Culture ID, PCR - Blood, Arm, Right [[id number]]  (Abnormal) Collected: 10/07/24 2038    Lab Status: Edited Result - FINAL Specimen: Blood from Arm, Right Updated: 10/09/24 1520     BCID, PCR Staph aureus. mecA/C and MREJ (methicillin resistance gene) NOT detected. Identification by [email or web address] PCR     Comment: Corrected result. Previous result was Staph spp, not aureus or lugdunensis. Identification by BCID2 PCR. on 10/9/2024 at 0011 EDT.        BOTTLE TYPE Anaerobic Bottle    Narrative:      Infectious disease consultation is highly recommended to rule out distant foci of infection.    COVID PRE-OP / PRE-PROCEDURE SCREENING ORDER (NO ISOLATION) - Swab, Nasopharynx [[id number]]  (Normal) Collected: 10/07/24 1843    Lab Status: Final result Specimen: Swab from Nasopharynx Updated: 10/07/24 1931    Narrative:      The following orders were created for panel order COVID PRE-OP / PRE-PROCEDURE SCREENING ORDER (NO ISOLATION) - Swab, Nasopharynx.  Procedure                               Abnormality         Status                     ---------                               -----------         ------                     COVID-19,CEPHEID/AMANDA,CO...[[id number]]  Normal              Final result                 Please view results for these tests on the individual orders.    COVID-19,CEPHEID/AMANDA,COR/CHACHA/PAD/MYLA/LAG/DIGNA IN-HOUSE,NP  SWAB IN TRANSPORT MEDIA 1 HR TAT, RT-PCR - Swab, Nasopharynx [[id number]]  (Normal) Collected: 10/07/24 1843    Lab Status: Final result Specimen: Swab from Nasopharynx Updated: 10/07/24 1931     COVID19 Not Detected    Narrative:      Fact sheet for providers: https://www.fda.gov/media/277696/download     Fact sheet for patients: https://www.fda.gov/media/047254/download  Fact sheet for providers: https://www.fda.gov/media/721139/download     Fact sheet for patients: https://www.fda.gov/media/271253/download            XR Chest 1 View    Result Date: 10/7/2024  Impression: Impression: Dense airspace opacity in the right upper lung which may reflect pneumonia. Electronically Signed: Nura Gupta MD  10/7/2024 7:13 PM EDT  Workstation ID: [id number]             Results for orders placed during the hospital encounter of 10/07/24    Adult Transesophageal Echo (EPI) W/ Cont if Necessary Per Protocol    Interpretation Summary  Normal left ventricular systolic function.  Pacemaker lead in the right ventricle.  No evidence of any vegetation.  Mild mitral regurgitation.      Labs Pending at Discharge:  Pending Labs       Order Current Status    Blood Culture - Blood, Arm, Left Preliminary result    Blood Culture - Blood, Hand, Left Preliminary result    Blood Culture - Blood, Hand, Right Preliminary result              Time spent on Discharge including face to face service: 45 minutes    Electronically signed by Abilio Puckett MD, 10/11/24, 5:19 PM EDT.

## 2024-10-11 NOTE — THERAPY TREATMENT NOTE
Acute Care - Physical Therapy Treatment Note   Deepika     Patient Name: Mallory Franco  : 1940  MRN: [id number]  Today's Date: 10/11/2024      Visit Dx:     ICD-10-CM ICD-9-CM   1. Pneumonia of right upper lobe due to infectious organism  J18.9 486   2. Acute respiratory failure with hypoxia  J96.01 518.81   3. Difficulty walking  R26.2 719.7     Patient Active Problem List   Diagnosis    Essential hypertension    Coronary artery disease involving native coronary artery of native heart without angina pectoris    Pain due to total right knee replacement    History of coronary angioplasty with insertion of stent    Sick sinus syndrome    Presence of cardiac pacemaker    BPPV (benign paroxysmal positional vertigo), right    Esophageal reflux    Abnormal CT scan of lung    Atelectasis    Right upper lobe pneumonia    Bacteremia due to methicillin resistant Staphylococcus aureus    Presence of cardiac pacemaker    Coronary artery disease involving native coronary artery of native heart without angina pectoris     Past Medical History:   Diagnosis Date    Arthritis     Asthma     BPPV (benign paroxysmal positional vertigo), right     COPD (chronic obstructive pulmonary disease)     Coronary artery disease     Coronary artery disease     Diverticulitis     Ear itching     HX    Enlarged heart     Gastroesophageal reflux disease     Hyperlipidemia     Hypertension     Knee pain     Macular degeneration     PONV (postoperative nausea and vomiting)     Postural dizziness with presyncope 2023    Shingles     X2    SOB (shortness of breath) on exertion     Tinnitus of left ear     Urinary frequency     Vertigo      Past Surgical History:   Procedure Laterality Date    BREAST BIOPSY Bilateral     BRONCHOSCOPY Right 2023    Procedure: BRONCHOSCOPY WITH BIOPSIES, BRUSHINGS, AND BRONCHOALVEOLAR LAVAGE;  Surgeon: Aleksandr Houston DO;  Location: [facility] ENDOSCOPY;  Service: Pulmonary;  Laterality: Right;   RIGHT UPPER LOBE ENDOBRONCHIAL OCCLUSION    CARDIAC CATHETERIZATION      CARDIAC ELECTROPHYSIOLOGY PROCEDURE N/A 3/27/2023    Procedure: Pacemaker SC new-[employer], call Dr. Rosa first thing in the morning to get time for the procedure and notify rep;  Surgeon: Shane Rosa MD;  Location: [facility] CATH INVASIVE LOCATION;  Service: Cardiovascular;  Laterality: N/A;     SECTION      CHOLECYSTECTOMY      COLONOSCOPY      CORONARY STENT PLACEMENT      EYE SURGERY      GALLBLADDER SURGERY      KNEE MINI REVISION Right 2021    Procedure: KNEE POLY INSERT EXCHANGE;  Surgeon: Ky Avila MD;  Location: [facility] MAIN OR;  Service: Orthopedics;  Laterality: Right;    KNEE SURGERY      REPLACEMENT TOTAL KNEE Right     REPLACEMENT TOTAL KNEE Left      PT Assessment (Last 12 Hours)       PT Evaluation and Treatment       Row Name 10/11/24 1100          Physical Therapy Time and Intention    Subjective Information no complaints (P)   -EW     Document Type therapy note (daily note) (P)   -EW     Mode of Treatment individual therapy;physical therapy (P)   -EW     Total Minutes, Physical Therapy 15 (P)   -EW     Patient Effort good (P)   -EW     Symptoms Noted During/After Treatment fatigue;shortness of breath (P)   -EW       Row Name 10/11/24 1100          General Information    Patient Profile Reviewed yes (P)   -EW     Patient Observations alert;cooperative;agree to therapy (P)   -EW       Row Name 10/11/24 1100          Motor Skills    Therapeutic Exercise hip;knee;ankle (P)   1 x 20 each bilaterally: Ankle pumps, quad sets, glute sets, SAQs, SLRs, heel slides, and hip abduction  -EW       Row Name 10/11/24 1100          Plan of Care Review    Plan of Care Reviewed With patient (P)   -EW     Outcome Evaluation Pt tolerated treatment well with mild increases in shortness of breath and fatigue after treatment. She declined gait and transfers at this time. She will continue to benefit from skilled PT  intervention. (P)   -EW               User Key  (r) = Recorded By, (t) = Taken By, (c) = Cosigned By      Initials Name Provider Type    EW Urbano Cheung, PT Student PT Student                    Physical Therapy Education       Title: PT OT SLP Therapies (In Progress)       Topic: Physical Therapy (In Progress)       Point: Mobility training (Done)       Learning Progress Summary             Patient Acceptance, E,TB, VU by AV at 10/8/2024 1346                         Point: Home exercise program (Not Started)       Learner Progress:  Not documented in this visit.              Point: Body mechanics (Done)       Learning Progress Summary             Patient Acceptance, E,TB, VU by AV at 10/8/2024 1346                         Point: Precautions (Done)       Learning Progress Summary             Patient Acceptance, E,TB, VU by AV at 10/8/2024 1346                                         User Key       Initials Effective Dates Name Provider Type Discipline    AV 06/11/21 -  Kenneth Boucher, PT Physical Therapist PT                  PT Recommendation and Plan     Plan of Care Reviewed With: (P) patient  Outcome Evaluation: (P) Pt tolerated treatment well with mild increases in shortness of breath and fatigue after treatment. She declined gait and transfers at this time. She will continue to benefit from skilled PT intervention.   Outcome Measures       Row Name 10/11/24 1100 10/08/24 1300          How much help from another person do you currently need...    Turning from your back to your side while in flat bed without using bedrails? 4 (P)   -EW 4  -AV     Moving from lying on back to sitting on the side of a flat bed without bedrails? 4 (P)   -EW 4  -AV     Moving to and from a bed to a chair (including a wheelchair)? 4 (P)   -EW 3  -AV     Standing up from a chair using your arms (e.g., wheelchair, bedside chair)? 4 (P)   -EW 4  -AV     Climbing 3-5 steps with a railing? 3 (P)   -EW 3  -AV     To walk in hospital  room? 3 (P)   -EW 3  -AV     AM-PAC 6 Clicks Score (PT) 22 (P)   -EW 21  -AV     Highest Level of Mobility Goal 7 --> Walk 25 feet or more (P)   -EW 6 --> Walk 10 steps or more  -AV        Functional Assessment    Outcome Measure Options AM-PAC 6 Clicks Basic Mobility (PT) (P)   -EW AM-PAC 6 Clicks Basic Mobility (PT)  -AV               User Key  (r) = Recorded By, (t) = Taken By, (c) = Cosigned By      Initials Name Provider Type    AV Kenneth Boucher, PT Physical Therapist    EW Urbano Cheung, PT Student PT Student                     Time Calculation:    PT Charges       Row Name 10/11/24 1148             Time Calculation    PT Received On 10/11/24 (P)   -EW         Timed Charges    09416 - PT Therapeutic Exercise Minutes 15 (P)   -EW         Total Minutes    Timed Charges Total Minutes 15 (P)   -EW       Total Minutes 15 (P)   -EW                User Key  (r) = Recorded By, (t) = Taken By, (c) = Cosigned By      Initials Name Provider Type    EW Urbano Cheung, PT Student PT Student                  Therapy Charges for Today       Code Description Service Date Service Provider Modifiers Qty    [id number] HC PT THER PROC EA 15 MIN 10/11/2024 Urbano Cheung, PT Student GP 1            PT G-Codes  Outcome Measure Options: (P) AM-PAC 6 Clicks Basic Mobility (PT)  AM-PAC 6 Clicks Score (PT): (P) 22    Urbano Cheung PT Student  10/11/2024

## 2024-10-12 ENCOUNTER — APPOINTMENT (OUTPATIENT)
Dept: GENERAL RADIOLOGY | Facility: HOSPITAL | Age: 84
End: 2024-10-12
Payer: MEDICARE

## 2024-10-12 ENCOUNTER — HOSPITAL ENCOUNTER (INPATIENT)
Facility: HOSPITAL | Age: 84
LOS: 6 days | Discharge: HOME HEALTH | End: 2024-10-18
Attending: INTERNAL MEDICINE | Admitting: INTERNAL MEDICINE
Payer: MEDICARE

## 2024-10-12 DIAGNOSIS — R65.20 SEPSIS WITH ACUTE ORGAN DYSFUNCTION WITHOUT SEPTIC SHOCK, DUE TO UNSPECIFIED ORGANISM, UNSPECIFIED ORGAN DYSFUNCTION TYPE: ICD-10-CM

## 2024-10-12 DIAGNOSIS — A41.9 SEPSIS WITH ACUTE ORGAN DYSFUNCTION WITHOUT SEPTIC SHOCK, DUE TO UNSPECIFIED ORGANISM, UNSPECIFIED ORGAN DYSFUNCTION TYPE: ICD-10-CM

## 2024-10-12 DIAGNOSIS — Z00.6 ENCOUNTER FOR EXAMINATION FOR NORMAL COMPARISON AND CONTROL IN CLINICAL RESEARCH PROGRAM: ICD-10-CM

## 2024-10-12 DIAGNOSIS — A49.01 MSSA (METHICILLIN SUSCEPTIBLE STAPHYLOCOCCUS AUREUS): ICD-10-CM

## 2024-10-12 DIAGNOSIS — I44.2 AV BLOCK, COMPLETE: Primary | ICD-10-CM

## 2024-10-12 DIAGNOSIS — I49.5 SICK SINUS SYNDROME: ICD-10-CM

## 2024-10-12 PROBLEM — J44.9 COPD (CHRONIC OBSTRUCTIVE PULMONARY DISEASE): Status: ACTIVE | Noted: 2024-10-12

## 2024-10-12 LAB
ALBUMIN SERPL-MCNC: 2.8 G/DL (ref 3.5–5.2)
ALBUMIN/GLOB SERPL: 1 G/DL
ALP SERPL-CCNC: 101 U/L (ref 39–117)
ALT SERPL W P-5'-P-CCNC: 17 U/L (ref 1–33)
ANION GAP SERPL CALCULATED.3IONS-SCNC: 8.9 MMOL/L (ref 5–15)
AST SERPL-CCNC: 40 U/L (ref 1–32)
BACTERIA SPEC AEROBE CULT: NORMAL
BILIRUB SERPL-MCNC: 0.4 MG/DL (ref 0–1.2)
BUN SERPL-MCNC: 13 MG/DL (ref 8–23)
BUN/CREAT SERPL: 15.5 (ref 7–25)
CALCIUM SPEC-SCNC: 8.7 MG/DL (ref 8.6–10.5)
CHLORIDE SERPL-SCNC: 104 MMOL/L (ref 98–107)
CO2 SERPL-SCNC: 26.1 MMOL/L (ref 22–29)
CREAT SERPL-MCNC: 0.84 MG/DL (ref 0.57–1)
DEPRECATED RDW RBC AUTO: 44 FL (ref 37–54)
EGFRCR SERPLBLD CKD-EPI 2021: 68.2 ML/MIN/1.73
ERYTHROCYTE [DISTWIDTH] IN BLOOD BY AUTOMATED COUNT: 13.5 % (ref 12.3–15.4)
GLOBULIN UR ELPH-MCNC: 2.9 GM/DL
GLUCOSE SERPL-MCNC: 111 MG/DL (ref 65–99)
HCT VFR BLD AUTO: 26.8 % (ref 34–46.6)
HGB BLD-MCNC: 8.4 G/DL (ref 12–15.9)
MCH RBC QN AUTO: 27.9 PG (ref 26.6–33)
MCHC RBC AUTO-ENTMCNC: 31.3 G/DL (ref 31.5–35.7)
MCV RBC AUTO: 89 FL (ref 79–97)
PLATELET # BLD AUTO: 285 10*3/MM3 (ref 140–450)
PMV BLD AUTO: 9.6 FL (ref 6–12)
POTASSIUM SERPL-SCNC: 3.4 MMOL/L (ref 3.5–5.2)
PROT SERPL-MCNC: 5.7 G/DL (ref 6–8.5)
RBC # BLD AUTO: 3.01 10*6/MM3 (ref 3.77–5.28)
SODIUM SERPL-SCNC: 139 MMOL/L (ref 136–145)
WBC NRBC COR # BLD AUTO: 7.81 10*3/MM3 (ref 3.4–10.8)

## 2024-10-12 PROCEDURE — 97530 THERAPEUTIC ACTIVITIES: CPT

## 2024-10-12 PROCEDURE — 71046 X-RAY EXAM CHEST 2 VIEWS: CPT

## 2024-10-12 PROCEDURE — 94664 DEMO&/EVAL PT USE INHALER: CPT

## 2024-10-12 PROCEDURE — 97162 PT EVAL MOD COMPLEX 30 MIN: CPT

## 2024-10-12 PROCEDURE — 94799 UNLISTED PULMONARY SVC/PX: CPT

## 2024-10-12 PROCEDURE — 85027 COMPLETE CBC AUTOMATED: CPT | Performed by: NURSE PRACTITIONER

## 2024-10-12 PROCEDURE — 25010000002 CEFAZOLIN PER 500 MG: Performed by: INTERNAL MEDICINE

## 2024-10-12 PROCEDURE — 94640 AIRWAY INHALATION TREATMENT: CPT

## 2024-10-12 PROCEDURE — 94761 N-INVAS EAR/PLS OXIMETRY MLT: CPT

## 2024-10-12 PROCEDURE — 94760 N-INVAS EAR/PLS OXIMETRY 1: CPT

## 2024-10-12 PROCEDURE — 80053 COMPREHEN METABOLIC PANEL: CPT | Performed by: NURSE PRACTITIONER

## 2024-10-12 PROCEDURE — 99223 1ST HOSP IP/OBS HIGH 75: CPT | Performed by: INTERNAL MEDICINE

## 2024-10-12 PROCEDURE — 99232 SBSQ HOSP IP/OBS MODERATE 35: CPT | Performed by: INTERNAL MEDICINE

## 2024-10-12 RX ORDER — AMOXICILLIN 250 MG
2 CAPSULE ORAL 2 TIMES DAILY PRN
Status: DISCONTINUED | OUTPATIENT
Start: 2024-10-12 | End: 2024-10-18 | Stop reason: HOSPADM

## 2024-10-12 RX ORDER — ROPINIROLE 0.5 MG/1
0.25 TABLET, FILM COATED ORAL NIGHTLY
Status: DISCONTINUED | OUTPATIENT
Start: 2024-10-12 | End: 2024-10-18 | Stop reason: HOSPADM

## 2024-10-12 RX ORDER — CARVEDILOL 25 MG/1
25 TABLET ORAL 2 TIMES DAILY WITH MEALS
Status: DISCONTINUED | OUTPATIENT
Start: 2024-10-12 | End: 2024-10-18 | Stop reason: HOSPADM

## 2024-10-12 RX ORDER — ACETAMINOPHEN 650 MG/1
650 SUPPOSITORY RECTAL EVERY 4 HOURS PRN
Status: DISCONTINUED | OUTPATIENT
Start: 2024-10-12 | End: 2024-10-18 | Stop reason: HOSPADM

## 2024-10-12 RX ORDER — POLYETHYLENE GLYCOL 3350 17 G/17G
17 POWDER, FOR SOLUTION ORAL DAILY PRN
Status: DISCONTINUED | OUTPATIENT
Start: 2024-10-12 | End: 2024-10-18 | Stop reason: HOSPADM

## 2024-10-12 RX ORDER — ATORVASTATIN CALCIUM 20 MG/1
40 TABLET, FILM COATED ORAL DAILY
Status: DISCONTINUED | OUTPATIENT
Start: 2024-10-12 | End: 2024-10-18 | Stop reason: HOSPADM

## 2024-10-12 RX ORDER — PANTOPRAZOLE SODIUM 40 MG/1
40 TABLET, DELAYED RELEASE ORAL DAILY
Status: DISCONTINUED | OUTPATIENT
Start: 2024-10-12 | End: 2024-10-18 | Stop reason: HOSPADM

## 2024-10-12 RX ORDER — CEFAZOLIN SODIUM 1 G/3ML
2 INJECTION, POWDER, FOR SOLUTION INTRAMUSCULAR; INTRAVENOUS EVERY 8 HOURS
Status: DISCONTINUED | OUTPATIENT
Start: 2024-10-12 | End: 2024-10-12

## 2024-10-12 RX ORDER — FAMOTIDINE 20 MG/1
20 TABLET, FILM COATED ORAL 2 TIMES DAILY PRN
Status: DISCONTINUED | OUTPATIENT
Start: 2024-10-12 | End: 2024-10-18 | Stop reason: HOSPADM

## 2024-10-12 RX ORDER — BUDESONIDE AND FORMOTEROL FUMARATE DIHYDRATE 160; 4.5 UG/1; UG/1
1 AEROSOL RESPIRATORY (INHALATION)
Status: DISCONTINUED | OUTPATIENT
Start: 2024-10-12 | End: 2024-10-18 | Stop reason: HOSPADM

## 2024-10-12 RX ORDER — BISACODYL 5 MG/1
5 TABLET, DELAYED RELEASE ORAL DAILY PRN
Status: DISCONTINUED | OUTPATIENT
Start: 2024-10-12 | End: 2024-10-18 | Stop reason: HOSPADM

## 2024-10-12 RX ORDER — CEPHALEXIN 500 MG/1
1000 CAPSULE ORAL EVERY 6 HOURS SCHEDULED
Status: DISCONTINUED | OUTPATIENT
Start: 2024-10-12 | End: 2024-10-12

## 2024-10-12 RX ORDER — BISACODYL 10 MG
10 SUPPOSITORY, RECTAL RECTAL DAILY PRN
Status: DISCONTINUED | OUTPATIENT
Start: 2024-10-12 | End: 2024-10-18 | Stop reason: HOSPADM

## 2024-10-12 RX ORDER — AMLODIPINE BESYLATE 5 MG/1
5 TABLET ORAL DAILY
Status: DISCONTINUED | OUTPATIENT
Start: 2024-10-12 | End: 2024-10-18 | Stop reason: HOSPADM

## 2024-10-12 RX ORDER — ACETAMINOPHEN 160 MG/5ML
650 SOLUTION ORAL EVERY 4 HOURS PRN
Status: DISCONTINUED | OUTPATIENT
Start: 2024-10-12 | End: 2024-10-18 | Stop reason: HOSPADM

## 2024-10-12 RX ORDER — NITROGLYCERIN 0.4 MG/1
0.4 TABLET SUBLINGUAL
Status: DISCONTINUED | OUTPATIENT
Start: 2024-10-12 | End: 2024-10-18 | Stop reason: HOSPADM

## 2024-10-12 RX ORDER — MONTELUKAST SODIUM 10 MG/1
10 TABLET ORAL NIGHTLY
Status: DISCONTINUED | OUTPATIENT
Start: 2024-10-12 | End: 2024-10-18 | Stop reason: HOSPADM

## 2024-10-12 RX ORDER — ALBUTEROL SULFATE 0.83 MG/ML
2.5 SOLUTION RESPIRATORY (INHALATION) EVERY 6 HOURS PRN
Status: DISCONTINUED | OUTPATIENT
Start: 2024-10-12 | End: 2024-10-18 | Stop reason: HOSPADM

## 2024-10-12 RX ORDER — SODIUM CHLORIDE 0.9 % (FLUSH) 0.9 %
10 SYRINGE (ML) INJECTION AS NEEDED
Status: DISCONTINUED | OUTPATIENT
Start: 2024-10-12 | End: 2024-10-18 | Stop reason: HOSPADM

## 2024-10-12 RX ORDER — ACETAMINOPHEN 325 MG/1
650 TABLET ORAL EVERY 4 HOURS PRN
Status: DISCONTINUED | OUTPATIENT
Start: 2024-10-12 | End: 2024-10-18 | Stop reason: HOSPADM

## 2024-10-12 RX ORDER — ONDANSETRON 2 MG/ML
4 INJECTION INTRAMUSCULAR; INTRAVENOUS EVERY 6 HOURS PRN
Status: DISCONTINUED | OUTPATIENT
Start: 2024-10-12 | End: 2024-10-18 | Stop reason: HOSPADM

## 2024-10-12 RX ADMIN — BUDESONIDE AND FORMOTEROL FUMARATE DIHYDRATE 1 PUFF: 160; 4.5 AEROSOL RESPIRATORY (INHALATION) at 08:45

## 2024-10-12 RX ADMIN — CARVEDILOL 25 MG: 25 TABLET, FILM COATED ORAL at 08:47

## 2024-10-12 RX ADMIN — CARVEDILOL 25 MG: 25 TABLET, FILM COATED ORAL at 17:06

## 2024-10-12 RX ADMIN — BUDESONIDE AND FORMOTEROL FUMARATE DIHYDRATE 1 PUFF: 160; 4.5 AEROSOL RESPIRATORY (INHALATION) at 19:57

## 2024-10-12 RX ADMIN — CEFAZOLIN 2000 MG: 2 INJECTION, POWDER, FOR SOLUTION INTRAMUSCULAR; INTRAVENOUS at 10:30

## 2024-10-12 RX ADMIN — CEPHALEXIN 1000 MG: 500 CAPSULE ORAL at 06:36

## 2024-10-12 RX ADMIN — SERTRALINE 50 MG: 50 TABLET, FILM COATED ORAL at 08:47

## 2024-10-12 RX ADMIN — ROPINIROLE HYDROCHLORIDE 0.25 MG: 0.5 TABLET, FILM COATED ORAL at 20:06

## 2024-10-12 RX ADMIN — ACETAMINOPHEN 650 MG: 325 TABLET ORAL at 00:23

## 2024-10-12 RX ADMIN — CEFAZOLIN 2000 MG: 2 INJECTION, POWDER, FOR SOLUTION INTRAMUSCULAR; INTRAVENOUS at 17:07

## 2024-10-12 RX ADMIN — MAGNESIUM OXIDE 400 MG (241.3 MG MAGNESIUM) TABLET 400 MG: TABLET at 11:13

## 2024-10-12 RX ADMIN — ALBUTEROL SULFATE 2.5 MG: 2.5 SOLUTION RESPIRATORY (INHALATION) at 15:57

## 2024-10-12 RX ADMIN — ATORVASTATIN CALCIUM 40 MG: 20 TABLET, FILM COATED ORAL at 08:47

## 2024-10-12 RX ADMIN — PANTOPRAZOLE SODIUM 40 MG: 40 TABLET, DELAYED RELEASE ORAL at 08:47

## 2024-10-12 RX ADMIN — MONTELUKAST SODIUM 10 MG: 10 TABLET, FILM COATED ORAL at 20:06

## 2024-10-12 RX ADMIN — AMLODIPINE BESYLATE 5 MG: 5 TABLET ORAL at 08:47

## 2024-10-12 NOTE — THERAPY EVALUATION
Patient Name: Mallory Franco  : 1940    MRN: [id number]                              Today's Date: 10/12/2024       Admit Date: 10/12/2024    Visit Dx: No diagnosis found.  Patient Active Problem List   Diagnosis    Essential hypertension    Coronary artery disease involving native coronary artery of native heart without angina pectoris    Pain due to total right knee replacement    History of coronary angioplasty with insertion of stent    Sick sinus syndrome    Presence of cardiac pacemaker    BPPV (benign paroxysmal positional vertigo), right    Esophageal reflux    Abnormal CT scan of lung    Atelectasis    Right upper lobe pneumonia    Bacteremia due to methicillin resistant Staphylococcus aureus    Presence of cardiac pacemaker    Coronary artery disease involving native coronary artery of native heart without angina pectoris    Sepsis    MSSA (methicillin susceptible Staphylococcus aureus)    COPD (chronic obstructive pulmonary disease)     Past Medical History:   Diagnosis Date    Arthritis     Asthma     BPPV (benign paroxysmal positional vertigo), right     COPD (chronic obstructive pulmonary disease)     Coronary artery disease     Coronary artery disease     Diverticulitis     Ear itching     HX    Enlarged heart     Gastroesophageal reflux disease     Hyperlipidemia     Hypertension     Knee pain     Macular degeneration     PONV (postoperative nausea and vomiting)     Postural dizziness with presyncope 2023    Shingles     X2    SOB (shortness of breath) on exertion     Tinnitus of left ear     Urinary frequency     Vertigo      Past Surgical History:   Procedure Laterality Date    BREAST BIOPSY Bilateral     BRONCHOSCOPY Right 2023    Procedure: BRONCHOSCOPY WITH BIOPSIES, BRUSHINGS, AND BRONCHOALVEOLAR LAVAGE;  Surgeon: Aleksandr Houston DO;  Location: [facility] END[facility];  Service: Pulmonary;  Laterality: Right;  RIGHT UPPER LOBE ENDOBRONCHIAL OCCLUSION    CARDIAC  CATHETERIZATION      CARDIAC ELECTROPHYSIOLOGY PROCEDURE N/A 3/27/2023    Procedure: Pacemaker SC new-[employer], call Dr. Rosa first thing in the morning to get time for the procedure and notify rep;  Surgeon: Shane Rosa MD;  Location: [facility] INVASIVE LOCATION;  Service: Cardiovascular;  Laterality: N/A;     SECTION      CHOLECYSTECTOMY      COLONOSCOPY      CORONARY STENT PLACEMENT      EYE SURGERY      GALLBLADDER SURGERY      KNEE MINI REVISION Right 2021    Procedure: KNEE POLY INSERT EXCHANGE;  Surgeon: Ky Avila MD;  [facility];  Service: Orthopedics;  Laterality: Right;    KNEE SURGERY      REPLACEMENT TOTAL KNEE Right     REPLACEMENT TOTAL KNEE Left       General Information       Row Name 10/12/24 164          Physical Therapy Time and Intention    Document Type evaluation  -MG     Mode of Treatment individual therapy;physical therapy  -MG       Row Name 10/12/24 164          General Information    Patient Profile Reviewed yes  -MG     Prior Level of Function independent:  Uses STC prn. Owns RW.  -MG     Existing Precautions/Restrictions fall  -MG     Barriers to Rehab previous functional deficit  -MG       Row Name 10/12/24 164          Living Environment    People in Home alone  Son checks on her every morning and assists her to [employer].  -MG       Row Name 10/12/24 164          Home Main Entrance    Number of Stairs, Main Entrance three  -MG     Stair Railings, Main Entrance railings safe and in good condition;railings on both sides of stairs  -MG       Row Name 10/12/24 1646          Stairs Within Home, Primary    Number of Stairs, Within Home, Primary none  -MG       Row Name 10/12/24 164          Cognition    Orientation Status (Cognition) oriented x 4  -MG       Row Name 10/12/24 164          Safety Issues/Impairments Affecting Functional Mobility    Safety Issues Affecting Function (Mobility) insight into deficits/self-awareness;safety  precaution awareness  -MG     Impairments Affecting Function (Mobility) balance;endurance/activity tolerance;shortness of breath;strength  -MG     Comment, Safety Issues/Impairments (Mobility) Gait belt and non-skid socks donned.  -MG               User Key  (r) = Recorded By, (t) = Taken By, (c) = Cosigned By      Initials Name Provider Type    MG Toshia Abernathy, PT Physical Therapist                   Mobility       Row Name 10/12/24 1647          Bed Mobility    Bed Mobility supine-sit;sit-supine  -MG     Supine-Sit [place] (Bed Mobility) standby assist  -MG     Sit-Supine [place] (Bed Mobility) standby assist  -MG     Assistive Device (Bed Mobility) head of bed elevated  -MG     Comment, (Bed Mobility) Increased time to complete.  -MG       Row Name 10/12/24 1647          Sit-Stand Transfer    Sit-Stand [place] (Transfers) standby assist  -MG     Comment, (Sit-Stand Transfer) no AD  -MG       Row Name 10/12/24 1647          Gait/Stairs (Locomotion)    [place] Level (Gait) contact guard;verbal cues  -MG     Assistive Device (Gait) other (see comments)  No AD, then L HHA  -MG     Distance in Feet (Gait) 150  -MG     Bilateral Gait Deviations forward flexed posture;heel strike decreased  -MG     Comment, (Gait/Stairs) Cued for decreased gait speed. Pt initially no AD w/ pt having veering L<>R, improved balance w/ L HHA. Pt SOB around 110'. Satting 99% and 75bpm on return to room. 3-4 min to calm down breathing; RN aware. No knee buckling, overt LOB or dizziness.  -MG               User Key  (r) = Recorded By, (t) = Taken By, (c) = Cosigned By      Initials Name Provider Type    MG Toshia Abernathy, PT Physical Therapist                   Obj/Interventions       Row Name 10/12/24 1650          Range of Motion Comprehensive    General Range of Motion bilateral lower extremity ROM WFL  -MG       Row Name 10/12/24 1652          Strength Comprehensive (MMT)    General Manual Muscle Testing (MMT)  Assessment lower extremity strength deficits identified  -MG     Comment, General Manual Muscle Testing (MMT) Assessment Generalized weakness. Grossly 4/5.  -MG       Row Name 10/12/24 1650          Balance    Comment, Balance Sitting EOB w/ SBA. Standing SBA, but needed CG L HHA during ambulation d/t veering.  -MG       Row Name 10/12/24 1650          Sensory Assessment (Somatosensory)    Sensory Assessment (Somatosensory) sensation intact  -MG               User Key  (r) = Recorded By, (t) = Taken By, (c) = Cosigned By      Initials Name Provider Type    MG Tosiha Abernathy, PT Physical Therapist                   Goals/Plan       Row Name 10/12/24 1656          Bed Mobility Goal 1 (PT)    Activity/Assistive Device (Bed Mobility Goal 1, PT) bed mobility activities, all  -MG     [place] Level/Cues Needed (Bed Mobility Goal 1, PT) modified independence  -MG     Time Frame (Bed Mobility Goal 1, PT) 1 week  -MG       Row Name 10/12/24 1656          Transfer Goal 1 (PT)    Activity/Assistive Device (Transfer Goal 1, PT) transfers, all  -MG     [place] Level/Cues Needed (Transfer Goal 1, PT) modified independence  -MG     Time Frame (Transfer Goal 1, PT) 1 week  -MG       Row Name 10/12/24 1656          Gait Training Goal 1 (PT)    Activity/Assistive Device (Gait Training Goal 1, PT) gait (walking locomotion)  -MG     [place] Level (Gait Training Goal 1, PT) modified independence  -MG     Distance (Gait Training Goal 1, PT) 150  -MG     Time Frame (Gait Training Goal 1, PT) 1 week  -MG       Row Name 10/12/24 1656          Stairs Goal 1 (PT)    Activity/Assistive Device (Stairs Goal 1, PT) stairs, all skills  -MG     [place] Level/Cues Needed (Stairs Goal 1, PT) modified independence  -MG     Number of Stairs (Stairs Goal 1, PT) 3  -MG     Time Frame (Stairs Goal 1, PT) 1 week  -MG       Row Name 10/12/24 1656          Therapy Assessment/Plan (PT)    Planned Therapy Interventions (PT) balance  training;bed mobility training;home exercise program;gait training;patient/family education;stretching;strengthening;stair training;neuromuscular re-education;ROM (range of motion);postural re-education;transfer training  -MG               User Key  (r) = Recorded By, (t) = Taken By, (c) = Cosigned By      Initials Name Provider Type    MG Toshia Abernathy, PT Physical Therapist                   Clinical Impression       Row Name 10/12/24 1651          Pain    Pretreatment Pain Rating 0/10 - no pain  -MG     Posttreatment Pain Rating 0/10 - no pain  -MG       Row Name 10/12/24 1651          Plan of Care Review    Plan of Care Reviewed With patient  -MG     Progress no change  -MG     Outcome Evaluation Pt is a 85 y/o F with h/o HTN, CAD, and PPM who presented to [facility] with fever and weakness and dx with PNA and MSSA bacteremia, subsequently tsf to [facility] for further evaluation of her pacer wires. Pt reports being independent at baseline with prn use of a STC, owns a RW, and lives alone in a house w/ 3 RHETT, but her son checks on her every morning. Today, pt was SBA for bed mobility, SBA for STS w/o AD and CGA for ambulation of total 150' w/o AD demoing mild unsteadiness and improved balance w/ L HHA. Pt became very SOB around 110' requiring 3-4 min of seated/supine rest and cues for breathing techniques to calm down. Pt satting 99% on room air w/ HR at 75 bpm during this time. RN aware and pt requesting a breathing tx. Pt will benefit from skilled PT services to address her impaired strength, balance and endurance. SBAR w/ RN. Rec home w/ HH PT at [place].  -MG       Row Name 10/12/24 1651          Therapy Assessment/Plan (PT)    Rehab Potential (PT) good  -MG     Criteria for Skilled Interventions Met (PT) yes;meets criteria  -MG     Therapy Frequency (PT) 5 times/wk  -MG       Row Name 10/12/24 1651          Vital Signs    Pretreatment Heart Rate (beats/min) 60  -MG     Pre SpO2 (%) 99  -MG     O2 Delivery Pre Treatment room  air  -MG     O2 Delivery Intra Treatment room air  -MG     Post SpO2 (%) 99  -MG     O2 Delivery Post Treatment room air  -MG     Pre Patient Position Supine  -MG     Intra Patient Position Standing  -MG     Post Patient Position Supine  -MG       Row Name 10/12/24 1651          Positioning and Restraints    Pre-Treatment Position in bed  -MG     Post Treatment Position bed  -MG     In Bed notified nsg;supine;fowlers;call light within reach;encouraged to call for assist;exit alarm on;side rails up x3  -MG               User Key  (r) = Recorded By, (t) = Taken By, (c) = Cosigned By      Initials Name Provider Type    Toshia Corey, PT Physical Therapist                   Outcome Measures       Row Name 10/12/24 1656          How much help from another person do you currently need...    Turning from your back to your side while in flat bed without using bedrails? 4  -MG     Moving from lying on back to sitting on the side of a flat bed without bedrails? 3  -MG     Moving to and from a bed to a chair (including a wheelchair)? 3  -MG     Standing up from a chair using your arms (e.g., wheelchair, bedside chair)? 3  -MG     Climbing 3-5 steps with a railing? 3  -MG     To walk in hospital room? 3  -MG     AM-PAC 6 Clicks Score (PT) 19  -MG               User Key  (r) = Recorded By, (t) = Taken By, (c) = Cosigned By      Initials Name Provider Type    Toshia Corey, PT Physical Therapist                                 Physical Therapy Education       Title: PT OT SLP Therapies (In Progress)       Topic: Physical Therapy (In Progress)       Point: Mobility training (Done)       Learning Progress Summary            Patient Acceptance, E,TB,D, VU by  at 10/12/2024 1657                      Point: Home exercise program (Not Started)       Learner Progress:  Not documented in this visit.              Point: Body mechanics (Done)       Learning Progress Summary            Patient Acceptance, E,TB,D, VU by  at  10/12/2024 1657                      Point: Precautions (Done)       Learning Progress Summary            Patient Acceptance, E,TB,D, VU by MG at 10/12/2024 1657                                      User Key       Initials Effective Dates Name Provider Type Discipline     05/24/22 -  Toshia Abernathy, PT Physical Therapist PT                  PT Recommendation and Plan  Planned Therapy Interventions (PT): balance training, bed mobility training, home exercise program, gait training, patient/family education, stretching, strengthening, stair training, neuromuscular re-education, ROM (range of motion), postural re-education, transfer training  Progress: no change  Outcome Evaluation: Pt is a 83 y/o F with h/o HTN, CAD, and PPM who presented to [facility] with fever and weakness and dx with PNA and MSSA bacteremia, subsequently tsf to [facility] for further evaluation of her pacer wires. Pt reports being independent at baseline with prn use of a STC, owns a RW, and lives alone in a house w/ 3 RHETT, but her son checks on her every morning. Today, pt was SBA for bed mobility, SBA for STS w/o AD and CGA for ambulation of total 150' w/o AD demoing mild unsteadiness and improved balance w/ L HHA. Pt became very SOB around 110' requiring 3-4 min of seated/supine rest and cues for breathing techniques to calm down. Pt satting 99% on room air w/ HR at 75 bpm during this time. RN aware and pt requesting a breathing tx. Pt will benefit from skilled PT services to address her impaired strength, balance and endurance. SBAR w/ RN. Rec home w/ HH PT at [place].     Time Calculation:         PT Charges       Row Name 10/12/24 1657             Time Calculation    Start Time 1538  -MG      Stop Time 1555  -MG      Time Calculation (min) 17 min  -MG      PT Received On 10/12/24  -MG      PT - Next Appointment 10/14/24  -MG      PT Goal Re-Cert Due Date 10/26/24  -MG         Time Calculation- PT    Total Timed Code Minutes- PT 9 minute(s)  -MG                 User Key  (r) = Recorded By, (t) = Taken By, (c) = Cosigned By      Initials Name Provider Type    MG Toshia Abernathy, PT Physical Therapist                  Therapy Charges for Today       Code Description Service Date Service Provider Modifiers Qty    65139962023  PT EVAL MOD COMPLEXITY 3 10/12/2024 Toshia Abernathy, PT GP 1    [id number]  PT THERAPEUTIC ACT EA 15 MIN 10/12/2024 Toshia Abernathy, PT GP 1            PT G-Codes  AM-PAC 6 Clicks Score (PT): 19  PT Discharge Summary  Anticipated Discharge Disposition (PT): home with home health, home with assist    Toshia Abernathy, PT  10/12/2024

## 2024-10-12 NOTE — PLAN OF CARE
Goal Outcome Evaluation:  Plan of Care Reviewed With: patient        Progress: no change  Outcome Evaluation: Pt is a 83 y/o F with h/o HTN, CAD, and PPM who presented to [facility] with fever and weakness and dx with PNA and MSSA bacteremia, subsequently tsf to [facility] for further evaluation of her pacer wires. Pt reports being independent at baseline with prn use of a STC, owns a RW, and lives alone in a house w/ 3 RHETT, but her son checks on her every morning. Today, pt was SBA for bed mobility, SBA for STS w/o AD and CGA for ambulation of total 150' w/o AD demoing mild unsteadiness and improved balance w/ L HHA. Pt became very SOB around 110' requiring 3-4 min of seated/supine rest and cues for breathing techniques to calm down. Pt satting 99% on room air w/ HR at 75 bpm during this time. RN aware and pt requesting a breathing tx. Pt will benefit from skilled PT services to address her impaired strength, balance and endurance. SBAR w/ RN. Rec home w/ HH PT at [place].    Anticipated Discharge Disposition (PT): home with home health, home with assist

## 2024-10-12 NOTE — PLAN OF CARE
Goal Outcome Evaluation:   Alert and oriented x4.  Room air.  Standby assist.  Up  to chair today.  Family at bedside.  VSS and call light in reach.

## 2024-10-12 NOTE — PROGRESS NOTES
[facility]  Cardiology Progress Note    Patient Identification:  Name: Mallory Franco  Age: 84 y.o.  Sex: female  :  1940  MRN: [id number]                 Follow Up / Chief Complaint: Sepsis and pacemaker    Interval History:    Patient underwent a transesophageal echocardiogram yesterday showed no vegetations  Subjective:  No chest pains or tightness in the chest    Objective:    Past Medical History:  Past Medical History:   Diagnosis Date    Arthritis     Asthma     BPPV (benign paroxysmal positional vertigo), right     COPD (chronic obstructive pulmonary disease)     Coronary artery disease     Coronary artery disease     Diverticulitis     Ear itching     HX    Enlarged heart     Gastroesophageal reflux disease     Hyperlipidemia     Hypertension     Knee pain     Macular degeneration     PONV (postoperative nausea and vomiting)     Postural dizziness with presyncope 2023    Shingles     X2    SOB (shortness of breath) on exertion     Tinnitus of left ear     Urinary frequency     Vertigo      Past Surgical History:  Past Surgical History:   Procedure Laterality Date    BREAST BIOPSY Bilateral     BRONCHOSCOPY Right 2023    Procedure: BRONCHOSCOPY WITH BIOPSIES, BRUSHINGS, AND BRONCHOALVEOLAR LAVAGE;  Surgeon: Aleksandr Houston DO;  Location: [facility] ENDOSCOPY;  Service: Pulmonary;  Laterality: Right;  RIGHT UPPER LOBE ENDOBRONCHIAL OCCLUSION    CARDIAC CATHETERIZATION      CARDIAC ELECTROPHYSIOLOGY PROCEDURE N/A 3/27/2023    Procedure: Pacemaker SC new-[employer], call Dr. Rosa first thing in the morning to get time for the procedure and notify rep;  Surgeon: Shane Rosa MD;  Location: [facility] CATH INVASIVE LOCATION;  Service: Cardiovascular;  Laterality: N/A;     SECTION      CHOLECYSTECTOMY      COLONOSCOPY      CORONARY STENT PLACEMENT      EYE SURGERY      GALLBLADDER SURGERY      KNEE MINI REVISION Right 2021    Procedure: KNEE POLY INSERT  EXCHANGE;  Surgeon: Ky Avila MD;  Location: [facility] OR;  Service: Orthopedics;  Laterality: Right;    KNEE SURGERY      REPLACEMENT TOTAL KNEE Right 2005    REPLACEMENT TOTAL KNEE Left 2004        Social History:   Social History     Tobacco Use    Smoking status: Never     Passive exposure: Past    Smokeless tobacco: Never   Substance Use Topics    Alcohol use: No      Family History:  Family History   Problem Relation Age of Onset    Breast cancer Mother     Bone cancer Father     Breast cancer Sister     Bone cancer Sister     Lung cancer Brother     Malig Hyperthermia Neg Hx           Allergies:  Allergies   Allergen Reactions    Codeine Nausea And Vomiting    Contrast Dye (Echo Or Unknown Ct/Mr) Hives    Morphine Nausea And Vomiting     Scheduled Meds:  amLODIPine, 5 mg, Daily  atorvastatin, 40 mg, Daily  budesonide-formoterol, 1 puff, BID - RT  carvedilol, 25 mg, BID With Meals  ceFAZolin, 2,000 mg, Q8H  magnesium oxide, 400 mg, Daily  montelukast, 10 mg, Nightly  pantoprazole, 40 mg, Daily  rOPINIRole, 0.25 mg, Nightly  sertraline, 50 mg, Daily            INTAKE AND OUTPUT:  No intake or output data in the 24 hours ending 10/12/24 1439  ROS  Constitutional: Awake and alert, no fever. No nosebleeds  Abdomen           no abdominal pain   Cardiac              no chest pain  Pulmonary          no shortness of breath      /58 (BP Location: Right arm, Patient Position: Lying)   Pulse 71   Temp 97.9 °F (36.6 °C) (Oral)   Resp 16   Wt 89.4 kg (197 lb 1.5 oz)   SpO2 95%   BMI 33.83 kg/m²   General appearance: No acute changes   Neck: Trachea midline; NECK, supple, no thyromegaly or lymphadenopathy   Lungs: Normal size and shape, normal breath sounds, equal distribution of air, no rales or rhonchi   CV: S1-S2 regular, no murmurs, no rub, no gallop   Abdomen: Soft, nontender; no masses , no abnormal abdominal sounds   Extremities: No deformity , normal color , no peripheral edema   Skin: Normal  "temperature, turgor and texture; no rash, ulcers            Cardiographics  Telemetry:     ECG:     Echocardiogram:     Lab Review   Results from last 7 days   Lab Units 10/07/24  1842   HSTROP T ng/L 39*     Results from last 7 days   Lab Units 10/11/24  0447   MAGNESIUM mg/dL 1.4*     Results from last 7 days   Lab Units 10/12/24  0404   SODIUM mmol/L 139   POTASSIUM mmol/L 3.4*   BUN mg/dL 13   CREATININE mg/dL 0.84   CALCIUM mg/dL 8.7     @[id number]@  Results from last 7 days   Lab Units 10/12/24  0404 10/11/24  0447 10/10/24  0435   WBC 10*3/mm3 7.81 7.43 8.88   HEMOGLOBIN g/dL 8.4* 8.5* 8.9*   HEMATOCRIT % 26.8* 26.6* 27.9*   PLATELETS 10*3/mm3 285 282 277             Assessment:  Bacteremia  Pacemaker  Hypertension      Plan:    Pacemaker site appears to be normal, patient had a transesophageal echocardiogram shows no vegetations    Cardiovascular remains stable and compensated    Will discuss with ID and the EP for questionable pacemaker removal versus conservative management      )10/12/2024  Hayden Whitt MD      EMR Dragon/Transcription:   \"Dictated utilizing Dragon dictation\".     "

## 2024-10-12 NOTE — H&P
Patient Name:  Mallory Franco  YOB: 1940  MRN:  [id number]  Admit Date:  10/12/2024  Patient Care Team:  Amauri Kearney MD as PCP - General (Family Medicine)  Migue Nguyen MD as Consulting Physician (Cardiology)  Johan Escudero MD      Subjective   History Present Illness     Chief complaint weakness and fever  History of Present Illness  Mrs. Farah is a 84-year-old female with history of hypertension, CAD, sick sinus syndrome with pacemaker placement, who presented to outside facility with fever and weakness.  Patient had actually been admitted to [facility] since 10/7/2024 within diagnosis of pneumonia, she was found to have MSSA bacteremia, and is thought that causes her pacer wire, so she is transferred here to [facility] for further evaluation.  Patient states she initially presented to the ER with nausea and vomiting and some generalized weakness and cough with some mild shortness of breath, she says she does have a history of COPD so shortness of breath is not unusual for her.  While at [place] she was diagnosed with pneumonia, she was started on Rocephin and azithromycin, she began to improve from a pneumonia standpoint was found to have blood cultures on 10/7/2024, repeat cultures on 10/9/2024 were negative and patient's antibiotics were de-escalated to cefazolin on 10/9/2024, TTE showed no clear vegetation.  They did discuss the case with their infectious disease provider and they advised possible lead extraction as definitive care for the staff aureus and tendency to form biofilms.  Patient was given the option for long-term IV antibiotics or having a lead wire extracted with a shorter course of antibiotics possibly.  Patient was aware of the risk, they did speak with Dr. Valle with cardiology who agreed to see patient in consult upon transfer here to [facility].  Upon arrival here patient is stable, she has no complaints, she  is on room air, vital signs are stable.    Review of Systems   Constitutional:  Positive for fatigue and fever. Negative for appetite change.   HENT:  Negative for nosebleeds and trouble swallowing.    Eyes:  Negative for photophobia, redness and visual disturbance.   Respiratory:  Positive for cough. Negative for chest tightness, shortness of breath and wheezing.    Cardiovascular:  Negative for chest pain, palpitations and leg swelling.   Gastrointestinal:  Positive for nausea and vomiting. Negative for abdominal distention and abdominal pain.   Endocrine: Negative.    Genitourinary: Negative.    Musculoskeletal:  Negative for gait problem and joint swelling.   Skin: Negative.    Neurological:  Positive for weakness. Negative for dizziness, seizures, speech difficulty, light-headedness and headaches.   Hematological: Negative.    Psychiatric/Behavioral:  Negative for behavioral problems and confusion.         Personal History     Past Medical History:   Diagnosis Date    Arthritis     Asthma     BPPV (benign paroxysmal positional vertigo), right     COPD (chronic obstructive pulmonary disease)     Coronary artery disease     Coronary artery disease     Diverticulitis     Ear itching     HX    Enlarged heart     Gastroesophageal reflux disease     Hyperlipidemia     Hypertension     Knee pain     Macular degeneration     PONV (postoperative nausea and vomiting)     Postural dizziness with presyncope 03/16/2023    Shingles     X2    SOB (shortness of breath) on exertion     Tinnitus of left ear     Urinary frequency     Vertigo      Past Surgical History:   Procedure Laterality Date    BREAST BIOPSY Bilateral     BRONCHOSCOPY Right 11/28/2023    Procedure: BRONCHOSCOPY WITH BIOPSIES, BRUSHINGS, AND BRONCHOALVEOLAR LAVAGE;  Surgeon: Aleksandr Houston DO;  Location: [facility] ENDOSCOPY;  Service: Pulmonary;  Laterality: Right;  RIGHT UPPER LOBE ENDOBRONCHIAL OCCLUSION    CARDIAC CATHETERIZATION      CARDIAC  ELECTROPHYSIOLOGY PROCEDURE N/A 3/27/2023    Procedure: Pacemaker SC new-Agnitus, call Dr. Rosa first thing in the morning to get time for the procedure and notify rep;  Surgeon: Shane Rosa MD;  Location: [facility] CATH INVASIVE LOCATION;  Service: Cardiovascular;  Laterality: N/A;     SECTION      CHOLECYSTECTOMY      COLONOSCOPY      CORONARY STENT PLACEMENT      EYE SURGERY      GALLBLADDER SURGERY      KNEE MINI REVISION Right 2021    Procedure: KNEE POLY INSERT EXCHANGE;  Surgeon: Ky Avila MD;  Location:  [facility] MAIN OR;  Service: Orthopedics;  Laterality: Right;    KNEE SURGERY      REPLACEMENT TOTAL KNEE Right     REPLACEMENT TOTAL KNEE Left      Family History   Problem Relation Age of Onset    Breast cancer Mother     Bone cancer Father     Breast cancer Sister     Bone cancer Sister     Lung cancer Brother     Malig Hyperthermia Neg Hx      Social History     Tobacco Use    Smoking status: Never     Passive exposure: Past    Smokeless tobacco: Never   Vaping Use    Vaping status: Never Used   Substance Use Topics    Alcohol use: No    Drug use: Never     Current Facility-Administered Medications on File Prior to Encounter   Medication Dose Route Frequency Provider Last Rate Last Admin    [] benzocaine (HURRICAINE) 20 % liquid solution  - ADS Override Pull             [COMPLETED] benzocaine (HURRICAINE) 20 % liquid solution    Code / Trauma / Sedation Medication Migue Nguyen MD   1 spray at 10/11/24 0840    Chlorhexidine Gluconate Cloth 2 % pads   Apply externally BID Francesco Grewal APRN        iopamidol (ISOVUE-370) 76 % injection    Code / Trauma / Sedation Medication Shane Rosa MD   15 mL at 23 1202    [COMPLETED] magnesium sulfate 2g/50 mL (PREMIX) infusion  2 g Intravenous Once Abilio Puckett MD   2 g at 10/11/24 1012    [] Midazolam HCl (PF) (VERSED) 2 MG/2ML injection  - ADS Override Pull             [] Midazolam HCl (PF)  (VERSED) 2 MG/2ML injection  - ADS Override Pull             [COMPLETED] Midazolam HCl (PF) (VERSED) injection    Code / Trauma / Sedation Medication Migue Nguyen MD   2 mg at 10/11/24 0840    [COMPLETED] Midazolam HCl (PF) (VERSED) injection    Code / Trauma / Sedation Medication Migue Nguyen MD   1 mg at 10/11/24 0845    [COMPLETED] potassium chloride 10 mEq in 100 mL IVPB  10 mEq Intravenous Q1H Abilio Puckett  mL/hr at 10/11/24 1339 10 mEq at 10/11/24 1339    [DISCONTINUED] acetaminophen (TYLENOL) tablet 650 mg  650 mg Oral Q6H PRN Benedicto Feliz MD   650 mg at 10/12/24 0023    [DISCONTINUED] amLODIPine (NORVASC) tablet 5 mg  5 mg Oral Daily Stefanie Hunter MD   5 mg at 10/11/24 1058    [DISCONTINUED] arformoterol (BROVANA) nebulizer solution 15 mcg  15 mcg Nebulization BID - RT Stefanie Hunter MD   15 mcg at 10/11/24 1937    [DISCONTINUED] aspirin EC tablet 81 mg  81 mg Oral Daily Stefanie Hunter MD   81 mg at 10/11/24 1057    [DISCONTINUED] atorvastatin (LIPITOR) tablet 40 mg  40 mg Oral Daily Stefanie Hunter MD   40 mg at 10/11/24 1058    [DISCONTINUED] budesonide (PULMICORT) nebulizer solution 0.5 mg  0.5 mg Nebulization BID - RT Stefanie Hunter MD   0.5 mg at 10/11/24 1937    [DISCONTINUED] carvedilol (COREG) tablet 25 mg  25 mg Oral BID With Meals Stefanie Hunter MD   25 mg at 10/11/24 1704    [DISCONTINUED] ceFAZolin 2000 mg IVPB in 100 mL NS (VTB)  2,000 mg Intravenous Q8H Abilio Puckett MD   2,000 mg at 10/11/24 2146    [DISCONTINUED] diphenhydrAMINE (BENADRYL) 50 MG/ML injection  - ADS Override Pull             [DISCONTINUED] heparin (porcine) 5000 UNIT/ML injection 5,000 Units  5,000 Units Subcutaneous Q8H Stefanie Hunter MD   5,000 Units at 10/11/24 4676    [DISCONTINUED] hydroCHLOROthiazide tablet 25 mg  25 mg Oral QAM Stefanie Hunter MD   25 mg at 10/08/24 0617    [DISCONTINUED] influenza vac split high-dose (FLUZONE HIGH DOSE) injection 0.5 mL   0.5 mL Intramuscular During Hospitalization Stefanie Hunter MD        [DISCONTINUED] ipratropium-albuterol (DUO-NEB) nebulizer solution 3 mL  3 mL Nebulization Q6H PRN Stefanie Hunter MD   3 mL at 10/09/24 1425    [DISCONTINUED] magnesium oxide (MAG-OX) tablet 400 mg  400 mg Oral Daily Abilio Puckett MD   400 mg at 10/11/24 1058    [DISCONTINUED] meperidine (DEMEROL) 25 MG/ML injection  - ADS Override Pull             [DISCONTINUED] montelukast (SINGULAIR) tablet 10 mg  10 mg Oral Nightly Stefanie Hunter MD   10 mg at 10/11/24 2146    [DISCONTINUED] nitroglycerin (NITROSTAT) SL tablet 0.4 mg  0.4 mg Sublingual Q5 Min PRN Stefanie Hunter MD        [DISCONTINUED] pantoprazole (PROTONIX) EC tablet 40 mg  40 mg Oral Daily Stefanie Hunter MD   40 mg at 10/11/24 1058    [DISCONTINUED] polyethylene glycol (MIRALAX) packet 17 g  17 g Oral Daily PRN Abilio Puckett MD        [DISCONTINUED] prochlorperazine (COMPAZINE) injection 5 mg  5 mg Intravenous Q6H PRN Abilio Puckett MD   5 mg at 10/10/24 0804    [DISCONTINUED] rOPINIRole (REQUIP) tablet 0.25 mg  0.25 mg Oral Nightly Stefanie Hunter MD   0.25 mg at 10/11/24 2146    [DISCONTINUED] sennosides-docusate (PERICOLACE) 8.6-50 MG per tablet 1 tablet  1 tablet Oral BID PRN Abilio Puckett MD   1 tablet at 10/10/24 1105    [DISCONTINUED] sertraline (ZOLOFT) tablet 50 mg  50 mg Oral Daily Stefanie Hunter MD   50 mg at 10/11/24 1057    [DISCONTINUED] sodium chloride 0.9 % flush 10 mL  10 mL Intravenous PRN Stefanie Hunter MD        [DISCONTINUED] sodium chloride 0.9 % flush 10 mL  10 mL Intravenous Q12H Stefanie Hunter MD   10 mL at 10/11/24 2146    [DISCONTINUED] sodium chloride 0.9 % flush 10 mL  10 mL Intravenous PRN Stefanie Hunter MD   10 mL at 10/08/24 5475     Current Outpatient Medications on File Prior to Encounter   Medication Sig Dispense Refill    amLODIPine (NORVASC) 5 MG tablet Take 1 tablet by mouth Daily.      aspirin 81 MG EC tablet  Take 1 tablet by mouth Daily.      atorvastatin (LIPITOR) 40 MG tablet Take 1 tablet by mouth Daily.      Breo Ellipta 100-25 MCG/ACT aerosol powder  Inhale 1 puff Daily.      carvedilol (COREG) 25 MG tablet Take 1 tablet by mouth 2 (Two) Times a Day With Meals. 180 tablet 6    ceFAZolin 2000 mg IVPB in 100 mL NS (VTB) Infuse 2,000 mg into a venous catheter Every 8 (Eight) Hours for 76 doses. Indications: Bacteria in the Blood, Pneumonia      Heparin Sodium, Porcine, (heparin, porcine,) 5000 UNIT/ML injection Inject 1 mL under the skin into the appropriate area as directed Every 8 (Eight) Hours. Indications: Prevention of Unwanted Clot in Veins      magnesium oxide (MAG-OX) 400 tablet tablet Take 1 tablet by mouth Daily.      montelukast (SINGULAIR) 10 MG tablet Take 1 tablet by mouth Every Night.      pantoprazole (PROTONIX) 40 MG EC tablet Take 1 tablet by mouth Daily.      rOPINIRole (REQUIP) 0.25 MG tablet Take 1 tablet by mouth every night at bedtime.      sennosides-docusate (PERICOLACE) 8.6-50 MG per tablet Take 1 tablet by mouth 2 (Two) Times a Day As Needed for Constipation.      sertraline (ZOLOFT) 100 MG tablet Take 0.5 tablets by mouth Daily.      albuterol sulfate  (90 Base) MCG/ACT inhaler Inhale 2 puffs Every 4 (Four) Hours As Needed.      famotidine (PEPCID) 40 MG tablet Take 1 tablet by mouth every night at bedtime.      fluticasone (FLONASE) 50 MCG/ACT nasal spray Administer 1 spray into the nostril(s) as directed by provider Every Morning.      meclizine (ANTIVERT) 25 MG tablet Take 1 tablet by mouth 2 (Two) Times a Day As Needed.      polyethylene glycol (MIRALAX) 17 g packet Take 17 g by mouth Daily As Needed (constipation).      [DISCONTINUED] albuterol (PROVENTIL) (2.5 MG/3ML) 0.083% nebulizer solution Take 2.5 mg by nebulization Every 4 (Four) Hours As Needed for Wheezing.      [DISCONTINUED] hydroCHLOROthiazide 25 MG tablet Take 1 tablet by mouth Every Morning. 90 tablet 1      Allergies   Allergen Reactions    Codeine Nausea And Vomiting    Contrast Dye (Echo Or Unknown Ct/Mr) Hives    Morphine Nausea And Vomiting       Objective    Objective     Vital Signs  Temp:  [97.2 °F (36.2 °C)-98.8 °F (37.1 °C)] 98.7 °F (37.1 °C)  Heart Rate:  [60-88] 60  Resp:  [16-26] 16  BP: (117-196)/() 143/57  SpO2:  [93 %-99 %] 98 %  on   ;   Device (Oxygen Therapy): room air  Body mass index is 33.94 kg/m².    Physical Exam    Results Review:  I reviewed the patient's new clinical results.  I reviewed the patient's new imaging results and agree with the interpretation.  I reviewed the patient's other test results and agree with the interpretation  I personally viewed and interpreted the patient's EKG/Telemetry data  Discussed with ED provider.    Lab Results (last 24 hours)       Procedure Component Value Units Date/Time    CBC & Differential [[id number]]  (Abnormal) Collected: 10/11/24 0447    Specimen: Blood Updated: 10/11/24 0549    Narrative:      The following orders were created for panel order CBC & Differential.  Procedure                               Abnormality         Status                     ---------                               -----------         ------                     CBC Auto Differential[[id number]]        Abnormal            Final result                 Please view results for these tests on the individual orders.    Basic Metabolic Panel [[id number]]  (Abnormal) Collected: 10/11/24 0447    Specimen: Blood Updated: 10/11/24 0604     Glucose 109 mg/dL      BUN 14 mg/dL      Creatinine 0.79 mg/dL      Sodium 136 mmol/L      Potassium 3.2 mmol/L      Chloride 100 mmol/L      CO2 26.2 mmol/L      Calcium 9.0 mg/dL      BUN/Creatinine Ratio 17.7     Anion Gap 9.8 mmol/L      eGFR 73.4 mL/min/1.73     Narrative:      GFR Normal >60  Chronic Kidney Disease <60  Kidney Failure <15    The GFR formula is only valid for adults with stable renal function between ages 18 and 70.     Magnesium [[id number]]  (Abnormal) Collected: 10/11/24 0447    Specimen: Blood Updated: 10/11/24 0604     Magnesium 1.4 mg/dL     CBC Auto Differential [[id number]]  (Abnormal) Collected: 10/11/24 0447    Specimen: Blood Updated: 10/11/24 0549     WBC 7.43 10*3/mm3      RBC 3.01 10*6/mm3      Hemoglobin 8.5 g/dL      Hematocrit 26.6 %      MCV 88.4 fL      MCH 28.2 pg      MCHC 32.0 g/dL      RDW 14.6 %      RDW-SD 47.0 fl      MPV 10.1 fL      Platelets 282 10*3/mm3      Neutrophil % 58.5 %      Lymphocyte % 25.8 %      Monocyte % 11.3 %      Eosinophil % 3.4 %      Basophil % 0.3 %      Immature Grans % 0.7 %      Neutrophils, Absolute 4.35 10*3/mm3      Lymphocytes, Absolute 1.92 10*3/mm3      Monocytes, Absolute 0.84 10*3/mm3      Eosinophils, Absolute 0.25 10*3/mm3      Basophils, Absolute 0.02 10*3/mm3      Immature Grans, Absolute 0.05 10*3/mm3      nRBC 0.0 /100 WBC             Imaging Results (Last 24 Hours)       ** No results found for the last 24 hours. **            Results for orders placed during the hospital encounter of 10/07/24    Adult Transesophageal Echo (EPI) W/ Cont if Necessary Per Protocol    Interpretation Summary  Normal left ventricular systolic function.  Pacemaker lead in the right ventricle.  No evidence of any vegetation.  Mild mitral regurgitation.      Telemetry Scan   Final Result           Assessment/Plan     Active Hospital Problems    Diagnosis  POA    **Bacteremia due to methicillin resistant Staphylococcus aureus [R78.81, B95.62]  Yes    Sepsis [A41.9]  Yes    COPD (chronic obstructive pulmonary disease) [J44.9]  Unknown    Coronary artery disease involving native coronary artery of native heart without angina pectoris [I25.10]  Yes    Presence of cardiac pacemaker [Z95.0]  Yes     [employer] L311 COMPLETE MRI PACEMAKER SYSTEM      Sick sinus syndrome [I49.5]  Yes    Essential hypertension [I10]  Yes     Patient had actually been admitted to [facility] since  10/7/2024 within diagnosis of pneumonia, she was found to have MSSA bacteremia, and is thought that causes her pacer wire, so she is transferred here to [facility] for further evaluation.     Bacteremia due to MSSA/presence of pacemaker/sepsis  -Continue cefazolin at this time  -Consult infectious disease  -Consult cardiology for possible infected pacer wire as cause  -Telemetry unit for monitoring  -Tylenol for fever control  -Repeat CBC, CMP in a.m.    COPD/pneumonia  -Patient was treated with Rocephin and azithromycin  -Patient on room air with sats 95% during my exam  -Continuous pulse oximetry, O2 to keep sats above 90%  -Continue home inhalers  -Albuterol as needed for wheezing or shortness of breath    CAD/hypertension  -Continue home medications when med rec complete  -Cardiology is consulted      I discussed the patient's findings and my recommendations with patient.    VTE Prophylaxis - SCDs.  Code Status - Full code.       YOVANA Rivera  [facility]  10/12/24  04:28 EDT

## 2024-10-12 NOTE — PLAN OF CARE
Goal Outcome Evaluation:  Plan of Care Reviewed With: patient   Pt received from [facility] d/terry concern for infection of pacemaker leads. Pt voiced no complaints or concerns throughout the shift, rested well. VSS. Safety maintained.      Progress: no change

## 2024-10-13 PROBLEM — B95.61 BACTEREMIA DUE TO METHICILLIN SUSCEPTIBLE STAPHYLOCOCCUS AUREUS (MSSA): Status: ACTIVE | Noted: 2024-10-10

## 2024-10-13 LAB
ALBUMIN SERPL-MCNC: 3.3 G/DL (ref 3.5–5.2)
ALBUMIN/GLOB SERPL: 1 G/DL
ALP SERPL-CCNC: 111 U/L (ref 39–117)
ALT SERPL W P-5'-P-CCNC: 12 U/L (ref 1–33)
ANION GAP SERPL CALCULATED.3IONS-SCNC: 12.8 MMOL/L (ref 5–15)
AST SERPL-CCNC: 44 U/L (ref 1–32)
BILIRUB SERPL-MCNC: 0.3 MG/DL (ref 0–1.2)
BUN SERPL-MCNC: 14 MG/DL (ref 8–23)
BUN/CREAT SERPL: 15.9 (ref 7–25)
CALCIUM SPEC-SCNC: 9.4 MG/DL (ref 8.6–10.5)
CHLORIDE SERPL-SCNC: 101 MMOL/L (ref 98–107)
CO2 SERPL-SCNC: 25.2 MMOL/L (ref 22–29)
CREAT SERPL-MCNC: 0.88 MG/DL (ref 0.57–1)
DEPRECATED RDW RBC AUTO: 45.3 FL (ref 37–54)
EGFRCR SERPLBLD CKD-EPI 2021: 64.5 ML/MIN/1.73
ERYTHROCYTE [DISTWIDTH] IN BLOOD BY AUTOMATED COUNT: 13.5 % (ref 12.3–15.4)
GLOBULIN UR ELPH-MCNC: 3.3 GM/DL
GLUCOSE SERPL-MCNC: 93 MG/DL (ref 65–99)
HCT VFR BLD AUTO: 29 % (ref 34–46.6)
HGB BLD-MCNC: 9.7 G/DL (ref 12–15.9)
MCH RBC QN AUTO: 30.7 PG (ref 26.6–33)
MCHC RBC AUTO-ENTMCNC: 33.4 G/DL (ref 31.5–35.7)
MCV RBC AUTO: 91.8 FL (ref 79–97)
PLATELET # BLD AUTO: 336 10*3/MM3 (ref 140–450)
PMV BLD AUTO: 9.5 FL (ref 6–12)
POTASSIUM SERPL-SCNC: 3.9 MMOL/L (ref 3.5–5.2)
PROT SERPL-MCNC: 6.6 G/DL (ref 6–8.5)
RBC # BLD AUTO: 3.16 10*6/MM3 (ref 3.77–5.28)
SODIUM SERPL-SCNC: 139 MMOL/L (ref 136–145)
WBC NRBC COR # BLD AUTO: 9 10*3/MM3 (ref 3.4–10.8)

## 2024-10-13 PROCEDURE — 94799 UNLISTED PULMONARY SVC/PX: CPT

## 2024-10-13 PROCEDURE — 25010000002 CEFAZOLIN PER 500 MG: Performed by: INTERNAL MEDICINE

## 2024-10-13 PROCEDURE — 85027 COMPLETE CBC AUTOMATED: CPT | Performed by: INTERNAL MEDICINE

## 2024-10-13 PROCEDURE — 80053 COMPREHEN METABOLIC PANEL: CPT | Performed by: INTERNAL MEDICINE

## 2024-10-13 PROCEDURE — 99232 SBSQ HOSP IP/OBS MODERATE 35: CPT

## 2024-10-13 PROCEDURE — 94761 N-INVAS EAR/PLS OXIMETRY MLT: CPT

## 2024-10-13 PROCEDURE — 99233 SBSQ HOSP IP/OBS HIGH 50: CPT | Performed by: INTERNAL MEDICINE

## 2024-10-13 PROCEDURE — 94664 DEMO&/EVAL PT USE INHALER: CPT

## 2024-10-13 RX ADMIN — ALBUTEROL SULFATE 2.5 MG: 2.5 SOLUTION RESPIRATORY (INHALATION) at 20:57

## 2024-10-13 RX ADMIN — MAGNESIUM OXIDE 400 MG (241.3 MG MAGNESIUM) TABLET 400 MG: TABLET at 08:22

## 2024-10-13 RX ADMIN — ACETAMINOPHEN 325MG 650 MG: 325 TABLET ORAL at 03:33

## 2024-10-13 RX ADMIN — BUDESONIDE AND FORMOTEROL FUMARATE DIHYDRATE 1 PUFF: 160; 4.5 AEROSOL RESPIRATORY (INHALATION) at 08:00

## 2024-10-13 RX ADMIN — ATORVASTATIN CALCIUM 40 MG: 20 TABLET, FILM COATED ORAL at 08:21

## 2024-10-13 RX ADMIN — CEFAZOLIN 2000 MG: 2 INJECTION, POWDER, FOR SOLUTION INTRAMUSCULAR; INTRAVENOUS at 17:05

## 2024-10-13 RX ADMIN — MONTELUKAST SODIUM 10 MG: 10 TABLET, FILM COATED ORAL at 20:16

## 2024-10-13 RX ADMIN — PANTOPRAZOLE SODIUM 40 MG: 40 TABLET, DELAYED RELEASE ORAL at 08:21

## 2024-10-13 RX ADMIN — CARVEDILOL 25 MG: 25 TABLET, FILM COATED ORAL at 08:21

## 2024-10-13 RX ADMIN — CARVEDILOL 25 MG: 25 TABLET, FILM COATED ORAL at 17:05

## 2024-10-13 RX ADMIN — SERTRALINE 50 MG: 50 TABLET, FILM COATED ORAL at 08:22

## 2024-10-13 RX ADMIN — AMLODIPINE BESYLATE 5 MG: 5 TABLET ORAL at 08:22

## 2024-10-13 RX ADMIN — BUDESONIDE AND FORMOTEROL FUMARATE DIHYDRATE 1 PUFF: 160; 4.5 AEROSOL RESPIRATORY (INHALATION) at 20:57

## 2024-10-13 RX ADMIN — CEFAZOLIN 2000 MG: 2 INJECTION, POWDER, FOR SOLUTION INTRAMUSCULAR; INTRAVENOUS at 02:07

## 2024-10-13 RX ADMIN — ROPINIROLE HYDROCHLORIDE 0.25 MG: 0.5 TABLET, FILM COATED ORAL at 20:16

## 2024-10-13 RX ADMIN — CEFAZOLIN 2000 MG: 2 INJECTION, POWDER, FOR SOLUTION INTRAMUSCULAR; INTRAVENOUS at 08:23

## 2024-10-13 NOTE — PLAN OF CARE
Goal Outcome Evaluation:              Outcome Evaluation: pt aoX4. pt was able to move from bed to chair today with PT. waiting to see if pacemaker removal will be appropriate. pt reports no cases of SOA this shift. evany ariana.

## 2024-10-13 NOTE — PLAN OF CARE
Goal Outcome Evaluation:  Plan of Care Reviewed With: patient   VSS. No concerns voiced this shift. Rested well. Safety maintained     Progress: no change

## 2024-10-13 NOTE — PROGRESS NOTES
Name: Mallory Franco ADMIT: 10/12/2024   : 1940  PCP: Amauri Kearney MD    MRN: [id number] LOS: 1 days   AGE/SEX: 84 y.o. female  ROOM: [facility]     Subjective   Subjective   Feels OK. Denies complaints       Objective   Objective   Vital Signs  Temp:  [97.8 °F (36.6 °C)-98.1 °F (36.7 °C)] 97.9 °F (36.6 °C)  Heart Rate:  [60-69] 63  Resp:  [15-17] 17  BP: (130-169)/(55-72) 130/55  SpO2:  [92 %-100 %] 96 %  on   ;   Device (Oxygen Therapy): room air  Body mass index is 33.83 kg/m².  Physical Exam  Vitals reviewed.   Constitutional:       General: She is not in acute distress.     Appearance: She is obese.   Cardiovascular:      Rate and Rhythm: Normal rate and regular rhythm.      Heart sounds: No murmur heard.  Pulmonary:      Breath sounds: Normal breath sounds.   Abdominal:      General: There is no distension.      Palpations: Abdomen is soft.      Tenderness: There is no abdominal tenderness.   Skin:     General: Skin is warm and dry.   Neurological:      Mental Status: She is alert.       Results Review     I reviewed the patient's new clinical results.  Results from last 7 days   Lab Units 10/13/24  0337 10/12/24  0404 10/11/24  0447 10/10/24  0435   WBC 10*3/mm3 9.00 7.81 7.43 8.88   HEMOGLOBIN g/dL 9.7* 8.4* 8.5* 8.9*   PLATELETS 10*3/mm3 336 285 282 277     Results from last 7 days   Lab Units 10/13/24  0338 10/12/24  0404 10/11/24  0447 10/10/24  0435   SODIUM mmol/L 139 139 136 136   POTASSIUM mmol/L 3.9 3.4* 3.2* 3.6   CHLORIDE mmol/L 101 104 100 101   CO2 mmol/L 25.2 26.1 26.2 23.4   BUN mg/dL 14 13 14 18   CREATININE mg/dL 0.88 0.84 0.79 0.93   GLUCOSE mg/dL 93 111* 109* 102*   EGFR mL/min/1.73 64.5 68.2 73.4 60.4     Results from last 7 days   Lab Units 10/13/24  0338 10/12/24  0404 10/08/24  0429 10/07/24  1842   ALBUMIN g/dL 3.3* 2.8* 3.1* 3.5   BILIRUBIN mg/dL 0.3 0.4 0.8 1.0   ALK PHOS U/L 111 101 100 108   AST (SGOT) U/L 44* 40* 22 19   ALT (SGPT) U/L 12 17 10 12     Results from last  "7 days   Lab Units 10/13/24  0338 10/12/24  0404 10/11/24  0447 10/10/24  0435 10/09/24  0442 10/08/24  1307 10/08/24  0429 10/07/24  1842 10/07/24  1842   CALCIUM mg/dL 9.4 8.7 9.0 9.2 9.1  --  8.9  --  9.4   ALBUMIN g/dL 3.3* 2.8*  --   --   --   --  3.1*  --  3.5   MAGNESIUM mg/dL  --   --  1.4* 1.5* 1.6 1.8 1.1*   < >  --    PHOSPHORUS mg/dL  --   --   --   --   --   --  2.4*  --   --     < > = values in this interval not displayed.     Results from last 7 days   Lab Units 10/07/24  2038   LACTATE mmol/L 1.3     No results found for: \"HGBA1C\", \"POCGLU\"    No radiology results for the last day    I have personally reviewed all medications:  Scheduled Medications  amLODIPine, 5 mg, Oral, Daily  atorvastatin, 40 mg, Oral, Daily  budesonide-formoterol, 1 puff, Inhalation, BID - RT  carvedilol, 25 mg, Oral, BID With Meals  ceFAZolin, 2,000 mg, Intravenous, Q8H  magnesium oxide, 400 mg, Oral, Daily  montelukast, 10 mg, Oral, Nightly  pantoprazole, 40 mg, Oral, Daily  rOPINIRole, 0.25 mg, Oral, Nightly  sertraline, 50 mg, Oral, Daily    Infusions   Diet  Diet: Cardiac; Healthy Heart (2-3 Na+); Fluid Consistency: Thin (IDDSI 0)    I have personally reviewed:  [x]  Laboratory   [x]  Microbiology   [x]  Radiology   []  EKG/Telemetry  []  Cardiology/Vascular   []  Pathology    [x]  Records       Assessment/Plan     Active Hospital Problems    Diagnosis  POA    **Bacteremia due to methicillin susceptible Staphylococcus aureus (MSSA) [R78.81, B95.61]  Yes    Sepsis [A41.9]  Yes    COPD (chronic obstructive pulmonary disease) [J44.9]  Yes    Coronary artery disease involving native coronary artery of native heart without angina pectoris [I25.10]  Yes    Presence of cardiac pacemaker [Z95.0]  Yes    Sick sinus syndrome [I49.5]  Yes    Essential hypertension [I10]  Yes      Resolved Hospital Problems   No resolved problems to display.       84 y.o. female admitted with Bacteremia due to methicillin susceptible Staphylococcus " aureus (MSSA).    Await decision from EP re: pacer lead removal. D/w Dr Whitten who is managing abx. No fevers and infection seems to be stable at this point    MSSA PNA: Reivewed CXR with persistent infiltrate but probably not surprising. Clinically stable/doing well    HTN stable      SCDs  Dispo TBD pending above decision    Pedro Mckeon MD  [facility]  10/13/24  18:59 EDT

## 2024-10-13 NOTE — PROGRESS NOTES
ID NOTE    CC: f/u  MSSA bacteremia with PPM present    Subj: No acute events.  No fever.  She is tolerating cefazolin without rash or diarrhea.  Awaiting recommendations from EP.      Medications:    Current Facility-Administered Medications:     acetaminophen (TYLENOL) tablet 650 mg, 650 mg, Oral, Q4H PRN, 650 mg at 10/13/24 0333 **OR** acetaminophen (TYLENOL) 160 MG/5ML oral solution 650 mg, 650 mg, Oral, Q4H PRN **OR** acetaminophen (TYLENOL) suppository 650 mg, 650 mg, Rectal, Q4H PRN, Brunilda Matias, APRN    albuterol (PROVENTIL) nebulizer solution 0.083% 2.5 mg/3mL, 2.5 mg, Nebulization, Q6H PRN, Brunilda Matias, APRN, 2.5 mg at 10/12/24 1557    amLODIPine (NORVASC) tablet 5 mg, 5 mg, Oral, Daily, Brunilda Matias, APRN, 5 mg at 10/13/24 0822    atorvastatin (LIPITOR) tablet 40 mg, 40 mg, Oral, Daily, Brunilda Matias, APRN, 40 mg at 10/13/24 0821    sennosides-docusate (PERICOLACE) 8.6-50 MG per tablet 2 tablet, 2 tablet, Oral, BID PRN **AND** polyethylene glycol (MIRALAX) packet 17 g, 17 g, Oral, Daily PRN **AND** bisacodyl (DULCOLAX) EC tablet 5 mg, 5 mg, Oral, Daily PRN **AND** bisacodyl (DULCOLAX) suppository 10 mg, 10 mg, Rectal, Daily PRN, Brunilda Matias M, APRN    budesonide-formoterol (SYMBICORT) 160-4.5 MCG/ACT inhaler 1 puff, 1 puff, Inhalation, BID - RT, Brunilda Matias, APRN, 1 puff at 10/13/24 0800    carvedilol (COREG) tablet 25 mg, 25 mg, Oral, BID With Meals, Brunilda Matias, APRN, 25 mg at 10/13/24 0821    ceFAZolin 2000 mg IVPB in 100 mL NS (MBP), 2,000 mg, Intravenous, Q8H, Man Whitten MD, 2,000 mg at 10/13/24 0823    famotidine (PEPCID) tablet 20 mg, 20 mg, Oral, BID PRN, Brunilda Matias APRN    magnesium oxide (MAG-OX) tablet 400 mg, 400 mg, Oral, Daily, Brunilda Matias APRN, 400 mg at 10/13/24 0822    melatonin tablet 5 mg, 5 mg, Oral, Nightly PRN, Brunilda Matias APRN    montelukast (SINGULAIR) tablet 10 mg, 10 mg, Oral, Nightly,  Brunilda Matias APRN, 10 mg at 10/12/24 2006    nitroglycerin (NITROSTAT) SL tablet 0.4 mg, 0.4 mg, Sublingual, Q5 Min PRN, Brunilda Matias APRN    ondansetron (ZOFRAN) injection 4 mg, 4 mg, Intravenous, Q6H PRN, Brunilda Matias APRN    pantoprazole (PROTONIX) EC tablet 40 mg, 40 mg, Oral, Daily, Brunilda Matias APRN, 40 mg at 10/13/24 0821    rOPINIRole (REQUIP) tablet 0.25 mg, 0.25 mg, Oral, Nightly, Brunilda Matias APRN, 0.25 mg at 10/12/24 2006    sertraline (ZOLOFT) tablet 50 mg, 50 mg, Oral, Daily, Brunilda Matias APRN, 50 mg at 10/13/24 0822    sodium chloride 0.9 % flush 10 mL, 10 mL, Intravenous, PRN, Brunilda Matias APRN    Facility-Administered Medications Ordered in Other Encounters:     Chlorhexidine Gluconate Cloth 2 % pads, , Apply externally, BID, Francesco Grewal APRN    iopamidol (ISOVUE-370) 76 % injection, , , Code / Trauma / Sedation Medication, Shane Rosa MD, 15 mL at 03/27/23 1202      Objective   Vital Signs   Temp:  [97.8 °F (36.6 °C)-98.1 °F (36.7 °C)] 97.8 °F (36.6 °C)  Heart Rate:  [60-69] 69  Resp:  [15-16] 16  BP: (138-169)/(59-72) 169/72    Physical Exam:   General: awake, alert, NAD, very nice  Eyes: no scleral icterus  Cardiovascular: NR  Respiratory: normal work of breathing on ambient air  :  no Simms catheter  Neurological: Alert and oriented x 3  Psychiatric: Normal mood and affect     Labs:   CBC, CMP, and blood cultures reviewed today  Lab Results   Component Value Date    WBC 9.00 10/13/2024    HGB 9.7 (L) 10/13/2024    HCT 29.0 (L) 10/13/2024    MCV 91.8 10/13/2024     10/13/2024       Lab Results   Component Value Date    GLUCOSE 93 10/13/2024    BUN 14 10/13/2024    CREATININE 0.88 10/13/2024    EGFRIFNONA 59 (L) 07/22/2021    BCR 15.9 10/13/2024    CO2 25.2 10/13/2024    CALCIUM 9.4 10/13/2024    ALBUMIN 3.3 (L) 10/13/2024    LABIL2 1.6 06/12/2020    AST 44 (H) 10/13/2024    ALT 12 10/13/2024       Microbiology:  10/7 COVID:  Negative  10/7 BCx: MSSA in 1/2 sets  10/9 MRSA nares PCR: Negative  10/9 BCx: Negative to date    Radiology:  10/12 CXR personally reviewed and shows a persistent and impressive right upper lobe pneumonia    ASSESSMENT/PLAN:  MSSA septicemia  Pacemaker present with concerns for infection  Obesity BMI 33  Right upper lobe pneumonia    For MSSA septicemia, continue cefazolin 2 g IV every 8 hours with duration to be determined.  Her repeat blood cultures from 10/9 are negative to date.  I will follow-up cardiology/EP evaluation regarding pacemaker plans.    D/W Dr. Mckeon and patient's son-in-law.    ID will follow.

## 2024-10-13 NOTE — PROGRESS NOTES
[facility]  Cardiology Progress Note    Patient Identification:  Name: Mallory Franco  Age: 84 y.o.  Sex: female  :  1940  MRN: [id number]                 Follow Up / Chief Complaint: Sepsis and pacemaker    Interval History: sitting up in chair. no chest pain. Tells me she felt a little short of breath this morning but feels fine now.         Objective:    Past Medical History:  Past Medical History:   Diagnosis Date    Arthritis     Asthma     BPPV (benign paroxysmal positional vertigo), right     COPD (chronic obstructive pulmonary disease)     Coronary artery disease     Coronary artery disease     Diverticulitis     Ear itching     HX    Enlarged heart     Gastroesophageal reflux disease     Hyperlipidemia     Hypertension     Knee pain     Macular degeneration     PONV (postoperative nausea and vomiting)     Postural dizziness with presyncope 2023    Shingles     X2    SOB (shortness of breath) on exertion     Tinnitus of left ear     Urinary frequency     Vertigo      Past Surgical History:  Past Surgical History:   Procedure Laterality Date    BREAST BIOPSY Bilateral     BRONCHOSCOPY Right 2023    Procedure: BRONCHOSCOPY WITH BIOPSIES, BRUSHINGS, AND BRONCHOALVEOLAR LAVAGE;  Surgeon: Aleksandr Houston DO;  Location: [facility] ENDOSCOPY;  Service: Pulmonary;  Laterality: Right;  RIGHT UPPER LOBE ENDOBRONCHIAL OCCLUSION    CARDIAC CATHETERIZATION      CARDIAC ELECTROPHYSIOLOGY PROCEDURE N/A 3/27/2023    Procedure: Pacemaker SC new-[employer], call Dr. Rosa first thing in the morning to get time for the procedure and notify rep;  Surgeon: Shane Rosa MD;  Location: [facility] CATH INVASIVE LOCATION;  Service: Cardiovascular;  Laterality: N/A;     SECTION      CHOLECYSTECTOMY      COLONOSCOPY      CORONARY STENT PLACEMENT      EYE SURGERY      GALLBLADDER SURGERY      KNEE MINI REVISION Right 2021    Procedure: KNEE POLY INSERT EXCHANGE;  Surgeon: Austin  Ky BRYANT MD;  Location: [facility] OR;  Service: Orthopedics;  Laterality: Right;    KNEE SURGERY      REPLACEMENT TOTAL KNEE Right 2005    REPLACEMENT TOTAL KNEE Left 2004        Social History:   Social History     Tobacco Use    Smoking status: Never     Passive exposure: Past    Smokeless tobacco: Never   Substance Use Topics    Alcohol use: No      Family History:  Family History   Problem Relation Age of Onset    Breast cancer Mother     Bone cancer Father     Breast cancer Sister     Bone cancer Sister     Lung cancer Brother     Malig Hyperthermia Neg Hx           Allergies:  Allergies   Allergen Reactions    Codeine Nausea And Vomiting    Contrast Dye (Echo Or Unknown Ct/Mr) Hives    Morphine Nausea And Vomiting     Scheduled Meds:  amLODIPine, 5 mg, Daily  atorvastatin, 40 mg, Daily  budesonide-formoterol, 1 puff, BID - RT  carvedilol, 25 mg, BID With Meals  ceFAZolin, 2,000 mg, Q8H  magnesium oxide, 400 mg, Daily  montelukast, 10 mg, Nightly  pantoprazole, 40 mg, Daily  rOPINIRole, 0.25 mg, Nightly  sertraline, 50 mg, Daily            INTAKE AND OUTPUT:    Intake/Output Summary (Last 24 hours) at 10/13/2024 1220  Last data filed at 10/13/2024 0900  Gross per 24 hour   Intake 380 ml   Output --   Net 380 ml     Review of Systems:   GI: no n/v or abd pain  Cardiac: no chest pain or palpitations  Pulmonary: no shortness of breath or cough        /72 (BP Location: Left arm, Patient Position: Lying)   Pulse 69   Temp 97.8 °F (36.6 °C) (Oral)   Resp 16   Wt 89.4 kg (197 lb 1.5 oz)   SpO2 94%   BMI 33.83 kg/m²   Physical Exam:  General:  Alert, cooperative, appears in no acute distress  Respiratory: Clear to auscultation.  Normal respiratory effort and rate.  Cardiovascular: S1S2 Regular rate and rhythm. No murmur, rub or gallop.   Gastrointestinal: soft, non tender. Bowel sounds present.   Extremities: ORTIZ x4. No pretibial pitting edema. Adequate musculoskeletal strength.   Neuro: AAO x3 CN II-XII  "grossly intact                Cardiographics  Echocardiogram:     Interpretation Summary    Normal left ventricular systolic function.  Pacemaker lead in the right ventricle.  No evidence of any vegetation.  Mild mitral regurgitation.     Lab Review   Results from last 7 days   Lab Units 10/07/24  1842   HSTROP T ng/L 39*     Results from last 7 days   Lab Units 10/11/24  0447   MAGNESIUM mg/dL 1.4*     Results from last 7 days   Lab Units 10/13/24  0338   SODIUM mmol/L 139   POTASSIUM mmol/L 3.9   BUN mg/dL 14   CREATININE mg/dL 0.88   CALCIUM mg/dL 9.4     @[id number]@  Results from last 7 days   Lab Units 10/13/24  0337 10/12/24  0404 10/11/24  0447   WBC 10*3/mm3 9.00 7.81 7.43   HEMOGLOBIN g/dL 9.7* 8.4* 8.5*   HEMATOCRIT % 29.0* 26.8* 26.6*   PLATELETS 10*3/mm3 336 285 282             Assessment:  Bacteremia  Pacemaker  Hypertension      Plan:  BP and HR stable  EPI without evidence of vegetation  EP to evaluate Monday for questionable pacemaker removal versus conservative management.   No change from cardiac standpoint. Abx per ID      )10/13/2024  YOVANA Douglass      EMR Dragon/Transcription:   \"Dictated utilizing Dragon dictation\".     "

## 2024-10-14 ENCOUNTER — APPOINTMENT (OUTPATIENT)
Dept: GENERAL RADIOLOGY | Facility: HOSPITAL | Age: 84
End: 2024-10-14
Payer: MEDICARE

## 2024-10-14 PROBLEM — A41.9 SEPSIS: Status: RESOLVED | Noted: 2024-10-12 | Resolved: 2024-10-14

## 2024-10-14 PROBLEM — J15.211: Status: ACTIVE | Noted: 2024-10-12

## 2024-10-14 LAB
BACTERIA SPEC AEROBE CULT: NORMAL
BACTERIA SPEC AEROBE CULT: NORMAL

## 2024-10-14 PROCEDURE — 33216 INSERT 1 ELECTRODE PM-DEFIB: CPT | Performed by: INTERNAL MEDICINE

## 2024-10-14 PROCEDURE — 25010000002 MIDAZOLAM PER 1 MG: Performed by: INTERNAL MEDICINE

## 2024-10-14 PROCEDURE — 99223 1ST HOSP IP/OBS HIGH 75: CPT

## 2024-10-14 PROCEDURE — 94799 UNLISTED PULMONARY SVC/PX: CPT

## 2024-10-14 PROCEDURE — 0JPT0PZ REMOVAL OF CARDIAC RHYTHM RELATED DEVICE FROM TRUNK SUBCUTANEOUS TISSUE AND FASCIA, OPEN APPROACH: ICD-10-PCS | Performed by: INTERNAL MEDICINE

## 2024-10-14 PROCEDURE — C1898 LEAD, PMKR, OTHER THAN TRANS: HCPCS | Performed by: INTERNAL MEDICINE

## 2024-10-14 PROCEDURE — C1894 INTRO/SHEATH, NON-LASER: HCPCS | Performed by: INTERNAL MEDICINE

## 2024-10-14 PROCEDURE — 33235 REMOVAL PACEMAKER ELECTRODE: CPT | Performed by: INTERNAL MEDICINE

## 2024-10-14 PROCEDURE — 25010000002 FENTANYL CITRATE (PF) 50 MCG/ML SOLUTION: Performed by: INTERNAL MEDICINE

## 2024-10-14 PROCEDURE — C1769 GUIDE WIRE: HCPCS | Performed by: INTERNAL MEDICINE

## 2024-10-14 PROCEDURE — 71045 X-RAY EXAM CHEST 1 VIEW: CPT

## 2024-10-14 PROCEDURE — 94761 N-INVAS EAR/PLS OXIMETRY MLT: CPT

## 2024-10-14 PROCEDURE — 97530 THERAPEUTIC ACTIVITIES: CPT

## 2024-10-14 PROCEDURE — 25010000002 CEFAZOLIN PER 500 MG

## 2024-10-14 PROCEDURE — 02PA3MZ REMOVAL OF CARDIAC LEAD FROM HEART, PERCUTANEOUS APPROACH: ICD-10-PCS | Performed by: INTERNAL MEDICINE

## 2024-10-14 PROCEDURE — 25010000002 CEFAZOLIN PER 500 MG: Performed by: INTERNAL MEDICINE

## 2024-10-14 PROCEDURE — 25010000002 LIDOCAINE 1 % SOLUTION: Performed by: INTERNAL MEDICINE

## 2024-10-14 PROCEDURE — 94664 DEMO&/EVAL PT USE INHALER: CPT

## 2024-10-14 PROCEDURE — 5A1223Z PERFORMANCE OF CARDIAC PACING, CONTINUOUS: ICD-10-PCS | Performed by: INTERNAL MEDICINE

## 2024-10-14 PROCEDURE — 33233 REMOVAL OF PM GENERATOR: CPT | Performed by: INTERNAL MEDICINE

## 2024-10-14 PROCEDURE — 99233 SBSQ HOSP IP/OBS HIGH 50: CPT | Performed by: INTERNAL MEDICINE

## 2024-10-14 DEVICE — PACE/SENSE LEAD
Type: IMPLANTABLE DEVICE | Site: HEART | Status: FUNCTIONAL
Brand: INGEVITY™+

## 2024-10-14 RX ORDER — METHOHEXITAL IN WATER/PF 100MG/10ML
SYRINGE (ML) INTRAVENOUS
Status: DISCONTINUED | OUTPATIENT
Start: 2024-10-14 | End: 2024-10-14 | Stop reason: HOSPADM

## 2024-10-14 RX ORDER — LIDOCAINE HYDROCHLORIDE 10 MG/ML
INJECTION, SOLUTION INFILTRATION; PERINEURAL
Status: DISCONTINUED | OUTPATIENT
Start: 2024-10-14 | End: 2024-10-14 | Stop reason: HOSPADM

## 2024-10-14 RX ORDER — FENTANYL CITRATE 50 UG/ML
INJECTION, SOLUTION INTRAMUSCULAR; INTRAVENOUS
Status: DISCONTINUED | OUTPATIENT
Start: 2024-10-14 | End: 2024-10-14 | Stop reason: HOSPADM

## 2024-10-14 RX ORDER — MIDAZOLAM HYDROCHLORIDE 1 MG/ML
INJECTION, SOLUTION INTRAMUSCULAR; INTRAVENOUS
Status: DISCONTINUED | OUTPATIENT
Start: 2024-10-14 | End: 2024-10-14 | Stop reason: HOSPADM

## 2024-10-14 RX ADMIN — CEFAZOLIN 2000 MG: 2 INJECTION, POWDER, FOR SOLUTION INTRAMUSCULAR; INTRAVENOUS at 17:18

## 2024-10-14 RX ADMIN — SERTRALINE 50 MG: 50 TABLET, FILM COATED ORAL at 08:28

## 2024-10-14 RX ADMIN — CARVEDILOL 25 MG: 25 TABLET, FILM COATED ORAL at 08:28

## 2024-10-14 RX ADMIN — MONTELUKAST SODIUM 10 MG: 10 TABLET, FILM COATED ORAL at 20:11

## 2024-10-14 RX ADMIN — CARVEDILOL 25 MG: 25 TABLET, FILM COATED ORAL at 18:04

## 2024-10-14 RX ADMIN — ATORVASTATIN CALCIUM 40 MG: 20 TABLET, FILM COATED ORAL at 08:28

## 2024-10-14 RX ADMIN — BUDESONIDE AND FORMOTEROL FUMARATE DIHYDRATE 1 PUFF: 160; 4.5 AEROSOL RESPIRATORY (INHALATION) at 06:58

## 2024-10-14 RX ADMIN — BUDESONIDE AND FORMOTEROL FUMARATE DIHYDRATE 1 PUFF: 160; 4.5 AEROSOL RESPIRATORY (INHALATION) at 20:23

## 2024-10-14 RX ADMIN — MAGNESIUM OXIDE 400 MG (241.3 MG MAGNESIUM) TABLET 400 MG: TABLET at 08:28

## 2024-10-14 RX ADMIN — CEFAZOLIN 2000 MG: 2 INJECTION, POWDER, FOR SOLUTION INTRAMUSCULAR; INTRAVENOUS at 08:38

## 2024-10-14 RX ADMIN — PANTOPRAZOLE SODIUM 40 MG: 40 TABLET, DELAYED RELEASE ORAL at 08:28

## 2024-10-14 RX ADMIN — CEFAZOLIN 2000 MG: 2 INJECTION, POWDER, FOR SOLUTION INTRAMUSCULAR; INTRAVENOUS at 00:50

## 2024-10-14 RX ADMIN — ROPINIROLE HYDROCHLORIDE 0.25 MG: 0.5 TABLET, FILM COATED ORAL at 20:11

## 2024-10-14 RX ADMIN — AMLODIPINE BESYLATE 5 MG: 5 TABLET ORAL at 08:28

## 2024-10-14 NOTE — PROGRESS NOTES
Name: Mallory Franco ADMIT: 10/12/2024   : 1940  PCP: Amauri Kearney MD    MRN: [id number] LOS: 2 days   AGE/SEX: 84 y.o. female  ROOM: [facility]     Subjective   Subjective   Feels OK, joking around after getting back from pacer nplacement       Objective   Objective   Vital Signs  Temp:  [97.8 °F (36.6 °C)-98.2 °F (36.8 °C)] 97.9 °F (36.6 °C)  Heart Rate:  [52-66] 52  Resp:  [10-28] 18  BP: (102-183)/() 124/77  SpO2:  [93 %-100 %] 94 %  on  Flow (L/min) (Oxygen Therapy):  [3] 3;   Device (Oxygen Therapy): room air  Body mass index is 33.83 kg/m².  Physical Exam  Vitals reviewed.   Constitutional:       General: She is not in acute distress.     Appearance: She is obese.   Cardiovascular:      Rate and Rhythm: Normal rate and regular rhythm.      Heart sounds: No murmur heard.  Pulmonary:      Effort: No respiratory distress.   Abdominal:      General: There is no distension.      Palpations: Abdomen is soft.      Tenderness: There is no abdominal tenderness.   Skin:     General: Skin is warm and dry.   Neurological:      Mental Status: She is alert.       Results Review     I reviewed the patient's new clinical results.  Results from last 7 days   Lab Units 10/13/24  0337 10/12/24  0404 10/11/24  0447 10/10/24  0435   WBC 10*3/mm3 9.00 7.81 7.43 8.88   HEMOGLOBIN g/dL 9.7* 8.4* 8.5* 8.9*   PLATELETS 10*3/mm3 336 285 282 277     Results from last 7 days   Lab Units 10/13/24  0338 10/12/24  0404 10/11/24  0447 10/10/24  0435   SODIUM mmol/L 139 139 136 136   POTASSIUM mmol/L 3.9 3.4* 3.2* 3.6   CHLORIDE mmol/L 101 104 100 101   CO2 mmol/L 25.2 26.1 26.2 23.4   BUN mg/dL 14 13 14 18   CREATININE mg/dL 0.88 0.84 0.79 0.93   GLUCOSE mg/dL 93 111* 109* 102*   EGFR mL/min/1.73 64.5 68.2 73.4 60.4     Results from last 7 days   Lab Units 10/13/24  0338 10/12/24  0404 10/08/24  0429 10/07/24  1842   ALBUMIN g/dL 3.3* 2.8* 3.1* 3.5   BILIRUBIN mg/dL 0.3 0.4 0.8 1.0   ALK PHOS U/L 111 101 100 108   AST  "(SGOT) U/L 44* 40* 22 19   ALT (SGPT) U/L 12 17 10 12     Results from last 7 days   Lab Units 10/13/24  0338 10/12/24  0404 10/11/24  0447 10/10/24  0435 10/09/24  0442 10/08/24  1307 10/08/24  0429 10/07/24  1842 10/07/24  1842   CALCIUM mg/dL 9.4 8.7 9.0 9.2 9.1  --  8.9  --  9.4   ALBUMIN g/dL 3.3* 2.8*  --   --   --   --  3.1*  --  3.5   MAGNESIUM mg/dL  --   --  1.4* 1.5* 1.6 1.8 1.1*   < >  --    PHOSPHORUS mg/dL  --   --   --   --   --   --  2.4*  --   --     < > = values in this interval not displayed.     Results from last 7 days   Lab Units 10/07/24  2038   LACTATE mmol/L 1.3     No results found for: \"HGBA1C\", \"POCGLU\"    XR Chest 1 View    Result Date: 10/14/2024  As described.  This report was finalized on 10/14/2024 12:45 PM by Dr. Ruel Campbell M.D on Workstation: [id number]       I have personally reviewed all medications:  Scheduled Medications  amLODIPine, 5 mg, Oral, Daily  atorvastatin, 40 mg, Oral, Daily  budesonide-formoterol, 1 puff, Inhalation, BID - RT  carvedilol, 25 mg, Oral, BID With Meals  ceFAZolin, 2,000 mg, Intravenous, Q8H  magnesium oxide, 400 mg, Oral, Daily  montelukast, 10 mg, Oral, Nightly  pantoprazole, 40 mg, Oral, Daily  rOPINIRole, 0.25 mg, Oral, Nightly  sertraline, 50 mg, Oral, Daily    Infusions   Diet  Diet: Regular/House; Fluid Consistency: Thin (IDDSI 0)    I have personally reviewed:  [x]  Laboratory   [x]  Microbiology   []  Radiology   []  EKG/Telemetry  []  Cardiology/Vascular   []  Pathology    []  Records       Assessment/Plan     Active Hospital Problems    Diagnosis  POA    **Bacteremia due to methicillin susceptible Staphylococcus aureus (MSSA) [R78.81, B95.61]  Yes    Sepsis [A41.9]  Yes    COPD (chronic obstructive pulmonary disease) [J44.9]  Yes    MSSA (methicillin susceptible Staphylococcus aureus) [A49.01]  Unknown    Coronary artery disease involving native coronary artery of native heart without angina pectoris [I25.10]  Yes    Presence of cardiac " pacemaker [Z95.0]  Yes    Sick sinus syndrome [I49.5]  Yes    Essential hypertension [I10]  Yes      Resolved Hospital Problems   No resolved problems to display.       84 y.o. female admitted with Bacteremia due to methicillin susceptible Staphylococcus aureus (MSSA).  Status post removal of pacemaker leads 10/14, now with externalized pacer with temporary wire    Monitor on telemetry.  EP planning replacement of leadless pacemaker in 3 days.    Continue cefazolin per ID recommendations.  Discussed with Dr. Whitten and with Dr. Majano    HTN stable    Anemia: Cont to follow, no bleeding      SCDs  Dispo: Potentially home at weeks end    Pedro Mckeon MD  [facility]  10/14/24  15:50 EDT

## 2024-10-14 NOTE — THERAPY TREATMENT NOTE
Patient Name: Mallory Franco  : 1940    MRN: [id number]                              Today's Date: 10/14/2024       Admit Date: 10/12/2024    Visit Dx:     ICD-10-CM ICD-9-CM   1. MSSA (methicillin susceptible Staphylococcus aureus)  A49.01 041.11     Patient Active Problem List   Diagnosis    Essential hypertension    Coronary artery disease involving native coronary artery of native heart without angina pectoris    Pain due to total right knee replacement    History of coronary angioplasty with insertion of stent    Sick sinus syndrome    Presence of cardiac pacemaker    BPPV (benign paroxysmal positional vertigo), right    Esophageal reflux    Abnormal CT scan of lung    Atelectasis    Right upper lobe pneumonia    Bacteremia due to methicillin susceptible Staphylococcus aureus (MSSA)    Presence of cardiac pacemaker    Coronary artery disease involving native coronary artery of native heart without angina pectoris    Sepsis    MSSA (methicillin susceptible Staphylococcus aureus)    COPD (chronic obstructive pulmonary disease)     Past Medical History:   Diagnosis Date    Arthritis     Asthma     BPPV (benign paroxysmal positional vertigo), right     COPD (chronic obstructive pulmonary disease)     Coronary artery disease     Coronary artery disease     Diverticulitis     Ear itching     HX    Enlarged heart     Gastroesophageal reflux disease     Hyperlipidemia     Hypertension     Knee pain     Macular degeneration     PONV (postoperative nausea and vomiting)     Postural dizziness with presyncope 2023    Shingles     X2    SOB (shortness of breath) on exertion     Tinnitus of left ear     Urinary frequency     Vertigo      Past Surgical History:   Procedure Laterality Date    BREAST BIOPSY Bilateral     BRONCHOSCOPY Right 2023    Procedure: BRONCHOSCOPY WITH BIOPSIES, BRUSHINGS, AND BRONCHOALVEOLAR LAVAGE;  Surgeon: Aleksandr Houston DO;  Location: [facility] ENDOSCOPY;  Service:  Pulmonary;  Laterality: Right;  RIGHT UPPER LOBE ENDOBRONCHIAL OCCLUSION    CARDIAC CATHETERIZATION      CARDIAC ELECTROPHYSIOLOGY PROCEDURE N/A 3/27/2023    Procedure: Pacemaker SC new-[employer], call Dr. Rosa first thing in the morning to get time for the procedure and notify rep;  Surgeon: Shane Rosa MD;  Location: [facility] CATH INVASIVE LOCATION;  Service: Cardiovascular;  Laterality: N/A;     SECTION      CHOLECYSTECTOMY      COLONOSCOPY      CORONARY STENT PLACEMENT      EYE SURGERY      GALLBLADDER SURGERY      KNEE MINI REVISION Right 2021    Procedure: KNEE POLY INSERT EXCHANGE;  Surgeon: Ky Avila MD;  Location: [facility] MAIN OR;  Service: Orthopedics;  Laterality: Right;    KNEE SURGERY      REPLACEMENT TOTAL KNEE Right     REPLACEMENT TOTAL KNEE Left       General Information       Row Name 10/14/24 1517          Physical Therapy Time and Intention    Document Type therapy note (daily note)  -PH     Mode of Treatment physical therapy  -       Row Name 10/14/24 1517          General Information    Existing Precautions/Restrictions fall  -PH       Row Name 10/14/24 1517          Cognition    Orientation Status (Cognition) oriented x 4  -PH       Row Name 10/14/24 1517          Safety Issues/Impairments Affecting Functional Mobility    Impairments Affecting Function (Mobility) balance;endurance/activity tolerance;strength;shortness of breath  -PH     Comment, Safety Issues/Impairments (Mobility) gt belt and non skid socks donned  -PH               User Key  (r) = Recorded By, (t) = Taken By, (c) = Cosigned By      Initials Name Provider Type    PH Evangelina Burnett PTA Physical Therapist Assistant                   Mobility       Row Name 10/14/24 1517          Bed Mobility    Bed Mobility supine-sit;sit-supine  -PH     Supine-Sit [place] (Bed Mobility) standby assist  -PH     Sit-Supine [place] (Bed Mobility) standby assist  -       Row Name 10/14/24  1517          Sit-Stand Transfer    Sit-Stand [place] (Transfers) standby assist  -PH     Comment, (Sit-Stand Transfer) pt reached fo monitor stand for support after standing  -PH       Row Name 10/14/24 1517          Gait/Stairs (Locomotion)    [place] Level (Gait) contact guard;standby assist  -PH     Assistive Device (Gait) other (see comments)  holding IV stand for support  -PH     Distance in Feet (Gait) 150  -PH     Bilateral Gait Deviations foot drop/toe drag  -PH     Comment, (Gait/Stairs) good pace w/ no overt LOB; HR in 70's  -PH               User Key  (r) = Recorded By, (t) = Taken By, (c) = Cosigned By      Initials Name Provider Type    PH Evangelina Burnett, ISRA Physical Therapist Assistant                   Obj/Interventions       Row Name 10/14/24 1519          Balance    Balance Assessment sitting static balance;standing static balance  -PH     Static Sitting Balance independent  -PH     Dynamic Standing Balance standby assist  -PH     Position/Device Used, Standing Balance other (see comments)  pt held IV monitor for support  -PH               User Key  (r) = Recorded By, (t) = Taken By, (c) = Cosigned By      Initials Name Provider Type    PH Evangelina Burnett, PTA Physical Therapist Assistant                   Goals/Plan    No documentation.                  Clinical Impression       Row Name 10/14/24 1519          Pain    Pretreatment Pain Rating 0/10 - no pain  -PH     Posttreatment Pain Rating 0/10 - no pain  -PH       Row Name 10/14/24 1519          Plan of Care Review    Plan of Care Reviewed With patient;family  -PH     Progress no change  -PH     Outcome Evaluation Pt was seen for PT tx this PM. Pt was in bed and sat up to EOB w/ SBA. Pt stood w/ SBA then reached for IV monitor for support. Pt amb around nsg station w/ good pace req SBA and pt hold on IV monitor for support. No overt LOB occurred. Pt returned to bed w/ SBA.  -PH       Row Name 10/14/24 1510           Vital Signs    O2 Delivery Pre Treatment room air  -PH     O2 Delivery Intra Treatment room air  -PH     O2 Delivery Post Treatment room air  -PH       Row Name 10/14/24 1519          Positioning and Restraints    Pre-Treatment Position in bed  -PH     Post Treatment Position bed  -PH     In Bed fowlers;call light within reach;encouraged to call for assist;exit alarm on;notified nsg;with family/caregiver  -PH               User Key  (r) = Recorded By, (t) = Taken By, (c) = Cosigned By      Initials Name Provider Type    PH Evangelina Burnett PTA Physical Therapist Assistant                   Outcome Measures       Row Name 10/14/24 1522 10/14/24 0800       How much help from another person do you currently need...    Turning from your back to your side while in flat bed without using bedrails? 4  -PH 4  -HS    Moving from lying on back to sitting on the side of a flat bed without bedrails? 4  -PH 4  -HS    Moving to and from a bed to a chair (including a wheelchair)? 3  -PH 4  -HS    Standing up from a chair using your arms (e.g., wheelchair, bedside chair)? 3  -PH 4  -HS    Climbing 3-5 steps with a railing? 3  -PH 3  -HS    To walk in hospital room? 3  -PH 3  -HS    AM-PAC 6 Clicks Score (PT) 20  -PH 22  -HS    Highest Level of Mobility Goal 6 --> Walk 10 steps or more  -PH 7 --> Walk 25 feet or more  -HS      Row Name 10/14/24 1522          Functional Assessment    Outcome Measure Options AM-PAC 6 Clicks Basic Mobility (PT)  -PH               User Key  (r) = Recorded By, (t) = Taken By, (c) = Cosigned By      Initials Name Provider Type    HS Jenny Cha, RN Registered Nurse     Evangelina Burnett PTA Physical Therapist Assistant                                 Physical Therapy Education       Title: PT OT SLP Therapies (In Progress)       Topic: Physical Therapy (In Progress)       Point: Mobility training (Done)       Learning Progress Summary            Patient Acceptance, E, VU by  at  10/14/2024 1522    Acceptance, E,TB,D, VU by  at 10/12/2024 1657                      Point: Home exercise program (Not Started)       Learner Progress:  Not documented in this visit.              Point: Body mechanics (Done)       Learning Progress Summary            Patient Acceptance, E, VU by  at 10/14/2024 1522    Acceptance, E,TB,D, VU by  at 10/12/2024 1657                      Point: Precautions (Done)       Learning Progress Summary            Patient Acceptance, E, VU by  at 10/14/2024 1522    Acceptance, E,TB,D, VU by  at 10/12/2024 1657                                      User Key       Initials Effective Dates Name Provider Type Discipline     05/24/22 -  Toshia Abernathy, PT Physical Therapist PT     06/16/21 -  Evangelina Burnett PTA Physical Therapist Assistant PT                  PT Recommendation and Plan     Progress: no change  Outcome Evaluation: Pt was seen for PT tx this PM. Pt was in bed and sat up to EOB w/ SBA. Pt stood w/ SBA then reached for IV monitor for support. Pt amb around nsg station w/ good pace req SBA and pt hold on IV monitor for support. No overt LOB occurred. Pt returned to bed w/ SBA.     Time Calculation:         PT Charges       Row Name 10/14/24 1522             Time Calculation    Start Time 1503  -PH      Stop Time 1513  -PH      Time Calculation (min) 10 min  -PH      PT Received On 10/14/24  -PH      PT - Next Appointment 10/15/24  -PH         Timed Charges    69673 - PT Therapeutic Activity Minutes 10  -PH         Total Minutes    Timed Charges Total Minutes 10  -PH       Total Minutes 10  -PH                User Key  (r) = Recorded By, (t) = Taken By, (c) = Cosigned By      Initials Name Provider Type    PH Evangelina Burnett PTA Physical Therapist Assistant                  Therapy Charges for Today       Code Description Service Date Service Provider Modifiers Qty    [id number] HC PT THERAPEUTIC ACT EA 15 MIN 10/14/2024 Lex  Evangelina, PTA GP 1            PT G-Codes  Outcome Measure Options: AM-PAC 6 Clicks Basic Mobility (PT)  AM-PAC 6 Clicks Score (PT): 20  PT Discharge Summary  Anticipated Discharge Disposition (PT): home with home health, home with assist    Evangelina Burnett, ISRA  10/14/2024

## 2024-10-14 NOTE — PROGRESS NOTES
ID NOTE    CC: f/u  MSSA bacteremia with PPM present    Subj: No acute events.  No fever.  No new symptoms to report today.  She is tolerating cefazolin without rash or diarrhea.  She is going to the Cath Lab with EP today for pacemaker removal.      Medications:    Current Facility-Administered Medications:     acetaminophen (TYLENOL) tablet 650 mg, 650 mg, Oral, Q4H PRN, 650 mg at 10/13/24 0333 **OR** acetaminophen (TYLENOL) 160 MG/5ML oral solution 650 mg, 650 mg, Oral, Q4H PRN **OR** acetaminophen (TYLENOL) suppository 650 mg, 650 mg, Rectal, Q4H PRN, Brunilda Matias, APRN    albuterol (PROVENTIL) nebulizer solution 0.083% 2.5 mg/3mL, 2.5 mg, Nebulization, Q6H PRN, Brunilda Matias, APRN, 2.5 mg at 10/13/24 2057    amLODIPine (NORVASC) tablet 5 mg, 5 mg, Oral, Daily, Brunilda Matias, APRN, 5 mg at 10/14/24 0828    atorvastatin (LIPITOR) tablet 40 mg, 40 mg, Oral, Daily, Brunilda Matias, APRN, 40 mg at 10/14/24 0828    sennosides-docusate (PERICOLACE) 8.6-50 MG per tablet 2 tablet, 2 tablet, Oral, BID PRN **AND** polyethylene glycol (MIRALAX) packet 17 g, 17 g, Oral, Daily PRN **AND** bisacodyl (DULCOLAX) EC tablet 5 mg, 5 mg, Oral, Daily PRN **AND** bisacodyl (DULCOLAX) suppository 10 mg, 10 mg, Rectal, Daily PRN, Brunilda Matias, APRN    budesonide-formoterol (SYMBICORT) 160-4.5 MCG/ACT inhaler 1 puff, 1 puff, Inhalation, BID - RT, Brunilda Matias APRN, 1 puff at 10/14/24 0658    carvedilol (COREG) tablet 25 mg, 25 mg, Oral, BID With Meals, Brunilda Matias APRN, 25 mg at 10/14/24 0828    ceFAZolin 2000 mg IVPB in 100 mL NS (MBP), 2,000 mg, Intravenous, Q8H, Man Whitten MD, 2,000 mg at 10/14/24 0838    famotidine (PEPCID) tablet 20 mg, 20 mg, Oral, BID PRN, Brunilda Matias APRN    magnesium oxide (MAG-OX) tablet 400 mg, 400 mg, Oral, Daily, Brunilda Matias APRN, 400 mg at 10/14/24 0828    melatonin tablet 5 mg, 5 mg, Oral, Nightly PRN, Brunilda Matias,  YOVANA    montelukast (SINGULAIR) tablet 10 mg, 10 mg, Oral, Nightly, Brunilda Matias, APRN, 10 mg at 10/13/24 2016    nitroglycerin (NITROSTAT) SL tablet 0.4 mg, 0.4 mg, Sublingual, Q5 Min PRN, Brunilda Matias APRN    ondansetron (ZOFRAN) injection 4 mg, 4 mg, Intravenous, Q6H PRN, Brunilda Matias APRN    pantoprazole (PROTONIX) EC tablet 40 mg, 40 mg, Oral, Daily, Brunilda Matias, APRN, 40 mg at 10/14/24 0828    rOPINIRole (REQUIP) tablet 0.25 mg, 0.25 mg, Oral, Nightly, Brunilda Matias, APRN, 0.25 mg at 10/13/24 2016    sertraline (ZOLOFT) tablet 50 mg, 50 mg, Oral, Daily, Brunilda Matias APRN, 50 mg at 10/14/24 0828    sodium chloride 0.9 % flush 10 mL, 10 mL, Intravenous, PRN, Brunilda Matias APRN    Facility-Administered Medications Ordered in Other Encounters:     Chlorhexidine Gluconate Cloth 2 % pads, , Apply externally, BID, Francesco Grewal APRN    iopamidol (ISOVUE-370) 76 % injection, , , Code / Trauma / Sedation Medication, Shane Rosa MD, 15 mL at 03/27/23 1202      Objective   Vital Signs   Temp:  [97.8 °F (36.6 °C)-98.2 °F (36.8 °C)] 97.8 °F (36.6 °C)  Heart Rate:  [61-66] 63  Resp:  [16-18] 18  BP: (130-183)/(55-77) 172/72    Physical Exam:   General: awake, alert, NAD, very nice, sitting up in chair  Eyes: no scleral icterus  Cardiovascular: NR  Respiratory: normal work of breathing without wheezing; on RA  :  no Simms catheter  Neurological: Alert and oriented x 3  Psychiatric: Normal mood and affect     Labs:   CBC and BMP ordered; blood cultures reviewed today  Lab Results   Component Value Date    WBC 9.00 10/13/2024    HGB 9.7 (L) 10/13/2024    HCT 29.0 (L) 10/13/2024    MCV 91.8 10/13/2024     10/13/2024       Lab Results   Component Value Date    GLUCOSE 93 10/13/2024    BUN 14 10/13/2024    CREATININE 0.88 10/13/2024    EGFRIFNONA 59 (L) 07/22/2021    BCR 15.9 10/13/2024    CO2 25.2 10/13/2024    CALCIUM 9.4 10/13/2024    ALBUMIN 3.3 (L) 10/13/2024     LABIL2 1.6 06/12/2020    AST 44 (H) 10/13/2024    ALT 12 10/13/2024       Microbiology:  10/7 COVID: Negative  10/7 BCx: MSSA in 1/2 sets  10/9 MRSA nares PCR: Negative  10/9 BCx: Negative to date    Prior radiology:  10/12 CXR personally reviewed and shows a persistent and impressive right upper lobe pneumonia    ASSESSMENT/PLAN:  MSSA septicemia  Pacemaker present with concerns for infection  Obesity BMI 33  Right upper lobe pneumonia    For MSSA septicemia with pacemaker present, continue cefazolin 2 g IV every 8 hours with duration to be determined.  Her repeat blood cultures from 10/9 are negative to date.  Noted plans for pacemaker removal today.  Recommend waiting approximately 72 hours before a new pacemaker is placed on the contralateral side.     D/W cardiologist.    ID will follow.

## 2024-10-14 NOTE — CASE MANAGEMENT/SOCIAL WORK
Discharge Planning Assessment  [facility]     Patient Name: Mallory Franco  MRN: [id number]  Today's Date: 10/14/2024    Admit Date: 10/12/2024    Plan: home with HH and family assist   Discharge Needs Assessment       Row Name 10/14/24 1439       Living Environment    People in Home alone    Current Living Arrangements home    Potentially Unsafe Housing Conditions none    Primary Care Provided by self    Provides Primary Care For no one    Family Caregiver if Needed child(andrei), adult    Quality of Family Relationships helpful;involved    Able to Return to Prior Arrangements yes       Resource/Environmental Concerns    Resource/Environmental Concerns none       Transition Planning    Patient/Family Anticipates Transition to home with family    Patient/Family Anticipated Services at Transition home health care    Transportation Anticipated family or friend will provide       Discharge Needs Assessment    Readmission Within the Last 30 Days current reason for admission unrelated to previous admission    Equipment Currently Used at Home none    Concerns to be Addressed discharge planning    Anticipated Changes Related to Illness none    Equipment Needed After Discharge none    Discharge Facility/Level of Care Needs home with home health    Provided Post Acute Provider List? Yes    Post Acute Provider List Home Health    Delivered To Patient;Support Person    Method of Delivery In person                   Discharge Plan       Row Name 10/14/24 5214       Plan    Plan home with HH and family assist    Patient/Family in Agreement with Plan yes    Plan Comments Spoke with pt and pt’s granddaughter Dary aime. Readmit assessment completed. Confirmed facesheet correct. Explained role of CCP. Pt reports she lives alone but family checks on her multiple times a day. Pts PCP is Dr. Kearney and agreeable to meds to beds. Pt has used HH in past but not sure agency. Pt’s granddaughter reports she has a friend that is a HH  RN and she will message her to see what agency to make a referral to. Pt has no SNF history. ID following for possible IV antibiotics. Pt plans home with family support at d/c. CCP to follow for needs.                  Continued Care and Services - Admitted Since 10/12/2024    No active coordination exists for this encounter.       Expected Discharge Date and Time       Expected Discharge Date Expected Discharge Time    Oct 16, 2024            Demographic Summary    No documentation.                  Functional Status    No documentation.                  Psychosocial    No documentation.                  Abuse/Neglect    No documentation.                  Legal    No documentation.                  Substance Abuse    No documentation.                  Patient Forms    No documentation.                     JARRET Hope

## 2024-10-14 NOTE — PLAN OF CARE
Goal Outcome Evaluation:              Outcome Evaluation: pt aoX4. went down to cath, came back with right IJ with external generator set on VVI 60. left chest incision is stapled, dressed with tegaderm. pt is ventricular paced. allowed to ambulate in room. planned to replace internal generator within 72 hrs. safey maintained.

## 2024-10-14 NOTE — CONSULTS
Electrophysiology Hospital Consult            Patient Name: Mallory Franco  Age/Sex: 84 y.o. female  : 1940  MRN: [id number]    Date of Admission: 10/12/2024  Date of Encounter Visit: 10/14/24  Encounter Provider: YOVANA Bhatia  Referring Provider: Abilio Puckett MD  Place of Service: [facility] CARDIOLOGY  Patient Care Team:  Amauri Kearney MD as PCP - General (Family Medicine)  Migue Nguyen MD as Consulting Physician (Cardiology)  Johan Escudero MD      Subjective:   EP Consultation for: MSSA, possible CIED infection     Chief Complaint: weakness, fever     History of Present Illness:  Mallory Franco is a 84 y.o. female who follows with Dr. Rosa. 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     She had episode of syncope in . 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       She presented to ED in 2023 with near syncope. Telemetry showed periods of 2:1 AV block with symptoms.     She underwent dual chamber pacemaker placement with Dr. Rosa.         She was admitted in [place] on 10/08 for dyspnea, weakness, and fever.     Chest xray consistent with pneumonia.     Blood cultures were positive, 1/2 for MSSA.     EPI revealed no evidence of vegetation.     She was transferred to [facility] for consideration of device removal.             Dr. Majano and I saw her this morning.     She says for the past few weeks to month she has been having symptoms on and off.     She has weakness and some shortness of breath with activity.     She has also been intermittently febrile over that time period.     Her device was placed in 2023 for symptomatic 2:1 AV block. She says she almost passed out.     She is not dependent on device.     Cultures 1/2 positive for MSSA.     Repeat cultures, NGTD    Past Medical History:  Past Medical History:   Diagnosis Date    Arthritis      Asthma     BPPV (benign paroxysmal positional vertigo), right     COPD (chronic obstructive pulmonary disease)     Coronary artery disease     Coronary artery disease     Diverticulitis     Ear itching     HX    Enlarged heart     Gastroesophageal reflux disease     Hyperlipidemia     Hypertension     Knee pain     Macular degeneration     PONV (postoperative nausea and vomiting)     Postural dizziness with presyncope 2023    Shingles     X2    SOB (shortness of breath) on exertion     Tinnitus of left ear     Urinary frequency     Vertigo        Past Surgical History:   Procedure Laterality Date    BREAST BIOPSY Bilateral     BRONCHOSCOPY Right 2023    Procedure: BRONCHOSCOPY WITH BIOPSIES, BRUSHINGS, AND BRONCHOALVEOLAR LAVAGE;  Surgeon: Aleksandr Houston DO;  Location: [facility] ENDOSCOPY;  Service: Pulmonary;  Laterality: Right;  RIGHT UPPER LOBE ENDOBRONCHIAL OCCLUSION    CARDIAC CATHETERIZATION      CARDIAC ELECTROPHYSIOLOGY PROCEDURE N/A 3/27/2023    Procedure: Pacemaker SC new-[employer], call Dr. Rosa first thing in the morning to get time for the procedure and notify rep;  Surgeon: Shane Rosa MD;  Location: [facility] CATH INVASIVE LOCATION;  Service: Cardiovascular;  Laterality: N/A;     SECTION      CHOLECYSTECTOMY      COLONOSCOPY      CORONARY STENT PLACEMENT      EYE SURGERY      GALLBLADDER SURGERY      KNEE MINI REVISION Right 2021    Procedure: KNEE POLY INSERT EXCHANGE;  Surgeon: Ky Avila MD;  Location: [facility] OR;  Service: Orthopedics;  Laterality: Right;    KNEE SURGERY      REPLACEMENT TOTAL KNEE Right     REPLACEMENT TOTAL KNEE Left        Home Medications:   Medications Prior to Admission   Medication Sig Dispense Refill Last Dose/Taking    amLODIPine (NORVASC) 5 MG tablet Take 1 tablet by mouth Daily.   10/11/2024    aspirin 81 MG EC tablet Take 1 tablet by mouth Daily.   10/11/2024    atorvastatin (LIPITOR) 40 MG tablet Take 1 tablet  by mouth Daily.   10/11/2024    Breo Ellipta 100-25 MCG/ACT aerosol powder  Inhale 1 puff Daily.   10/11/2024    carvedilol (COREG) 25 MG tablet Take 1 tablet by mouth 2 (Two) Times a Day With Meals. 180 tablet 6 10/11/2024    ceFAZolin 2000 mg IVPB in 100 mL NS (VTB) Infuse 2,000 mg into a venous catheter Every 8 (Eight) Hours for 76 doses. Indications: Bacteria in the Blood, Pneumonia   10/11/2024    Heparin Sodium, Porcine, (heparin, porcine,) 5000 UNIT/ML injection Inject 1 mL under the skin into the appropriate area as directed Every 8 (Eight) Hours. Indications: Prevention of Unwanted Clot in Veins   10/11/2024    magnesium oxide (MAG-OX) 400 tablet tablet Take 1 tablet by mouth Daily.   10/11/2024    montelukast (SINGULAIR) 10 MG tablet Take 1 tablet by mouth Every Night.   10/11/2024    pantoprazole (PROTONIX) 40 MG EC tablet Take 1 tablet by mouth Daily.   10/11/2024    rOPINIRole (REQUIP) 0.25 MG tablet Take 1 tablet by mouth every night at bedtime.   10/11/2024    sennosides-docusate (PERICOLACE) 8.6-50 MG per tablet Take 1 tablet by mouth 2 (Two) Times a Day As Needed for Constipation.   Past Week    sertraline (ZOLOFT) 100 MG tablet Take 0.5 tablets by mouth Daily.   10/11/2024    albuterol sulfate  (90 Base) MCG/ACT inhaler Inhale 2 puffs Every 4 (Four) Hours As Needed.   Unknown    famotidine (PEPCID) 40 MG tablet Take 1 tablet by mouth every night at bedtime.   Unknown    fluticasone (FLONASE) 50 MCG/ACT nasal spray Administer 1 spray into the nostril(s) as directed by provider Every Morning.   Unknown    meclizine (ANTIVERT) 25 MG tablet Take 1 tablet by mouth 2 (Two) Times a Day As Needed.   Unknown    polyethylene glycol (MIRALAX) 17 g packet Take 17 g by mouth Daily As Needed (constipation).   Unknown       Allergies:  Allergies   Allergen Reactions    Codeine Nausea And Vomiting    Contrast Dye (Echo Or Unknown Ct/Mr) Hives    Morphine Nausea And Vomiting       Past Social  History:  Social History     Socioeconomic History    Marital status:    Tobacco Use    Smoking status: Never     Passive exposure: Past    Smokeless tobacco: Never   Vaping Use    Vaping status: Never Used   Substance and Sexual Activity    Alcohol use: No    Drug use: Never    Sexual activity: Defer       Past Family History:  Family History   Problem Relation Age of Onset    Breast cancer Mother     Bone cancer Father     Breast cancer Sister     Bone cancer Sister     Lung cancer Brother     Malig Hyperthermia Neg Hx        Review of Systems: All systems reviewed. Pertinent positives identified in HPI. All other systems are negative.     14 point ROS was performed and is negative except as outlined in HPI.     Objective:     Objective:  Vital Signs (last 24 hours)         10/13 0700  10/14 0659 10/14 0700  10/14 0906   Most Recent      Temp (°F) 97.8 -  98.2       97.9 (36.6) 10/13 2302    Heart Rate 61 -  69      66     66 10/14 0800    Resp 15 -  18      16     16 10/14 0747    /55 -  183/77       165/70 10/14 0106    SpO2 (%) 93 -  96       96 10/14 0658          Temp:  [97.9 °F (36.6 °C)-98.2 °F (36.8 °C)] 97.9 °F (36.6 °C)  Heart Rate:  [61-66] 66  Resp:  [16-18] 16  BP: (130-183)/(55-77) 165/70  Body mass index is 33.83 kg/m².        Physical Exam:     General Appearance: No acute distress, well developed and well nourished.   Eyes: Conjunctiva and lids: No erythema, swelling, or discharge. Sclera non-icteric.   HENT: Atraumatic, normocephalic. External eyes, ears, and nose normal.   Respiratory: No signs of respiratory distress. Respiration rhythm and depth normal.   Clear to auscultation. No rales, crackles, rhonchi, or wheezing auscultated.   Cardiovascular:  Heart Rate and Rhythm: Normal, Heart Sounds: Normal S1 and S2. No S3 or S4 noted.  Gastrointestinal:  Abdomen soft, non-distended, non-tender.  Musculoskeletal: Normal movement of extremities  Skin: Warm and dry.   Psychiatric: Patient  alert and oriented to person, place, and time. Speech and behavior appropriate. Normal mood and affect.    Labs:   Lab Review:     Results from last 7 days   Lab Units 10/13/24  0338 10/12/24  0404 10/11/24  0447 10/10/24  0435 10/09/24  0442 10/08/24  0429 10/07/24  1842   SODIUM mmol/L 139 139 136 136 134* 134* 136   POTASSIUM mmol/L 3.9 3.4* 3.2* 3.6 3.2* 3.1* 3.1*   CHLORIDE mmol/L 101 104 100 101 97* 96* 96*   CO2 mmol/L 25.2 26.1 26.2 23.4 23.7 25.6 27.5   BUN mg/dL 14 13 14 18 19 14 15   CREATININE mg/dL 0.88 0.84 0.79 0.93 0.91 0.90 0.88   GLUCOSE mg/dL 93 111* 109* 102* 113* 120* 127*   CALCIUM mg/dL 9.4 8.7 9.0 9.2 9.1 8.9 9.4   AST (SGOT) U/L 44* 40*  --   --   --  22 19   ALT (SGPT) U/L 12 17  --   --   --  10 12     Results from last 7 days   Lab Units 10/07/24  1842   HSTROP T ng/L 39*     Results from last 7 days   Lab Units 10/13/24  0337   WBC 10*3/mm3 9.00   HEMOGLOBIN g/dL 9.7*   HEMATOCRIT % 29.0*   PLATELETS 10*3/mm3 336             Results from last 7 days   Lab Units 10/11/24  0447   MAGNESIUM mg/dL 1.4*         Results from last 7 days   Lab Units 10/07/24  1842   PROBNP pg/mL 1,395.0               EKG:                     I personally viewed and interpreted the patient's EKG/Telemetry tracings.    Assessment:       Bacteremia due to methicillin susceptible Staphylococcus aureus (MSSA)    Essential hypertension    Sick sinus syndrome    Presence of cardiac pacemaker    Coronary artery disease involving native coronary artery of native heart without angina pectoris    Sepsis    MSSA (methicillin susceptible Staphylococcus aureus)    COPD (chronic obstructive pulmonary disease)        Plan:   Dr. Rincon and I saw this patient.         She was admitted with fever/weakness and found to have MSSA bacteremia. We discussed possible pacemaker involvement and recommended considering extraction to clear infections.     We also discussed more conservative measures with antibiotics.     She is concerned  about being on antibiotics long term and the possibility of recurrent infections without removal of hardware.     We discussed risks, benefits, and alternatives of device extraction. We also discussed this with the family. They are agreeable to prefer to proceed.           We will plan on removal today and placement of temporary pacing wire.     We will plan to reimplant with likely leadless pacing system later this week.             Thank you for allowing me to participate in the care of Mallory Franco. Feel free to contact me directly with any further questions or concerns.    YOVANA Bhatia  [facility]  10/14/24  09:06 EDT

## 2024-10-14 NOTE — PLAN OF CARE
Goal Outcome Evaluation:  Plan of Care Reviewed With: patient, family        Progress: no change  Outcome Evaluation: Pt was seen for PT tx this PM. Pt was in bed and sat up to EOB w/ SBA. Pt stood w/ SBA then reached for IV monitor for support. Pt amb around nsg station w/ good pace req SBA and pt hold on IV monitor for support. No overt LOB occurred. Pt returned to bed w/ SBA.    Anticipated Discharge Disposition (PT): home with home health, home with assist

## 2024-10-14 NOTE — PLAN OF CARE
Goal Outcome Evaluation:  Plan of Care Reviewed With: patient        Progress: improving  Outcome Evaluation: VSS, up with walker with standby asst to BR, kimberley po well, IV Kefzol as ordered, safety maintained

## 2024-10-15 LAB
ANION GAP SERPL CALCULATED.3IONS-SCNC: 10.3 MMOL/L (ref 5–15)
BUN SERPL-MCNC: 13 MG/DL (ref 8–23)
BUN/CREAT SERPL: 16.7 (ref 7–25)
CALCIUM SPEC-SCNC: 8.6 MG/DL (ref 8.6–10.5)
CHLORIDE SERPL-SCNC: 103 MMOL/L (ref 98–107)
CO2 SERPL-SCNC: 23.7 MMOL/L (ref 22–29)
CREAT SERPL-MCNC: 0.78 MG/DL (ref 0.57–1)
DEPRECATED RDW RBC AUTO: 43.9 FL (ref 37–54)
EGFRCR SERPLBLD CKD-EPI 2021: 74.5 ML/MIN/1.73
ERYTHROCYTE [DISTWIDTH] IN BLOOD BY AUTOMATED COUNT: 13.6 % (ref 12.3–15.4)
GLUCOSE SERPL-MCNC: 100 MG/DL (ref 65–99)
HCT VFR BLD AUTO: 27.3 % (ref 34–46.6)
HGB BLD-MCNC: 8.6 G/DL (ref 12–15.9)
MCH RBC QN AUTO: 27.9 PG (ref 26.6–33)
MCHC RBC AUTO-ENTMCNC: 31.5 G/DL (ref 31.5–35.7)
MCV RBC AUTO: 88.6 FL (ref 79–97)
PLATELET # BLD AUTO: 320 10*3/MM3 (ref 140–450)
PMV BLD AUTO: 9.1 FL (ref 6–12)
POTASSIUM SERPL-SCNC: 3.5 MMOL/L (ref 3.5–5.2)
QT INTERVAL: 497 MS
QTC INTERVAL: 493 MS
RBC # BLD AUTO: 3.08 10*6/MM3 (ref 3.77–5.28)
SODIUM SERPL-SCNC: 137 MMOL/L (ref 136–145)
WBC NRBC COR # BLD AUTO: 7.72 10*3/MM3 (ref 3.4–10.8)

## 2024-10-15 PROCEDURE — 93005 ELECTROCARDIOGRAM TRACING: CPT

## 2024-10-15 PROCEDURE — 93010 ELECTROCARDIOGRAM REPORT: CPT | Performed by: INTERNAL MEDICINE

## 2024-10-15 PROCEDURE — 85027 COMPLETE CBC AUTOMATED: CPT | Performed by: HOSPITALIST

## 2024-10-15 PROCEDURE — 94799 UNLISTED PULMONARY SVC/PX: CPT

## 2024-10-15 PROCEDURE — 25010000002 CEFAZOLIN PER 500 MG

## 2024-10-15 PROCEDURE — 80048 BASIC METABOLIC PNL TOTAL CA: CPT | Performed by: HOSPITALIST

## 2024-10-15 PROCEDURE — 94664 DEMO&/EVAL PT USE INHALER: CPT

## 2024-10-15 PROCEDURE — 97530 THERAPEUTIC ACTIVITIES: CPT

## 2024-10-15 PROCEDURE — 99232 SBSQ HOSP IP/OBS MODERATE 35: CPT | Performed by: INTERNAL MEDICINE

## 2024-10-15 PROCEDURE — 99024 POSTOP FOLLOW-UP VISIT: CPT

## 2024-10-15 PROCEDURE — 25010000002 CEFAZOLIN PER 500 MG: Performed by: INTERNAL MEDICINE

## 2024-10-15 PROCEDURE — 94761 N-INVAS EAR/PLS OXIMETRY MLT: CPT

## 2024-10-15 RX ADMIN — ATORVASTATIN CALCIUM 40 MG: 20 TABLET, FILM COATED ORAL at 09:03

## 2024-10-15 RX ADMIN — MONTELUKAST SODIUM 10 MG: 10 TABLET, FILM COATED ORAL at 21:25

## 2024-10-15 RX ADMIN — SERTRALINE 50 MG: 50 TABLET, FILM COATED ORAL at 09:03

## 2024-10-15 RX ADMIN — CEFAZOLIN 2000 MG: 2 INJECTION, POWDER, FOR SOLUTION INTRAMUSCULAR; INTRAVENOUS at 09:04

## 2024-10-15 RX ADMIN — CARVEDILOL 25 MG: 25 TABLET, FILM COATED ORAL at 09:04

## 2024-10-15 RX ADMIN — CEFAZOLIN 2000 MG: 2 INJECTION, POWDER, FOR SOLUTION INTRAMUSCULAR; INTRAVENOUS at 17:26

## 2024-10-15 RX ADMIN — CARVEDILOL 25 MG: 25 TABLET, FILM COATED ORAL at 17:26

## 2024-10-15 RX ADMIN — BUDESONIDE AND FORMOTEROL FUMARATE DIHYDRATE 1 PUFF: 160; 4.5 AEROSOL RESPIRATORY (INHALATION) at 06:44

## 2024-10-15 RX ADMIN — BUDESONIDE AND FORMOTEROL FUMARATE DIHYDRATE 1 PUFF: 160; 4.5 AEROSOL RESPIRATORY (INHALATION) at 20:30

## 2024-10-15 RX ADMIN — CEFAZOLIN 2000 MG: 2 INJECTION, POWDER, FOR SOLUTION INTRAMUSCULAR; INTRAVENOUS at 01:41

## 2024-10-15 RX ADMIN — AMLODIPINE BESYLATE 5 MG: 5 TABLET ORAL at 09:03

## 2024-10-15 RX ADMIN — ROPINIROLE HYDROCHLORIDE 0.25 MG: 0.5 TABLET, FILM COATED ORAL at 21:25

## 2024-10-15 RX ADMIN — PANTOPRAZOLE SODIUM 40 MG: 40 TABLET, DELAYED RELEASE ORAL at 09:04

## 2024-10-15 RX ADMIN — MAGNESIUM OXIDE 400 MG (241.3 MG MAGNESIUM) TABLET 400 MG: TABLET at 09:03

## 2024-10-15 NOTE — THERAPY TREATMENT NOTE
Patient Name: Mallory Franco  : 1940    MRN: [id number]                              Today's Date: 10/15/2024       Admit Date: 10/12/2024    Visit Dx:     ICD-10-CM ICD-9-CM   1. MSSA (methicillin susceptible Staphylococcus aureus)  A49.01 041.11     Patient Active Problem List   Diagnosis    Essential hypertension    Coronary artery disease involving native coronary artery of native heart without angina pectoris    Pain due to total right knee replacement    History of coronary angioplasty with insertion of stent    Sick sinus syndrome    Presence of cardiac pacemaker    BPPV (benign paroxysmal positional vertigo), right    Esophageal reflux    Abnormal CT scan of lung    Atelectasis    Right upper lobe pneumonia    Bacteremia due to methicillin susceptible Staphylococcus aureus (MSSA)    Presence of cardiac pacemaker    Coronary artery disease involving native coronary artery of native heart without angina pectoris    Pneumonia of right upper lobe due to methicillin susceptible Staphylococcus aureus (MSSA)    COPD (chronic obstructive pulmonary disease)     Past Medical History:   Diagnosis Date    Arthritis     Asthma     BPPV (benign paroxysmal positional vertigo), right     COPD (chronic obstructive pulmonary disease)     Coronary artery disease     Coronary artery disease     Diverticulitis     Ear itching     HX    Enlarged heart     Gastroesophageal reflux disease     Hyperlipidemia     Hypertension     Knee pain     Macular degeneration     PONV (postoperative nausea and vomiting)     Postural dizziness with presyncope 2023    Shingles     X2    SOB (shortness of breath) on exertion     Tinnitus of left ear     Urinary frequency     Vertigo      Past Surgical History:   Procedure Laterality Date    BREAST BIOPSY Bilateral     BRONCHOSCOPY Right 2023    Procedure: BRONCHOSCOPY WITH BIOPSIES, BRUSHINGS, AND BRONCHOALVEOLAR LAVAGE;  Surgeon: Aleksandr Houston DO;  Location: [facility]  ENDOSCOPY;  Service: Pulmonary;  Laterality: Right;  RIGHT UPPER LOBE ENDOBRONCHIAL OCCLUSION    CARDIAC CATHETERIZATION      CARDIAC ELECTROPHYSIOLOGY PROCEDURE N/A 3/27/2023    Procedure: Pacemaker SC new-[employer], call Dr. Rosa first thing in the morning to get time for the procedure and notify rep;  Surgeon: Shane Rosa MD;  Location: [facility] INVASIVE LOCATION;  Service: Cardiovascular;  Laterality: N/A;     SECTION      CHOLECYSTECTOMY      COLONOSCOPY      CORONARY STENT PLACEMENT      EYE SURGERY      GALLBLADDER SURGERY      KNEE MINI REVISION Right 2021    Procedure: KNEE POLY INSERT EXCHANGE;  Surgeon: Ky Avila MD;  Location: [facility] MAIN OR;  Service: Orthopedics;  Laterality: Right;    KNEE SURGERY      REPLACEMENT TOTAL KNEE Right     REPLACEMENT TOTAL KNEE Left       General Information       Row Name 10/15/24 1333          Physical Therapy Time and Intention    Document Type therapy note (daily note)  -     Mode of Treatment physical therapy  -       Row Name 10/15/24 1333          General Information    Existing Precautions/Restrictions fall  -       Row Name 10/15/24 1333          Safety Issues/Impairments Affecting Functional Mobility    Impairments Affecting Function (Mobility) balance;endurance/activity tolerance;strength;shortness of breath  -PH     Comment, Safety Issues/Impairments (Mobility) gt belt and non skid socks donned  -               User Key  (r) = Recorded By, (t) = Taken By, (c) = Cosigned By      Initials Name Provider Type    PH Evangelina Burnett PTA Physical Therapist Assistant                   Mobility       Row Name 10/15/24 1333          Bed Mobility    Bed Mobility sit-supine  -PH     Sit-Supine [place] (Bed Mobility) standby assist  -       Row Name 10/15/24 1333          Sit-Stand Transfer    Sit-Stand [place] (Transfers) standby assist  -       Row Name 10/15/24 1333          Gait/Stairs (Locomotion)     [place] Level (Gait) standby assist  -PH     Assistive Device (Gait) walker, front-wheeled  -PH     Distance in Feet (Gait) 200  -PH     Bilateral Gait Deviations forward flexed posture  -PH     [place] Level (Stairs) not tested  -PH     Comment, (Gait/Stairs) good pace although req educ to stop and take rests as needed; pt then took 2 standing rests of approx 10 sec w/ SOA noted; fatigue limiting  -PH               User Key  (r) = Recorded By, (t) = Taken By, (c) = Cosigned By      Initials Name Provider Type     Evangelina Burnett PTA Physical Therapist Assistant                   Obj/Interventions       Row Name 10/15/24 4244          Balance    Balance Assessment sitting static balance;standing static balance  -PH     Static Sitting Balance independent  -PH     Static Standing Balance standby assist  -PH     Dynamic Standing Balance --  -PH     Position/Device Used, Standing Balance walker, front-wheeled  -PH               User Key  (r) = Recorded By, (t) = Taken By, (c) = Cosigned By      Initials Name Provider Type     Evangelina Burnett PTA Physical Therapist Assistant                   Goals/Plan    No documentation.                  Clinical Impression       Row Name 10/15/24 1340          Pain    Pretreatment Pain Rating 0/10 - no pain  -PH     Posttreatment Pain Rating 0/10 - no pain  -PH       Row Name 10/15/24 1341          Plan of Care Review    Plan of Care Reviewed With patient  -PH     Progress improving  -PH     Outcome Evaluation Pt was seen for PT tx this PM. Pt was UIC and stood w/ SBA. Pt amb w/ CGA and no AD to fww (approx 10'). Pt amb around nsg station 1x w/ SBA and use of fww. Educ pt on pacing and taking standing rests when feeling SOA or fatigued during gait. Pt did take 2 standing rests of approx 10 sec after being educated. Pt was very SOA after returning to room. Pt returned to bed w/ SBA. PT will prog as pt kimberley.  -PH       Row Name 10/15/24 1348          Vital  Signs    O2 Delivery Pre Treatment --  -PH     O2 Delivery Intra Treatment --  -PH     O2 Delivery Post Treatment --  -PH       Row Name 10/15/24 1340          Positioning and Restraints    Pre-Treatment Position sitting in chair/recliner  -PH     Post Treatment Position bed  -PH     In Bed fowlers;call light within reach;encouraged to call for assist;exit alarm on;notified nsg;with family/caregiver  -PH               User Key  (r) = Recorded By, (t) = Taken By, (c) = Cosigned By      Initials Name Provider Type     Evangelina Burnett PTA Physical Therapist Assistant                   Outcome Measures       Row Name 10/15/24 1343          How much help from another person do you currently need...    Turning from your back to your side while in flat bed without using bedrails? 4  -PH     Moving from lying on back to sitting on the side of a flat bed without bedrails? 4  -PH     Moving to and from a bed to a chair (including a wheelchair)? 3  -PH     Standing up from a chair using your arms (e.g., wheelchair, bedside chair)? 4  -PH     Climbing 3-5 steps with a railing? 3  -PH     To walk in hospital room? 3  -PH     AM-PAC 6 Clicks Score (PT) 21  -PH     Highest Level of Mobility Goal 6 --> Walk 10 steps or more  -PH       Row Name 10/15/24 1343          Functional Assessment    Outcome Measure Options AM-PAC 6 Clicks Basic Mobility (PT)  -PH               User Key  (r) = Recorded By, (t) = Taken By, (c) = Cosigned By      Initials Name Provider Type     Evangelina Burnett PTA Physical Therapist Assistant                                 Physical Therapy Education       Title: PT OT SLP Therapies (In Progress)       Topic: Physical Therapy (In Progress)       Point: Mobility training (Done)       Learning Progress Summary            Patient Acceptance, E,TB,D, VU,NR by  at 10/15/2024 1347    Acceptance, E, VU by PH at 10/14/2024 1522    Acceptance, E,TB,D, VU by  at 10/12/2024 4247                       Point: Home exercise program (Not Started)       Learner Progress:  Not documented in this visit.              Point: Body mechanics (Done)       Learning Progress Summary            Patient Acceptance, E,TB,D, VU,NR by  at 10/15/2024 1347    Acceptance, E, VU by  at 10/14/2024 1522    Acceptance, E,TB,D, VU by  at 10/12/2024 1657                      Point: Precautions (Done)       Learning Progress Summary            Patient Acceptance, E,TB,D, VU,NR by  at 10/15/2024 1347    Acceptance, E, VU by  at 10/14/2024 1522    Acceptance, E,TB,D, VU by  at 10/12/2024 1657                                      User Key       Initials Effective Dates Name Provider Type Discipline     05/24/22 -  Toshia Abernathy PT Physical Therapist PT     06/16/21 -  Evangelina Burnett PTA Physical Therapist Assistant PT                  PT Recommendation and Plan     Progress: improving  Outcome Evaluation: Pt was seen for PT tx this PM. Pt was UIC and stood w/ SBA. Pt amb w/ CGA and no AD to fww (approx 10'). Pt amb around nsg station 1x w/ SBA and use of fww. Educ pt on pacing and taking standing rests when feeling SOA or fatigued during gait. Pt did take 2 standing rests of approx 10 sec after being educated. Pt was very SOA after returning to room. Pt returned to bed w/ SBA. PT will prog as pt kimberley.     Time Calculation:         PT Charges       Row Name 10/15/24 1347             Time Calculation    Start Time 1302  -PH      Stop Time 1312  -PH      Time Calculation (min) 10 min  -PH      PT Received On 10/15/24  -PH      PT - Next Appointment 10/16/24  -PH         Timed Charges    43024 - PT Therapeutic Activity Minutes 10  -PH         Total Minutes    Timed Charges Total Minutes 10  -PH       Total Minutes 10  -PH                User Key  (r) = Recorded By, (t) = Taken By, (c) = Cosigned By      Initials Name Provider Type    PH Evangelina Burnett PTA Physical Therapist Assistant                  Therapy  Charges for Today       Code Description Service Date Service Provider Modifiers Qty    93859434580 HC PT THERAPEUTIC ACT EA 15 MIN 10/14/2024 Evangelina Burnett, ISRA GP 1    28684515582 HC PT THERAPEUTIC ACT EA 15 MIN 10/15/2024 Evangelina Burnett PTA GP 1            PT G-Codes  Outcome Measure Options: AM-PAC 6 Clicks Basic Mobility (PT)  AM-PAC 6 Clicks Score (PT): 21  PT Discharge Summary  Anticipated Discharge Disposition (PT): home with home health, home with assist    Evangelina Burnett PTA  10/15/2024

## 2024-10-15 NOTE — PLAN OF CARE
Goal Outcome Evaluation:      Plan to do pm on Thursday and picc line placement. No complaints. Patient up adlib. In chair most of the day. Plan of care ongoing

## 2024-10-15 NOTE — PROGRESS NOTES
Name: Mallory Franco ADMIT: 10/12/2024   : 1940  PCP: Amauri Kearney MD    MRN: [id number] LOS: 3 days   AGE/SEX: 84 y.o. female  ROOM: [facility]     Subjective   Subjective   No c/o       Objective   Objective   Vital Signs  Temp:  [97.5 °F (36.4 °C)-98.2 °F (36.8 °C)] 97.5 °F (36.4 °C)  Heart Rate:  [59-74] 63  Resp:  [12-18] 18  BP: (110-169)/(53-69) 110/53  SpO2:  [97 %-99 %] 97 %  on   ;   Device (Oxygen Therapy): room air  Body mass index is 33.83 kg/m².  Physical Exam  Vitals reviewed.   Constitutional:       General: She is not in acute distress.     Appearance: She is obese.   Cardiovascular:      Rate and Rhythm: Normal rate and regular rhythm.      Heart sounds: No murmur heard.  Pulmonary:      Effort: No respiratory distress.   Abdominal:      General: There is no distension.      Palpations: Abdomen is soft.      Tenderness: There is no abdominal tenderness.   Skin:     General: Skin is warm and dry.   Neurological:      Mental Status: She is alert.       Results Review     I reviewed the patient's new clinical results.  Results from last 7 days   Lab Units 10/15/24  0339 10/13/24  0337 10/12/24  0404 10/11/24  0447   WBC 10*3/mm3 7.72 9.00 7.81 7.43   HEMOGLOBIN g/dL 8.6* 9.7* 8.4* 8.5*   PLATELETS 10*3/mm3 320 336 285 282     Results from last 7 days   Lab Units 10/15/24  0339 10/13/24  0338 10/12/24  0404 10/11/24  0447   SODIUM mmol/L 137 139 139 136   POTASSIUM mmol/L 3.5 3.9 3.4* 3.2*   CHLORIDE mmol/L 103 101 104 100   CO2 mmol/L 23.7 25.2 26.1 26.2   BUN mg/dL 13 14 13 14   CREATININE mg/dL 0.78 0.88 0.84 0.79   GLUCOSE mg/dL 100* 93 111* 109*   EGFR mL/min/1.73 74.5 64.5 68.2 73.4     Results from last 7 days   Lab Units 10/13/24  0338 10/12/24  0404   ALBUMIN g/dL 3.3* 2.8*   BILIRUBIN mg/dL 0.3 0.4   ALK PHOS U/L 111 101   AST (SGOT) U/L 44* 40*   ALT (SGPT) U/L 12 17     Results from last 7 days   Lab Units 10/15/24  0339 10/13/24  0338 10/12/24  0404 10/11/24  0447  "10/10/24  0435 10/09/24  0442   CALCIUM mg/dL 8.6 9.4 8.7 9.0 9.2 9.1   ALBUMIN g/dL  --  3.3* 2.8*  --   --   --    MAGNESIUM mg/dL  --   --   --  1.4* 1.5* 1.6           No results found for: \"HGBA1C\", \"POCGLU\"    XR Chest 1 View    Result Date: 10/14/2024  As described.  This report was finalized on 10/14/2024 12:45 PM by Dr. Ruel Campbell M.D on Workstation: [id number]       I have personally reviewed all medications:  Scheduled Medications  amLODIPine, 5 mg, Oral, Daily  atorvastatin, 40 mg, Oral, Daily  budesonide-formoterol, 1 puff, Inhalation, BID - RT  carvedilol, 25 mg, Oral, BID With Meals  ceFAZolin, 2,000 mg, Intravenous, Q8H  magnesium oxide, 400 mg, Oral, Daily  montelukast, 10 mg, Oral, Nightly  pantoprazole, 40 mg, Oral, Daily  rOPINIRole, 0.25 mg, Oral, Nightly  sertraline, 50 mg, Oral, Daily    Infusions   Diet  Diet: Regular/House; Fluid Consistency: Thin (IDDSI 0)    I have personally reviewed:  [x]  Laboratory   [x]  Microbiology   []  Radiology   []  EKG/Telemetry  []  Cardiology/Vascular   []  Pathology    []  Records       Assessment/Plan     Active Hospital Problems    Diagnosis  POA    **Bacteremia due to methicillin susceptible Staphylococcus aureus (MSSA) [R78.81, B95.61]  Yes    COPD (chronic obstructive pulmonary disease) [J44.9]  Yes    Pneumonia of right upper lobe due to methicillin susceptible Staphylococcus aureus (MSSA) [J15.211]  Yes    Coronary artery disease involving native coronary artery of native heart without angina pectoris [I25.10]  Yes    Presence of cardiac pacemaker [Z95.0]  Yes    Sick sinus syndrome [I49.5]  Yes    Essential hypertension [I10]  Yes      Resolved Hospital Problems    Diagnosis Date Resolved POA    Sepsis [A41.9] 10/14/2024 Yes       84 y.o. female admitted with Bacteremia due to methicillin susceptible Staphylococcus aureus (MSSA).  Status post removal of pacemaker leads 10/14, now with externalized pacer with temporary wire    Doing well.  EP " planning pacer reimplantation on Thursday.  Externalized pacer present in the meantime.    Continue cefazolin per ID recommendations.  Discussed this with staff.  She will need a PICC line placed Thursday or Friday and outpatient antibiotics arranged through 11/11.    HTN stable    Anemia: Stable.  Cont to follow, no bleeding      SCDs  Dispo: Potentially home at weeks end    Pedro Mckeon MD  [facility]  10/15/24  17:02 EDT

## 2024-10-15 NOTE — PROGRESS NOTES
Electrophysiology Follow-Up Note      Patient Name: Mallory MESSINA Pack  Age/Sex: 84 y.o. female  : 1940  MRN: [id number]      Day of Service: 10/15/24       Chief Complaint/Follow-up:MSSA, CIED infection, status post removal     S: status post removal and temporary pacemaker placement. She is doing well, rare pacing overnight.       Temp:  [97.8 °F (36.6 °C)-98.2 °F (36.8 °C)] 98.2 °F (36.8 °C)  Heart Rate:  [52-74] 69  Resp:  [10-28] 18  BP: (102-172)/() 161/63     PHYSICAL EXAM:    General Appearance: No acute distress, well developed and well nourished.   Eyes: Conjunctiva and lids: No erythema, swelling, or discharge. Sclera non-icteric.   HENT: Atraumatic, normocephalic. External eyes, ears, and nose normal.   Respiratory: No signs of respiratory distress. Respiration rhythm and depth normal.   Clear to auscultation. No rales, crackles, rhonchi, or wheezing auscultated.   Cardiovascular:  Heart Rate and Rhythm: Normal, Heart Sounds: Normal S1 and S2. No S3 or S4 noted.  Gastrointestinal:  Abdomen soft, non-distended, non-tender.  Musculoskeletal: Normal movement of extremities  Skin: Warm and dry.   Psychiatric: Patient alert and oriented to person, place, and time. Speech and behavior appropriate. Normal mood and affect.         Results from last 7 days   Lab Units 10/15/24  0339 10/13/24  0338 10/12/24  040   SODIUM mmol/L 137 139 139   POTASSIUM mmol/L 3.5 3.9 3.4*   CHLORIDE mmol/L 103 101 104   CO2 mmol/L 23.7 25.2 26.1   BUN mg/dL 13 14 13   CREATININE mg/dL 0.78 0.88 0.84   GLUCOSE mg/dL 100* 93 111*   CALCIUM mg/dL 8.6 9.4 8.7     Results from last 7 days   Lab Units 10/15/24  0339 10/13/24  0337 10/12/24  0404   WBC 10*3/mm3 7.72 9.00 7.81   HEMOGLOBIN g/dL 8.6* 9.7* 8.4*   HEMATOCRIT % 27.3* 29.0* 26.8*   PLATELETS 10*3/mm3 320 336 285                       Current Medications:   Scheduled Meds:amLODIPine, 5 mg, Oral, Daily  atorvastatin, 40 mg, Oral, Daily  budesonide-formoterol,  1 puff, Inhalation, BID - RT  carvedilol, 25 mg, Oral, BID With Meals  ceFAZolin, 2,000 mg, Intravenous, Q8H  magnesium oxide, 400 mg, Oral, Daily  montelukast, 10 mg, Oral, Nightly  pantoprazole, 40 mg, Oral, Daily  rOPINIRole, 0.25 mg, Oral, Nightly  sertraline, 50 mg, Oral, Daily            Bacteremia due to methicillin susceptible Staphylococcus aureus (MSSA)    Essential hypertension    Sick sinus syndrome    Presence of cardiac pacemaker    Coronary artery disease involving native coronary artery of native heart without angina pectoris    Pneumonia of right upper lobe due to methicillin susceptible Staphylococcus aureus (MSSA)    COPD (chronic obstructive pulmonary disease)       Plan:     MSSA, CIEF infection--- stats post device removal and temporary pacemaker placement. She rarely paced overnight. We reviewed notes and discussed and sounds like it was put in for symptomatic AV block. Will plan for reimplant.         Plans for reimplant with micra, tentatively for Thursday.         Bernabe Rosales, YOVANA  10/15/24  09:33 EDT

## 2024-10-15 NOTE — PROGRESS NOTES
ID NOTE    CC: f/u  MSSA bacteremia with PPM present    Subj: Yesterday she went to the Cath Lab with EP for pacemaker removal.  She says the procedure went well.  She is tolerating cefazolin without rash or diarrhea.      Medications:    Current Facility-Administered Medications:     acetaminophen (TYLENOL) tablet 650 mg, 650 mg, Oral, Q4H PRN, 650 mg at 10/13/24 0333 **OR** acetaminophen (TYLENOL) 160 MG/5ML oral solution 650 mg, 650 mg, Oral, Q4H PRN **OR** acetaminophen (TYLENOL) suppository 650 mg, 650 mg, Rectal, Q4H PRN, Bobby, Bernabe W, APRN    albuterol (PROVENTIL) nebulizer solution 0.083% 2.5 mg/3mL, 2.5 mg, Nebulization, Q6H PRN, Bobby, Bernabe W, APRN, 2.5 mg at 10/13/24 2057    amLODIPine (NORVASC) tablet 5 mg, 5 mg, Oral, Daily, Bobby, Bernabe W, APRN, 5 mg at 10/15/24 0903    atorvastatin (LIPITOR) tablet 40 mg, 40 mg, Oral, Daily, Bobby, Bernabe W, APRN, 40 mg at 10/15/24 0903    sennosides-docusate (PERICOLACE) 8.6-50 MG per tablet 2 tablet, 2 tablet, Oral, BID PRN **AND** polyethylene glycol (MIRALAX) packet 17 g, 17 g, Oral, Daily PRN **AND** bisacodyl (DULCOLAX) EC tablet 5 mg, 5 mg, Oral, Daily PRN **AND** bisacodyl (DULCOLAX) suppository 10 mg, 10 mg, Rectal, Daily PRN, Bobby, Bernabe W, APRN    budesonide-formoterol (SYMBICORT) 160-4.5 MCG/ACT inhaler 1 puff, 1 puff, Inhalation, BID - RT, Bobby, Bernabe W, APRN, 1 puff at 10/15/24 0644    carvedilol (COREG) tablet 25 mg, 25 mg, Oral, BID With Meals, Bobby, Bernabe W, APRN, 25 mg at 10/15/24 0904    ceFAZolin 2000 mg IVPB in 100 mL NS (MBP), 2,000 mg, Intravenous, Q8H, Bernabe Rosales, APRN, 2,000 mg at 10/15/24 0904    famotidine (PEPCID) tablet 20 mg, 20 mg, Oral, BID PRN, Bernabe Rosales, APRN    magnesium oxide (MAG-OX) tablet 400 mg, 400 mg, Oral, Daily, Bernabe Rosales, APRN, 400 mg at 10/15/24 0903    melatonin tablet 5 mg, 5 mg, Oral, Nightly PRN, Bernabe Rosales, APRN    montelukast (SINGULAIR) tablet 10 mg, 10 mg, Oral, Nightly, Bernabe Rosales, APRN,  10 mg at 10/14/24 2011    nitroglycerin (NITROSTAT) SL tablet 0.4 mg, 0.4 mg, Sublingual, Q5 Min PRN, Bernabe Rosales APRN    ondansetron (ZOFRAN) injection 4 mg, 4 mg, Intravenous, Q6H PRN, Bernabe Rosales APRN    pantoprazole (PROTONIX) EC tablet 40 mg, 40 mg, Oral, Daily, Bernabe Rosales APRN, 40 mg at 10/15/24 0904    rOPINIRole (REQUIP) tablet 0.25 mg, 0.25 mg, Oral, Nightly, Bernabe Rosales APRN, 0.25 mg at 10/14/24 2011    sertraline (ZOLOFT) tablet 50 mg, 50 mg, Oral, Daily, Bernabe Rosales APRN, 50 mg at 10/15/24 0903    sodium chloride 0.9 % flush 10 mL, 10 mL, Intravenous, PRN, Bernabe Rosales APRN    Facility-Administered Medications Ordered in Other Encounters:     Chlorhexidine Gluconate Cloth 2 % pads, , Apply externally, BID, Francesco Grewal APRN    iopamidol (ISOVUE-370) 76 % injection, , , Code / Trauma / Sedation Medication, Shane Rosa MD, 15 mL at 03/27/23 1202      Objective   Vital Signs   Temp:  [97.8 °F (36.6 °C)-98.2 °F (36.8 °C)] 98.2 °F (36.8 °C)  Heart Rate:  [52-74] 69  Resp:  [10-28] 18  BP: (102-172)/() 161/63    Physical Exam:   General: awake, alert, NAD, very nice, sitting up in chair eating breakfast  Eyes: no scleral icterus  Cardiovascular: NR  Respiratory: normal work of breathing on ambient air  :  no Simms catheter  Neurological: Alert and oriented x 3  Psychiatric: Normal mood and affect     Labs:   CBC, BMP, and blood cultures reviewed today  Lab Results   Component Value Date    WBC 7.72 10/15/2024    HGB 8.6 (L) 10/15/2024    HCT 27.3 (L) 10/15/2024    MCV 88.6 10/15/2024     10/15/2024     Lab Results   Component Value Date    GLUCOSE 100 (H) 10/15/2024    CALCIUM 8.6 10/15/2024     10/15/2024    K 3.5 10/15/2024    CO2 23.7 10/15/2024     10/15/2024    BUN 13 10/15/2024    CREATININE 0.78 10/15/2024    EGFR 74.5 10/15/2024    BCR 16.7 10/15/2024    ANIONGAP 10.3 10/15/2024         Microbiology:  10/7 COVID: Negative  10/7 BCx: MSSA in 1/2  sets  10/9 MRSA nares PCR: Negative  10/9 BCx: Negative, final    New radiology:  10/14 CXR personally reviewed and shows that her previous left-sided pacemaker has been removed and the right one is in place    ASSESSMENT/PLAN:  MSSA septicemia  Pacemaker present with concerns for infection -removed 10/14  Obesity BMI 33  Right upper lobe pneumonia    On 10/14/2024, she went to the Cath Lab with electrophysiology for pacemaker removal.  She cleared her blood cultures quickly.  Her EPI did not show any evidence of valvular vegetations.  Therefore the recommendation is to treat her with cefazolin 2 g IV every 8 hours through 11/11/2024 which will complete a 4-week course.  She will need a single-lumen PICC closer to the time of discharge.  She will need a weekly CBC with differential and creatinine faxed to me at [phone]    D/W EP physician and okay with me to place a new pacemaker on the right side on 10/17/2024 as this will satisfy the 72-hour waiting period.    I will check on her again tomorrow.

## 2024-10-15 NOTE — PLAN OF CARE
Goal Outcome Evaluation:  Plan of Care Reviewed With: patient        Progress: improving  Outcome Evaluation: Pt was seen for PT tx this PM. Pt was UIC and stood w/ SBA. Pt amb w/ CGA and no AD to fww (approx 10'). Pt amb around nsg station 1x w/ SBA and use of fww. Educ pt on pacing and taking standing rests when feeling SOA or fatigued during gait. Pt did take 2 standing rests of approx 10 sec after being educated. Pt was very SOA after returning to room. Pt returned to bed w/ SBA. PT will prog as pt kimberley.    Anticipated Discharge Disposition (PT): home with home health, home with assist

## 2024-10-15 NOTE — PLAN OF CARE
Goal Outcome Evaluation:  Plan of Care Reviewed With: patient        Progress: improving  Outcome Evaluation: VSS, up with standby asst, kimberley po well, denies pain, pacemaker generator and wires secured to RIJ with tegaderm intact, IV antibiotics as ordered, safety maintained

## 2024-10-16 PROBLEM — I44.2 AV BLOCK, COMPLETE: Status: ACTIVE | Noted: 2024-10-11

## 2024-10-16 PROBLEM — Z00.6 ENCOUNTER FOR EXAMINATION FOR NORMAL COMPARISON AND CONTROL IN CLINICAL RESEARCH PROGRAM: Status: ACTIVE | Noted: 2024-10-11

## 2024-10-16 PROCEDURE — 94761 N-INVAS EAR/PLS OXIMETRY MLT: CPT

## 2024-10-16 PROCEDURE — 99024 POSTOP FOLLOW-UP VISIT: CPT

## 2024-10-16 PROCEDURE — 99232 SBSQ HOSP IP/OBS MODERATE 35: CPT | Performed by: INTERNAL MEDICINE

## 2024-10-16 PROCEDURE — 25010000002 CEFAZOLIN PER 500 MG: Performed by: INTERNAL MEDICINE

## 2024-10-16 PROCEDURE — 94799 UNLISTED PULMONARY SVC/PX: CPT

## 2024-10-16 PROCEDURE — 25010000002 FUROSEMIDE PER 20 MG

## 2024-10-16 PROCEDURE — 97530 THERAPEUTIC ACTIVITIES: CPT

## 2024-10-16 PROCEDURE — 94760 N-INVAS EAR/PLS OXIMETRY 1: CPT

## 2024-10-16 PROCEDURE — 94664 DEMO&/EVAL PT USE INHALER: CPT

## 2024-10-16 RX ORDER — FUROSEMIDE 10 MG/ML
40 INJECTION INTRAMUSCULAR; INTRAVENOUS ONCE
Status: COMPLETED | OUTPATIENT
Start: 2024-10-16 | End: 2024-10-16

## 2024-10-16 RX ORDER — HYDROCHLOROTHIAZIDE 25 MG/1
25 TABLET ORAL DAILY
COMMUNITY

## 2024-10-16 RX ADMIN — FUROSEMIDE 40 MG: 10 INJECTION, SOLUTION INTRAMUSCULAR; INTRAVENOUS at 17:21

## 2024-10-16 RX ADMIN — BUDESONIDE AND FORMOTEROL FUMARATE DIHYDRATE 1 PUFF: 160; 4.5 AEROSOL RESPIRATORY (INHALATION) at 07:38

## 2024-10-16 RX ADMIN — CEFAZOLIN 2000 MG: 2 INJECTION, POWDER, FOR SOLUTION INTRAMUSCULAR; INTRAVENOUS at 09:55

## 2024-10-16 RX ADMIN — ATORVASTATIN CALCIUM 40 MG: 20 TABLET, FILM COATED ORAL at 09:55

## 2024-10-16 RX ADMIN — AMLODIPINE BESYLATE 5 MG: 5 TABLET ORAL at 09:55

## 2024-10-16 RX ADMIN — SERTRALINE 50 MG: 50 TABLET, FILM COATED ORAL at 09:55

## 2024-10-16 RX ADMIN — BUDESONIDE AND FORMOTEROL FUMARATE DIHYDRATE 1 PUFF: 160; 4.5 AEROSOL RESPIRATORY (INHALATION) at 19:36

## 2024-10-16 RX ADMIN — MONTELUKAST SODIUM 10 MG: 10 TABLET, FILM COATED ORAL at 20:23

## 2024-10-16 RX ADMIN — MAGNESIUM OXIDE 400 MG (241.3 MG MAGNESIUM) TABLET 400 MG: TABLET at 09:55

## 2024-10-16 RX ADMIN — CARVEDILOL 25 MG: 25 TABLET, FILM COATED ORAL at 17:22

## 2024-10-16 RX ADMIN — ROPINIROLE HYDROCHLORIDE 0.25 MG: 0.5 TABLET, FILM COATED ORAL at 20:23

## 2024-10-16 RX ADMIN — CEFAZOLIN 2000 MG: 2 INJECTION, POWDER, FOR SOLUTION INTRAMUSCULAR; INTRAVENOUS at 17:21

## 2024-10-16 RX ADMIN — CARVEDILOL 25 MG: 25 TABLET, FILM COATED ORAL at 09:55

## 2024-10-16 RX ADMIN — PANTOPRAZOLE SODIUM 40 MG: 40 TABLET, DELAYED RELEASE ORAL at 09:55

## 2024-10-16 RX ADMIN — CEFAZOLIN 2000 MG: 2 INJECTION, POWDER, FOR SOLUTION INTRAMUSCULAR; INTRAVENOUS at 01:11

## 2024-10-16 NOTE — PLAN OF CARE
Goal Outcome Evaluation:  Plan of Care Reviewed With: patient            No acute distress noted this shift, safety maintained, continue plan of care

## 2024-10-16 NOTE — PLAN OF CARE
Goal Outcome Evaluation:         Plan for PM change tomorrow. And picc line placement. Npo at midnight. 1 dose of IV lasix given. Pt having adequate urine output.

## 2024-10-16 NOTE — PLAN OF CARE
Goal Outcome Evaluation:  Plan of Care Reviewed With: patient        Progress: no change  Outcome Evaluation: Pt seen for PT tx this AM. Pt was in bed and performed supine to sit w/ mod I. Pt stood w/ SBA. Discussed pacing and reducing speed as well as stopping for rests as needed w/ pt. Pt did reduced speed and stopped for 2 brief standing rests w/ no SOA noted today. Pt req SBA w/ use of fww for gait and was fairly steady today w/ no overt LOB. Pt was UIC at end of session w/ family in room. PT will prog as pt kimberley.    Anticipated Discharge Disposition (PT): home with home health, home with assist

## 2024-10-16 NOTE — PROGRESS NOTES
Name: Mallory Franco ADMIT: 10/12/2024   : 1940  PCP: Amauri Kearney MD    MRN: [id number] LOS: 4 days   AGE/SEX: 84 y.o. female  ROOM: [facility]     Subjective   Subjective   No c/o       Objective   Objective   Vital Signs  Temp:  [97.9 °F (36.6 °C)-98.4 °F (36.9 °C)] 98.1 °F (36.7 °C)  Heart Rate:  [63-76] 65  Resp:  [18] 18  BP: (130-189)/(47-75) 130/47  SpO2:  [94 %-97 %] 97 %  on   ;   Device (Oxygen Therapy): room air  Body mass index is 33.83 kg/m².  Physical Exam  Vitals reviewed.   Constitutional:       General: She is not in acute distress.     Appearance: She is obese.   Cardiovascular:      Rate and Rhythm: Normal rate and regular rhythm.      Heart sounds: No murmur heard.  Pulmonary:      Effort: No respiratory distress.   Skin:     General: Skin is warm and dry.   Neurological:      Mental Status: She is alert and oriented to person, place, and time.   Psychiatric:         Mood and Affect: Mood normal.       Results Review     I reviewed the patient's new clinical results.  Results from last 7 days   Lab Units 10/15/24  0339 10/13/24  0337 10/12/24  0404 10/11/24  0447   WBC 10*3/mm3 7.72 9.00 7.81 7.43   HEMOGLOBIN g/dL 8.6* 9.7* 8.4* 8.5*   PLATELETS 10*3/mm3 320 336 285 282     Results from last 7 days   Lab Units 10/15/24  0339 10/13/24  0338 10/12/24  0404 10/11/24  0447   SODIUM mmol/L 137 139 139 136   POTASSIUM mmol/L 3.5 3.9 3.4* 3.2*   CHLORIDE mmol/L 103 101 104 100   CO2 mmol/L 23.7 25.2 26.1 26.2   BUN mg/dL 13 14 13 14   CREATININE mg/dL 0.78 0.88 0.84 0.79   GLUCOSE mg/dL 100* 93 111* 109*   EGFR mL/min/1.73 74.5 64.5 68.2 73.4     Results from last 7 days   Lab Units 10/13/24  0338 10/12/24  0404   ALBUMIN g/dL 3.3* 2.8*   BILIRUBIN mg/dL 0.3 0.4   ALK PHOS U/L 111 101   AST (SGOT) U/L 44* 40*   ALT (SGPT) U/L 12 17     Results from last 7 days   Lab Units 10/15/24  0339 10/13/24  0338 10/12/24  0404 10/11/24  0447 10/10/24  0435   CALCIUM mg/dL 8.6 9.4 8.7 9.0 9.2  "  ALBUMIN g/dL  --  3.3* 2.8*  --   --    MAGNESIUM mg/dL  --   --   --  1.4* 1.5*           No results found for: \"HGBA1C\", \"POCGLU\"    No radiology results for the last day    I have personally reviewed all medications:  Scheduled Medications  amLODIPine, 5 mg, Oral, Daily  atorvastatin, 40 mg, Oral, Daily  budesonide-formoterol, 1 puff, Inhalation, BID - RT  carvedilol, 25 mg, Oral, BID With Meals  ceFAZolin, 2,000 mg, Intravenous, Q8H  magnesium oxide, 400 mg, Oral, Daily  montelukast, 10 mg, Oral, Nightly  pantoprazole, 40 mg, Oral, Daily  rOPINIRole, 0.25 mg, Oral, Nightly  sertraline, 50 mg, Oral, Daily    Infusions   Diet  Diet: Regular/House; Fluid Consistency: Thin (IDDSI 0)  NPO Diet NPO Type: Strict NPO    I have personally reviewed:  [x]  Laboratory   [x]  Microbiology   []  Radiology   []  EKG/Telemetry  []  Cardiology/Vascular   []  Pathology    []  Records       Assessment/Plan     Active Hospital Problems    Diagnosis  POA    **Bacteremia due to methicillin susceptible Staphylococcus aureus (MSSA) [R78.81, B95.61]  Yes    COPD (chronic obstructive pulmonary disease) [J44.9]  Yes    Pneumonia of right upper lobe due to methicillin susceptible Staphylococcus aureus (MSSA) [J15.211]  Yes    AV block, complete [I44.2]  Unknown    Encounter for examination for normal comparison and control in clinical research program [Z00.6]  Not Applicable    Coronary artery disease involving native coronary artery of native heart without angina pectoris [I25.10]  Yes    Presence of cardiac pacemaker [Z95.0]  Yes    Sick sinus syndrome [I49.5]  Yes    Essential hypertension [I10]  Yes      Resolved Hospital Problems    Diagnosis Date Resolved POA    Sepsis [A41.9] 10/14/2024 Yes       84 y.o. female admitted with Bacteremia due to methicillin susceptible Staphylococcus aureus (MSSA).  Status post removal of pacemaker leads 10/14, now with externalized pacer with temporary wire    Doing well.  EP planning pacer " reimplantation on Thursday.  Externalized pacer present in the meantime.    Continue cefazolin per ID recommendations.  She will need a PICC line placed Thursday or Friday and outpatient antibiotics arranged through 11/11.  CCP aware    HTN stable    Anemia: Stable.  Cont to follow, no bleeding      SCDs  Dispo: Anticipate home Friday if all goes well.    Pedro Mckeon MD  [facility]  10/16/24  14:22 EDT

## 2024-10-16 NOTE — PROGRESS NOTES
Electrophysiology Follow-Up Note      Patient Name: Mallory Franco  Age/Sex: 84 y.o. female  : 1940  MRN: [id number]      Day of Service: 10/16/24       Chief Complaint/Follow-up: MSSA, CIED infection, status post extraction and temp    S: doing well this morning, no complaints.       Temp:  [97.5 °F (36.4 °C)-98.4 °F (36.9 °C)] 97.9 °F (36.6 °C)  Heart Rate:  [63-76] 67  Resp:  [18] 18  BP: (110-189)/(53-75) 160/75     PHYSICAL EXAM:    General Appearance: No acute distress, well developed and well nourished.   Eyes: Conjunctiva and lids: No erythema, swelling, or discharge. Sclera non-icteric.   HENT: Atraumatic, normocephalic. External eyes, ears, and nose normal.   Respiratory: No signs of respiratory distress. Respiration rhythm and depth normal.   Clear to auscultation. No rales, crackles, rhonchi, or wheezing auscultated.   Cardiovascular:  Heart Rate and Rhythm: Normal, Heart Sounds: Normal S1 and S2. No S3 or S4 noted.  Gastrointestinal:  Abdomen soft, non-distended, non-tender.  Musculoskeletal: Normal movement of extremities  Skin: Warm and dry.   Psychiatric: Patient alert and oriented to person, place, and time. Speech and behavior appropriate. Normal mood and affect.         Results from last 7 days   Lab Units 10/15/24  0339 10/13/24  0338 10/12/24  0404   SODIUM mmol/L 137 139 139   POTASSIUM mmol/L 3.5 3.9 3.4*   CHLORIDE mmol/L 103 101 104   CO2 mmol/L 23.7 25.2 26.1   BUN mg/dL 13 14 13   CREATININE mg/dL 0.78 0.88 0.84   GLUCOSE mg/dL 100* 93 111*   CALCIUM mg/dL 8.6 9.4 8.7     Results from last 7 days   Lab Units 10/15/24  0339 10/13/24  0337 10/12/24  0404   WBC 10*3/mm3 7.72 9.00 7.81   HEMOGLOBIN g/dL 8.6* 9.7* 8.4*   HEMATOCRIT % 27.3* 29.0* 26.8*   PLATELETS 10*3/mm3 320 336 285                       Current Medications:   Scheduled Meds:amLODIPine, 5 mg, Oral, Daily  atorvastatin, 40 mg, Oral, Daily  budesonide-formoterol, 1 puff, Inhalation, BID - RT  carvedilol, 25 mg,  Oral, BID With Meals  ceFAZolin, 2,000 mg, Intravenous, Q8H  magnesium oxide, 400 mg, Oral, Daily  montelukast, 10 mg, Oral, Nightly  pantoprazole, 40 mg, Oral, Daily  rOPINIRole, 0.25 mg, Oral, Nightly  sertraline, 50 mg, Oral, Daily            Bacteremia due to methicillin susceptible Staphylococcus aureus (MSSA)    Essential hypertension    Sick sinus syndrome    Presence of cardiac pacemaker    Coronary artery disease involving native coronary artery of native heart without angina pectoris    Pneumonia of right upper lobe due to methicillin susceptible Staphylococcus aureus (MSSA)    COPD (chronic obstructive pulmonary disease)       Plan:   She is doing well this morning, rare pacing.     Plans to proceed tomorrow with leadless pacemaker placement. We discussed risks, benefits, and alternatives.     She is agreeable to proceed tomorrow.         Bernabe Rosales, YOVANA  10/16/24  08:06 EDT

## 2024-10-16 NOTE — PROGRESS NOTES
ID NOTE    CC: f/u  MSSA bacteremia with PPM present    Subj: Acute events.  No fever.  She says she is feeling well.  Plans for Micra pacer implantation tomorrow.  She is tolerating cefazolin without rash or diarrhea.    Medications:    Current Facility-Administered Medications:     acetaminophen (TYLENOL) tablet 650 mg, 650 mg, Oral, Q4H PRN, 650 mg at 10/13/24 0333 **OR** acetaminophen (TYLENOL) 160 MG/5ML oral solution 650 mg, 650 mg, Oral, Q4H PRN **OR** acetaminophen (TYLENOL) suppository 650 mg, 650 mg, Rectal, Q4H PRN, Bobby, Bernabe W, APRN    albuterol (PROVENTIL) nebulizer solution 0.083% 2.5 mg/3mL, 2.5 mg, Nebulization, Q6H PRN, Bobby, Bernabe W, APRN, 2.5 mg at 10/13/24 2057    amLODIPine (NORVASC) tablet 5 mg, 5 mg, Oral, Daily, Bobby Bernabe W, APRN, 5 mg at 10/16/24 0955    atorvastatin (LIPITOR) tablet 40 mg, 40 mg, Oral, Daily, Bobby, Bernabe W, APRN, 40 mg at 10/16/24 0955    sennosides-docusate (PERICOLACE) 8.6-50 MG per tablet 2 tablet, 2 tablet, Oral, BID PRN **AND** polyethylene glycol (MIRALAX) packet 17 g, 17 g, Oral, Daily PRN **AND** bisacodyl (DULCOLAX) EC tablet 5 mg, 5 mg, Oral, Daily PRN **AND** bisacodyl (DULCOLAX) suppository 10 mg, 10 mg, Rectal, Daily PRN, Bobby, Bernabe W, APRN    budesonide-formoterol (SYMBICORT) 160-4.5 MCG/ACT inhaler 1 puff, 1 puff, Inhalation, BID - RT, Bobby Bernabe W, APRN, 1 puff at 10/16/24 0738    carvedilol (COREG) tablet 25 mg, 25 mg, Oral, BID With Meals, Bobby Bernabe W, APRN, 25 mg at 10/16/24 0955    ceFAZolin 2000 mg IVPB in 100 mL NS (MBP), 2,000 mg, Intravenous, Q8H, Man Whitten MD, 2,000 mg at 10/16/24 0955    famotidine (PEPCID) tablet 20 mg, 20 mg, Oral, BID PRN, Bernabe Rosales, APRN    magnesium oxide (MAG-OX) tablet 400 mg, 400 mg, Oral, Daily, Bernabe Rosales, YOVANA, 400 mg at 10/16/24 0955    melatonin tablet 5 mg, 5 mg, Oral, Nightly PRN, Bernabe Rosales, APRN    montelukast (SINGULAIR) tablet 10 mg, 10 mg, Oral, Nightly, Bernabe Rosales  ROBYN, APRN, 10 mg at 10/15/24 2125    nitroglycerin (NITROSTAT) SL tablet 0.4 mg, 0.4 mg, Sublingual, Q5 Min PRN, Bernabe Rosales APRN    ondansetron (ZOFRAN) injection 4 mg, 4 mg, Intravenous, Q6H PRN, Bernabe Rosales, APRN    pantoprazole (PROTONIX) EC tablet 40 mg, 40 mg, Oral, Daily, Bernabe Rosales APRN, 40 mg at 10/16/24 0955    rOPINIRole (REQUIP) tablet 0.25 mg, 0.25 mg, Oral, Nightly, Bernabe Rosales, APRN, 0.25 mg at 10/15/24 2125    sertraline (ZOLOFT) tablet 50 mg, 50 mg, Oral, Daily, Bernabe Rosales, APRN, 50 mg at 10/16/24 0955    sodium chloride 0.9 % flush 10 mL, 10 mL, Intravenous, PRN, Bernabe Rosales APRN    Facility-Administered Medications Ordered in Other Encounters:     Chlorhexidine Gluconate Cloth 2 % pads, , Apply externally, BID, Francesco Grewal APRN    iopamidol (ISOVUE-370) 76 % injection, , , Code / Trauma / Sedation Medication, Shane Rosa MD, 15 mL at 03/27/23 1202      Objective   Vital Signs   Temp:  [97.5 °F (36.4 °C)-98.4 °F (36.9 °C)] 97.9 °F (36.6 °C)  Heart Rate:  [63-76] 67  Resp:  [18] 18  BP: (110-189)/(53-75) 160/75    Physical Exam:   General: awake, alert, NAD, very nice, sitting on side of bed  Eyes: no scleral icterus  Cardiovascular: Normal rate, right sided temp pacer present  Respiratory: normal work of breathing on RA without wheezing  :  no Simms catheter  Neurological: Alert and oriented x 3  Psychiatric: Normal mood and affect     Labs:   Blood cultures reviewed today; CBC and BMP ordered  Lab Results   Component Value Date    WBC 7.72 10/15/2024    HGB 8.6 (L) 10/15/2024    HCT 27.3 (L) 10/15/2024    MCV 88.6 10/15/2024     10/15/2024     Lab Results   Component Value Date    GLUCOSE 100 (H) 10/15/2024    CALCIUM 8.6 10/15/2024     10/15/2024    K 3.5 10/15/2024    CO2 23.7 10/15/2024     10/15/2024    BUN 13 10/15/2024    CREATININE 0.78 10/15/2024    EGFR 74.5 10/15/2024    BCR 16.7 10/15/2024    ANIONGAP 10.3 10/15/2024          Microbiology:  10/7 COVID: Negative  10/7 BCx: MSSA in 1/2 sets  10/9 MRSA nares PCR: Negative  10/9 BCx: Negative, final      ASSESSMENT/PLAN:  MSSA septicemia  Pacemaker present with concerns for infection -removed 10/14  Obesity BMI 33  Right upper lobe pneumonia    On 10/14/2024, she went to the Cath Lab with electrophysiology for pacemaker removal.  She cleared her blood cultures quickly.  Her EPI did not show any evidence of valvular vegetations.  Therefore the recommendation is to treat her with cefazolin 2 g IV every 8 hours x 4 weeks with stop date 11/11/2024 at which time she will follow-up with me in the infectious diseases clinic.  She will need a single-lumen PICC closer to the time of discharge.  She will need a weekly CBC with differential and creatinine faxed to me at [phone]    D/W  regarding setting up home health.    D/W EP physician and okay with me to place a new pacemaker on the right side on 10/17/2024 as this will satisfy the 72-hour waiting period.  This has been scheduled.    Thank you for allowing me to be involved in the care of this patient. Infectious diseases will sign off at this time with antibiotics plan in place, but please call me at [phone] if any further ID questions or new ID concerns.

## 2024-10-16 NOTE — DISCHARGE PLACEMENT REQUEST
"Tim Franco (84 y.o. Female)       Date of Birth   1940    Social Security Number       Address   [address]  [place] [address]    Home Phone   [phone]    MRN   [id number]       Taoism   Anglican    Marital Status                               Admission Date   10/12/24    Admission Type   Urgent    Admitting Provider   Mt Dean MD    Attending Provider   Pedro Mckeon MD    Department, Room/Bed   [address], [address]       Discharge Date       Discharge Disposition       Discharge Destination                                 Attending Provider: Pedro Mckeon MD    Allergies: Codeine, Contrast Dye (Echo Or Unknown Ct/mr), Morphine    Isolation: None   Infection: None   Code Status: CPR    Ht: 162.6 cm (64\")   Wt: 89.4 kg (197 lb 1.5 oz)    Admission Cmt: None   Principal Problem: Bacteremia due to methicillin susceptible Staphylococcus aureus (MSSA) [R78.81,B95.61]                   Active Insurance as of 10/12/2024       Primary Coverage       Payor Plan Insurance Group Employer/Plan Group    MEDICARE MEDICARE A & B        Payor Plan Address Payor Plan Phone Number Payor Plan Fax Number Effective Dates    [address] [phone]  10/1/2004 - None Entered    [facility] [id number]         Subscriber Name Subscriber Birth Date Member ID       TIM FRANCO 1940 [id number]               Secondary Coverage       Payor Plan Insurance Group Employer/Plan Group    [facility] SUPP [id number]       Payor Plan Address Payor Plan Phone Number Payor Plan Fax Number Effective Dates    [address]   12/1/2016 - None Entered    [facility] [id number]         Subscriber Name Subscriber Birth Date Member ID       TIM FRANCO 1940 [id number]                     Emergency Contacts        (Rel.) Home Phone Work Phone Mobile Phone    Ed Franco (Son) [phone] -- [phone]    ANDREWLARY (Other) " [phone] -- [phone]

## 2024-10-16 NOTE — THERAPY TREATMENT NOTE
Patient Name: Mallory Franco  : 1940    MRN: [id number]                              Today's Date: 10/16/2024       Admit Date: 10/12/2024    Visit Dx:     ICD-10-CM ICD-9-CM   1. AV block, complete  I44.2 426.0   2. MSSA (methicillin susceptible Staphylococcus aureus)  A49.01 041.11   3. Sick sinus syndrome  I49.5 427.81   4. Encounter for examination for normal comparison and control in clinical research program  Z00.6 V70.7     Patient Active Problem List   Diagnosis    Essential hypertension    Coronary artery disease involving native coronary artery of native heart without angina pectoris    Pain due to total right knee replacement    History of coronary angioplasty with insertion of stent    Sick sinus syndrome    Presence of cardiac pacemaker    BPPV (benign paroxysmal positional vertigo), right    Esophageal reflux    Abnormal CT scan of lung    Atelectasis    Right upper lobe pneumonia    Bacteremia due to methicillin susceptible Staphylococcus aureus (MSSA)    Presence of cardiac pacemaker    Coronary artery disease involving native coronary artery of native heart without angina pectoris    Pneumonia of right upper lobe due to methicillin susceptible Staphylococcus aureus (MSSA)    COPD (chronic obstructive pulmonary disease)    AV block, complete    Encounter for examination for normal comparison and control in clinical research program     Past Medical History:   Diagnosis Date    Arthritis     Asthma     BPPV (benign paroxysmal positional vertigo), right     COPD (chronic obstructive pulmonary disease)     Coronary artery disease     Coronary artery disease     Diverticulitis     Ear itching     HX    Enlarged heart     Gastroesophageal reflux disease     Hyperlipidemia     Hypertension     Knee pain     Macular degeneration     PONV (postoperative nausea and vomiting)     Postural dizziness with presyncope 2023    Shingles     X2    SOB (shortness of breath) on exertion     Tinnitus of  left ear     Urinary frequency     Vertigo      Past Surgical History:   Procedure Laterality Date    BREAST BIOPSY Bilateral     BRONCHOSCOPY Right 2023    Procedure: BRONCHOSCOPY WITH BIOPSIES, BRUSHINGS, AND BRONCHOALVEOLAR LAVAGE;  Surgeon: Aleksandr Houston DO;  Location: [facility] ENDOSCOPY;  Service: Pulmonary;  Laterality: Right;  RIGHT UPPER LOBE ENDOBRONCHIAL OCCLUSION    CARDIAC CATHETERIZATION      CARDIAC ELECTROPHYSIOLOGY PROCEDURE N/A 3/27/2023    Procedure: Pacemaker SC new-[employer], call Dr. Rosa first thing in the morning to get time for the procedure and notify rep;  Surgeon: Shane Rosa MD;  Location: [facility] CATH INVASIVE LOCATION;  Service: Cardiovascular;  Laterality: N/A;     SECTION      CHOLECYSTECTOMY      COLONOSCOPY      CORONARY STENT PLACEMENT      EYE SURGERY      GALLBLADDER SURGERY      KNEE MINI REVISION Right 2021    Procedure: KNEE POLY INSERT EXCHANGE;  Surgeon: Ky Avila MD;  Location: [facility] OR;  Service: Orthopedics;  Laterality: Right;    KNEE SURGERY      REPLACEMENT TOTAL KNEE Right     REPLACEMENT TOTAL KNEE Left       General Information       Row Name 10/16/24 1302          Physical Therapy Time and Intention    Document Type therapy note (daily note)  -     Mode of Treatment physical therapy  -       Row Name 10/16/24 1302          General Information    Existing Precautions/Restrictions fall  -PH       Row Name 10/16/24 1302          Cognition    Orientation Status (Cognition) oriented x 4  -PH       Row Name 10/16/24 1302          Safety Issues/Impairments Affecting Functional Mobility    Impairments Affecting Function (Mobility) balance;endurance/activity tolerance;strength  -PH     Comment, Safety Issues/Impairments (Mobility) gt belt and non skid socks donned  -PH               User Key  (r) = Recorded By, (t) = Taken By, (c) = Cosigned By      Initials Name Provider Type    PH Evangelina Burnett, ISRA  Physical Therapist Assistant                   Mobility       Row Name 10/16/24 1302          Bed Mobility    Bed Mobility supine-sit  -PH     Supine-Sit [place] (Bed Mobility) modified independence  -PH       Row Name 10/16/24 1302          Sit-Stand Transfer    Sit-Stand [place] (Transfers) standby assist  -PH     Comment, (Sit-Stand Transfer) no AD  -PH       Row Name 10/16/24 1302          Gait/Stairs (Locomotion)    [place] Level (Gait) standby assist  -PH     Assistive Device (Gait) walker, front-wheeled  -PH     Distance in Feet (Gait) 200  -PH     Bilateral Gait Deviations forward flexed posture  -PH     [place] Level (Stairs) unable to assess  -PH     Comment, (Gait/Stairs) strongly encouraged decr pace and stopping for rests w/ pt taking 2 brief rests and reducing speed; no SOA noted today; fatigue limiting; pt steady w/ no overt LOB  -PH               User Key  (r) = Recorded By, (t) = Taken By, (c) = Cosigned By      Initials Name Provider Type    PH Evangelina Burnett, PTA Physical Therapist Assistant                   Obj/Interventions    No documentation.                  Goals/Plan    No documentation.                  Clinical Impression       Row Name 10/16/24 1304          Pain    Pretreatment Pain Rating 0/10 - no pain  -PH     Posttreatment Pain Rating 0/10 - no pain  -PH       Row Name 10/16/24 1305          Plan of Care Review    Plan of Care Reviewed With patient  -PH     Progress no change  -PH     Outcome Evaluation Pt seen for PT tx this AM. Pt was in bed and performed supine to sit w/ mod I. Pt stood w/ SBA. Discussed pacing and reducing speed as well as stopping for rests as needed w/ pt. Pt did reduced speed and stopped for 2 brief standing rests w/ no SOA noted today. Pt req SBA w/ use of fww for gait and was fairly steady today w/ no overt LOB. Pt was UIC at end of session w/ family in room. PT will prog as pt kimberley.  -PH       Row Name 10/16/24 1308           Vital Signs    O2 Delivery Pre Treatment room air  -PH     O2 Delivery Intra Treatment room air  -PH     O2 Delivery Post Treatment room air  -PH       Row Name 10/16/24 1304          Positioning and Restraints    Pre-Treatment Position in bed  -PH     Post Treatment Position chair  -PH     In Chair reclined;call light within reach;encouraged to call for assist;exit alarm on;with family/caregiver;notified nsg  -PH               User Key  (r) = Recorded By, (t) = Taken By, (c) = Cosigned By      Initials Name Provider Type     Evangelina Burnett PTA Physical Therapist Assistant                   Outcome Measures       Row Name 10/16/24 1308          How much help from another person do you currently need...    Turning from your back to your side while in flat bed without using bedrails? 4  -PH     Moving from lying on back to sitting on the side of a flat bed without bedrails? 4  -PH     Moving to and from a bed to a chair (including a wheelchair)? 3  -PH     Standing up from a chair using your arms (e.g., wheelchair, bedside chair)? 4  -PH     Climbing 3-5 steps with a railing? 3  -PH     To walk in hospital room? 3  -PH     AM-PAC 6 Clicks Score (PT) 21  -PH     Highest Level of Mobility Goal 6 --> Walk 10 steps or more  -PH       Row Name 10/16/24 1308          Functional Assessment    Outcome Measure Options AM-PAC 6 Clicks Basic Mobility (PT)  -PH               User Key  (r) = Recorded By, (t) = Taken By, (c) = Cosigned By      Initials Name Provider Type     Evangelina Burnett PTA Physical Therapist Assistant                                 Physical Therapy Education       Title: PT OT SLP Therapies (In Progress)       Topic: Physical Therapy (In Progress)       Point: Mobility training (Done)       Learning Progress Summary            Patient Acceptance, E,TB, VU,DU by  at 10/16/2024 1308    Acceptance, E,TB,D, VU,NR by  at 10/15/2024 1347    Acceptance, E, VU by  at 10/14/2024 1522     Acceptance, E,TB,D, VU by MG at 10/12/2024 1657                      Point: Home exercise program (Not Started)       Learner Progress:  Not documented in this visit.              Point: Body mechanics (Done)       Learning Progress Summary            Patient Acceptance, E,TB, VU,DU by PH at 10/16/2024 1308    Acceptance, E,TB,D, VU,NR by PH at 10/15/2024 1347    Acceptance, E, VU by PH at 10/14/2024 1522    Acceptance, E,TB,D, VU by MG at 10/12/2024 1657                      Point: Precautions (Done)       Learning Progress Summary            Patient Acceptance, E,TB, VU,DU by PH at 10/16/2024 1308    Acceptance, E,TB,D, VU,NR by PH at 10/15/2024 1347    Acceptance, E, VU by PH at 10/14/2024 1522    Acceptance, E,TB,D, VU by MG at 10/12/2024 1657                                      User Key       Initials Effective Dates Name Provider Type Discipline     05/24/22 -  Toshia Abernathy, PT Physical Therapist PT     06/16/21 -  Evangelina Burnett PTA Physical Therapist Assistant PT                  PT Recommendation and Plan     Progress: no change  Outcome Evaluation: Pt seen for PT tx this AM. Pt was in bed and performed supine to sit w/ mod I. Pt stood w/ SBA. Discussed pacing and reducing speed as well as stopping for rests as needed w/ pt. Pt did reduced speed and stopped for 2 brief standing rests w/ no SOA noted today. Pt req SBA w/ use of fww for gait and was fairly steady today w/ no overt LOB. Pt was UIC at end of session w/ family in room. PT will prog as pt kimberley.     Time Calculation:         PT Charges       Row Name 10/16/24 1309             Time Calculation    Start Time 1132  -PH      Stop Time 1146  -PH      Time Calculation (min) 14 min  -PH      PT Received On 10/16/24  -PH      PT - Next Appointment 10/17/24  -PH         Timed Charges    07950 - PT Therapeutic Activity Minutes 14  -PH         Total Minutes    Timed Charges Total Minutes 14  -PH       Total Minutes 14  -PH                User  Key  (r) = Recorded By, (t) = Taken By, (c) = Cosigned By      Initials Name Provider Type    Evangelina Appiah PTA Physical Therapist Assistant                  Therapy Charges for Today       Code Description Service Date Service Provider Modifiers Qty    24946441374 HC PT THERAPEUTIC ACT EA 15 MIN 10/15/2024 Evangelina Burnett PTA GP 1    16662682561 HC PT THERAPEUTIC ACT EA 15 MIN 10/16/2024 Evangelina Burnett PTA GP 1            PT G-Codes  Outcome Measure Options: AM-PAC 6 Clicks Basic Mobility (PT)  AM-PAC 6 Clicks Score (PT): 21  PT Discharge Summary  Anticipated Discharge Disposition (PT): home with home health, home with assist    Evangelina Burnett PTA  10/16/2024

## 2024-10-16 NOTE — PROGRESS NOTES
Continued Stay Note  [facility]     Patient Name: Mallory Franco  MRN: [id number]  Today's Date: 10/16/2024    Admit Date: 10/12/2024    Plan: Home with [facility] and [facility] (referral pending)   Discharge Plan       Row Name 10/16/24 1038       Plan    Plan Home with [facility] and [facility] (referral pending)    Plan Comments HH list provided to pt at bedside.  Pt wanting  [facility] and referral also sent to [facility] for IV Cefazolin 2G Q8 for 4 weeks with stop date 11/11/24.  Per Diana with [facility] they can accpet and staff pt for the [place] area.  [facility] referral pending at this time.  Referral placed in EP[facility] for both [facility] and [facility].   Pt to have PICC placed 10/17/24.                   Discharge Codes    No documentation.                 Expected Discharge Date and Time       Expected Discharge Date Expected Discharge Time    Oct 18, 2024               JARRET Davila

## 2024-10-17 ENCOUNTER — APPOINTMENT (OUTPATIENT)
Dept: GENERAL RADIOLOGY | Facility: HOSPITAL | Age: 84
End: 2024-10-17
Payer: MEDICARE

## 2024-10-17 PROCEDURE — 33274 TCAT INSJ/RPL PERM LDLS PM: CPT | Performed by: INTERNAL MEDICINE

## 2024-10-17 PROCEDURE — 94760 N-INVAS EAR/PLS OXIMETRY 1: CPT

## 2024-10-17 PROCEDURE — C1894 INTRO/SHEATH, NON-LASER: HCPCS | Performed by: INTERNAL MEDICINE

## 2024-10-17 PROCEDURE — 94799 UNLISTED PULMONARY SVC/PX: CPT

## 2024-10-17 PROCEDURE — 25010000002 MIDAZOLAM PER 1 MG: Performed by: INTERNAL MEDICINE

## 2024-10-17 PROCEDURE — 94761 N-INVAS EAR/PLS OXIMETRY MLT: CPT

## 2024-10-17 PROCEDURE — 02HK3NZ INSERTION OF INTRACARDIAC PACEMAKER INTO RIGHT VENTRICLE, PERCUTANEOUS APPROACH: ICD-10-PCS | Performed by: INTERNAL MEDICINE

## 2024-10-17 PROCEDURE — C1769 GUIDE WIRE: HCPCS | Performed by: INTERNAL MEDICINE

## 2024-10-17 PROCEDURE — 25010000002 HEPARIN (PORCINE) PER 1000 UNITS: Performed by: INTERNAL MEDICINE

## 2024-10-17 PROCEDURE — 25010000002 CEFAZOLIN PER 500 MG

## 2024-10-17 PROCEDURE — 25010000002 CEFAZOLIN PER 500 MG: Performed by: INTERNAL MEDICINE

## 2024-10-17 PROCEDURE — C1786 PMKR, SINGLE, RATE-RESP: HCPCS | Performed by: INTERNAL MEDICINE

## 2024-10-17 PROCEDURE — C1751 CATH, INF, PER/CENT/MIDLINE: HCPCS

## 2024-10-17 PROCEDURE — 25510000001 IOPAMIDOL PER 1 ML: Performed by: INTERNAL MEDICINE

## 2024-10-17 PROCEDURE — 99232 SBSQ HOSP IP/OBS MODERATE 35: CPT | Performed by: INTERNAL MEDICINE

## 2024-10-17 PROCEDURE — 02HV33Z INSERTION OF INFUSION DEVICE INTO SUPERIOR VENA CAVA, PERCUTANEOUS APPROACH: ICD-10-PCS | Performed by: HOSPITALIST

## 2024-10-17 PROCEDURE — 25010000002 LIDOCAINE-EPINEPHRINE 1 %-1:200000 SOLUTION: Performed by: INTERNAL MEDICINE

## 2024-10-17 PROCEDURE — 25010000002 FENTANYL CITRATE (PF) 50 MCG/ML SOLUTION: Performed by: INTERNAL MEDICINE

## 2024-10-17 PROCEDURE — C1760 CLOSURE DEV, VASC: HCPCS | Performed by: INTERNAL MEDICINE

## 2024-10-17 PROCEDURE — 94664 DEMO&/EVAL PT USE INHALER: CPT

## 2024-10-17 DEVICE — SYS PACE MICRA LD/LESS AV2: Type: IMPLANTABLE DEVICE | Site: HEART | Status: FUNCTIONAL

## 2024-10-17 RX ORDER — HEPARIN SODIUM 1000 [USP'U]/ML
INJECTION, SOLUTION INTRAVENOUS; SUBCUTANEOUS
Status: DISCONTINUED | OUTPATIENT
Start: 2024-10-17 | End: 2024-10-17 | Stop reason: HOSPADM

## 2024-10-17 RX ORDER — SODIUM CHLORIDE 0.9 % (FLUSH) 0.9 %
10 SYRINGE (ML) INJECTION AS NEEDED
Status: DISCONTINUED | OUTPATIENT
Start: 2024-10-17 | End: 2024-10-18 | Stop reason: HOSPADM

## 2024-10-17 RX ORDER — SODIUM CHLORIDE 0.9 % (FLUSH) 0.9 %
10 SYRINGE (ML) INJECTION EVERY 12 HOURS SCHEDULED
Status: DISCONTINUED | OUTPATIENT
Start: 2024-10-17 | End: 2024-10-18 | Stop reason: HOSPADM

## 2024-10-17 RX ORDER — MIDAZOLAM HYDROCHLORIDE 1 MG/ML
INJECTION, SOLUTION INTRAMUSCULAR; INTRAVENOUS
Status: DISCONTINUED | OUTPATIENT
Start: 2024-10-17 | End: 2024-10-17 | Stop reason: HOSPADM

## 2024-10-17 RX ORDER — ACETAMINOPHEN 325 MG/1
650 TABLET ORAL EVERY 4 HOURS PRN
Status: DISCONTINUED | OUTPATIENT
Start: 2024-10-17 | End: 2024-10-18 | Stop reason: HOSPADM

## 2024-10-17 RX ORDER — FENTANYL CITRATE 50 UG/ML
INJECTION, SOLUTION INTRAMUSCULAR; INTRAVENOUS
Status: DISCONTINUED | OUTPATIENT
Start: 2024-10-17 | End: 2024-10-17 | Stop reason: HOSPADM

## 2024-10-17 RX ORDER — SODIUM CHLORIDE 0.9 % (FLUSH) 0.9 %
20 SYRINGE (ML) INJECTION AS NEEDED
Status: DISCONTINUED | OUTPATIENT
Start: 2024-10-17 | End: 2024-10-18 | Stop reason: HOSPADM

## 2024-10-17 RX ORDER — ONDANSETRON 2 MG/ML
4 INJECTION INTRAMUSCULAR; INTRAVENOUS EVERY 6 HOURS PRN
Status: DISCONTINUED | OUTPATIENT
Start: 2024-10-17 | End: 2024-10-18 | Stop reason: HOSPADM

## 2024-10-17 RX ORDER — IOPAMIDOL 755 MG/ML
INJECTION, SOLUTION INTRAVASCULAR
Status: DISCONTINUED | OUTPATIENT
Start: 2024-10-17 | End: 2024-10-17 | Stop reason: HOSPADM

## 2024-10-17 RX ORDER — ACETAMINOPHEN 650 MG/1
650 SUPPOSITORY RECTAL EVERY 4 HOURS PRN
Status: DISCONTINUED | OUTPATIENT
Start: 2024-10-17 | End: 2024-10-18 | Stop reason: HOSPADM

## 2024-10-17 RX ADMIN — MONTELUKAST SODIUM 10 MG: 10 TABLET, FILM COATED ORAL at 20:15

## 2024-10-17 RX ADMIN — ROPINIROLE HYDROCHLORIDE 0.25 MG: 0.5 TABLET, FILM COATED ORAL at 20:15

## 2024-10-17 RX ADMIN — CARVEDILOL 25 MG: 25 TABLET, FILM COATED ORAL at 14:45

## 2024-10-17 RX ADMIN — CEFAZOLIN 2000 MG: 2 INJECTION, POWDER, FOR SOLUTION INTRAMUSCULAR; INTRAVENOUS at 10:12

## 2024-10-17 RX ADMIN — BUDESONIDE AND FORMOTEROL FUMARATE DIHYDRATE 1 PUFF: 160; 4.5 AEROSOL RESPIRATORY (INHALATION) at 07:31

## 2024-10-17 RX ADMIN — CEFAZOLIN 2000 MG: 2 INJECTION, POWDER, FOR SOLUTION INTRAMUSCULAR; INTRAVENOUS at 17:37

## 2024-10-17 RX ADMIN — CEFAZOLIN 2000 MG: 2 INJECTION, POWDER, FOR SOLUTION INTRAMUSCULAR; INTRAVENOUS at 00:40

## 2024-10-17 RX ADMIN — BUDESONIDE AND FORMOTEROL FUMARATE DIHYDRATE 1 PUFF: 160; 4.5 AEROSOL RESPIRATORY (INHALATION) at 20:35

## 2024-10-17 RX ADMIN — Medication 10 ML: at 20:16

## 2024-10-17 RX ADMIN — CARVEDILOL 25 MG: 25 TABLET, FILM COATED ORAL at 17:37

## 2024-10-17 RX ADMIN — SERTRALINE 50 MG: 50 TABLET, FILM COATED ORAL at 14:44

## 2024-10-17 RX ADMIN — Medication 5 MG: at 00:40

## 2024-10-17 RX ADMIN — Medication 10 ML: at 20:15

## 2024-10-17 RX ADMIN — ATORVASTATIN CALCIUM 40 MG: 20 TABLET, FILM COATED ORAL at 14:43

## 2024-10-17 RX ADMIN — AMLODIPINE BESYLATE 5 MG: 5 TABLET ORAL at 14:45

## 2024-10-17 RX ADMIN — MAGNESIUM OXIDE 400 MG (241.3 MG MAGNESIUM) TABLET 400 MG: TABLET at 14:43

## 2024-10-17 NOTE — CASE MANAGEMENT/SOCIAL WORK
Continued Stay Note  [facility]     Patient Name: Mallory Franco  MRN: [id number]  Today's Date: 10/17/2024    Admit Date: 10/12/2024    Plan: Home with [facility], [facility], family to transport   Discharge Plan       Row Name 10/17/24 1501       Plan    Plan Home with [facility], [facility], family to transport    Plan Comments CCP met briefly with pt at bedside. Introduced self and explained role of . [facility] following pt, [facility] to provide IVAB. Pt would like a rollator, she does not have a DME provider. Rollator ordered for pt. Explained that CCP would follow to assess for dc needs.                   Discharge Codes    No documentation.                 Expected Discharge Date and Time       Expected Discharge Date Expected Discharge Time    Oct 18, 2024               Yajaira Madrigal RN

## 2024-10-17 NOTE — SIGNIFICANT NOTE
"   10/17/24 1804   PICC Single Lumen 10/17/24 Left Basilic   Placement date: If unknown, DO NOT use \"Add Comment\" note/Placement time: If unknown, DO NOT use \"Add Comment\" note: 10/17/24 1500   Hand Hygiene Completed: Yes  Size (Fr): 4  Description (optional): SL PICC  Length (cm): 43 cm  Orientation: Left  Loc...   Site Assessment Clean;Dry;Intact   #1 Lumen Status Blood return noted;Capped;Flushed;Normal saline locked   Length bruce (cm) 0 cm   Line Care Connections checked and tightened   Extremity Circumference (cm) 34 cm   Dressing Type Border Dressing;Transparent;Securing device;Antimicrobial dressing/disc   Dressing Status Clean;Dry;Intact   Dressing Intervention New dressing   Liquid Adhesive Applied   Dressing Change Due 10/24/24   Indication/Daily Review of Necessity long-term IV access >7 days       PICC placed successfully, confirmation via chest xray   Primary RN notified - PICC LINE IS READY TO BE USED       LOT - [id number]  EX - 07/31/2025  FINAL COUNT - 3 NEEDLES, 2 GUIDEWIRES, 1 SCALPEL ACCOUNTED FOR AT PROCEDURE END   "

## 2024-10-17 NOTE — PLAN OF CARE
Assumed care of pt. Pt is currently sitting up in the recliner at this time. Will cont to monitor.    Goal Outcome Evaluation: Pt will maintain cardiac stability upon discharge.

## 2024-10-17 NOTE — CASE MANAGEMENT/SOCIAL WORK
Case Management Readmission Assessment Note    Case Management Readmission Assessment (all recorded)       Readmission Interview       Row Name 10/14/24 1449             Readmission Indications    Is the patient and/or family able to complete the readmission assessment questions? Yes      Is this hospitalization related to the prior hospital diagnosis? No        Row Name 10/14/24 1449             Recommendation for rehospitalization    Did you speak with your physician prior to coming to the hospital No        Row Name 10/14/24 1449             Follow-up Appointments    Do you have a PCP? Yes      Did you have an appointment with PCP after your hospitalization? No      Did you have an appointment with a Specialist? Yes      When was your appointment scheduled? 01/10/25      Did you go to appointment? --  apt is in Jan 2025      Are you current with the Pulmonary Clinic? No      Are you current with the CHF Clinic? No        Row Name 10/14/24 1449             Medications    Did you have newly prescribed medications at discharge? Yes      Did you understand the reasons for your medications at discharge and how to take them? Yes      Did you understand the side effects of your medications? Yes      Are you taking all of you prescribed medications? Yes      What pharmacy was used to fill prescription(s)? [facility] in [facility]      Were medications picked up? Yes        Row Name 10/14/24 1449             Discharge Instructions    Did you understand your discharge instructions? Yes      Did your family/caregiver hear your instructions? Yes      Were you told to eat a special diet? No      Did you adhere to the diet? --  n/a no diet      Were you given a number of someone to call if you had questions or concerns? No        Row Name 10/14/24 1449             Index discharge location/services    Where did you go upon discharge? Home      Do you have supportive family or friends in the home? Yes        Row Name 10/14/24  1449             Discharge Readiness    On a scale of 1-5 (5 being well prepared), how ready were you for discharge 5        Row Name 10/14/24 1449             Palliative Care/Hospice    Are you current with Palliative Care? No      Are you current with Hospice Care? No        Row Name 10/14/24 1449 10/12/24 0234 10/12/24 0233       Advance Directives (For Healthcare)    Pre-existing AND/MOST/POLST Order No No --    Advance Directive Status Patient has advance directive, copy requested Patient has advance directive, copy requested Patient has advance directive, copy requested    Type of Advance Directive Health care directive/Living will  asked for paperwork Health care directive/Living will Health care directive/Living will    Have you reviewed your Advance Directive and is it valid for this stay? Not applicable  not on file Not applicable Not applicable    Literature Provided on Advance Directives No No No    Patient Requests Assistance on Advance Directives Patient Declined Patient Declined Patient Declined

## 2024-10-17 NOTE — PROGRESS NOTES
Name: Mallory Franco ADMIT: 10/12/2024   : 1940  PCP: Amauri Kearney MD    MRN: [id number] LOS: 5 days   AGE/SEX: 84 y.o. female  ROOM: [facility]     Subjective   Subjective   Just back from pacemaker placement.  Doing well       Objective   Objective   Vital Signs  Temp:  [97.5 °F (36.4 °C)-98.2 °F (36.8 °C)] 97.7 °F (36.5 °C)  Heart Rate:  [60-65] 60  Resp:  [14-20] 20  BP: (113-186)/(55-94) 121/65  SpO2:  [95 %-98 %] 97 %  on   ;   Device (Oxygen Therapy): room air  Body mass index is 33.83 kg/m².  Physical Exam  Vitals reviewed.   Constitutional:       General: She is not in acute distress.     Appearance: She is obese.   Cardiovascular:      Rate and Rhythm: Normal rate and regular rhythm.      Heart sounds: No murmur heard.  Pulmonary:      Effort: No respiratory distress.   Skin:     General: Skin is warm and dry.   Neurological:      Mental Status: She is alert and oriented to person, place, and time.   Psychiatric:         Mood and Affect: Mood normal.       Results Review     I reviewed the patient's new clinical results.  Results from last 7 days   Lab Units 10/15/24  0339 10/13/24  0337 10/12/24  0404 10/11/24  0447   WBC 10*3/mm3 7.72 9.00 7.81 7.43   HEMOGLOBIN g/dL 8.6* 9.7* 8.4* 8.5*   PLATELETS 10*3/mm3 320 336 285 282     Results from last 7 days   Lab Units 10/15/24  0339 10/13/24  0338 10/12/24  0404 10/11/24  0447   SODIUM mmol/L 137 139 139 136   POTASSIUM mmol/L 3.5 3.9 3.4* 3.2*   CHLORIDE mmol/L 103 101 104 100   CO2 mmol/L 23.7 25.2 26.1 26.2   BUN mg/dL 13 14 13 14   CREATININE mg/dL 0.78 0.88 0.84 0.79   GLUCOSE mg/dL 100* 93 111* 109*   EGFR mL/min/1.73 74.5 64.5 68.2 73.4     Results from last 7 days   Lab Units 10/13/24  0338 10/12/24  0404   ALBUMIN g/dL 3.3* 2.8*   BILIRUBIN mg/dL 0.3 0.4   ALK PHOS U/L 111 101   AST (SGOT) U/L 44* 40*   ALT (SGPT) U/L 12 17     Results from last 7 days   Lab Units 10/15/24  0339 10/13/24  0338 10/12/24  0404 10/11/24  0447   CALCIUM mg/dL  "8.6 9.4 8.7 9.0   ALBUMIN g/dL  --  3.3* 2.8*  --    MAGNESIUM mg/dL  --   --   --  1.4*           No results found for: \"HGBA1C\", \"POCGLU\"    No radiology results for the last day    I have personally reviewed all medications:  Scheduled Medications  amLODIPine, 5 mg, Oral, Daily  [Transfer Hold] atorvastatin, 40 mg, Oral, Daily  [Transfer Hold] budesonide-formoterol, 1 puff, Inhalation, BID - RT  carvedilol, 25 mg, Oral, BID With Meals  ceFAZolin, 2,000 mg, Intravenous, Q8H  [Transfer Hold] magnesium oxide, 400 mg, Oral, Daily  [Transfer Hold] montelukast, 10 mg, Oral, Nightly  [Transfer Hold] pantoprazole, 40 mg, Oral, Daily  [Transfer Hold] rOPINIRole, 0.25 mg, Oral, Nightly  [Transfer Hold] sertraline, 50 mg, Oral, Daily    Infusions   Diet  Diet: Regular/House; Fluid Consistency: Thin (IDDSI 0)    I have personally reviewed:  [x]  Laboratory   [x]  Microbiology   []  Radiology   []  EKG/Telemetry  []  Cardiology/Vascular   []  Pathology    []  Records       Assessment/Plan     Active Hospital Problems    Diagnosis  POA    **Bacteremia due to methicillin susceptible Staphylococcus aureus (MSSA) [R78.81, B95.61]  Yes    COPD (chronic obstructive pulmonary disease) [J44.9]  Yes    Pneumonia of right upper lobe due to methicillin susceptible Staphylococcus aureus (MSSA) [J15.211]  Yes    AV block, complete [I44.2]  Unknown    Encounter for examination for normal comparison and control in clinical research program [Z00.6]  Not Applicable    Coronary artery disease involving native coronary artery of native heart without angina pectoris [I25.10]  Yes    Presence of cardiac pacemaker [Z95.0]  Yes    Sick sinus syndrome [I49.5]  Yes    Essential hypertension [I10]  Yes      Resolved Hospital Problems    Diagnosis Date Resolved POA    Sepsis [A41.9] 10/14/2024 Yes       84 y.o. female admitted with Bacteremia due to methicillin susceptible Staphylococcus aureus (MSSA).  Status post removal of pacemaker leads 10/14.  " New leadless pacemaker implantation 10/17    Monitor on telemetry postprocedure.  Check pacer function on the morning    Continue cefazolin per ID recommendations.  Await PICC line placement for outpatient antibiotics through 11/11.  CCP aware    HTN stable    Anemia: Stable.  Cont to follow, no bleeding      SCDs  Dispo: Anticipate home Friday if all goes well.    Pedro Mckeon MD  [facility]  10/17/24  13:49 EDT

## 2024-10-17 NOTE — PLAN OF CARE
Pt has been NPO since MN. Resting in bed quietly. Will cont to monitor.

## 2024-10-17 NOTE — SIGNIFICANT NOTE
10/17/24 1324   OTHER   Discipline physical therapist   Rehab Time/Intention   Session Not Performed other (see comments);patient unavailable for treatment  (Pt to OR for pacemaker insertion today, PT will hold and follow up 10/18)   Recommendation   PT - Next Appointment 10/18/24

## 2024-10-17 NOTE — NURSING NOTE
IV team consulted to place PICC. Spoke with primary RN about plan of care and consent forms. Primary RN to notify IV team when consent forms are complete. IV team will continue to follow.

## 2024-10-17 NOTE — PROGRESS NOTES
ID NOTE    CC: f/u  MSSA bacteremia with PPM present    Subj: She underwent pacemaker insertion today.  She says the procedure went well.  She does not have any pain or discomfort.  She is tolerating cefazolin without rash or diarrhea.  D/W 2 family members at the patient's bedside.  Medications:    Current Facility-Administered Medications:     [Transfer Hold] acetaminophen (TYLENOL) tablet 650 mg, 650 mg, Oral, Q4H PRN, 650 mg at 10/13/24 0333 **OR** [Transfer Hold] acetaminophen (TYLENOL) 160 MG/5ML oral solution 650 mg, 650 mg, Oral, Q4H PRN **OR** [Transfer Hold] acetaminophen (TYLENOL) suppository 650 mg, 650 mg, Rectal, Q4H PRN, Bernabe Rosales W, APRN    [Transfer Hold] albuterol (PROVENTIL) nebulizer solution 0.083% 2.5 mg/3mL, 2.5 mg, Nebulization, Q6H PRN, Bernabe Rosales, APRN, 2.5 mg at 10/13/24 2057    amLODIPine (NORVASC) tablet 5 mg, 5 mg, Oral, Daily, Bernabe Rosales W, APRN, 5 mg at 10/16/24 0955    [Transfer Hold] atorvastatin (LIPITOR) tablet 40 mg, 40 mg, Oral, Daily, Bernabe Rosales W, APRN, 40 mg at 10/16/24 0955    [Transfer Hold] sennosides-docusate (PERICOLACE) 8.6-50 MG per tablet 2 tablet, 2 tablet, Oral, BID PRN **AND** [Transfer Hold] polyethylene glycol (MIRALAX) packet 17 g, 17 g, Oral, Daily PRN **AND** [Transfer Hold] bisacodyl (DULCOLAX) EC tablet 5 mg, 5 mg, Oral, Daily PRN **AND** [Transfer Hold] bisacodyl (DULCOLAX) suppository 10 mg, 10 mg, Rectal, Daily PRN, Bernabe Rosales W, APRN    [Transfer Hold] budesonide-formoterol (SYMBICORT) 160-4.5 MCG/ACT inhaler 1 puff, 1 puff, Inhalation, BID - RT, BobbyBernabe de guzman APRN, 1 puff at 10/17/24 0731    carvedilol (COREG) tablet 25 mg, 25 mg, Oral, BID With Meals, Bernabe Rosales APRN, 25 mg at 10/16/24 1722    ceFAZolin 2000 mg IVPB in 100 mL NS (MBP), 2,000 mg, Intravenous, Q8H, Man Whitten MD, 2,000 mg at 10/17/24 1012    famotidine (PEPCID) tablet 20 mg, 20 mg, Oral, BID PRN, Bernabe Rosales APRN    [Transfer Hold] magnesium oxide  (MAG-OX) tablet 400 mg, 400 mg, Oral, Daily, BobbyBernabe de guzman W, APRN, 400 mg at 10/16/24 0955    [Transfer Hold] melatonin tablet 5 mg, 5 mg, Oral, Nightly PRN, Bernabe Rosales W, APRN, 5 mg at 10/17/24 0040    [Transfer Hold] montelukast (SINGULAIR) tablet 10 mg, 10 mg, Oral, Nightly, BobbyLeigh Ann de guzmany W, APRN, 10 mg at 10/16/24 2023    [Transfer Hold] nitroglycerin (NITROSTAT) SL tablet 0.4 mg, 0.4 mg, Sublingual, Q5 Min PRN, Bernabe Rosales W, APRN    [Transfer Hold] ondansetron (ZOFRAN) injection 4 mg, 4 mg, Intravenous, Q6H PRN, Bernabe Rosales W, APRN    [Transfer Hold] pantoprazole (PROTONIX) EC tablet 40 mg, 40 mg, Oral, Daily, Bernabe Rosales W, APRN, 40 mg at 10/16/24 0955    [Transfer Hold] rOPINIRole (REQUIP) tablet 0.25 mg, 0.25 mg, Oral, Nightly, BobbyBernabe de guzman W, APRN, 0.25 mg at 10/16/24 2023    [Transfer Hold] sertraline (ZOLOFT) tablet 50 mg, 50 mg, Oral, Daily, Leigh Ann Rosalesy W, APRN, 50 mg at 10/16/24 0955    [Transfer Hold] sodium chloride 0.9 % flush 10 mL, 10 mL, Intravenous, PRN, Bernabe Rosales, APRN    Facility-Administered Medications Ordered in Other Encounters:     Chlorhexidine Gluconate Cloth 2 % pads, , Apply externally, BID, Francesco Grewal, APRN    iopamidol (ISOVUE-370) 76 % injection, , , Code / Trauma / Sedation Medication, Shane Rosa MD, 15 mL at 03/27/23 1202      Objective   Vital Signs   Temp:  [97.5 °F (36.4 °C)-98.2 °F (36.8 °C)] 97.7 °F (36.5 °C)  Heart Rate:  [60-65] 60  Resp:  [14-20] 20  BP: (113-186)/(47-94) 121/65    Physical Exam:   General: awake, alert, NAD, very nice, lying in bed  Eyes: no scleral icterus  Cardiovascular: Normal rate, right sided temp pacer has been removed  Respiratory: normal work of breathing without wheezing  :  no Simms catheter  Neurological: Alert and oriented x 3  Psychiatric: Normal mood and affect     Labs:   Blood cultures reviewed today  Lab Results   Component Value Date    WBC 7.72 10/15/2024    HGB 8.6 (L) 10/15/2024    HCT 27.3 (L) 10/15/2024    MCV  88.6 10/15/2024     10/15/2024     Lab Results   Component Value Date    GLUCOSE 100 (H) 10/15/2024    CALCIUM 8.6 10/15/2024     10/15/2024    K 3.5 10/15/2024    CO2 23.7 10/15/2024     10/15/2024    BUN 13 10/15/2024    CREATININE 0.78 10/15/2024    EGFR 74.5 10/15/2024    BCR 16.7 10/15/2024    ANIONGAP 10.3 10/15/2024         Microbiology:  10/7 COVID: Negative  10/7 BCx: MSSA in 1/2 sets  10/9 MRSA nares PCR: Negative  10/9 BCx: Negative      ASSESSMENT/PLAN:  MSSA septicemia  Pacemaker present with concerns for infection -removed 10/14  Obesity BMI 33  Right upper lobe pneumonia    On 10/14/2024, she went to the Cath Lab with EP for pacemaker removal.  She tolerated the procedure well. She cleared her blood cultures quickly.  Her EPI did not show any evidence of valvular vegetations.  After > 72 hours, she had a Micra pacer placed on 10/17/24.     I recommend she be treated with cefazolin 2 g IV every 8 hours x 4 weeks with stop date 11/11/2024 at which time she will follow-up with me in the infectious diseases clinic.  I will order a single-lumen PICC line to be placed tomorrow.  She will need a weekly CBC with differential and creatinine faxed to me at [phone].

## 2024-10-17 NOTE — PLAN OF CARE
Goal Outcome Evaluation:  Pt back from cath lab around 1210  Rt groin site soft and dry-remained that all shift  Small amount old drg noted when pt  up to BRP/BSC to urinate and had a soft stool  No complaints of pain  PICC line placed  Xray shows good placement  Hoping to go home tomorrow  Safety maintained

## 2024-10-18 ENCOUNTER — READMISSION MANAGEMENT (OUTPATIENT)
Dept: CALL CENTER | Facility: HOSPITAL | Age: 84
End: 2024-10-18
Payer: MEDICARE

## 2024-10-18 VITALS
RESPIRATION RATE: 20 BRPM | WEIGHT: 197.09 LBS | HEART RATE: 92 BPM | BODY MASS INDEX: 33.83 KG/M2 | DIASTOLIC BLOOD PRESSURE: 85 MMHG | OXYGEN SATURATION: 95 % | SYSTOLIC BLOOD PRESSURE: 169 MMHG | TEMPERATURE: 99 F

## 2024-10-18 PROBLEM — J15.211: Status: RESOLVED | Noted: 2024-10-12 | Resolved: 2024-10-18

## 2024-10-18 PROCEDURE — 94664 DEMO&/EVAL PT USE INHALER: CPT

## 2024-10-18 PROCEDURE — 99024 POSTOP FOLLOW-UP VISIT: CPT

## 2024-10-18 PROCEDURE — 94761 N-INVAS EAR/PLS OXIMETRY MLT: CPT

## 2024-10-18 PROCEDURE — 94799 UNLISTED PULMONARY SVC/PX: CPT

## 2024-10-18 PROCEDURE — 25010000002 CEFAZOLIN PER 500 MG

## 2024-10-18 RX ADMIN — AMLODIPINE BESYLATE 5 MG: 5 TABLET ORAL at 09:07

## 2024-10-18 RX ADMIN — SERTRALINE 50 MG: 50 TABLET, FILM COATED ORAL at 09:07

## 2024-10-18 RX ADMIN — CARVEDILOL 25 MG: 25 TABLET, FILM COATED ORAL at 09:07

## 2024-10-18 RX ADMIN — Medication 10 ML: at 09:08

## 2024-10-18 RX ADMIN — PANTOPRAZOLE SODIUM 40 MG: 40 TABLET, DELAYED RELEASE ORAL at 09:07

## 2024-10-18 RX ADMIN — CEFAZOLIN 2000 MG: 2 INJECTION, POWDER, FOR SOLUTION INTRAMUSCULAR; INTRAVENOUS at 01:32

## 2024-10-18 RX ADMIN — ATORVASTATIN CALCIUM 40 MG: 20 TABLET, FILM COATED ORAL at 09:07

## 2024-10-18 RX ADMIN — BUDESONIDE AND FORMOTEROL FUMARATE DIHYDRATE 1 PUFF: 160; 4.5 AEROSOL RESPIRATORY (INHALATION) at 07:15

## 2024-10-18 RX ADMIN — MAGNESIUM OXIDE 400 MG (241.3 MG MAGNESIUM) TABLET 400 MG: TABLET at 09:08

## 2024-10-18 NOTE — DISCHARGE SUMMARY
Patient Name: Mallory Franco  : 1940  MRN: [id number]    Date of Admission: 10/12/2024  Date of Discharge:  10/18/2024  Primary Care Physician: Amauri Kearney MD      Chief Complaint:   No chief complaint on file.      Discharge Diagnoses     Active Hospital Problems    Diagnosis  POA    **Bacteremia due to methicillin susceptible Staphylococcus aureus (MSSA) [R78.81, B95.61]  Yes    COPD (chronic obstructive pulmonary disease) [J44.9]  Yes    AV block, complete [I44.2]  Yes    Encounter for examination for normal comparison and control in clinical research program [Z00.6]  Not Applicable    Coronary artery disease involving native coronary artery of native heart without angina pectoris [I25.10]  Yes    Presence of cardiac pacemaker [Z95.0]  Yes    Sick sinus syndrome [I49.5]  Yes    Essential hypertension [I10]  Yes      Resolved Hospital Problems    Diagnosis Date Resolved POA    Sepsis [A41.9] 10/14/2024 Yes    Pneumonia of right upper lobe due to methicillin susceptible Staphylococcus aureus (MSSA) [J15.211] 10/18/2024 Yes        Hospital Course     Ms. Franco is a 84 y.o. female with a history of pacemaker and CAD who presented to [facility] initially complaining of fevers and weakness.  Please see the discharge summary from that hospitalization completed 10/11 for full details.  She was ultimately found to have pneumonia secondary to MSSA and bacteremia.  Given the presence of her pacemaker she was recommended for transfer here for further evaluation.  She was seen by infectious disease and electrophysiology and they agreed with explantation of her pacemaker.  She was continued on cefazolin as initiated at the other facility.  Pacemaker was removed on 10/14 and temporary pacemaker was placed.  After 72 hours, new leadless pacemaker was implanted and patient tolerated that very well.  She has had no adverse events during her hospitalization and is stable for discharge home on IV  antibiotics through 11/11.  Infectious disease recommendations.  A PICC line is in place and arrangements are being made for discharge today.    Day of Discharge     Subjective:  No complaints.  She is doing well    Physical Exam:  Temp:  [98.2 °F (36.8 °C)-99 °F (37.2 °C)] 99 °F (37.2 °C)  Heart Rate:  [72-92] 92  Resp:  [18-20] 20  BP: (130-169)/(64-85) 169/85  Body mass index is 33.83 kg/m².  Physical Exam  Vitals reviewed.   Constitutional:       General: She is not in acute distress.     Appearance: She is obese.   Cardiovascular:      Rate and Rhythm: Normal rate and regular rhythm.      Heart sounds: No murmur heard.  Pulmonary:      Effort: No respiratory distress.   Abdominal:      General: There is no distension.      Palpations: Abdomen is soft.      Tenderness: There is no abdominal tenderness.   Skin:     General: Skin is warm and dry.   Neurological:      Mental Status: She is alert and oriented to person, place, and time.   Psychiatric:         Mood and Affect: Mood normal.         Consultants     Consult Orders (all) (From admission, onward)       Start     Ordered    10/18/24 0800  IV TEAM Consult - Indicate Reason for Consult  Once        Comments: For 5 weeks of discharge antibiotics   Provider:  (Not yet assigned)    10/17/24 1259    10/17/24 1227  IV TEAM Consult - Indicate Reason for Consult  Once,   Status:  Canceled        Comments: For 5 weeks of discharge antibiotics   Provider:  (Not yet assigned)    10/17/24 1227    10/12/24 0702  Inpatient Infectious Diseases Consult  IN AM        Specialty:  Infectious Diseases  Provider:  Man Whitten MD    10/12/24 0338    10/12/24 0702  Inpatient Cardiology Consult  IN AM        Specialty:  Cardiology  Provider:  Kyle Valle MD    10/12/24 0338                  Procedures     Implant or Replacement of Single Chamber Leadless Pacemaker, RV Only  medt micra    Imaging Results (All)       Procedure Component Value Units Date/Time     XR Chest Post CVA Port [[id number]] Collected: 10/17/24 1741     Updated: 10/17/24 1749    Narrative:      XR CHEST POST CVA PORT-     INDICATIONS: PICC placement.     TECHNIQUE: FRONTAL VIEW OF THE CHEST     COMPARISON: 10/14/2024     FINDINGS:     Left PICC extends to the superior cava. Heart is mildly enlarged. Aorta  is tortuous, calcified. Pulmonary vasculature is unremarkable. Opacity  at right mid to upper lung appears similar to the prior exam. No large  pleural effusion. No pneumothorax. Otherwise stable.       Impression:         As described.           This report was finalized on 10/17/2024 5:46 PM by Dr. Ruel Campbell M.D on Workstation: [id number]       XR Chest 1 View [[id number]] Collected: 10/14/24 1244     Updated: 10/14/24 1248    Narrative:      XR CHEST 1 VW-     HISTORY: Female who is 84 years-old, pacemaker placement     TECHNIQUE: Frontal view of the chest     COMPARISON: 10/12/2024     FINDINGS: Previous left-sided pacemaker and cardiac leads are no longer  seen. A new right-sided gender device and cardiac lead are seen. The  heart size is borderline. Aorta is calcified. Pulmonary vasculature is  unremarkable. Persistent opacity is seen in the right midlung, slightly  increased. No pleural effusion, or pneumothorax. No acute osseous  process.       Impression:      As described.     This report was finalized on 10/14/2024 12:45 PM by Dr. Ruel Campbell M.D on Workstation: [id number]       XR Chest PA & Lateral [[id number]] Collected: 10/12/24 1116     Updated: 10/12/24 1120    Narrative:      XR CHEST PA AND LATERAL-     Clinical: Follow-up pneumonia     COMPARISON examination 10/7/2024     FINDINGS: The cardiomediastinal silhouette is stable. Transvenous  pacemaker in position as before. The left lung is clear. Right upper  lobe consolidation is similar to the previous examination. No new area  of airspace disease has developed. No vascular congestion or pleural  effusion.    "  CONCLUSION: Stable chest     This report was finalized on 10/12/2024 11:17 AM by Dr. Roger Abraham M.D on Workstation: [id number]               Results for orders placed during the hospital encounter of 10/07/24    Adult Transesophageal Echo (EPI) W/ Cont if Necessary Per Protocol    Interpretation Summary  Normal left ventricular systolic function.  Pacemaker lead in the right ventricle.  No evidence of any vegetation.  Mild mitral regurgitation.    Pertinent Labs     Results from last 7 days   Lab Units 10/15/24  0339 10/13/24  0337 10/12/24  0404   WBC 10*3/mm3 7.72 9.00 7.81   HEMOGLOBIN g/dL 8.6* 9.7* 8.4*   PLATELETS 10*3/mm3 320 336 285     Results from last 7 days   Lab Units 10/15/24  0339 10/13/24  0338 10/12/24  0404   SODIUM mmol/L 137 139 139   POTASSIUM mmol/L 3.5 3.9 3.4*   CHLORIDE mmol/L 103 101 104   CO2 mmol/L 23.7 25.2 26.1   BUN mg/dL 13 14 13   CREATININE mg/dL 0.78 0.88 0.84   GLUCOSE mg/dL 100* 93 111*   EGFR mL/min/1.73 74.5 64.5 68.2     Results from last 7 days   Lab Units 10/13/24  0338 10/12/24  0404   ALBUMIN g/dL 3.3* 2.8*   BILIRUBIN mg/dL 0.3 0.4   ALK PHOS U/L 111 101   AST (SGOT) U/L 44* 40*   ALT (SGPT) U/L 12 17     Results from last 7 days   Lab Units 10/15/24  0339 10/13/24  0338 10/12/24  0404   CALCIUM mg/dL 8.6 9.4 8.7   ALBUMIN g/dL  --  3.3* 2.8*               Invalid input(s): \"LDLCALC\"          Test Results Pending at Discharge     Pending Results       None              Discharge Details        Discharge Medications        New Medications        Instructions Start Date   ceFAZolin 2000 mg IVPB in 100 mL NS (MBP)  Replaces: ceFAZolin 2000 mg IVPB in 100 mL NS (VTB)   2,000 mg, Intravenous, Every 8 Hours             Continue These Medications        Instructions Start Date   albuterol sulfate  (90 Base) MCG/ACT inhaler  Commonly known as: PROVENTIL HFA;VENTOLIN HFA;PROAIR HFA   2 puffs, Inhalation, Every 4 Hours PRN      amLODIPine 5 MG tablet  Commonly known " as: NORVASC   1 tablet, Daily      aspirin 81 MG EC tablet   81 mg, Daily      atorvastatin 40 MG tablet  Commonly known as: LIPITOR   40 mg, Daily      Breo Ellipta 100-25 MCG/ACT aerosol powder   Generic drug: Fluticasone Furoate-Vilanterol   1 puff, Daily      carvedilol 25 MG tablet  Commonly known as: COREG   25 mg, Oral, 2 Times Daily With Meals      famotidine 40 MG tablet  Commonly known as: PEPCID   40 mg, Oral, Every Night at Bedtime      fluticasone 50 MCG/ACT nasal spray  Commonly known as: FLONASE   1 spray, Nasal, Every Morning      hydroCHLOROthiazide 25 MG tablet   25 mg, Daily      magnesium oxide 400 tablet tablet  Commonly known as: MAG-OX   400 mg, Oral, Daily      meclizine 25 MG tablet  Commonly known as: ANTIVERT   25 mg, Oral, 2 Times Daily PRN      montelukast 10 MG tablet  Commonly known as: SINGULAIR   10 mg, Nightly      pantoprazole 40 MG EC tablet  Commonly known as: PROTONIX   40 mg, Daily      polyethylene glycol 17 g packet  Commonly known as: MIRALAX   17 g, Oral, Daily PRN      rOPINIRole 0.25 MG tablet  Commonly known as: REQUIP   0.25 mg, Every Night at Bedtime      sennosides-docusate 8.6-50 MG per tablet  Commonly known as: PERICOLACE   1 tablet, Oral, 2 Times Daily PRN      sertraline 100 MG tablet  Commonly known as: ZOLOFT   50 mg, Daily             Stop These Medications      ceFAZolin 2000 mg IVPB in 100 mL NS (VTB)  Replaced by: ceFAZolin 2000 mg IVPB in 100 mL NS (MBP)     heparin (porcine) 5000 UNIT/ML injection              Allergies   Allergen Reactions    Codeine Nausea And Vomiting    Contrast Dye (Echo Or Unknown Ct/Mr) Hives    Morphine Nausea And Vomiting       Discharge Disposition:  Home-Health Care Svc      Discharge Diet:  Diet Order   Procedures    Diet: Regular/House; Fluid Consistency: Thin (IDDSI 0)       Discharge Activity:   Activity Instructions    As tolerated           CODE STATUS:    Code Status and Medical Interventions: CPR (Attempt to  Resuscitate); Full Support   Ordered at: 10/12/24 0338     Code Status (Patient has no pulse and is not breathing):    CPR (Attempt to Resuscitate)     Medical Interventions (Patient has pulse or is breathing):    Full Support       Future Appointments   Date Time Provider Department Center   11/11/2024  1:50 PM Man Whitten MD [facility]   1/10/2025 10:15 AM Migue Nguyen MD [facility]      Contact information for follow-up providers       Johan Escudero MD Follow up on 10/28/2024.    Specialty: Family Medicine  Why: Appointment scheduled for October 28 at 2:40pm  Contact information:  [address] [place] [address] [phone]                       Contact information for after-discharge care       Home Medical Care       [facility] .    Services: Home Health Services, Home Nursing  Contact information:  [address]  [facility] [id number] [phone]                                   Time Spent on Discharge:  Greater than 30 minutes      Pedro Mckeon MD  [facility]  10/18/24  12:29 EDT

## 2024-10-18 NOTE — CASE MANAGEMENT/SOCIAL WORK
Case Management Discharge Note      Final Note: Home with [facility] to provide IVAB, [facility] home health, [employer] to provide rollator, family to transport home.    Provided Post Acute Provider List?: Yes  Post Acute Provider List: Home Health  Delivered To: Patient, Support Person  Method of Delivery: In person    Selected Continued Care - Discharged on 10/18/2024 Admission date: 10/12/2024 - Discharge disposition: Home-Health Care Svc      Destination    No services have been selected for the patient.                Durable Medical Equipment Coordination complete.      Service Provider Services Address Phone Fax Patient Preferred    [facility] [address] [facility] [address] [phone] --              Dialysis/Infusion Coordination complete.      Service Provider Services Address Phone Fax Patient Preferred    [facility] Infusion and IV Therapy [address] [address] [address] [phone] --              Home Medical Care Coordination complete.      Service Provider Services Address Phone Fax Patient Preferred    [facility], Home Nursing [address] [address] [phone] --              Therapy    No services have been selected for the patient.                Community [facility]    No services have been selected for the patient.                [facility]    No services have been selected for the patient.                    Transportation Services  Private: Car    Final Discharge Disposition Code: 06 - home with home health care

## 2024-10-18 NOTE — CASE MANAGEMENT/SOCIAL WORK
Continued Stay Note  [facility]     Patient Name: Mallory Franco  MRN: [id number]  Today's Date: 10/18/2024    Admit Date: 10/12/2024    Plan: DC home with [facility] to provide IVAB, [facility] home [facility], family to transport   Discharge Plan       Row Name 10/18/24 0911       Plan    Plan DC home with [facility] to provide IVAB, [facility] home [facility], family to transport    Plan Comments CCP contacted che RAYGOZA/Agnes Gruber and informed of discharge. Che will bring IVAB, and teach pt and family how to administer IVAB prior to dc. Contacted Diana BLANCAS/Iris and informed that pt will be dc this day. Pt [facility]                   Discharge Codes    No documentation.                 Expected Discharge Date and Time       Expected Discharge Date Expected Discharge Time    Oct 18, 2024               Yajaira Madrigal RN

## 2024-10-18 NOTE — PROGRESS NOTES
Electrophysiology Follow-Up Note      Patient Name: Mallory Franco  Age/Sex: 84 y.o. female  : 1940  MRN: [id number]      Day of Service: 10/18/24       Chief Complaint/Follow-up: MSSA, CIED infection     S: doing well this morning, no complaints. Ready to go home.     Temp:  [97.6 °F (36.4 °C)-99 °F (37.2 °C)] 99 °F (37.2 °C)  Heart Rate:  [60-92] 92  Resp:  [14-20] 20  BP: (121-186)/(55-85) 169/85     PHYSICAL EXAM:    General Appearance: No acute distress, well developed and well nourished.   Eyes: Conjunctiva and lids: No erythema, swelling, or discharge. Sclera non-icteric.   HENT: Atraumatic, normocephalic. External eyes, ears, and nose normal.   Respiratory: No signs of respiratory distress. Respiration rhythm and depth normal.   Clear to auscultation. No rales, crackles, rhonchi, or wheezing auscultated.   Cardiovascular:  Heart Rate and Rhythm: Normal, Heart Sounds: Normal S1 and S2. No S3 or S4 noted.  Gastrointestinal:  Abdomen soft, non-distended, non-tender.  Musculoskeletal: Normal movement of extremities  Skin: Warm and dry.   Psychiatric: Patient alert and oriented to person, place, and time. Speech and behavior appropriate. Normal mood and affect.        Results from last 7 days   Lab Units 10/15/24  0339 10/13/24  0338 10/12/24  0404   SODIUM mmol/L 137 139 139   POTASSIUM mmol/L 3.5 3.9 3.4*   CHLORIDE mmol/L 103 101 104   CO2 mmol/L 23.7 25.2 26.1   BUN mg/dL 13 14 13   CREATININE mg/dL 0.78 0.88 0.84   GLUCOSE mg/dL 100* 93 111*   CALCIUM mg/dL 8.6 9.4 8.7     Results from last 7 days   Lab Units 10/15/24  0339 10/13/24  0337 10/12/24  0404   WBC 10*3/mm3 7.72 9.00 7.81   HEMOGLOBIN g/dL 8.6* 9.7* 8.4*   HEMATOCRIT % 27.3* 29.0* 26.8*   PLATELETS 10*3/mm3 320 990 788                       Current Medications:   Scheduled Meds:amLODIPine, 5 mg, Oral, Daily  atorvastatin, 40 mg, Oral, Daily  budesonide-formoterol, 1 puff, Inhalation, BID - RT  carvedilol, 25 mg, Oral, BID With  Meals  ceFAZolin, 2,000 mg, Intravenous, Q8H  magnesium oxide, 400 mg, Oral, Daily  montelukast, 10 mg, Oral, Nightly  pantoprazole, 40 mg, Oral, Daily  rOPINIRole, 0.25 mg, Oral, Nightly  sertraline, 50 mg, Oral, Daily  sodium chloride, 10 mL, Intravenous, Q12H  sodium chloride, 10 mL, Intravenous, Q12H            Bacteremia due to methicillin susceptible Staphylococcus aureus (MSSA)    AV block, complete    Encounter for examination for normal comparison and control in clinical research program    Essential hypertension    Sick sinus syndrome    Presence of cardiac pacemaker    Coronary artery disease involving native coronary artery of native heart without angina pectoris    COPD (chronic obstructive pulmonary disease)       Plan:   She is status post pacing system extraction and reimplant of micra leadless pcemaker (10/17). This morning she is doing well. Incisions without complication. Normal device testing and function.     From our standpoint she can go home.     Will set up for device check in one week.       YOVANA Bhatia  10/18/24  08:45 EDT

## 2024-10-18 NOTE — DISCHARGE INSTR - OTHER ORDERS
[facility] WILL BE DELIVERING YOUR ANTIBIOTICS. IF YOU HAVE ANY QUESTIONS OR CONCERNS, THEIR PHONE NUMBER -[phone].    [facility] WILL BE PROVIDING HOME HEALTH SERVICES. THEIR PHONE NUMBER -[phone].

## 2024-10-18 NOTE — PLAN OF CARE
Goal Outcome Evaluation:           Progress: improving  Outcome Evaluation: VSS. A/O x4. On Room air. R groin site remained c/d/i. IV Cefazolin given. Pt appeared to have slept in between care & had no c/o pain or discomfort through shift. Pt is hopeful for D/C soon. Safety maintained. Will CTM.

## 2024-10-19 NOTE — OUTREACH NOTE
Prep Survey      Flowsheet Row Responses   [facility] [place]   Is LACE score < 7 ? No   Eligibility Readm Mgmt   Discharge diagnosis Bacteremia due to methicillin susceptible Staphylococcus aureus (MSSA)   Does the patient have one of the following disease processes/diagnoses(primary or secondary)? Sepsis   Does the patient have Home health ordered? Yes   What is the Home health agency?  [facility]   Is there a DME ordered? Yes   What DME was ordered? [facility]- IV abx   Medication alerts for this patient IV ABX   Prep survey completed? Yes            Chelo TREJO - Registered Nurse

## 2024-10-22 ENCOUNTER — READMISSION MANAGEMENT (OUTPATIENT)
Dept: CALL CENTER | Facility: HOSPITAL | Age: 84
End: 2024-10-22
Payer: MEDICARE

## 2024-10-22 ENCOUNTER — LAB REQUISITION (OUTPATIENT)
Dept: LAB | Facility: HOSPITAL | Age: 84
End: 2024-10-22
Payer: MEDICARE

## 2024-10-22 DIAGNOSIS — A41.9 SEPSIS, UNSPECIFIED ORGANISM: ICD-10-CM

## 2024-10-22 LAB
BASOPHILS # BLD AUTO: 0.03 10*3/MM3 (ref 0–0.2)
BASOPHILS NFR BLD AUTO: 0.4 % (ref 0–1.5)
CREAT SERPL-MCNC: 0.97 MG/DL (ref 0.57–1)
DEPRECATED RDW RBC AUTO: 49.7 FL (ref 37–54)
EGFRCR SERPLBLD CKD-EPI 2021: 57.4 ML/MIN/1.73
EOSINOPHIL # BLD AUTO: 0.27 10*3/MM3 (ref 0–0.4)
EOSINOPHIL NFR BLD AUTO: 4 % (ref 0.3–6.2)
ERYTHROCYTE [DISTWIDTH] IN BLOOD BY AUTOMATED COUNT: 15.4 % (ref 12.3–15.4)
HCT VFR BLD AUTO: 27.5 % (ref 34–46.6)
HGB BLD-MCNC: 8.5 G/DL (ref 12–15.9)
IMM GRANULOCYTES # BLD AUTO: 0.02 10*3/MM3 (ref 0–0.05)
IMM GRANULOCYTES NFR BLD AUTO: 0.3 % (ref 0–0.5)
LYMPHOCYTES # BLD AUTO: 2.06 10*3/MM3 (ref 0.7–3.1)
LYMPHOCYTES NFR BLD AUTO: 30.7 % (ref 19.6–45.3)
MCH RBC QN AUTO: 28 PG (ref 26.6–33)
MCHC RBC AUTO-ENTMCNC: 30.9 G/DL (ref 31.5–35.7)
MCV RBC AUTO: 90.5 FL (ref 79–97)
MONOCYTES # BLD AUTO: 0.65 10*3/MM3 (ref 0.1–0.9)
MONOCYTES NFR BLD AUTO: 9.7 % (ref 5–12)
NEUTROPHILS NFR BLD AUTO: 3.69 10*3/MM3 (ref 1.7–7)
NEUTROPHILS NFR BLD AUTO: 54.9 % (ref 42.7–76)
NRBC BLD AUTO-RTO: 0 /100 WBC (ref 0–0.2)
PLATELET # BLD AUTO: 293 10*3/MM3 (ref 140–450)
PMV BLD AUTO: 10.4 FL (ref 6–12)
RBC # BLD AUTO: 3.04 10*6/MM3 (ref 3.77–5.28)
WBC NRBC COR # BLD AUTO: 6.72 10*3/MM3 (ref 3.4–10.8)

## 2024-10-22 PROCEDURE — 85025 COMPLETE CBC W/AUTO DIFF WBC: CPT | Performed by: FAMILY MEDICINE

## 2024-10-22 PROCEDURE — 82565 ASSAY OF CREATININE: CPT | Performed by: FAMILY MEDICINE

## 2024-10-22 NOTE — OUTREACH NOTE
Sepsis Week 1 Survey      Flowsheet Row Responses   [facility] patient discharged from? [place]   Does the patient have one of the following disease processes/diagnoses(primary or secondary)? Sepsis   Week 1 attempt successful? Yes   Call start time 0905   Call end time 0908   Discharge diagnosis Bacteremia due to methicillin susceptible Staphylococcus aureus (MSSA)   Person spoke with today (if not patient) and relationship DIL   Medication alerts for this patient IV ABX   Meds reviewed with patient/caregiver? Yes   Is the patient having any side effects they believe may be caused by any medication additions or changes? No   Does the patient have all medications related to this admission filled (includes all antibiotics, inhalers, nebulizers,steroids,etc.) Yes   Is the patient taking all medications as directed (includes completed medication regime)? Yes   Does the patient have a primary care provider?  Yes   Does the patient have an appointment with their PCP within 7 days of discharge? Yes   Has the patient kept scheduled appointments due by today? N/A   What is the Home health agency?  [facility]   Has home health visited the patient within 72 hours of discharge? Yes   Psychosocial issues? No   Did the patient receive a copy of their discharge instructions? Yes   Nursing interventions Reviewed instructions with patient   What is the patient's perception of their health status since discharge? Improving   Nursing interventions Nurse provided patient education   Is the patient/caregiver able to teach back TIME? T emperature - higher or lower than normal, I nfection - may have signs and symptoms of an infection, M ental Decline - confused, sleepy, difficult to arouse, E xtremely Ill - severe pain, discomfort, shortness of breath   Nursing interventions Nurse provided patient education   Is patient/caregiver able to teach back steps to recovery at home? Set small, achievable  goals for return to baseline health, Eat a balanced diet   Is the patient/caregiver able to teach back signs and symptoms of worsening condition: Fever, Rapid heart rate (>90), Shortness of breath/rapid respiratory rate, Hyperthermia, Altered mental status(confusion/coma)   Is the patient/caregiver able to teach back the hierarchy of who to call/visit for symptoms/problems? PCP, Specialist, Home health nurse, Urgent Care, ED, 911 Yes   Week 1 call completed? Yes   Wrap up additional comments DIL reports HH is in home at this time. Reports Pt is improving. No needs at this time.   Call end time 0908            JACQUELINE GOSS - Registered Nurse

## 2024-10-24 ENCOUNTER — CLINICAL SUPPORT NO REQUIREMENTS (OUTPATIENT)
Age: 84
End: 2024-10-24
Payer: MEDICARE

## 2024-10-24 DIAGNOSIS — I49.5 SICK SINUS SYNDROME: Primary | ICD-10-CM

## 2024-10-29 ENCOUNTER — LAB REQUISITION (OUTPATIENT)
Dept: LAB | Facility: HOSPITAL | Age: 84
End: 2024-10-29
Payer: MEDICARE

## 2024-10-29 ENCOUNTER — READMISSION MANAGEMENT (OUTPATIENT)
Dept: CALL CENTER | Facility: HOSPITAL | Age: 84
End: 2024-10-29
Payer: MEDICARE

## 2024-10-29 DIAGNOSIS — B95.61 METHICILLIN SUSCEPTIBLE STAPHYLOCOCCUS AUREUS INFECTION AS THE CAUSE OF DISEASES CLASSIFIED ELSEWHERE: ICD-10-CM

## 2024-10-29 DIAGNOSIS — R78.81 BACTEREMIA: ICD-10-CM

## 2024-10-29 LAB
BASOPHILS # BLD AUTO: 0.01 10*3/MM3 (ref 0–0.2)
BASOPHILS NFR BLD AUTO: 0.2 % (ref 0–1.5)
CREAT SERPL-MCNC: 0.95 MG/DL (ref 0.57–1)
DEPRECATED RDW RBC AUTO: 52 FL (ref 37–54)
EGFRCR SERPLBLD CKD-EPI 2021: 58.8 ML/MIN/1.73
EOSINOPHIL # BLD AUTO: 0.23 10*3/MM3 (ref 0–0.4)
EOSINOPHIL NFR BLD AUTO: 3.8 % (ref 0.3–6.2)
ERYTHROCYTE [DISTWIDTH] IN BLOOD BY AUTOMATED COUNT: 15.9 % (ref 12.3–15.4)
HCT VFR BLD AUTO: 27.2 % (ref 34–46.6)
HGB BLD-MCNC: 8.1 G/DL (ref 12–15.9)
IMM GRANULOCYTES # BLD AUTO: 0.02 10*3/MM3 (ref 0–0.05)
IMM GRANULOCYTES NFR BLD AUTO: 0.3 % (ref 0–0.5)
LYMPHOCYTES # BLD AUTO: 1.95 10*3/MM3 (ref 0.7–3.1)
LYMPHOCYTES NFR BLD AUTO: 32.5 % (ref 19.6–45.3)
MCH RBC QN AUTO: 27.2 PG (ref 26.6–33)
MCHC RBC AUTO-ENTMCNC: 29.8 G/DL (ref 31.5–35.7)
MCV RBC AUTO: 91.3 FL (ref 79–97)
MONOCYTES # BLD AUTO: 0.62 10*3/MM3 (ref 0.1–0.9)
MONOCYTES NFR BLD AUTO: 10.3 % (ref 5–12)
NEUTROPHILS NFR BLD AUTO: 3.17 10*3/MM3 (ref 1.7–7)
NEUTROPHILS NFR BLD AUTO: 52.9 % (ref 42.7–76)
NRBC BLD AUTO-RTO: 0 /100 WBC (ref 0–0.2)
PLATELET # BLD AUTO: 260 10*3/MM3 (ref 140–450)
PMV BLD AUTO: 10.3 FL (ref 6–12)
RBC # BLD AUTO: 2.98 10*6/MM3 (ref 3.77–5.28)
WBC NRBC COR # BLD AUTO: 6 10*3/MM3 (ref 3.4–10.8)

## 2024-10-29 PROCEDURE — 85025 COMPLETE CBC W/AUTO DIFF WBC: CPT | Performed by: FAMILY MEDICINE

## 2024-10-29 PROCEDURE — 82565 ASSAY OF CREATININE: CPT | Performed by: FAMILY MEDICINE

## 2024-10-29 NOTE — OUTREACH NOTE
Sepsis Week 2 Survey      Flowsheet Row Responses   [facility] patient discharged from? [place]   Does the patient have one of the following disease processes/diagnoses(primary or secondary)? Sepsis   Week 2 attempt successful? Yes   Call start time 1445   Call end time 1452   Discharge diagnosis Bacteremia due to methicillin susceptible Staphylococcus aureus (MSSA)   Medication alerts for this patient IV ABX continues   Meds reviewed with patient/caregiver? Yes   Is the patient having any side effects they believe may be caused by any medication additions or changes? No   Does the patient have all medications related to this admission filled (includes all antibiotics, inhalers, nebulizers,steroids,etc.) Yes   Is the patient taking all medications as directed (includes completed medication regime)? Yes   Comments regarding appointments Reviewed f/u's on pt appt schedule   Does the patient have a primary care provider?  Yes   Comments regarding PCP Pt had a f/u with PCP on 10/28/24   Does the patient have an appointment with their PCP within 7 days of discharge? Greater than 7 days   Nursing Interventions Verified appointment date/time/provider   Has the patient kept scheduled appointments due by today? Yes   Comments ID MD on 11/11/24, Cardio 11/27/24   What is the Home health agency?  [facility]   Has home health visited the patient within 72 hours of discharge? Yes   Psychosocial issues? No   Did the patient receive a copy of their discharge instructions? Yes   Nursing interventions Reviewed instructions with patient   What is the patient's perception of their health status since discharge? Improving  [Improved--denies fever, reports itching and still some soreness to site.  HH following to assist pt]   Nursing interventions Nurse provided patient education   Is the patient/caregiver able to teach back TIME? T emperature - higher or lower than normal, I nfection - may have signs and  symptoms of an infection   Nursing interventions Nurse provided patient education   Is the patient/caregiver able to teach back the hierarchy of who to call/visit for symptoms/problems? PCP, Specialist, Home health nurse, Urgent Care, ED, 911 Yes   Week 2 call completed? Yes   Graduated Yes  [No immediate needs, appts in place]   Call end time [phone]            LARY MESSINA - Registered Nurse

## 2024-10-29 NOTE — PROGRESS NOTES
"Enter Query Response Below      Query Response: infected cardiac implantable device was ruled in, sepsis due to pneumonia             If applicable, please update the problem list.       Patient: Mallory Franco        : 1940  Account: [id number]           Admit Date: 10/12/2024        How to Respond to this query:       a. Click New Note     b. Answer query within the yellow box.                c. Update the Problem List, if applicable.      If you have any questions about this query contact me at: [email or web address]     Dr. Mckeon,    Patient presented on 10/12 with MSSA sepsis and MSSA pneumonia.  Patient was treated with IV antibiotics with plan to continue IV antibiotics through .  Cardiology consultation dated 10/14 includes, \"We discussed possible pacemaker involvement and recommended considering extraction to clear infections.\"  Discharge summary includes:  \"She was seen by infectious disease and electrophysiology and they agreed with explantation  of her pacemaker.  She was continued on cefazolin as initiated at the other facility.  Pacemaker was removed on 10/14 and temporary pacemaker was placed.  After 72 hours, new leadless pacemaker was implanted and patient tolerated that very well.\"    Please clarify the following:    - infected cardiac implantable device was ruled in, sepsis due to pneumonia  - infected cardiac implantable device was ruled out, sepsis due to pneumonia  - other _________________________  - unable to determine    By submitting this query, we are merely seeking further clarification of documentation to accurately reflect all conditions that you are monitoring, evaluating, treating or that extend the hospitalization or utilize additional resources of care. Please utilize your independent clinical judgment when addressing the question(s) above.     This query and your response, once completed, will be entered into the legal medical record.    Sincerely,  Marion FOLEY" Willian RN CCDS CCS  Clinical Documentation Integrity Program

## 2024-10-30 LAB
QT INTERVAL: 431 MS
QTC INTERVAL: 470 MS

## 2024-11-05 ENCOUNTER — LAB REQUISITION (OUTPATIENT)
Dept: LAB | Facility: HOSPITAL | Age: 84
End: 2024-11-05
Payer: MEDICARE

## 2024-11-05 DIAGNOSIS — B95.61 METHICILLIN SUSCEPTIBLE STAPHYLOCOCCUS AUREUS INFECTION AS THE CAUSE OF DISEASES CLASSIFIED ELSEWHERE: ICD-10-CM

## 2024-11-05 DIAGNOSIS — I25.10 ATHEROSCLEROTIC HEART DISEASE OF NATIVE CORONARY ARTERY WITHOUT ANGINA PECTORIS: ICD-10-CM

## 2024-11-05 DIAGNOSIS — J44.0 CHRONIC OBSTRUCTIVE PULMONARY DISEASE WITH (ACUTE) LOWER RESPIRATORY INFECTION: ICD-10-CM

## 2024-11-05 DIAGNOSIS — J18.9 PNEUMONIA, UNSPECIFIED ORGANISM: ICD-10-CM

## 2024-11-05 DIAGNOSIS — Z51.A ENCOUNTER FOR SEPSIS AFTERCARE: ICD-10-CM

## 2024-11-05 LAB
BASOPHILS # BLD AUTO: 0.02 10*3/MM3 (ref 0–0.2)
BASOPHILS NFR BLD AUTO: 0.3 % (ref 0–1.5)
CREAT SERPL-MCNC: 1 MG/DL (ref 0.57–1)
DEPRECATED RDW RBC AUTO: 55.8 FL (ref 37–54)
EGFRCR SERPLBLD CKD-EPI 2021: 55.3 ML/MIN/1.73
EOSINOPHIL # BLD AUTO: 0.17 10*3/MM3 (ref 0–0.4)
EOSINOPHIL NFR BLD AUTO: 2.3 % (ref 0.3–6.2)
ERYTHROCYTE [DISTWIDTH] IN BLOOD BY AUTOMATED COUNT: 16.3 % (ref 12.3–15.4)
HCT VFR BLD AUTO: 28.1 % (ref 34–46.6)
HGB BLD-MCNC: 8.4 G/DL (ref 12–15.9)
IMM GRANULOCYTES # BLD AUTO: 0.03 10*3/MM3 (ref 0–0.05)
IMM GRANULOCYTES NFR BLD AUTO: 0.4 % (ref 0–0.5)
LYMPHOCYTES # BLD AUTO: 2.08 10*3/MM3 (ref 0.7–3.1)
LYMPHOCYTES NFR BLD AUTO: 28.3 % (ref 19.6–45.3)
MCH RBC QN AUTO: 27.8 PG (ref 26.6–33)
MCHC RBC AUTO-ENTMCNC: 29.9 G/DL (ref 31.5–35.7)
MCV RBC AUTO: 93 FL (ref 79–97)
MONOCYTES # BLD AUTO: 0.85 10*3/MM3 (ref 0.1–0.9)
MONOCYTES NFR BLD AUTO: 11.5 % (ref 5–12)
NEUTROPHILS NFR BLD AUTO: 4.21 10*3/MM3 (ref 1.7–7)
NEUTROPHILS NFR BLD AUTO: 57.2 % (ref 42.7–76)
NRBC BLD AUTO-RTO: 0 /100 WBC (ref 0–0.2)
PLATELET # BLD AUTO: 292 10*3/MM3 (ref 140–450)
PMV BLD AUTO: 10.2 FL (ref 6–12)
RBC # BLD AUTO: 3.02 10*6/MM3 (ref 3.77–5.28)
WBC NRBC COR # BLD AUTO: 7.36 10*3/MM3 (ref 3.4–10.8)

## 2024-11-05 PROCEDURE — 82565 ASSAY OF CREATININE: CPT | Performed by: FAMILY MEDICINE

## 2024-11-05 PROCEDURE — 85025 COMPLETE CBC W/AUTO DIFF WBC: CPT | Performed by: FAMILY MEDICINE

## 2024-11-11 ENCOUNTER — OFFICE VISIT (OUTPATIENT)
Dept: INFECTIOUS DISEASES | Facility: CLINIC | Age: 84
End: 2024-11-11
Payer: MEDICARE

## 2024-11-11 VITALS
BODY MASS INDEX: 32.3 KG/M2 | RESPIRATION RATE: 16 BRPM | SYSTOLIC BLOOD PRESSURE: 156 MMHG | HEIGHT: 64 IN | TEMPERATURE: 98.4 F | OXYGEN SATURATION: 96 % | DIASTOLIC BLOOD PRESSURE: 77 MMHG | WEIGHT: 189.2 LBS | HEART RATE: 53 BPM

## 2024-11-11 DIAGNOSIS — T82.7XXD PACEMAKER INFECTION, SUBSEQUENT ENCOUNTER: ICD-10-CM

## 2024-11-11 DIAGNOSIS — A41.01 MSSA (METHICILLIN SUSCEPTIBLE STAPHYLOCOCCUS AUREUS) SEPTICEMIA: Primary | ICD-10-CM

## 2024-11-11 DIAGNOSIS — Z79.2 LONG TERM (CURRENT) USE OF ANTIBIOTICS: ICD-10-CM

## 2024-11-11 PROCEDURE — 3078F DIAST BP <80 MM HG: CPT | Performed by: INTERNAL MEDICINE

## 2024-11-11 PROCEDURE — 3077F SYST BP >= 140 MM HG: CPT | Performed by: INTERNAL MEDICINE

## 2024-11-11 PROCEDURE — 99214 OFFICE O/P EST MOD 30 MIN: CPT | Performed by: INTERNAL MEDICINE

## 2024-11-11 NOTE — PROGRESS NOTES
ID CLINIC NOTE    CC: f/u MSSA bacteremia with PPM present     HPI: Mallory Franco is a 84 y.o. female here for f/u MSSA bacteremia with PPM present.  I saw her in the hospital for this problem from 10/12 - 10/17/2024.  During that admission on 10/14/2024, she went to the Cath Lab with EP for pacemaker removal.  She tolerated the procedure well. She cleared her blood cultures quickly.  Her EPI did not show any evidence of valvular vegetations.  After > 72 hours, she had a Micra pacer placed on 10/17/24.  I recommended that she be treated with cefazolin 2 g IV every 8 hours with stop date of today.    She says she is tolerating cefazolin without any side effects.  Previous pacemaker site is healing nicely.  No issues with her PICC line.  She has follow-up with cardiology/EP in about 2-1/2 weeks.     Past Medical History:   Diagnosis Date    Arthritis     Asthma     BPPV (benign paroxysmal positional vertigo), right     COPD (chronic obstructive pulmonary disease)     Coronary artery disease     Coronary artery disease     Diverticulitis     Ear itching     HX    Enlarged heart     Gastroesophageal reflux disease     Hyperlipidemia     Hypertension     Knee pain     Macular degeneration     PONV (postoperative nausea and vomiting)     Postural dizziness with presyncope 03/16/2023    Shingles     X2    SOB (shortness of breath) on exertion     Tinnitus of left ear     Urinary frequency     Vertigo        Past Surgical History:   Procedure Laterality Date    BREAST BIOPSY Bilateral     BRONCHOSCOPY Right 11/28/2023    Procedure: BRONCHOSCOPY WITH BIOPSIES, BRUSHINGS, AND BRONCHOALVEOLAR LAVAGE;  Surgeon: Aleksandr Houston DO;  Location: [facility] END[facility];  Service: Pulmonary;  Laterality: Right;  RIGHT UPPER LOBE ENDOBRONCHIAL OCCLUSION    CARDIAC CATHETERIZATION      CARDIAC ELECTROPHYSIOLOGY PROCEDURE N/A 3/27/2023    Procedure: Pacemaker SC new-[employer], call Dr. Rosa first thing in the morning to get  time for the procedure and notify rep;  Surgeon: Shane Rosa MD;  Location: [facility] CATH INVASIVE LOCATION;  Service: Cardiovascular;  Laterality: N/A;    CARDIAC ELECTROPHYSIOLOGY PROCEDURE N/A 10/14/2024    Procedure: Temporary Pacemaker;  Surgeon: Abilio Majano MD;  Location: [facility] CATH INVASIVE LOCATION;  Service: Cardiovascular;  Laterality: N/A;    CARDIAC PACEMAKER REMOVAL N/A 10/14/2024    Procedure: Lead Removal PPM - DC, Dual;  Surgeon: Abilio Majano MD;  Location: [facility] CATH INVASIVE LOCATION;  Service: Cardiovascular;  Laterality: N/A;    CARDIAC PACEMAKER REMOVAL N/A 10/14/2024    Procedure: PPM Generator Explant;  Surgeon: Abilio Majano MD;  Location: [facility] CATH INVASIVE LOCATION;  Service: Cardiovascular;  Laterality: N/A;     SECTION      CHOLECYSTECTOMY      COLONOSCOPY      CORONARY STENT PLACEMENT      EYE SURGERY      GALLBLADDER SURGERY      KNEE MINI REVISION Right 2021    Procedure: KNEE POLY INSERT EXCHANGE;  Surgeon: Ky Avila MD;  Location: [facility] OR;  Service: Orthopedics;  Laterality: Right;    KNEE SURGERY      PACEMAKER IMPLANTATION N/A 10/17/2024    Procedure: Implant or Replacement of Single Chamber Leadless Pacemaker, RV Only  medt micra;  Surgeon: Abilio Majano MD;  Location: [facility] INVASIVE LOCATION;  Service: Cardiovascular;  Laterality: N/A;    REPLACEMENT TOTAL KNEE Right     REPLACEMENT TOTAL KNEE Left      Social History:  Lives in [place], [place]     Antibiotic allergies and intolerances:  None    Medications:     Current Outpatient Medications:     albuterol sulfate  (90 Base) MCG/ACT inhaler, Inhale 2 puffs Every 4 (Four) Hours As Needed., Disp: , Rfl:     amLODIPine (NORVASC) 5 MG tablet, Take 1 tablet by mouth Daily., Disp: , Rfl:     aspirin 81 MG EC tablet, Take 1 tablet by mouth Daily., Disp: , Rfl:     atorvastatin (LIPITOR) 40 MG tablet, Take 1 tablet by mouth Daily., Disp: , Rfl:     " Breo Ellipta 100-25 MCG/ACT aerosol powder , Inhale 1 puff Daily., Disp: , Rfl:     carvedilol (COREG) 25 MG tablet, Take 1 tablet by mouth 2 (Two) Times a Day With Meals., Disp: 180 tablet, Rfl: 6    ceFAZolin 2000 mg IVPB in 100 mL NS (MBP), Infuse 2,000 mg into a venous catheter Every 8 (Eight) Hours for 74 doses. Indications: Bacteria in the Blood, Disp: , Rfl:     famotidine (PEPCID) 40 MG tablet, Take 1 tablet by mouth every night at bedtime., Disp: , Rfl:     fluticasone (FLONASE) 50 MCG/ACT nasal spray, Administer 1 spray into the nostril(s) as directed by provider Every Morning., Disp: , Rfl:     hydroCHLOROthiazide 25 MG tablet, Take 1 tablet by mouth Daily., Disp: , Rfl:     magnesium oxide (MAG-OX) 400 tablet tablet, Take 1 tablet by mouth Daily., Disp: , Rfl:     meclizine (ANTIVERT) 25 MG tablet, Take 1 tablet by mouth 2 (Two) Times a Day As Needed., Disp: , Rfl:     montelukast (SINGULAIR) 10 MG tablet, Take 1 tablet by mouth Every Night., Disp: , Rfl:     pantoprazole (PROTONIX) 40 MG EC tablet, Take 1 tablet by mouth Daily., Disp: , Rfl:     polyethylene glycol (MIRALAX) 17 g packet, Take 17 g by mouth Daily As Needed (constipation)., Disp: , Rfl:     rOPINIRole (REQUIP) 0.25 MG tablet, Take 1 tablet by mouth every night at bedtime., Disp: , Rfl:     sennosides-docusate (PERICOLACE) 8.6-50 MG per tablet, Take 1 tablet by mouth 2 (Two) Times a Day As Needed for Constipation., Disp: , Rfl:     sertraline (ZOLOFT) 100 MG tablet, Take 0.5 tablets by mouth Daily., Disp: , Rfl:   No current facility-administered medications for this visit.    Facility-Administered Medications Ordered in Other Visits:     Chlorhexidine Gluconate Cloth 2 % pads, , Apply externally, BID, Francesco Grewal APRN    iopamidol (ISOVUE-370) 76 % injection, , , Code / Trauma / Sedation Medication, Shane Rosa MD, 15 mL at 03/27/23 1202      OBJECTIVE:  Temp 98.4 °F (36.9 °C) (Oral)   Resp 16   Ht 162.6 cm (64\")   Wt 85.8 kg " (189 lb 3.2 oz)   SpO2 96%   BMI 32.48 kg/m²     General: awake, alert, NAD, very nice  Eyes: no scleral icterus  Cardiovascular: NR, prior pacemaker site healed w/o erythema or drainage  Respiratory: normal work of breathing on RA  :  no Simms catheter  Neurological: Alert and oriented x 3  Psychiatric: Normal mood and affect   Vasc: PICC in LUE w/o erythema    DIAGNOSTICS:  CBC, creatinine, and blood cultures reviewed today  Lab Results   Component Value Date    WBC 7.36 11/05/2024    HGB 8.4 (L) 11/05/2024    HCT 28.1 (L) 11/05/2024     11/05/2024     Lab Results   Component Value Date    GLUCOSE 100 (H) 10/15/2024    CALCIUM 8.6 10/15/2024     10/15/2024    K 3.5 10/15/2024    CO2 23.7 10/15/2024     10/15/2024    BUN 13 10/15/2024    CREATININE 1.00 11/05/2024    EGFR 55.3 (L) 11/05/2024    BCR 16.7 10/15/2024    ANIONGAP 10.3 10/15/2024     Microbiology:  10/7 COVID: Negative  10/7 BCx: MSSA in 1/2 sets  10/9 MRSA nares PCR: Negative  10/9 BCx: Negative     ASSESSMENT/PLAN:  MSSA septicemia  Pacemaker present with concerns for infection - removed 10/14/24  Micra pacemaker now in place  Obesity BMI 33  Long-term use of antibiotic    She has now completed her 4-week course of cefazolin.  Initial pacemaker was removed, she cleared her blood cultures quickly, echocardiogram was negative for vegetations, and she now has a Micra pacemaker in place.  DC cefazolin and remove PICC line today.  Monitoring labs reviewed.  RTC as needed.

## 2024-11-14 ENCOUNTER — TELEPHONE (OUTPATIENT)
Age: 84
End: 2024-11-14
Payer: MEDICARE

## 2024-11-14 NOTE — TELEPHONE ENCOUNTER
Thank you, I spoke with her and she had gotten the report to send through. I answered her questions and we will see pt on 11/27 for device optimization.     Kindly,   Malou

## 2024-11-14 NOTE — TELEPHONE ENCOUNTER
Jaylin, patient's daughter, called today concerned about not being able to get home monitor to transmit.    She has called [employer] numerous times and unable to speak with a human.    She is getting concerned and frustrated.    Please call Jaylin at [phone].

## 2024-11-27 ENCOUNTER — OFFICE VISIT (OUTPATIENT)
Age: 84
End: 2024-11-27
Payer: MEDICARE

## 2024-11-27 ENCOUNTER — CLINICAL SUPPORT NO REQUIREMENTS (OUTPATIENT)
Age: 84
End: 2024-11-27
Payer: MEDICARE

## 2024-11-27 VITALS
DIASTOLIC BLOOD PRESSURE: 68 MMHG | BODY MASS INDEX: 31.76 KG/M2 | WEIGHT: 186 LBS | HEIGHT: 64 IN | SYSTOLIC BLOOD PRESSURE: 100 MMHG | HEART RATE: 59 BPM

## 2024-11-27 DIAGNOSIS — I44.2 AV BLOCK, COMPLETE: Primary | ICD-10-CM

## 2024-11-27 DIAGNOSIS — I49.5 SICK SINUS SYNDROME: Primary | ICD-10-CM

## 2024-11-27 DIAGNOSIS — I49.5 SICK SINUS SYNDROME: ICD-10-CM

## 2024-11-27 RX ORDER — FERROUS SULFATE 324(65)MG
324 TABLET, DELAYED RELEASE (ENTERIC COATED) ORAL EVERY OTHER DAY
COMMUNITY

## 2024-12-02 NOTE — PROGRESS NOTES
Date of Office Visit: 2024  Encounter Provider: YOVANA Bhatia  Place of Service: [facility] CARDIOLOGY  Patient Name: Mallory Franco  :1940    Chief Complaint   Patient presents with   • SSS   • AV block   • Pacemaker Check   :     HPI: Mallory Franco is a 84 y.o. female who follows with Dr. Rosa. She has history of AV block---s/p dual chamber [place] PPM (3/2023), she also has CAD and history of PCI.      She had episode of syncope in . 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      She presented to ED in 2023 with near syncope. Telemetry showed periods of 2:1 AV block with symptoms.      She underwent dual chamber pacemaker placement with Dr. Rosa.      She was admitted in [place] on 10/08 for dyspnea, weakness, and fever. Blood cultures were positive, 1/2 for MSSA.      She was transferred to [facility] and underwent full system extraction and reimplant of micra leadless pacemaker.               She presents today for follow up appt.     Since extraction she has been doing okay.     Her biggest complaint of dyspnea on exertion and fatigue.     Normal device testing and function. : ~90%. AV synchrony about 40%.     She is anemic on recent labs. Hgb around 8, was 11-12 a year ago.     She is also on metoprolol for rate control.     No device complaints or concerns.     Says she was having fatigue before extraction but seems worse now.     Past Medical History:   Diagnosis Date   • Arthritis    • Asthma    • BPPV (benign paroxysmal positional vertigo), right    • COPD (chronic obstructive pulmonary disease)    • Coronary artery disease    • Coronary artery disease    • Diverticulitis    • Ear itching     HX   • Enlarged heart    • Gastroesophageal reflux disease    • Hyperlipidemia    • Hypertension    • Knee pain    • Macular degeneration    • PONV (postoperative nausea and vomiting)    • Postural dizziness with presyncope 2023   •  Shingles     X2   • SOB (shortness of breath) on exertion    • Tinnitus of left ear    • Urinary frequency    • Vertigo        Past Surgical History:   Procedure Laterality Date   • BREAST BIOPSY Bilateral    • BRONCHOSCOPY Right 2023    Procedure: BRONCHOSCOPY WITH BIOPSIES, BRUSHINGS, AND BRONCHOALVEOLAR LAVAGE;  Surgeon: Aleksandr Houston DO;  Location: [facility] ENDOSCOPY;  Service: Pulmonary;  Laterality: Right;  RIGHT UPPER LOBE ENDOBRONCHIAL OCCLUSION   • CARDIAC CATHETERIZATION     • CARDIAC ELECTROPHYSIOLOGY PROCEDURE N/A 3/27/2023    Procedure: Pacemaker SC new-[employer], call Dr. Rosa first thing in the morning to get time for the procedure and notify rep;  Surgeon: Shane Rosa MD;  Location: [facility] CATH INVASIVE LOCATION;  Service: Cardiovascular;  Laterality: N/A;   • CARDIAC ELECTROPHYSIOLOGY PROCEDURE N/A 10/14/2024    Procedure: Temporary Pacemaker;  Surgeon: Abilio Majano MD;  Location: [facility] CATH INVASIVE LOCATION;  Service: Cardiovascular;  Laterality: N/A;   • CARDIAC PACEMAKER REMOVAL N/A 10/14/2024    Procedure: Lead Removal PPM - DC, Dual;  Surgeon: Abilio Majano MD;  Location: [facility] CATH INVASIVE LOCATION;  Service: Cardiovascular;  Laterality: N/A;   • CARDIAC PACEMAKER REMOVAL N/A 10/14/2024    Procedure: PPM Generator Explant;  Surgeon: Abilio Majano MD;  Location: [facility] CATH INVASIVE LOCATION;  Service: Cardiovascular;  Laterality: N/A;   •  SECTION     • CHOLECYSTECTOMY     • COLONOSCOPY     • CORONARY STENT PLACEMENT     • EYE SURGERY     • GALLBLADDER SURGERY     • KNEE MINI REVISION Right 2021    Procedure: KNEE POLY INSERT EXCHANGE;  Surgeon: Ky Avila MD;  Location: [facility] OR;  Service: Orthopedics;  Laterality: Right;   • KNEE SURGERY     • PACEMAKER IMPLANTATION N/A 10/17/2024    Procedure: Implant or Replacement of Single Chamber Leadless Pacemaker, RV Only  medt micra;  Surgeon: Abilio Majano MD;   Location: [facility] INVASIVE LOCATION;  Service: Cardiovascular;  Laterality: N/A;   • REPLACEMENT TOTAL KNEE Right 2005   • REPLACEMENT TOTAL KNEE Left 2004       Social History     Socioeconomic History   • Marital status:    Tobacco Use   • Smoking status: Never     Passive exposure: Past   • Smokeless tobacco: Never   Vaping Use   • Vaping status: Never Used   Substance and Sexual Activity   • Alcohol use: No   • Drug use: Never   • Sexual activity: Defer       Family History   Problem Relation Age of Onset   • Breast cancer Mother    • Bone cancer Father    • Breast cancer Sister    • Bone cancer Sister    • Lung cancer Brother    • Malig Hyperthermia Neg Hx        Review of Systems   Constitutional: Negative for chills, fever and malaise/fatigue.   Cardiovascular:  Negative for chest pain, dyspnea on exertion, leg swelling, near-syncope, orthopnea, palpitations, paroxysmal nocturnal dyspnea and syncope.   Respiratory:  Negative for cough and shortness of breath.    Hematologic/Lymphatic: Negative.    Musculoskeletal:  Negative for joint pain, joint swelling and myalgias.   Gastrointestinal:  Negative for abdominal pain, diarrhea, melena, nausea and vomiting.   Genitourinary:  Negative for frequency and hematuria.   Neurological:  Negative for light-headedness, numbness, paresthesias and seizures.   Allergic/Immunologic: Negative.    All other systems reviewed and are negative.      Allergies   Allergen Reactions   • Codeine Nausea And Vomiting   • Contrast Dye (Echo Or Unknown Ct/Mr) Hives   • Morphine Nausea And Vomiting         Current Outpatient Medications:   •  albuterol sulfate  (90 Base) MCG/ACT inhaler, Inhale 2 puffs Every 4 (Four) Hours As Needed., Disp: , Rfl:   •  amLODIPine (NORVASC) 5 MG tablet, Take 1 tablet by mouth Daily., Disp: , Rfl:   •  aspirin 81 MG EC tablet, Take 1 tablet by mouth Daily., Disp: , Rfl:   •  atorvastatin (LIPITOR) 40 MG tablet, Take 1 tablet by mouth  Daily., Disp: , Rfl:   •  Breo Ellipta 100-25 MCG/ACT aerosol powder , Inhale 1 puff Daily., Disp: , Rfl:   •  carvedilol (COREG) 25 MG tablet, Take 1 tablet by mouth 2 (Two) Times a Day With Meals., Disp: 180 tablet, Rfl: 6  •  famotidine (PEPCID) 40 MG tablet, Take 1 tablet by mouth every night at bedtime., Disp: , Rfl:   •  ferrous sulfate 324 (65 Fe) MG tablet delayed-release EC tablet, Take 1 tablet by mouth Every Other Day., Disp: , Rfl:   •  fluticasone (FLONASE) 50 MCG/ACT nasal spray, Administer 1 spray into the nostril(s) as directed by provider Every Morning., Disp: , Rfl:   •  hydroCHLOROthiazide 25 MG tablet, Take 1 tablet by mouth Daily., Disp: , Rfl:   •  magnesium oxide (MAG-OX) 400 tablet tablet, Take 1 tablet by mouth Daily., Disp: , Rfl:   •  meclizine (ANTIVERT) 25 MG tablet, Take 1 tablet by mouth 2 (Two) Times a Day As Needed., Disp: , Rfl:   •  montelukast (SINGULAIR) 10 MG tablet, Take 1 tablet by mouth Every Night., Disp: , Rfl:   •  pantoprazole (PROTONIX) 40 MG EC tablet, Take 1 tablet by mouth Daily., Disp: , Rfl:   •  polyethylene glycol (MIRALAX) 17 g packet, Take 17 g by mouth Daily As Needed (constipation)., Disp: , Rfl:   •  rOPINIRole (REQUIP) 0.25 MG tablet, Take 1 tablet by mouth every night at bedtime., Disp: , Rfl:   •  sennosides-docusate (PERICOLACE) 8.6-50 MG per tablet, Take 1 tablet by mouth 2 (Two) Times a Day As Needed for Constipation., Disp: , Rfl:   •  sertraline (ZOLOFT) 100 MG tablet, Take 0.5 tablets by mouth Daily., Disp: , Rfl:   No current facility-administered medications for this visit.    Facility-Administered Medications Ordered in Other Visits:   •  Chlorhexidine Gluconate Cloth 2 % pads, , Apply externally, BID, Grewal, Francesco, APRN  •  iopamidol (ISOVUE-370) 76 % injection, , , Code / Trauma / Sedation Medication, Shane Rosa MD, 15 mL at 03/27/23 1202      Objective:     Vitals:    11/27/24 1240   BP: 100/68   BP Location: Left arm   Patient Position:  "Sitting   Pulse: 59   Weight: 84.4 kg (186 lb)   Height: 162.6 cm (64\")     Body mass index is 31.93 kg/m².    PHYSICAL EXAM:    Vitals Reviewed.   General Appearance: No acute distress, well developed and well nourished.   Eyes: Conjunctiva and lids: No erythema, swelling, or discharge. Sclera non-icteric.   HENT: Atraumatic, normocephalic. External eyes, ears, and nose normal.   Respiratory: No signs of respiratory distress. Respiration rhythm and depth normal.   Clear to auscultation. No rales, crackles, rhonchi, or wheezing auscultated.   Cardiovascular:  Heart Rate and Rhythm: Normal, Heart Sounds: Normal S1 and S2. No S3 or S4 noted.  Gastrointestinal:  Abdomen soft, non-distended, non-tender.   Musculoskeletal: Normal movement of extremities  Skin: Warm and dry.   Psychiatric: Patient alert and oriented to person, place, and time. Speech and behavior appropriate. Normal mood and affect.       ECG 12 Lead    Date/Time: 12/2/2024 9:06 AM  Performed by: Bernabe Rosales APRN    Authorized by: Bernabe Rosales APRN  Comparison: compared with previous ECG   Similar to previous ECG  Rhythm: paced          Assessment:       Diagnosis Plan   1. AV block, complete        2. Sick sinus syndrome               Plan:   1-2. AV block, SSS---MSSA---s/p extraction and reimplant of micra----- she has been doing okay from device standpoint. Normal testing and function in office.     I do not suspect her complaints are cardiac in nature, I did have her device optimized today to try and increase AV synchrony, we will monitor response on next check.     Her hemaglobin is low and I wonder if this is contributing, she is set to have this rechecked next week.     I will also reduce her cardvediolol to daily to see if this helps with fatigue.         Follow up in one month for device/symptom check.             As always, it has been a pleasure to participate in your patient's care.      Sincerely,         YOVANA Bhatia   "

## 2024-12-28 ENCOUNTER — APPOINTMENT (OUTPATIENT)
Dept: CT IMAGING | Facility: HOSPITAL | Age: 84
End: 2024-12-28
Payer: MEDICARE

## 2024-12-28 ENCOUNTER — APPOINTMENT (OUTPATIENT)
Dept: GENERAL RADIOLOGY | Facility: HOSPITAL | Age: 84
End: 2024-12-28
Payer: MEDICARE

## 2024-12-28 ENCOUNTER — HOSPITAL ENCOUNTER (EMERGENCY)
Facility: HOSPITAL | Age: 84
Discharge: HOME | End: 2024-12-28
Attending: EMERGENCY MEDICINE
Payer: MEDICARE

## 2024-12-28 VITALS
SYSTOLIC BLOOD PRESSURE: 126 MMHG | RESPIRATION RATE: 18 BRPM | BODY MASS INDEX: 33.12 KG/M2 | OXYGEN SATURATION: 93 % | WEIGHT: 194 LBS | HEIGHT: 64 IN | DIASTOLIC BLOOD PRESSURE: 72 MMHG | HEART RATE: 79 BPM | TEMPERATURE: 99.5 F

## 2024-12-28 DIAGNOSIS — R10.84 GENERALIZED ABDOMINAL PAIN: Primary | ICD-10-CM

## 2024-12-28 DIAGNOSIS — K59.00 CONSTIPATION, UNSPECIFIED CONSTIPATION TYPE: ICD-10-CM

## 2024-12-28 LAB
ALBUMIN SERPL-MCNC: 3.8 G/DL (ref 3.5–5.2)
ALBUMIN/GLOB SERPL: 1.1 G/DL
ALP SERPL-CCNC: 79 U/L (ref 39–117)
ALT SERPL W P-5'-P-CCNC: 12 U/L (ref 1–33)
ANION GAP SERPL CALCULATED.3IONS-SCNC: 12.8 MMOL/L (ref 5–15)
AST SERPL-CCNC: 19 U/L (ref 1–32)
BACTERIA UR QL AUTO: NORMAL /HPF
BASOPHILS # BLD AUTO: 0.01 10*3/MM3 (ref 0–0.2)
BASOPHILS NFR BLD AUTO: 0.1 % (ref 0–1.5)
BILIRUB SERPL-MCNC: 1.2 MG/DL (ref 0–1.2)
BILIRUB UR QL STRIP: NEGATIVE
BUN SERPL-MCNC: 15 MG/DL (ref 8–23)
BUN/CREAT SERPL: 15.6 (ref 7–25)
CALCIUM SPEC-SCNC: 9.9 MG/DL (ref 8.6–10.5)
CHLORIDE SERPL-SCNC: 98 MMOL/L (ref 98–107)
CLARITY UR: CLEAR
CO2 SERPL-SCNC: 28.2 MMOL/L (ref 22–29)
COLOR UR: YELLOW
CREAT SERPL-MCNC: 0.96 MG/DL (ref 0.57–1)
D-LACTATE SERPL-SCNC: 1.7 MMOL/L (ref 0.5–2)
DEPRECATED RDW RBC AUTO: 51.5 FL (ref 37–54)
EGFRCR SERPLBLD CKD-EPI 2021: 58.1 ML/MIN/1.73
EOSINOPHIL # BLD AUTO: 0.02 10*3/MM3 (ref 0–0.4)
EOSINOPHIL NFR BLD AUTO: 0.2 % (ref 0.3–6.2)
ERYTHROCYTE [DISTWIDTH] IN BLOOD BY AUTOMATED COUNT: 15.9 % (ref 12.3–15.4)
GLOBULIN UR ELPH-MCNC: 3.4 GM/DL
GLUCOSE SERPL-MCNC: 134 MG/DL (ref 65–99)
GLUCOSE UR STRIP-MCNC: NEGATIVE MG/DL
HCT VFR BLD AUTO: 32.9 % (ref 34–46.6)
HGB BLD-MCNC: 10.3 G/DL (ref 12–15.9)
HGB UR QL STRIP.AUTO: NEGATIVE
HOLD SPECIMEN: NORMAL
HOLD SPECIMEN: NORMAL
HYALINE CASTS UR QL AUTO: NORMAL /LPF
IMM GRANULOCYTES # BLD AUTO: 0.03 10*3/MM3 (ref 0–0.05)
IMM GRANULOCYTES NFR BLD AUTO: 0.3 % (ref 0–0.5)
KETONES UR QL STRIP: NEGATIVE
LEUKOCYTE ESTERASE UR QL STRIP.AUTO: NEGATIVE
LIPASE SERPL-CCNC: 8 U/L (ref 13–60)
LYMPHOCYTES # BLD AUTO: 1.75 10*3/MM3 (ref 0.7–3.1)
LYMPHOCYTES NFR BLD AUTO: 16.8 % (ref 19.6–45.3)
MCH RBC QN AUTO: 27.8 PG (ref 26.6–33)
MCHC RBC AUTO-ENTMCNC: 31.3 G/DL (ref 31.5–35.7)
MCV RBC AUTO: 88.9 FL (ref 79–97)
MONOCYTES # BLD AUTO: 0.82 10*3/MM3 (ref 0.1–0.9)
MONOCYTES NFR BLD AUTO: 7.9 % (ref 5–12)
NEUTROPHILS NFR BLD AUTO: 7.77 10*3/MM3 (ref 1.7–7)
NEUTROPHILS NFR BLD AUTO: 74.7 % (ref 42.7–76)
NITRITE UR QL STRIP: NEGATIVE
NRBC BLD AUTO-RTO: 0 /100 WBC (ref 0–0.2)
PH UR STRIP.AUTO: 7 [PH] (ref 5–8)
PLATELET # BLD AUTO: 216 10*3/MM3 (ref 140–450)
PMV BLD AUTO: 10.7 FL (ref 6–12)
POTASSIUM SERPL-SCNC: 3.3 MMOL/L (ref 3.5–5.2)
PROT SERPL-MCNC: 7.2 G/DL (ref 6–8.5)
PROT UR QL STRIP: ABNORMAL
RBC # BLD AUTO: 3.7 10*6/MM3 (ref 3.77–5.28)
RBC # UR STRIP: NORMAL /HPF
REF LAB TEST METHOD: NORMAL
SODIUM SERPL-SCNC: 139 MMOL/L (ref 136–145)
SP GR UR STRIP: 1.01 (ref 1–1.03)
SQUAMOUS #/AREA URNS HPF: NORMAL /HPF
UROBILINOGEN UR QL STRIP: ABNORMAL
WBC # UR STRIP: NORMAL /HPF
WBC NRBC COR # BLD AUTO: 10.4 10*3/MM3 (ref 3.4–10.8)
WHOLE BLOOD HOLD COAG: NORMAL
WHOLE BLOOD HOLD SPECIMEN: NORMAL

## 2024-12-28 PROCEDURE — 96374 THER/PROPH/DIAG INJ IV PUSH: CPT

## 2024-12-28 PROCEDURE — 83690 ASSAY OF LIPASE: CPT

## 2024-12-28 PROCEDURE — 80053 COMPREHEN METABOLIC PANEL: CPT

## 2024-12-28 PROCEDURE — 81001 URINALYSIS AUTO W/SCOPE: CPT

## 2024-12-28 PROCEDURE — 25810000003 SODIUM CHLORIDE 0.9 % SOLUTION: Performed by: EMERGENCY MEDICINE

## 2024-12-28 PROCEDURE — 99284 EMERGENCY DEPT VISIT MOD MDM: CPT

## 2024-12-28 PROCEDURE — 71045 X-RAY EXAM CHEST 1 VIEW: CPT

## 2024-12-28 PROCEDURE — 25010000002 ONDANSETRON PER 1 MG: Performed by: EMERGENCY MEDICINE

## 2024-12-28 PROCEDURE — 74176 CT ABD & PELVIS W/O CONTRAST: CPT

## 2024-12-28 PROCEDURE — 85025 COMPLETE CBC W/AUTO DIFF WBC: CPT

## 2024-12-28 PROCEDURE — 83605 ASSAY OF LACTIC ACID: CPT

## 2024-12-28 RX ORDER — SODIUM CHLORIDE 0.9 % (FLUSH) 0.9 %
10 SYRINGE (ML) INJECTION AS NEEDED
Status: DISCONTINUED | OUTPATIENT
Start: 2024-12-28 | End: 2024-12-28 | Stop reason: HOSPADM

## 2024-12-28 RX ORDER — ONDANSETRON 2 MG/ML
4 INJECTION INTRAMUSCULAR; INTRAVENOUS ONCE
Status: COMPLETED | OUTPATIENT
Start: 2024-12-28 | End: 2024-12-28

## 2024-12-28 RX ADMIN — ONDANSETRON 4 MG: 2 INJECTION INTRAMUSCULAR; INTRAVENOUS at 14:25

## 2024-12-28 RX ADMIN — SODIUM CHLORIDE 1000 ML: 9 INJECTION, SOLUTION INTRAVENOUS at 14:25

## 2024-12-28 NOTE — ED PROVIDER NOTES
Time: 1:24 PM EST  Date of encounter:  12/28/2024  Independent Historian/Clinical History and Information was obtained by:   Patient    History is limited by: N/A    Chief Complaint:, Abdominal pain      History of Present Illness:  Patient is a 84 y.o. year old female who presents to the emergency department for evaluation of abdominal pain.  Constipated x 4 days.  Patient is concerned she may be dehydrated.      Patient Care Team  Primary Care Provider: Amauri Kearney MD    Past Medical History:     Allergies   Allergen Reactions    Codeine Nausea And Vomiting    Contrast Dye (Echo Or Unknown Ct/Mr) Hives    Morphine Nausea And Vomiting     Past Medical History:   Diagnosis Date    Arthritis     Asthma     BPPV (benign paroxysmal positional vertigo), right     COPD (chronic obstructive pulmonary disease)     Coronary artery disease     Coronary artery disease     Diverticulitis     Ear itching     HX    Enlarged heart     Gastroesophageal reflux disease     Hyperlipidemia     Hypertension     Knee pain     Macular degeneration     PONV (postoperative nausea and vomiting)     Postural dizziness with presyncope 03/16/2023    Shingles     X2    SOB (shortness of breath) on exertion     Tinnitus of left ear     Urinary frequency     Vertigo      Past Surgical History:   Procedure Laterality Date    BREAST BIOPSY Bilateral     BRONCHOSCOPY Right 11/28/2023    Procedure: BRONCHOSCOPY WITH BIOPSIES, BRUSHINGS, AND BRONCHOALVEOLAR LAVAGE;  Surgeon: Aleksandr Houston DO;  Location: [facility] ENDOSCOPY;  Service: Pulmonary;  Laterality: Right;  RIGHT UPPER LOBE ENDOBRONCHIAL OCCLUSION    CARDIAC CATHETERIZATION      CARDIAC ELECTROPHYSIOLOGY PROCEDURE N/A 3/27/2023    Procedure: Pacemaker SC new-[employer], call Dr. Rosa first thing in the morning to get time for the procedure and notify rep;  Surgeon: Shane Rosa MD;  Location: [facility] CATH INVASIVE LOCATION;  Service: Cardiovascular;  Laterality: N/A;     CARDIAC ELECTROPHYSIOLOGY PROCEDURE N/A 10/14/2024    Procedure: Temporary Pacemaker;  Surgeon: Abilio Majano MD;  Location:  [facility] CATH INVASIVE LOCATION;  Service: Cardiovascular;  Laterality: N/A;    CARDIAC PACEMAKER REMOVAL N/A 10/14/2024    Procedure: Lead Removal PPM - DC, Dual;  Surgeon: Abilio Majano MD;  Location:  [facility] CATH INVASIVE LOCATION;  Service: Cardiovascular;  Laterality: N/A;    CARDIAC PACEMAKER REMOVAL N/A 10/14/2024    Procedure: PPM Generator Explant;  Surgeon: Abilio Majano MD;  Location:  [facility] CATH INVASIVE LOCATION;  Service: Cardiovascular;  Laterality: N/A;     SECTION      CHOLECYSTECTOMY      COLONOSCOPY      CORONARY STENT PLACEMENT      EYE SURGERY      GALLBLADDER SURGERY      KNEE MINI REVISION Right 2021    Procedure: KNEE POLY INSERT EXCHANGE;  Surgeon: Ky Avila MD;  Location:  [facility] MAIN OR;  Service: Orthopedics;  Laterality: Right;    KNEE SURGERY      PACEMAKER IMPLANTATION N/A 10/17/2024    Procedure: Implant or Replacement of Single Chamber Leadless Pacemaker, RV Only  medt micra;  Surgeon: Abilio Majano MD;  Location:  [facility] CATH INVASIVE LOCATION;  Service: Cardiovascular;  Laterality: N/A;    REPLACEMENT TOTAL KNEE Right     REPLACEMENT TOTAL KNEE Left      Family History   Problem Relation Age of Onset    Breast cancer Mother     Bone cancer Father     Breast cancer Sister     Bone cancer Sister     Lung cancer Brother     Malig Hyperthermia Neg Hx        Home Medications:  Prior to Admission medications    Medication Sig Start Date End Date Taking? Authorizing Provider   albuterol sulfate  (90 Base) MCG/ACT inhaler Inhale 2 puffs Every 4 (Four) Hours As Needed. 3/13/23   Brittany Garner MD   amLODIPine (NORVASC) 5 MG tablet Take 1 tablet by mouth Daily. 9/3/23   Brittany Graner MD   aspirin 81 MG EC tablet Take 1 tablet by mouth Daily.    Brittany Garner MD   atorvastatin (LIPITOR)  40 MG tablet Take 1 tablet by mouth Daily.    Brittany Garner MD   Bresam Ellipta 100-25 MCG/ACT aerosol powder  Inhale 1 puff Daily. 5/30/23   Brittany Garner MD   carvedilol (COREG) 25 MG tablet Take 1 tablet by mouth 2 (Two) Times a Day With Meals. 8/22/23   Migue Nguyen MD   famotidine (PEPCID) 40 MG tablet Take 1 tablet by mouth every night at bedtime. 1/14/23   Brittany Garner MD   ferrous sulfate 324 (65 Fe) MG tablet delayed-release EC tablet Take 1 tablet by mouth Every Other Day.    Brittany Garner MD   fluticasone (FLONASE) 50 MCG/ACT nasal spray Administer 1 spray into the nostril(s) as directed by provider Every Morning. 9/30/14   Brittany Garner MD   hydroCHLOROthiazide 25 MG tablet Take 1 tablet by mouth Daily.    Brittany Garner MD   magnesium oxide (MAG-OX) 400 tablet tablet Take 1 tablet by mouth Daily. 10/12/24   Abilio Puckett MD   meclizine (ANTIVERT) 25 MG tablet Take 1 tablet by mouth 2 (Two) Times a Day As Needed. 5/18/15   Brittany Garner MD   montelukast (SINGULAIR) 10 MG tablet Take 1 tablet by mouth Every Night. 5/18/15   Brittany Garner MD   pantoprazole (PROTONIX) 40 MG EC tablet Take 1 tablet by mouth Daily. 1/6/23   Brittany Garner MD   polyethylene glycol (MIRALAX) 17 g packet Take 17 g by mouth Daily As Needed (constipation). 10/11/24   Abilio Puckett MD   rOPINIRole (REQUIP) 0.25 MG tablet Take 1 tablet by mouth every night at bedtime. 10/25/23   Brittany Garner MD   sennosides-docusate (PERICOLACE) 8.6-50 MG per tablet Take 1 tablet by mouth 2 (Two) Times a Day As Needed for Constipation. 10/11/24   Abilio Puckett MD   sertraline (ZOLOFT) 100 MG tablet Take 0.5 tablets by mouth Daily. 9/22/24   Brittany Garner MD        Social History:   Social History     Tobacco Use    Smoking status: Never     Passive exposure: Past    Smokeless tobacco: Never   Vaping Use    Vaping status: Never Used   Substance Use Topics     "Alcohol use: No    Drug use: Never         Review of Systems:  Review of Systems   Respiratory:  Positive for shortness of breath.    Gastrointestinal:  Positive for abdominal pain and constipation.   Neurological:  Positive for dizziness.        Physical Exam:  /77 (BP Location: Left arm, Patient Position: Lying)   Pulse 87   Temp 99.5 °F (37.5 °C) (Oral)   Resp 18   Ht 162.6 cm (64.02\")   Wt 88 kg (194 lb 0.1 oz)   SpO2 91%   BMI 33.28 kg/m²     Physical Exam  Vitals and nursing note reviewed.   Constitutional:       General: She is not in acute distress.  Eyes:      Extraocular Movements: Extraocular movements intact.   Cardiovascular:      Rate and Rhythm: Normal rate and regular rhythm.      Heart sounds: Normal heart sounds.   Pulmonary:      Effort: Pulmonary effort is normal. No respiratory distress.      Breath sounds: Normal breath sounds.   Abdominal:      General: Abdomen is flat. Bowel sounds are decreased.      Palpations: Abdomen is soft.      Tenderness: There is no abdominal tenderness.   Musculoskeletal:         General: Normal range of motion.      Cervical back: Normal range of motion and neck supple.   Skin:     General: Skin is warm and dry.   Neurological:      Mental Status: She is alert and oriented to person, place, and time. Mental status is at baseline.                    Medical Decision Making:      Comorbidities that affect care:    Hypertension, COPD, coronary artery disease, history of medical    External Notes reviewed:    Previous Clinic Note: Patient seen 11/27/2024 by cardiology nurse practitioner for follow-up to sick sinus syndrome status post pacemaker placement      The following orders were placed and all results were independently analyzed by me:  Orders Placed This Encounter   Procedures    XR Chest 1 View    CT Abdomen Pelvis Without Contrast    [place] Draw    Comprehensive Metabolic Panel    Lipase    Urinalysis With Microscopic If Indicated (No Culture) - " Urine, Clean Catch    Lactic Acid, Plasma    CBC Auto Differential    Urinalysis, Microscopic Only - Urine, Clean Catch    NPO Diet NPO Type: Strict NPO    Undress & Gown    Insert Peripheral IV    CBC & Differential    Green Top (Gel)    Lavender Top    Gold Top - SST    Light Blue Top       Medications Given in the Emergency Department:  Medications   sodium chloride 0.9 % flush 10 mL (has no administration in time range)   sodium chloride 0.9 % bolus 1,000 mL (0 mL Intravenous Stopped 12/28/24 1455)   ondansetron (ZOFRAN) injection 4 mg (4 mg Intravenous Given 12/28/24 1425)        ED Course:         Labs:    Lab Results (last 24 hours)       Procedure Component Value Units Date/Time    CBC & Differential [[id number]]  (Abnormal) Collected: 12/28/24 1226    Specimen: Blood from Arm, Right Updated: 12/28/24 1233    Narrative:      The following orders were created for panel order CBC & Differential.  Procedure                               Abnormality         Status                     ---------                               -----------         ------                     CBC Auto Differential[[id number]]        Abnormal            Final result                 Please view results for these tests on the individual orders.    Comprehensive Metabolic Panel [[id number]]  (Abnormal) Collected: 12/28/24 1226    Specimen: Blood from Arm, Right Updated: 12/28/24 1255     Glucose 134 mg/dL      BUN 15 mg/dL      Creatinine 0.96 mg/dL      Sodium 139 mmol/L      Potassium 3.3 mmol/L      Chloride 98 mmol/L      CO2 28.2 mmol/L      Calcium 9.9 mg/dL      Total Protein 7.2 g/dL      Albumin 3.8 g/dL      ALT (SGPT) 12 U/L      AST (SGOT) 19 U/L      Alkaline Phosphatase 79 U/L      Total Bilirubin 1.2 mg/dL      Globulin 3.4 gm/dL      A/G Ratio 1.1 g/dL      BUN/Creatinine Ratio 15.6     Anion Gap 12.8 mmol/L      eGFR 58.1 mL/min/1.73     Narrative:      GFR Categories in Chronic Kidney Disease (CKD)      GFR Category           GFR (mL/min/1.73)    Interpretation  G1                     90 or greater         Normal or high (1)  G2                      60-89                Mild decrease (1)  G3a                   45-59                Mild to moderate decrease  G3b                   30-44                Moderate to severe decrease  G4                    15-29                Severe decrease  G5                    14 or less           Kidney failure          (1)In the absence of evidence of kidney disease, neither GFR category G1 or G2 fulfill the criteria for CKD.    eGFR calculation 2021 CKD-EPI creatinine equation, which does not include race as a factor    Lipase [[id number]]  (Abnormal) Collected: 12/28/24 1226    Specimen: Blood from Arm, Right Updated: 12/28/24 1255     Lipase 8 U/L     Lactic Acid, Plasma [[id number]]  (Normal) Collected: 12/28/24 1226    Specimen: Blood from Arm, Right Updated: 12/28/24 1251     Lactate 1.7 mmol/L     CBC Auto Differential [[id number]]  (Abnormal) Collected: 12/28/24 1226    Specimen: Blood from Arm, Right Updated: 12/28/24 1233     WBC 10.40 10*3/mm3      RBC 3.70 10*6/mm3      Hemoglobin 10.3 g/dL      Hematocrit 32.9 %      MCV 88.9 fL      MCH 27.8 pg      MCHC 31.3 g/dL      RDW 15.9 %      RDW-SD 51.5 fl      MPV 10.7 fL      Platelets 216 10*3/mm3      Neutrophil % 74.7 %      Lymphocyte % 16.8 %      Monocyte % 7.9 %      Eosinophil % 0.2 %      Basophil % 0.1 %      Immature Grans % 0.3 %      Neutrophils, Absolute 7.77 10*3/mm3      Lymphocytes, Absolute 1.75 10*3/mm3      Monocytes, Absolute 0.82 10*3/mm3      Eosinophils, Absolute 0.02 10*3/mm3      Basophils, Absolute 0.01 10*3/mm3      Immature Grans, Absolute 0.03 10*3/mm3      nRBC 0.0 /100 WBC     Urinalysis With Microscopic If Indicated (No Culture) - Urine, Clean Catch [[id number]]  (Abnormal) Collected: 12/28/24 1253    Specimen: Urine, Clean Catch Updated: 12/28/24 1309     Color, UA Yellow     Appearance, UA Clear     pH, UA 7.0      Specific Gravity, UA 1.015     Glucose, UA Negative     Ketones, UA Negative     Bilirubin, UA Negative     Blood, UA Negative     Protein, UA 30 mg/dL (1+)     Leuk Esterase, UA Negative     Nitrite, UA Negative     Urobilinogen, UA 1.0 E.U./dL    Urinalysis, Microscopic Only - Urine, Clean Catch [[id number]] Collected: 12/28/24 1253    Specimen: Urine, Clean Catch Updated: 12/28/24 1320     RBC, UA 0-2 /HPF      WBC, UA 0-2 /HPF      Bacteria, UA None Seen /HPF      Squamous Epithelial Cells, UA 0-2 /HPF      Hyaline Casts, UA 0-2 /LPF      Methodology Automated Microscopy             Imaging:    CT Abdomen Pelvis Without Contrast    Result Date: 12/28/2024  CT ABDOMEN PELVIS WO CONTRAST Date of Exam: 12/28/2024 4:39 PM EST Indication: abdominal pain. Comparison: 10/29/2024 Technique: Axial CT images were obtained of the abdomen and pelvis without the administration of contrast. Reconstructed coronal and sagittal images were also obtained. Automated exposure control and iterative construction methods were used. FINDINGS: Lung bases: Faint right lower lobe infiltrate. Moderate hiatal hernia. Moderate-sized hiatal hernia. Liver:No masses. No intrahepatic biliary ductal dilatation. Spleen: Splenic granulomata Pancreas:No pancreatic masses. No evidence of pancreatitis. Gallbladder and common bile duct: Previous cholecystectomy. Inflammation within the gallbladder fossa. Adrenal glands:No adrenal masses Kidneys and ureters:No kidney stones. No renal masses.No calculi present within the ureters. Normal caliber ureters. Urinary bladder: Urinary bladder wall thickening. The urinary bladder is collapsed. Small bowel:Normal caliber small bowel. Large bowel: Colonic diverticulosis without evidence of diverticulitis. Appendix: Not seen however there is no evidence of appendicitis. GENITOURINARY: Normal appearance of the uterus and adnexa. Ascites or pneumoperitoneum:None. Adenopathy:None present Osseous structures: The  proximal femurs are intact. Degenerative changes of the hips. The pubic bones are intact. The sacrum and sacroiliac joints are normal. Severe degenerative changes of the lumbar spine. No rib fractures are seen. Severe degenerative  changes of the lumbar spine. Other findings: None     1.Faint right lower lobe infiltrate. 2.Moderate-sized hiatal hernia. 3.Colonic diverticulosis without evidence of diverticulitis. 4.Urinary bladder wall thickening. Correlate with urinalysis. 5.Subtle inflammation within the martha hepatis with no definite obstruction of the biliary tree appreciated. Electronically Signed: Pawan Tucker MD  12/28/2024 5:15 PM EST  Workstation ID: [id number]    XR Chest 1 View    Result Date: 12/28/2024  XR CHEST 1 VW Date of Exam: 12/28/2024 2:40 PM EST Indication: Short of breath Comparison: CXR 10/17/2024 Findings: The heart remains slightly enlarged. The pulmonary vascularity does not appear congested. Ill-defined opacity in the right upper lobe is unchanged. Degenerative changes seen in the glenohumeral joints bilaterally. Precordial loop recorder remains in place.     Impression: No significant change in comparison to 10/17/2024 Electronically Signed: Phoenix Mcleod MD  12/28/2024 2:48 PM EST  Workstation ID: [id number]       Differential Diagnosis and Discussion:    Abdominal Pain: Based on the patient's signs and symptoms, I considered abdominal aortic aneurysm, small bowel obstruction, pancreatitis, acute cholecystitis, acute appendecitis, peptic ulcer disease, gastritis, colitis, endocrine disorders, irritable bowel syndrome and other differential diagnosis an etiology of the patient's abdominal pain.    PROCEDURES:    Labs were collected in the emergency department and all labs were reviewed and interpreted by me.  X-ray were performed in the emergency department and all X-ray impressions were independently interpreted by me.  CT scan was performed in the emergency department and the CT scan  radiology impression was interpreted by me.    No orders to display       Procedures    MDM  No acute findings were found on patient's workup.  We discussed over-the-counter medications for constipation.  Patient is stable for discharge.    The patient is resting comfortably and feels better, is alert and in no distress. Repeat examination is unremarkable and benign; in particular, there's no discomfort at McBurney's point and there is no pulsatile mass. The history, exam, diagnostic testing, and current condition does not suggest acute appendicitis, bowel obstruction, acute cholecystitis, bowel perforation, major gastrointestinal bleeding, severe diverticulitis, abdominal aortic aneurysm, mesenteric ischemia, volvulus, sepsis, or other significant pathology that warrants further testing, continued ED treatment, admission, for surgical evaluation at this point. The vital signs have been stable. The patient does not have uncontrollable pain, intractable vomiting, or other significant symptoms. The patient's condition is stable and appropriate for discharge from the emergency department.    Patient Care Considerations:    ANTIBIOTICS: I considered prescribing antibiotics as an outpatient however no bacterial focus of infection was found.      Consultants/Shared Management Plan:    None    Social Determinants of Health:    Patient is independent, reliable, and has access to care.       Disposition and Care Coordination:    Discharged: The patient is suitable and stable for discharge with no need for consideration of admission.    I have explained the patient´s condition, diagnoses and treatment plan based on the information available to me at this time. I have answered questions and addressed any concerns. The patient has a good  understanding of the patient´s diagnosis, condition, and treatment plan as can be expected at this point. The vital signs have been stable. The patient´s condition is stable and appropriate  for discharge from the emergency department.      The patient will pursue further outpatient evaluation with the primary care physician or other designated or consulting physician as outlined in the discharge instructions. They are agreeable to this plan of care and follow-up instructions have been explained in detail. The patient has received these instructions in written format and has expressed an understanding of the discharge instructions. The patient is aware that any significant change in condition or worsening of symptoms should prompt an immediate return to this or the closest emergency department or call to 911.    Final diagnoses:   Generalized abdominal pain   Constipation, unspecified constipation type        ED Disposition       ED Disposition   Discharge    Condition   Stable    Comment   --               This medical record created using voice recognition software.             Kaveh Espana DO  12/28/24 1945

## 2024-12-29 NOTE — DISCHARGE INSTRUCTIONS
May try over-the-counter remedies for constipation such as Dulcolax suppositories, MiraLAX, magnesium citrate.  If you drink 1 entire bottle of magnesium citrate this will help facilitate a bowel movement and the MiraLAX will continue to help keep you regular.

## 2024-12-30 ENCOUNTER — CLINICAL SUPPORT NO REQUIREMENTS (OUTPATIENT)
Age: 84
End: 2024-12-30
Payer: MEDICARE

## 2024-12-30 ENCOUNTER — OFFICE VISIT (OUTPATIENT)
Age: 84
End: 2024-12-30
Payer: MEDICARE

## 2024-12-30 VITALS
DIASTOLIC BLOOD PRESSURE: 62 MMHG | HEART RATE: 65 BPM | WEIGHT: 194 LBS | HEIGHT: 64 IN | SYSTOLIC BLOOD PRESSURE: 120 MMHG | BODY MASS INDEX: 33.12 KG/M2

## 2024-12-30 DIAGNOSIS — I49.5 SICK SINUS SYNDROME: ICD-10-CM

## 2024-12-30 DIAGNOSIS — R91.8 ABNORMAL CT SCAN OF LUNG: ICD-10-CM

## 2024-12-30 DIAGNOSIS — I44.2 AV BLOCK, COMPLETE: Primary | ICD-10-CM

## 2024-12-30 DIAGNOSIS — Z95.0 PRESENCE OF CARDIAC PACEMAKER: ICD-10-CM

## 2024-12-30 PROCEDURE — 3074F SYST BP LT 130 MM HG: CPT

## 2024-12-30 PROCEDURE — 99214 OFFICE O/P EST MOD 30 MIN: CPT

## 2024-12-30 PROCEDURE — 3078F DIAST BP <80 MM HG: CPT

## 2024-12-30 PROCEDURE — 93000 ELECTROCARDIOGRAM COMPLETE: CPT

## 2024-12-30 NOTE — PROGRESS NOTES
Date of Office Visit: 2024  Encounter Provider: YOVANA Bhatia  Place of Service: [facility] CARDIOLOGY  Patient Name: Mallory Franco  :1940    Chief Complaint   Patient presents with    SSS    AV block, complete    Pacemaker Check   :     HPI: Mallory Franco is a 84 y.o. female who follows with Dr. Rosa. She has history of AV block---s/p dual chamber [place] PPM (3/2023), she also has CAD and history of PCI.      She had episode of syncope in . 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      She presented to ED in 2023 with near syncope. Telemetry showed periods of 2:1 AV block with symptoms.      She underwent dual chamber pacemaker placement with Dr. Rosa.      She was admitted in [place] on 10/08 for dyspnea, weakness, and fever. Blood cultures were positive, 1/2 for MSSA.       She was transferred to [facility] and underwent full system extraction and reimplant of micra leadless pacemaker.     I saw her in 2024. She was having some dyspnea and fatigue. Device testing and function was normal, we switched her device to VVI due to ongoing AF.     Her hemaglobin was around 8 which was felt to be cause of her symptoms. Cardiac status was stable.             She presents today for follow up.     Was actually in the emergency room over the weekend.  She had complaints of shortness of breath, abdominal pain, and constipation.    CT of the abdomen was unremarkable.    Chest x-ray showed right lower lobe infiltrate.  He continues to have some wheezing and hoarseness.    Denies any chest pain, palpitations, PND, orthopnea, edema    Normal device testing and function in office today.  V pacing about 97%.  She was switched to VVI due to ongoing A-fib.    She has no complaints or concerns about her device.    She has not seen a pulmonologist in many years.  She does have a history of COPD and asthma.  She is on albuterol inhaler but nothing  else.    Past Medical History:   Diagnosis Date    Arthritis     Asthma     BPPV (benign paroxysmal positional vertigo), right     COPD (chronic obstructive pulmonary disease)     Coronary artery disease     Coronary artery disease     Diverticulitis     Ear itching     HX    Enlarged heart     Gastroesophageal reflux disease     Hyperlipidemia     Hypertension     Knee pain     Macular degeneration     PONV (postoperative nausea and vomiting)     Postural dizziness with presyncope 2023    Shingles     X2    SOB (shortness of breath) on exertion     Tinnitus of left ear     Urinary frequency     Vertigo        Past Surgical History:   Procedure Laterality Date    BREAST BIOPSY Bilateral     BRONCHOSCOPY Right 2023    Procedure: BRONCHOSCOPY WITH BIOPSIES, BRUSHINGS, AND BRONCHOALVEOLAR LAVAGE;  Surgeon: Aleksandr Houston DO;  Location: [facility] ENDOSCOPY;  Service: Pulmonary;  Laterality: Right;  RIGHT UPPER LOBE ENDOBRONCHIAL OCCLUSION    CARDIAC CATHETERIZATION      CARDIAC ELECTROPHYSIOLOGY PROCEDURE N/A 3/27/2023    Procedure: Pacemaker SC new-[employer], call Dr. Rosa first thing in the morning to get time for the procedure and notify rep;  Surgeon: Shane Rosa MD;  Location: [facility] CATH INVASIVE LOCATION;  Service: Cardiovascular;  Laterality: N/A;    CARDIAC ELECTROPHYSIOLOGY PROCEDURE N/A 10/14/2024    Procedure: Temporary Pacemaker;  Surgeon: Abilio Majano MD;  Location: [facility] CATH INVASIVE LOCATION;  Service: Cardiovascular;  Laterality: N/A;    CARDIAC PACEMAKER REMOVAL N/A 10/14/2024    Procedure: Lead Removal PPM - DC, Dual;  Surgeon: Abilio Majano MD;  Location: [facility] CATH INVASIVE LOCATION;  Service: Cardiovascular;  Laterality: N/A;    CARDIAC PACEMAKER REMOVAL N/A 10/14/2024    Procedure: PPM Generator Explant;  Surgeon: Abilio Majano MD;  Location: [facility] CATH INVASIVE LOCATION;  Service: Cardiovascular;  Laterality: N/A;     SECTION       CHOLECYSTECTOMY      COLONOSCOPY      CORONARY STENT PLACEMENT      EYE SURGERY      GALLBLADDER SURGERY      KNEE MINI REVISION Right 7/28/2021    Procedure: KNEE POLY INSERT EXCHANGE;  Surgeon: Ky Avila MD;  Location:  [facility] MAIN OR;  Service: Orthopedics;  Laterality: Right;    KNEE SURGERY      PACEMAKER IMPLANTATION N/A 10/17/2024    Procedure: Implant or Replacement of Single Chamber Leadless Pacemaker, RV Only  medt micra;  Surgeon: Abilio Majano MD;  Location:  [facility] CATH INVASIVE LOCATION;  Service: Cardiovascular;  Laterality: N/A;    REPLACEMENT TOTAL KNEE Right 2005    REPLACEMENT TOTAL KNEE Left 2004       Social History     Socioeconomic History    Marital status:    Tobacco Use    Smoking status: Never     Passive exposure: Past    Smokeless tobacco: Never   Vaping Use    Vaping status: Never Used   Substance and Sexual Activity    Alcohol use: No    Drug use: Never    Sexual activity: Defer       Family History   Problem Relation Age of Onset    Breast cancer Mother     Bone cancer Father     Breast cancer Sister     Bone cancer Sister     Lung cancer Brother     Malig Hyperthermia Neg Hx        Review of Systems   Constitutional: Negative for chills, fever and malaise/fatigue.   Cardiovascular:  Negative for chest pain, dyspnea on exertion, leg swelling, near-syncope, orthopnea, palpitations, paroxysmal nocturnal dyspnea and syncope.   Respiratory:  Negative for cough and shortness of breath.    Hematologic/Lymphatic: Negative.    Musculoskeletal:  Negative for joint pain, joint swelling and myalgias.   Gastrointestinal:  Negative for abdominal pain, diarrhea, melena, nausea and vomiting.   Genitourinary:  Negative for frequency and hematuria.   Neurological:  Negative for light-headedness, numbness, paresthesias and seizures.   Allergic/Immunologic: Negative.    All other systems reviewed and are negative.      Allergies   Allergen Reactions    Codeine Nausea And Vomiting     Contrast Dye (Echo Or Unknown Ct/Mr) Hives    Morphine Nausea And Vomiting         Current Outpatient Medications:     albuterol sulfate  (90 Base) MCG/ACT inhaler, Inhale 2 puffs Every 4 (Four) Hours As Needed., Disp: , Rfl:     amLODIPine (NORVASC) 5 MG tablet, Take 1 tablet by mouth Daily., Disp: , Rfl:     aspirin 81 MG EC tablet, Take 1 tablet by mouth Daily., Disp: , Rfl:     atorvastatin (LIPITOR) 40 MG tablet, Take 1 tablet by mouth Daily., Disp: , Rfl:     Breo Ellipta 100-25 MCG/ACT aerosol powder , Inhale 1 puff Daily., Disp: , Rfl:     carvedilol (COREG) 25 MG tablet, Take 1 tablet by mouth 2 (Two) Times a Day With Meals. (Patient taking differently: Take 1 tablet by mouth Daily.), Disp: 180 tablet, Rfl: 6    famotidine (PEPCID) 40 MG tablet, Take 1 tablet by mouth every night at bedtime., Disp: , Rfl:     ferrous sulfate 324 (65 Fe) MG tablet delayed-release EC tablet, Take 1 tablet by mouth Every Other Day., Disp: , Rfl:     fluticasone (FLONASE) 50 MCG/ACT nasal spray, Administer 1 spray into the nostril(s) as directed by provider Every Morning., Disp: , Rfl:     hydroCHLOROthiazide 25 MG tablet, Take 1 tablet by mouth Daily., Disp: , Rfl:     magnesium oxide (MAG-OX) 400 tablet tablet, Take 1 tablet by mouth Daily., Disp: , Rfl:     meclizine (ANTIVERT) 25 MG tablet, Take 1 tablet by mouth 2 (Two) Times a Day As Needed., Disp: , Rfl:     montelukast (SINGULAIR) 10 MG tablet, Take 1 tablet by mouth Every Night., Disp: , Rfl:     pantoprazole (PROTONIX) 40 MG EC tablet, Take 1 tablet by mouth Daily., Disp: , Rfl:     polyethylene glycol (MIRALAX) 17 g packet, Take 17 g by mouth Daily As Needed (constipation)., Disp: , Rfl:     rOPINIRole (REQUIP) 0.25 MG tablet, Take 1 tablet by mouth every night at bedtime., Disp: , Rfl:     sennosides-docusate (PERICOLACE) 8.6-50 MG per tablet, Take 1 tablet by mouth 2 (Two) Times a Day As Needed for Constipation., Disp: , Rfl:     sertraline (ZOLOFT) 100 MG  "tablet, Take 0.5 tablets by mouth Daily., Disp: , Rfl:   No current facility-administered medications for this visit.    Facility-Administered Medications Ordered in Other Visits:     Chlorhexidine Gluconate Cloth 2 % pads, , Apply externally, BID, Francesco Grewal APRN    iopamidol (ISOVUE-370) 76 % injection, , , Code / Trauma / Sedation Medication, Shane Rosa MD, 15 mL at 03/27/23 1202      Objective:     Vitals:    12/30/24 1055   BP: 120/62   BP Location: Left arm   Patient Position: Sitting   Pulse: 65   Weight: 88 kg (194 lb)   Height: 162.6 cm (64.02\")     Body mass index is 33.28 kg/m².    PHYSICAL EXAM:    Vitals Reviewed.   General Appearance: No acute distress, well developed and well nourished.   Eyes: Conjunctiva and lids: No erythema, swelling, or discharge. Sclera non-icteric.   HENT: Atraumatic, normocephalic. External eyes, ears, and nose normal.   Respiratory: No signs of respiratory distress. Respiration rhythm and depth normal.   Clear to auscultation. No rales, crackles, rhonchi, or wheezing auscultated.   Cardiovascular:  Heart Rate and Rhythm: Normal, Heart Sounds: Normal S1 and S2. No S3 or S4 noted.  Gastrointestinal:  Abdomen soft, non-distended, non-tender.   Musculoskeletal: Normal movement of extremities  Skin: Warm and dry.   Psychiatric: Patient alert and oriented to person, place, and time. Speech and behavior appropriate. Normal mood and affect.       ECG 12 Lead    Date/Time: 12/30/2024 11:50 AM  Performed by: Bernabe Rosales APRN    Authorized by: Bernabe Rosales APRN  Comparison: compared with previous ECG   Similar to previous ECG  Rhythm: atrial fibrillation and paced            Assessment:       Diagnosis Plan   1. AV block, complete        2. Sick sinus syndrome        3. Presence of cardiac pacemaker        4. Abnormal CT scan of lung               Plan:   1-3--- AV block, permanent pacemaker-----she had extraction and reimplant of Micra leadless pacemaker.  She has ongoing " A-fib and was was switched to VVI pacing.  Her symptoms are virtually unchanged.  I do not see any evidence that this is cardiac in nature.  I recommend we continue with current parameters.  She is jeffrey follow-up in 6 months for device testing symptoms persist could consider echo    4. Dyspnea--- he continues to have ongoing dyspnea.  She is having wheezing as well.  She is only on albuterol inhaler and has not seen a pulmonologist in over a year.  I recommended that she contact them and consider follow-up appointment to maximize medical therapy.  Does not have any evidence of heart failure.            Follow-up in 6 months          As always, it has been a pleasure to participate in your patient's care.      Sincerely,         YOVANA Bhatia

## 2025-01-07 ENCOUNTER — ANESTHESIA EVENT (OUTPATIENT)
Dept: GASTROENTEROLOGY | Facility: HOSPITAL | Age: 85
End: 2025-01-07
Payer: MEDICARE

## 2025-01-07 ENCOUNTER — APPOINTMENT (OUTPATIENT)
Dept: GENERAL RADIOLOGY | Facility: HOSPITAL | Age: 85
DRG: 193 | End: 2025-01-07
Payer: MEDICARE

## 2025-01-07 ENCOUNTER — HOSPITAL ENCOUNTER (INPATIENT)
Facility: HOSPITAL | Age: 85
LOS: 2 days | Discharge: HOME | DRG: 193 | End: 2025-01-09
Attending: EMERGENCY MEDICINE | Admitting: INTERNAL MEDICINE
Payer: MEDICARE

## 2025-01-07 ENCOUNTER — APPOINTMENT (OUTPATIENT)
Dept: CT IMAGING | Facility: HOSPITAL | Age: 85
DRG: 193 | End: 2025-01-07
Payer: MEDICARE

## 2025-01-07 DIAGNOSIS — E87.6 HYPOKALEMIA: ICD-10-CM

## 2025-01-07 DIAGNOSIS — J18.9 PNEUMONIA OF RIGHT LUNG DUE TO INFECTIOUS ORGANISM, UNSPECIFIED PART OF LUNG: ICD-10-CM

## 2025-01-07 DIAGNOSIS — R91.8 MASS OF RIGHT LUNG: Primary | ICD-10-CM

## 2025-01-07 DIAGNOSIS — Z78.9 DECREASED ACTIVITIES OF DAILY LIVING (ADL): ICD-10-CM

## 2025-01-07 DIAGNOSIS — E83.42 HYPOMAGNESEMIA: ICD-10-CM

## 2025-01-07 DIAGNOSIS — J18.9 PNEUMONIA OF RIGHT MIDDLE LOBE DUE TO INFECTIOUS ORGANISM: ICD-10-CM

## 2025-01-07 LAB
ALBUMIN SERPL-MCNC: 3.6 G/DL (ref 3.5–5.2)
ALBUMIN/GLOB SERPL: 1.2 G/DL
ALP SERPL-CCNC: 70 U/L (ref 39–117)
ALT SERPL W P-5'-P-CCNC: 23 U/L (ref 1–33)
ANION GAP SERPL CALCULATED.3IONS-SCNC: 12.7 MMOL/L (ref 5–15)
AST SERPL-CCNC: 22 U/L (ref 1–32)
B PARAPERT DNA SPEC QL NAA+PROBE: NOT DETECTED
B PERT DNA SPEC QL NAA+PROBE: NOT DETECTED
BASOPHILS # BLD AUTO: 0.02 10*3/MM3 (ref 0–0.2)
BASOPHILS NFR BLD AUTO: 0.2 % (ref 0–1.5)
BILIRUB SERPL-MCNC: 0.9 MG/DL (ref 0–1.2)
BUN SERPL-MCNC: 17 MG/DL (ref 8–23)
BUN/CREAT SERPL: 20 (ref 7–25)
C PNEUM DNA NPH QL NAA+NON-PROBE: NOT DETECTED
CALCIUM SPEC-SCNC: 9.3 MG/DL (ref 8.6–10.5)
CHLORIDE SERPL-SCNC: 98 MMOL/L (ref 98–107)
CHOLEST SERPL-MCNC: 107 MG/DL (ref 0–200)
CO2 SERPL-SCNC: 26.3 MMOL/L (ref 22–29)
CREAT SERPL-MCNC: 0.85 MG/DL (ref 0.57–1)
D-LACTATE SERPL-SCNC: 1.3 MMOL/L (ref 0.5–2)
DEPRECATED RDW RBC AUTO: 49.5 FL (ref 37–54)
EGFRCR SERPLBLD CKD-EPI 2021: 67.2 ML/MIN/1.73
EOSINOPHIL # BLD AUTO: 0.16 10*3/MM3 (ref 0–0.4)
EOSINOPHIL NFR BLD AUTO: 1.8 % (ref 0.3–6.2)
ERYTHROCYTE [DISTWIDTH] IN BLOOD BY AUTOMATED COUNT: 15.9 % (ref 12.3–15.4)
FLUAV SUBTYP SPEC NAA+PROBE: NOT DETECTED
FLUAV SUBTYP SPEC NAA+PROBE: NOT DETECTED
FLUBV RNA ISLT QL NAA+PROBE: NOT DETECTED
FLUBV RNA ISLT QL NAA+PROBE: NOT DETECTED
GEN 5 1HR TROPONIN T REFLEX: 77 NG/L
GLOBULIN UR ELPH-MCNC: 3.1 GM/DL
GLUCOSE SERPL-MCNC: 99 MG/DL (ref 65–99)
HADV DNA SPEC NAA+PROBE: NOT DETECTED
HCOV 229E RNA SPEC QL NAA+PROBE: NOT DETECTED
HCOV HKU1 RNA SPEC QL NAA+PROBE: NOT DETECTED
HCOV NL63 RNA SPEC QL NAA+PROBE: NOT DETECTED
HCOV OC43 RNA SPEC QL NAA+PROBE: NOT DETECTED
HCT VFR BLD AUTO: 33.3 % (ref 34–46.6)
HDLC SERPL-MCNC: 45 MG/DL (ref 40–60)
HGB BLD-MCNC: 10.5 G/DL (ref 12–15.9)
HMPV RNA NPH QL NAA+NON-PROBE: NOT DETECTED
HOLD SPECIMEN: NORMAL
HOLD SPECIMEN: NORMAL
HPIV1 RNA ISLT QL NAA+PROBE: NOT DETECTED
HPIV2 RNA SPEC QL NAA+PROBE: NOT DETECTED
HPIV3 RNA NPH QL NAA+PROBE: NOT DETECTED
HPIV4 P GENE NPH QL NAA+PROBE: NOT DETECTED
IMM GRANULOCYTES # BLD AUTO: 0.13 10*3/MM3 (ref 0–0.05)
IMM GRANULOCYTES NFR BLD AUTO: 1.5 % (ref 0–0.5)
L PNEUMO1 AG UR QL IA: NEGATIVE
LDLC SERPL CALC-MCNC: 47 MG/DL (ref 0–100)
LDLC/HDLC SERPL: 1.07 {RATIO}
LYMPHOCYTES # BLD AUTO: 2.45 10*3/MM3 (ref 0.7–3.1)
LYMPHOCYTES NFR BLD AUTO: 27.5 % (ref 19.6–45.3)
M PNEUMO IGG SER IA-ACNC: NOT DETECTED
MAGNESIUM SERPL-MCNC: 0.9 MG/DL (ref 1.6–2.4)
MCH RBC QN AUTO: 27.1 PG (ref 26.6–33)
MCHC RBC AUTO-ENTMCNC: 31.5 G/DL (ref 31.5–35.7)
MCV RBC AUTO: 85.8 FL (ref 79–97)
MONOCYTES # BLD AUTO: 0.57 10*3/MM3 (ref 0.1–0.9)
MONOCYTES NFR BLD AUTO: 6.4 % (ref 5–12)
MRSA DNA SPEC QL NAA+PROBE: NORMAL
NEUTROPHILS NFR BLD AUTO: 5.58 10*3/MM3 (ref 1.7–7)
NEUTROPHILS NFR BLD AUTO: 62.6 % (ref 42.7–76)
NRBC BLD AUTO-RTO: 0 /100 WBC (ref 0–0.2)
NT-PROBNP SERPL-MCNC: 4957 PG/ML (ref 0–1800)
PLATELET # BLD AUTO: 343 10*3/MM3 (ref 140–450)
PMV BLD AUTO: 9.5 FL (ref 6–12)
POTASSIUM SERPL-SCNC: 2.8 MMOL/L (ref 3.5–5.2)
PROCALCITONIN SERPL-MCNC: 0.04 NG/ML (ref 0–0.25)
PROT SERPL-MCNC: 6.7 G/DL (ref 6–8.5)
QT INTERVAL: 441 MS
QTC INTERVAL: 518 MS
RBC # BLD AUTO: 3.88 10*6/MM3 (ref 3.77–5.28)
RHINOVIRUS RNA SPEC NAA+PROBE: NOT DETECTED
RSV RNA NPH QL NAA+NON-PROBE: NOT DETECTED
RSV RNA NPH QL NAA+NON-PROBE: NOT DETECTED
S PNEUM AG SPEC QL LA: NEGATIVE
SARS-COV-2 RNA RESP QL NAA+PROBE: NOT DETECTED
SARS-COV-2 RNA RESP QL NAA+PROBE: NOT DETECTED
SODIUM SERPL-SCNC: 137 MMOL/L (ref 136–145)
TRIGL SERPL-MCNC: 70 MG/DL (ref 0–150)
TROPONIN T % DELTA: 4 %
TROPONIN T NUMERIC DELTA: 3 NG/L
TROPONIN T SERPL HS-MCNC: 74 NG/L
TROPONIN T SERPL HS-MCNC: 74 NG/L
TSH SERPL DL<=0.05 MIU/L-ACNC: 1.5 UIU/ML (ref 0.27–4.2)
VLDLC SERPL-MCNC: 15 MG/DL (ref 5–40)
WBC NRBC COR # BLD AUTO: 8.91 10*3/MM3 (ref 3.4–10.8)
WHOLE BLOOD HOLD COAG: NORMAL
WHOLE BLOOD HOLD SPECIMEN: NORMAL

## 2025-01-07 PROCEDURE — 25010000002 MAGNESIUM SULFATE 2 GM/50ML SOLUTION: Performed by: EMERGENCY MEDICINE

## 2025-01-07 PROCEDURE — 93005 ELECTROCARDIOGRAM TRACING: CPT | Performed by: EMERGENCY MEDICINE

## 2025-01-07 PROCEDURE — 84443 ASSAY THYROID STIM HORMONE: CPT | Performed by: INTERNAL MEDICINE

## 2025-01-07 PROCEDURE — 83880 ASSAY OF NATRIURETIC PEPTIDE: CPT | Performed by: EMERGENCY MEDICINE

## 2025-01-07 PROCEDURE — 80061 LIPID PANEL: CPT | Performed by: INTERNAL MEDICINE

## 2025-01-07 PROCEDURE — 85025 COMPLETE CBC W/AUTO DIFF WBC: CPT | Performed by: EMERGENCY MEDICINE

## 2025-01-07 PROCEDURE — 25010000002 VANCOMYCIN 5 G RECONSTITUTED SOLUTION: Performed by: EMERGENCY MEDICINE

## 2025-01-07 PROCEDURE — 25010000002 POTASSIUM CHLORIDE 10 MEQ/100ML SOLUTION: Performed by: EMERGENCY MEDICINE

## 2025-01-07 PROCEDURE — 84145 PROCALCITONIN (PCT): CPT | Performed by: INTERNAL MEDICINE

## 2025-01-07 PROCEDURE — 71250 CT THORAX DX C-: CPT

## 2025-01-07 PROCEDURE — 84484 ASSAY OF TROPONIN QUANT: CPT | Performed by: EMERGENCY MEDICINE

## 2025-01-07 PROCEDURE — 94799 UNLISTED PULMONARY SVC/PX: CPT

## 2025-01-07 PROCEDURE — 94640 AIRWAY INHALATION TREATMENT: CPT

## 2025-01-07 PROCEDURE — 84484 ASSAY OF TROPONIN QUANT: CPT | Performed by: INTERNAL MEDICINE

## 2025-01-07 PROCEDURE — 25010000002 PIPERACILLIN SOD-TAZOBACTAM PER 1 G: Performed by: EMERGENCY MEDICINE

## 2025-01-07 PROCEDURE — 80053 COMPREHEN METABOLIC PANEL: CPT | Performed by: EMERGENCY MEDICINE

## 2025-01-07 PROCEDURE — 99223 1ST HOSP IP/OBS HIGH 75: CPT | Performed by: INTERNAL MEDICINE

## 2025-01-07 PROCEDURE — 63710000001 PREDNISONE PER 1 MG: Performed by: INTERNAL MEDICINE

## 2025-01-07 PROCEDURE — 93010 ELECTROCARDIOGRAM REPORT: CPT | Performed by: INTERNAL MEDICINE

## 2025-01-07 PROCEDURE — 83605 ASSAY OF LACTIC ACID: CPT | Performed by: EMERGENCY MEDICINE

## 2025-01-07 PROCEDURE — 99285 EMERGENCY DEPT VISIT HI MDM: CPT

## 2025-01-07 PROCEDURE — 87637 SARSCOV2&INF A&B&RSV AMP PRB: CPT | Performed by: EMERGENCY MEDICINE

## 2025-01-07 PROCEDURE — 25010000002 FUROSEMIDE PER 20 MG: Performed by: INTERNAL MEDICINE

## 2025-01-07 PROCEDURE — 0202U NFCT DS 22 TRGT SARS-COV-2: CPT | Performed by: INTERNAL MEDICINE

## 2025-01-07 PROCEDURE — 25010000002 PIPERACILLIN SOD-TAZOBACTAM PER 1 G: Performed by: INTERNAL MEDICINE

## 2025-01-07 PROCEDURE — 71045 X-RAY EXAM CHEST 1 VIEW: CPT

## 2025-01-07 PROCEDURE — 83735 ASSAY OF MAGNESIUM: CPT | Performed by: EMERGENCY MEDICINE

## 2025-01-07 PROCEDURE — 74176 CT ABD & PELVIS W/O CONTRAST: CPT

## 2025-01-07 PROCEDURE — 87040 BLOOD CULTURE FOR BACTERIA: CPT | Performed by: EMERGENCY MEDICINE

## 2025-01-07 PROCEDURE — 25010000002 MAGNESIUM SULFATE IN D5W 1G/100ML (PREMIX) 1-5 GM/100ML-% SOLUTION: Performed by: INTERNAL MEDICINE

## 2025-01-07 PROCEDURE — 87641 MR-STAPH DNA AMP PROBE: CPT | Performed by: INTERNAL MEDICINE

## 2025-01-07 PROCEDURE — 36415 COLL VENOUS BLD VENIPUNCTURE: CPT

## 2025-01-07 PROCEDURE — 87449 NOS EACH ORGANISM AG IA: CPT | Performed by: INTERNAL MEDICINE

## 2025-01-07 PROCEDURE — 25810000003 SODIUM CHLORIDE 0.9 % SOLUTION: Performed by: EMERGENCY MEDICINE

## 2025-01-07 RX ORDER — FUROSEMIDE 10 MG/ML
40 INJECTION INTRAMUSCULAR; INTRAVENOUS ONCE
Status: COMPLETED | OUTPATIENT
Start: 2025-01-07 | End: 2025-01-07

## 2025-01-07 RX ORDER — ACETAMINOPHEN 650 MG/1
650 SUPPOSITORY RECTAL EVERY 4 HOURS PRN
Status: DISCONTINUED | OUTPATIENT
Start: 2025-01-07 | End: 2025-01-09 | Stop reason: HOSPADM

## 2025-01-07 RX ORDER — BUDESONIDE 0.5 MG/2ML
0.5 INHALANT ORAL
Status: DISCONTINUED | OUTPATIENT
Start: 2025-01-07 | End: 2025-01-09 | Stop reason: HOSPADM

## 2025-01-07 RX ORDER — IPRATROPIUM BROMIDE AND ALBUTEROL SULFATE 2.5; .5 MG/3ML; MG/3ML
3 SOLUTION RESPIRATORY (INHALATION) EVERY 4 HOURS PRN
Status: DISCONTINUED | OUTPATIENT
Start: 2025-01-07 | End: 2025-01-08

## 2025-01-07 RX ORDER — SODIUM CHLORIDE 0.9 % (FLUSH) 0.9 %
10 SYRINGE (ML) INJECTION AS NEEDED
Status: DISCONTINUED | OUTPATIENT
Start: 2025-01-07 | End: 2025-01-09 | Stop reason: HOSPADM

## 2025-01-07 RX ORDER — PREDNISONE 20 MG/1
40 TABLET ORAL
Status: DISCONTINUED | OUTPATIENT
Start: 2025-01-07 | End: 2025-01-09 | Stop reason: HOSPADM

## 2025-01-07 RX ORDER — VANCOMYCIN/0.9 % SOD CHLORIDE 1.5G/250ML
1500 PLASTIC BAG, INJECTION (ML) INTRAVENOUS EVERY 24 HOURS
Status: DISCONTINUED | OUTPATIENT
Start: 2025-01-08 | End: 2025-01-08

## 2025-01-07 RX ORDER — IPRATROPIUM BROMIDE AND ALBUTEROL SULFATE 2.5; .5 MG/3ML; MG/3ML
3 SOLUTION RESPIRATORY (INHALATION) EVERY 4 HOURS PRN
Status: ON HOLD | COMMUNITY
Start: 2025-01-02 | End: 2025-01-09

## 2025-01-07 RX ORDER — ACETAMINOPHEN 325 MG/1
650 TABLET ORAL EVERY 4 HOURS PRN
Status: DISCONTINUED | OUTPATIENT
Start: 2025-01-07 | End: 2025-01-09 | Stop reason: HOSPADM

## 2025-01-07 RX ORDER — AMOXICILLIN 250 MG
2 CAPSULE ORAL 2 TIMES DAILY PRN
Status: DISCONTINUED | OUTPATIENT
Start: 2025-01-07 | End: 2025-01-09 | Stop reason: HOSPADM

## 2025-01-07 RX ORDER — BISACODYL 10 MG
10 SUPPOSITORY, RECTAL RECTAL DAILY PRN
Status: DISCONTINUED | OUTPATIENT
Start: 2025-01-07 | End: 2025-01-09 | Stop reason: HOSPADM

## 2025-01-07 RX ORDER — IPRATROPIUM BROMIDE AND ALBUTEROL SULFATE 2.5; .5 MG/3ML; MG/3ML
3 SOLUTION RESPIRATORY (INHALATION)
Status: DISCONTINUED | OUTPATIENT
Start: 2025-01-07 | End: 2025-01-08

## 2025-01-07 RX ORDER — ONDANSETRON 2 MG/ML
4 INJECTION INTRAMUSCULAR; INTRAVENOUS EVERY 6 HOURS PRN
Status: DISCONTINUED | OUTPATIENT
Start: 2025-01-07 | End: 2025-01-09 | Stop reason: HOSPADM

## 2025-01-07 RX ORDER — POTASSIUM CHLORIDE 7.45 MG/ML
10 INJECTION INTRAVENOUS
Status: COMPLETED | OUTPATIENT
Start: 2025-01-07 | End: 2025-01-07

## 2025-01-07 RX ORDER — SODIUM CHLORIDE 0.9 % (FLUSH) 0.9 %
10 SYRINGE (ML) INJECTION EVERY 12 HOURS SCHEDULED
Status: DISCONTINUED | OUTPATIENT
Start: 2025-01-07 | End: 2025-01-09 | Stop reason: HOSPADM

## 2025-01-07 RX ORDER — MAGNESIUM SULFATE HEPTAHYDRATE 40 MG/ML
2 INJECTION, SOLUTION INTRAVENOUS ONCE
Status: COMPLETED | OUTPATIENT
Start: 2025-01-07 | End: 2025-01-07

## 2025-01-07 RX ORDER — POTASSIUM CHLORIDE 750 MG/1
40 CAPSULE, EXTENDED RELEASE ORAL ONCE
Status: COMPLETED | OUTPATIENT
Start: 2025-01-07 | End: 2025-01-07

## 2025-01-07 RX ORDER — POLYETHYLENE GLYCOL 3350 17 G/17G
17 POWDER, FOR SOLUTION ORAL DAILY PRN
Status: DISCONTINUED | OUTPATIENT
Start: 2025-01-07 | End: 2025-01-09 | Stop reason: HOSPADM

## 2025-01-07 RX ORDER — ARFORMOTEROL TARTRATE 15 UG/2ML
15 SOLUTION RESPIRATORY (INHALATION)
Status: DISCONTINUED | OUTPATIENT
Start: 2025-01-07 | End: 2025-01-09 | Stop reason: HOSPADM

## 2025-01-07 RX ORDER — ACETAMINOPHEN 160 MG/5ML
650 SOLUTION ORAL EVERY 4 HOURS PRN
Status: DISCONTINUED | OUTPATIENT
Start: 2025-01-07 | End: 2025-01-09 | Stop reason: HOSPADM

## 2025-01-07 RX ORDER — FUROSEMIDE 10 MG/ML
40 INJECTION INTRAMUSCULAR; INTRAVENOUS DAILY
Status: DISCONTINUED | OUTPATIENT
Start: 2025-01-07 | End: 2025-01-09 | Stop reason: HOSPADM

## 2025-01-07 RX ORDER — ONDANSETRON 4 MG/1
4 TABLET, ORALLY DISINTEGRATING ORAL EVERY 6 HOURS PRN
Status: DISCONTINUED | OUTPATIENT
Start: 2025-01-07 | End: 2025-01-09 | Stop reason: HOSPADM

## 2025-01-07 RX ORDER — BISACODYL 5 MG/1
5 TABLET, DELAYED RELEASE ORAL DAILY PRN
Status: DISCONTINUED | OUTPATIENT
Start: 2025-01-07 | End: 2025-01-09 | Stop reason: HOSPADM

## 2025-01-07 RX ORDER — SODIUM CHLORIDE 9 MG/ML
40 INJECTION, SOLUTION INTRAVENOUS AS NEEDED
Status: DISCONTINUED | OUTPATIENT
Start: 2025-01-07 | End: 2025-01-09 | Stop reason: HOSPADM

## 2025-01-07 RX ORDER — MAGNESIUM SULFATE 1 G/100ML
2 INJECTION INTRAVENOUS
Status: COMPLETED | OUTPATIENT
Start: 2025-01-07 | End: 2025-01-07

## 2025-01-07 RX ORDER — SODIUM CHLORIDE, SODIUM LACTATE, POTASSIUM CHLORIDE, CALCIUM CHLORIDE 600; 310; 30; 20 MG/100ML; MG/100ML; MG/100ML; MG/100ML
30 INJECTION, SOLUTION INTRAVENOUS CONTINUOUS
Status: CANCELLED | OUTPATIENT
Start: 2025-01-07 | End: 2025-01-11

## 2025-01-07 RX ORDER — IPRATROPIUM BROMIDE AND ALBUTEROL SULFATE 2.5; .5 MG/3ML; MG/3ML
3 SOLUTION RESPIRATORY (INHALATION)
Status: DISCONTINUED | OUTPATIENT
Start: 2025-01-07 | End: 2025-01-07

## 2025-01-07 RX ADMIN — Medication 10 ML: at 22:24

## 2025-01-07 RX ADMIN — POTASSIUM CHLORIDE 10 MEQ: 7.46 INJECTION, SOLUTION INTRAVENOUS at 05:56

## 2025-01-07 RX ADMIN — POTASSIUM CHLORIDE 40 MEQ: 750 CAPSULE, EXTENDED RELEASE ORAL at 06:00

## 2025-01-07 RX ADMIN — VANCOMYCIN HYDROCHLORIDE 1750 MG: 5 INJECTION, POWDER, LYOPHILIZED, FOR SOLUTION INTRAVENOUS at 08:25

## 2025-01-07 RX ADMIN — ARFORMOTEROL TARTRATE 15 MCG: 15 SOLUTION RESPIRATORY (INHALATION) at 19:05

## 2025-01-07 RX ADMIN — MAGNESIUM SULFATE IN WATER 2 G: 40 INJECTION, SOLUTION INTRAVENOUS at 06:59

## 2025-01-07 RX ADMIN — ARFORMOTEROL TARTRATE 15 MCG: 15 SOLUTION RESPIRATORY (INHALATION) at 10:15

## 2025-01-07 RX ADMIN — IPRATROPIUM BROMIDE AND ALBUTEROL SULFATE 3 ML: 2.5; .5 SOLUTION RESPIRATORY (INHALATION) at 19:05

## 2025-01-07 RX ADMIN — PIPERACILLIN AND TAZOBACTAM 3.38 G: 3; .375 INJECTION, POWDER, FOR SOLUTION INTRAVENOUS at 22:25

## 2025-01-07 RX ADMIN — BUDESONIDE 0.5 MG: 0.5 INHALANT RESPIRATORY (INHALATION) at 10:15

## 2025-01-07 RX ADMIN — PIPERACILLIN AND TAZOBACTAM 3.38 G: 3; .375 INJECTION, POWDER, FOR SOLUTION INTRAVENOUS at 15:56

## 2025-01-07 RX ADMIN — MAGNESIUM SULFATE HEPTAHYDRATE 2 G: 1 INJECTION, SOLUTION INTRAVENOUS at 10:21

## 2025-01-07 RX ADMIN — PREDNISONE 40 MG: 20 TABLET ORAL at 11:33

## 2025-01-07 RX ADMIN — PIPERACILLIN AND TAZOBACTAM 3.38 G: 3; .375 INJECTION, POWDER, FOR SOLUTION INTRAVENOUS at 07:43

## 2025-01-07 RX ADMIN — FUROSEMIDE 40 MG: 10 INJECTION, SOLUTION INTRAMUSCULAR; INTRAVENOUS at 11:33

## 2025-01-07 RX ADMIN — BUDESONIDE 0.5 MG: 0.5 INHALANT RESPIRATORY (INHALATION) at 19:05

## 2025-01-07 RX ADMIN — Medication 10 ML: at 11:34

## 2025-01-07 RX ADMIN — MAGNESIUM SULFATE HEPTAHYDRATE 2 G: 1 INJECTION, SOLUTION INTRAVENOUS at 11:33

## 2025-01-07 RX ADMIN — DICLOFENAC SODIUM 2 G: 10 GEL TOPICAL at 13:33

## 2025-01-07 RX ADMIN — MAGNESIUM SULFATE HEPTAHYDRATE 2 G: 1 INJECTION, SOLUTION INTRAVENOUS at 12:39

## 2025-01-07 NOTE — H&P
[facility] HOSPITALIST H&P    [facility]  History Of Present Illness  Mallory is a 84 y.o. female presenting with past medical history COPD, coronary artery disease, permanent pacemaker never raised secondary to sick sinus syndrome, hypertension, recent bacteremia with MSSA on cefazolin admitted to [facility] for pneumonia and lung mass.  Patient states she has had several bouts of pneumonia since October 2024.  Most recently she has developed chills, increasing shortness of breath nonproductive cough prompting her to come to the emergency department.  Patient is not taking medication to alleviate her symptoms.  Patient denies any knowledge of malignancy in the lung on exam.   In the Emergency Department patient was afebrile, tachypneic, white blood cell count within normal limits.  Initial troponin of 74, proBNP 4957.  Potassium of 2.8.  Magnesium 0.9.  Chest x-ray reviewed, CT chest without contrast showed interval worsening of masslike consolidation in the right upper lobe which now involves the minor fissure.  This is certainly worrisome for malignancy.  Follow-up with a PET/CT recommended.  Bronchoscopy may be beneficial.  New spiculated nodule in the right upper lobe.  This could be infectious or neoplastic.  New groundglass opacities in the right middle lobe likely pneumonia.  Additional densities in the right lower lobe and to a lesser degree in the left lower lobe which could be infectious or inflammatory or they could potentially reflect areas of tumor if the larger area is in fact malignant.  Tiny right pleural effusion.  Cardiomegaly.  Moderate size hiatal hernia.  CT abdomen pelvis without IV contrast showed no acute findings in abdomen or pelvis.  1/7/2025 EKG showed normal sinus rhythm heart rate 83 with pacemaker.    Past Medical History  Past Medical History:   Diagnosis Date    Arthritis     Asthma     BPPV (benign paroxysmal positional vertigo), right     COPD (chronic obstructive  pulmonary disease)     Coronary artery disease     Coronary artery disease     Diverticulitis     Ear itching     HX    Enlarged heart     Gastroesophageal reflux disease     Hyperlipidemia     Hypertension     Knee pain     Macular degeneration     PONV (postoperative nausea and vomiting)     Postural dizziness with presyncope 2023    Shingles     X2    SOB (shortness of breath) on exertion     Tinnitus of left ear     Urinary frequency     Vertigo         Surgical History   has a past surgical history that includes  section; Gallbladder surgery; Knee surgery; Coronary stent placement; Eye surgery; Breast biopsy (Bilateral); Cholecystectomy; Replacement total knee (Right, ); Replacement total knee (Left, ); Colonoscopy; Cardiac catheterization; Knee Mini Revision (Right, 2021); Cardiac electrophysiology procedure (N/A, 3/27/2023); Bronchoscopy (Right, 2023); Cardiac pacemaker removal (N/A, 10/14/2024); Cardiac pacemaker removal (N/A, 10/14/2024); Cardiac electrophysiology procedure (N/A, 10/14/2024); and Pacemaker Implantation (N/A, 10/17/2024).     Social History  @ reports that she has never smoked. She has been exposed to tobacco smoke. She has never used smokeless tobacco. She reports that she does not drink alcohol and does not use drugs.    Family History  Family History   Problem Relation Age of Onset    Breast cancer Mother     Bone cancer Father     Breast cancer Sister     Bone cancer Sister     Lung cancer Brother     Malig Hyperthermia Neg Hx         Medication History  Past Medical History:   Diagnosis Date    Arthritis     Asthma     BPPV (benign paroxysmal positional vertigo), right     COPD (chronic obstructive pulmonary disease)     Coronary artery disease     Coronary artery disease     Diverticulitis     Ear itching     HX    Enlarged heart     Gastroesophageal reflux disease     Hyperlipidemia     Hypertension     Knee pain     Macular degeneration     PONV  (postoperative nausea and vomiting)     Postural dizziness with presyncope 03/16/2023    Shingles     X2    SOB (shortness of breath) on exertion     Tinnitus of left ear     Urinary frequency     Vertigo        Allergies  Allergies   Allergen Reactions    Codeine Nausea And Vomiting    Contrast Dye (Echo Or Unknown Ct/Mr) Hives    Morphine Nausea And Vomiting       Problem List    Pneumonia      Vitals  Vitals:    01/07/25 0400 01/07/25 0530 01/07/25 0600 01/07/25 0721   BP: 153/78 133/63 121/65 169/95   BP Location:    Right arm   Patient Position:    Lying   Pulse: 66 65 63 63   Resp:    20   Temp:    97.6 °F (36.4 °C)   TempSrc:    Oral   SpO2: 100% 96% 97% 96%   Weight:       Height:           Medications  (Not in a hospital admission)      REVIEW OF SYSTEMS:  Constitutional: Chills   Head: No head injury, No facial trauma  Eyes: No acute visual changes, No exudate, No trauma  Ears: No acute hearing loss, No earaches  Nose: No nasal discharge, No epistaxis  Neck: No new hoarseness, No voice change or new masses  Lungs:   Shortness of breath   Heart: No chest pressure with exertion, No palpitations,   Abdomen:  No new masses, no bright red blood per rectum  Extremities: No acute pain while ambulating, no new lesions  Skin: No new changes in skin color, no rashes or lesions  Neurologic: No new motor or sensory changes    PHYSICAL EXAM    General Appearance: Alert, cooperative, no distress, appears stated age  Head: Normocephalic, atraumatic  Eyes: PERRL, conjunctiva/corneas clear, EOM's intact, bilaterally  Ears: Normal  external ear canals, both ears  Nose: Nares normal, septum midline, mucosa normal, no drainage or sinus tenderness  Throat: Lips, mucosa, and tongue normal; teeth and gums normal  Neck: Supple  Lungs:  Clear to auscultation bilaterally, respirations unlabored  Heart: Regular rate and rhythm, S1, S2 normal, no murmur, rub or gallop  Abdomen:  Soft, non-tender, bowel sounds active all four  "quadrants,  no masses, no organomegaly  Extremities: Extremities normal, atraumatic, no cyanosis or edema  Pulses: 2+ and symmetric  Skin: Skin color, texture, turgor normal, no rashes or lesions,    pressure ulcer  Neurologic: Non Focal       Last Recorded Vitals  Blood pressure 169/95, pulse 63, temperature 97.6 °F (36.4 °C), temperature source Oral, resp. rate 20, height 162.6 cm (64\"), weight 84.4 kg (186 lb), SpO2 96%.    Relevant Results    Results from last 7 days   Lab Units 01/07/25  0412   WBC 10*3/mm3 8.91   HEMOGLOBIN g/dL 10.5*   HEMATOCRIT % 33.3*   PLATELETS 10*3/mm3 343      Results from last 7 days   Lab Units 01/07/25  0412   SODIUM mmol/L 137   POTASSIUM mmol/L 2.8*   CHLORIDE mmol/L 98   CO2 mmol/L 26.3   BUN mg/dL 17   CREATININE mg/dL 0.85   CALCIUM mg/dL 9.3   BILIRUBIN mg/dL 0.9   ALK PHOS U/L 70   ALT (SGPT) U/L 23   AST (SGOT) U/L 22   GLUCOSE mg/dL 99           Results from last 7 days   Lab Units 01/07/25  0655   LACTATE mmol/L 1.3                               Results from last 7 days   Lab Units 01/07/25  0532 01/07/25  0412   HSTROP T ng/L 77* 74*                          IMAGING   CT Abdomen Pelvis Without Contrast    Result Date: 1/7/2025  CT ABDOMEN PELVIS WO CONTRAST Date of Exam: 1/7/2025 6:00 AM EST Indication: Diffuse abdominal pain Comparison: 12/28/2024 Technique: Axial CT images were obtained of the abdomen and pelvis without the administration of contrast. Reconstructed coronal and sagittal images were also obtained. Automated exposure control and iterative construction methods were used. Findings: Please see chest CT report dictated separately. There is atherosclerotic disease with no aortic aneurysm. Gallbladder is surgically absent. No biliary obstruction. The unenhanced solid abdominal organs appear grossly normal. No renal or ureteral stones. No hydronephrosis. Urinary bladder appears normal. Solid pelvic organs are grossly normal. The appendix is not definitely seen, " but there is no evidence of acute appendicitis. There is colonic diverticulosis without definite acute diverticulitis or colitis. No small bowel obstruction is identified. There is a moderate hiatal hernia. The stomach is otherwise normal. Duodenal diverticulum is present adjacent to the pancreatic head, unchanged. No adenopathy. There is a trace amount of dependent free fluid in the pelvis, diminished in volume from the prior study. There is lumbar degenerative disease with mild levoscoliosis. No clearly suspicious osseous lesions.     1.No acute findings identified in the abdomen or pelvis. 2.Colonic diverticulosis without definite acute diverticulitis. No bowel obstruction. 3.Moderate hiatal hernia. 4.Trace amount of dependent free fluid in the pelvis, diminished in volume from the prior study. 5.No renal or ureteral stones. No hydronephrosis. Electronically Signed: Amos Mathew MD  1/7/2025 6:25 AM EST  Workstation ID: [id number]    CT Chest Without Contrast Diagnostic    Result Date: 1/7/2025  CT CHEST WO CONTRAST DIAGNOSTIC Date of Exam: 1/7/2025 5:52 AM EST Indication: Persistent right upper lobe infiltrate. Comparison: 1/15/2024 Technique: Axial CT images were obtained of the chest without contrast administration.  Reconstructed coronal and sagittal images were also obtained. Automated exposure control and iterative construction methods were used. Findings: Please see abdomen pelvis CT report dictated separately. There is atherosclerotic disease and coronary artery disease. There is a tiny right pleural effusion. No left-sided pleural effusion or pericardial effusion. The heart is enlarged. Previous pacemaker has been removed. There appears to be an electronic device near the cardiac apex, possibly in the right ventricle. Correlate with medical history. Calcified mediastinal and right hilar nodes are compatible with old granulomatous disease. No definite noncalcified adenopathy. Please note that the charlee  are poorly characterized in the absence of contrast. There is a moderate size hiatal hernia. There has been interval worsening of irregular masslike right upper lobe consolidation, now measuring roughly 5.1 x 6.5 x 3.3 cm this previously measured about 4.4 x 5.6 x 1.1 cm when measured similarly. The lesion now involves the minor fissure. Previously seen associated bronchiectasis is less apparent now due to the increase in masslike consolidation. This is certainly worrisome for malignancy, and follow-up with a PET/CT is recommended. There is a new right upper lobe spiculated nodule measuring 1.7 x 1.3 cm. Scattered other densities are noted in the right lower lobe and to a lesser degree in the left lower lobe. These are new from prior and could be infectious or inflammatory, or they could potentially reflect areas of tumor if the larger area is in fact malignant. There are groundglass infiltrates in the right middle lobe that likely reflect pneumonia. No clearly suspicious osseous lesions.     1.Interval worsening of masslike consolidation in the right upper lobe, which now involves the minor fissure. This is certainly worrisome for malignancy, and follow-up with a PET/CT is recommended. Bronchoscopy may be beneficial. 2.New spiculated nodule in the right upper lobe. This could be infectious or neoplastic. 3.New groundglass infiltrates in the right middle lobe, likely pneumonia. 4.Additional densities in the right lower lobe and to a lesser degree in the left lower lobe, which could be infectious or inflammatory, or they could potentially reflect areas of tumor if the larger area is in fact malignant. 5.Tiny right pleural effusion. 6.Cardiomegaly, coronary artery disease, and atherosclerotic disease. 7.Moderate size hiatal hernia. Electronically Signed: Amos Mathew MD  1/7/2025 6:15 AM EST  Workstation ID: [id number]    XR Chest 1 View    Result Date: 1/7/2025  XR CHEST 1 VW Date of Exam: 1/7/2025 4:08 AM EST  Indication: SOA Triage Protocol Comparison: 12/28/2024 Findings: Heart remains enlarged. Left-sided loop recorder is noted. There is also a left coronary stent. Pulmonary vascularity is normal. Granulomatous calcifications are noted in the right paratracheal stripe and right hilum. The left lung is clear. There is persistent dense consolidation in the right upper lobe that is unchanged. Blunting of the right costophrenic angle suggest a trace amount of right pleural fluid. No pneumothorax.     1.Persistent dense consolidation in the right upper lobe. Continued follow-up recommended. 2.Cardiomegaly. 3.Probable trace right pleural effusion. Electronically Signed: Amos Mathew MD  1/7/2025 4:23 AM EST  Workstation ID: [id number]              Assessment & Plan     Active Hospital Problems    Diagnosis  POA    **Pneumonia [J18.9]  Yes        Dyspnea secondary to right middle lobe pneumonia/lung mass possible malignancy/COPD  Admit to general medical floor on telemetry  Duonebs q6hr prn SOB  Keep 02 Sat 88-92%   Monitor SaO2  Obtain blood cx, sputum cx, legionella Ur antigen, Strep Pneumoniae antigen  RSV antigen, Coronavirus Antigen, Influenza antigen negative  Begin incentive spirometer  Wean O2 as tolerated  Begin Duonebs, Prednisone, Abx therapy Zosyn, vancomycin  Imaging reviewed  pulmonary consultation     Potassium 2.8  Replete    Magnesium 0.9  Replete    Permanent pacemaker secondary to sick sinus syndrome/coronary artery disease    Elevated BNP/trace pleural effusion  Diuresis with Lasix    Hypertension  Monitor blood pressure closely  Adjust antihypertensives as needed  Avoid hypotension  Encouraged patient to keep BP log    Bacteremia MSSA will schedule to be on cefazolin through 11/11/2025  See above    VTE Prophylaxis: SCD  Mechanical VTE prophylaxis orders are signed & held.        Code Status: Code Status and Medical Interventions: CPR (Attempt to Resuscitate); Full Support  Diet: Diet: Regular/House;  Fluid Consistency: Thin (IDDSI 0)  Length of Stay: 0  Ramu Lentz DO   01/07/25 08:06 EST

## 2025-01-07 NOTE — ED NOTES
This RN attempted to call report to the floor ([address]), was on hold for 20 minutes, with no nurse taking report.

## 2025-01-07 NOTE — ED PROVIDER NOTES
Time: 4:14 AM EST  Date of encounter:  1/7/2025  Independent Historian/Clinical History and Information was obtained by:   Patient  Chief Complaint: Shortness of breath and constipation    History is limited by: N/A    History of Present Illness:  Patient is a 84 y.o. year old female who presents to the emergency department for evaluation of shortness of breath and constipation.  Patient notes that her primary care provider diagnosed her with pneumonia about 2 weeks ago.  She states that she just finished his antibiotics.  Patient states that she is chronically short of breath.  Shortness of breath has been worse for the last several days.  She states that she really only gets short of breath with exertion and is relieved by rest.  She denies any orthopnea or PND.  She does have swelling in her legs but this is chronic and unchanged.  Patient notes a cough that is nonproductive.  The patient had no fever rigors or myalgias.  Patient denies any chest pain or chest pressure.  Patient states that she is constipated.  The patient states that she has not had a bowel movement in 3 weeks.  The patient states she is given herself 2 enemas and all she had out was the water that she put in.  Patient notes abdominal distention.  The patient believes that the abdominal distention is contributing to her shortness of breath.  She states that she does have diffuse abdominal pain but is mild in nature.  She denies any urinary frequency urgency or dysuria.    HPI    Patient Care Team  Primary Care Provider: Amauri Kearney MD    Past Medical History:     Allergies   Allergen Reactions    Codeine Nausea And Vomiting    Contrast Dye (Echo Or Unknown Ct/Mr) Hives    Morphine Nausea And Vomiting     Past Medical History:   Diagnosis Date    Arthritis     Asthma     BPPV (benign paroxysmal positional vertigo), right     COPD (chronic obstructive pulmonary disease)     Coronary artery disease     Coronary artery disease      Diverticulitis     Ear itching     HX    Enlarged heart     Gastroesophageal reflux disease     Hyperlipidemia     Hypertension     Knee pain     Macular degeneration     PONV (postoperative nausea and vomiting)     Postural dizziness with presyncope 2023    Shingles     X2    SOB (shortness of breath) on exertion     Tinnitus of left ear     Urinary frequency     Vertigo      Past Surgical History:   Procedure Laterality Date    BREAST BIOPSY Bilateral     BRONCHOSCOPY Right 2023    Procedure: BRONCHOSCOPY WITH BIOPSIES, BRUSHINGS, AND BRONCHOALVEOLAR LAVAGE;  Surgeon: Aleksandr Houston DO;  Location: [facility] ENDOSCOPY;  Service: Pulmonary;  Laterality: Right;  RIGHT UPPER LOBE ENDOBRONCHIAL OCCLUSION    CARDIAC CATHETERIZATION      CARDIAC ELECTROPHYSIOLOGY PROCEDURE N/A 3/27/2023    Procedure: Pacemaker SC new-STARFACE, call Dr. Rosa first thing in the morning to get time for the procedure and notify rep;  Surgeon: Shane Rosa MD;  Location: [facility] CATH INVASIVE LOCATION;  Service: Cardiovascular;  Laterality: N/A;    CARDIAC ELECTROPHYSIOLOGY PROCEDURE N/A 10/14/2024    Procedure: Temporary Pacemaker;  Surgeon: Abilio Majano MD;  Location: [facility] CATH INVASIVE LOCATION;  Service: Cardiovascular;  Laterality: N/A;    CARDIAC PACEMAKER REMOVAL N/A 10/14/2024    Procedure: Lead Removal PPM - DC, Dual;  Surgeon: Abilio Majano MD;  Location: [facility] CATH INVASIVE LOCATION;  Service: Cardiovascular;  Laterality: N/A;    CARDIAC PACEMAKER REMOVAL N/A 10/14/2024    Procedure: PPM Generator Explant;  Surgeon: Abilio Majano MD;  Location: [facility] CATH INVASIVE LOCATION;  Service: Cardiovascular;  Laterality: N/A;     SECTION      CHOLECYSTECTOMY      COLONOSCOPY      CORONARY STENT PLACEMENT      EYE SURGERY      GALLBLADDER SURGERY      KNEE MINI REVISION Right 2021    Procedure: KNEE POLY INSERT EXCHANGE;  Surgeon: Ky Avila MD;  Location: [facility]  MAIN OR;  Service: Orthopedics;  Laterality: Right;    KNEE SURGERY      PACEMAKER IMPLANTATION N/A 10/17/2024    Procedure: Implant or Replacement of Single Chamber Leadless Pacemaker, RV Only  medt micra;  Surgeon: Abilio Majano MD;  Location: [facility] INVASIVE LOCATION;  Service: Cardiovascular;  Laterality: N/A;    REPLACEMENT TOTAL KNEE Right 2005    REPLACEMENT TOTAL KNEE Left 2004     Family History   Problem Relation Age of Onset    Breast cancer Mother     Bone cancer Father     Breast cancer Sister     Bone cancer Sister     Lung cancer Brother     Malig Hyperthermia Neg Hx        Home Medications:  Prior to Admission medications    Medication Sig Start Date End Date Taking? Authorizing Provider   albuterol sulfate  (90 Base) MCG/ACT inhaler Inhale 2 puffs Every 4 (Four) Hours As Needed. 3/13/23   Brittany Garner MD   amLODIPine (NORVASC) 5 MG tablet Take 1 tablet by mouth Daily. 9/3/23   Brittany Garner MD   aspirin 81 MG EC tablet Take 1 tablet by mouth Daily.    Brittany Garner MD   atorvastatin (LIPITOR) 40 MG tablet Take 1 tablet by mouth Daily.    Brittany Garner MD   Breo Ellipta 100-25 MCG/ACT aerosol powder  Inhale 1 puff Daily. 5/30/23   Brittany Garner MD   carvedilol (COREG) 25 MG tablet Take 1 tablet by mouth 2 (Two) Times a Day With Meals.  Patient taking differently: Take 1 tablet by mouth Daily. 8/22/23   Migue Nguyen MD   famotidine (PEPCID) 40 MG tablet Take 1 tablet by mouth every night at bedtime. 1/14/23   Brittany Garner MD   ferrous sulfate 324 (65 Fe) MG tablet delayed-release EC tablet Take 1 tablet by mouth Every Other Day.    Brittany Garner MD   fluticasone (FLONASE) 50 MCG/ACT nasal spray Administer 1 spray into the nostril(s) as directed by provider Every Morning. 9/30/14   Brittany Garner MD   hydroCHLOROthiazide 25 MG tablet Take 1 tablet by mouth Daily.    Brittany Garner MD   magnesium oxide  (MAG-OX) 400 tablet tablet Take 1 tablet by mouth Daily. 10/12/24   Abilio Puckett MD   meclizine (ANTIVERT) 25 MG tablet Take 1 tablet by mouth 2 (Two) Times a Day As Needed. 5/18/15   Brittany Garner MD   montelukast (SINGULAIR) 10 MG tablet Take 1 tablet by mouth Every Night. 5/18/15   Brittany Garner MD   pantoprazole (PROTONIX) 40 MG EC tablet Take 1 tablet by mouth Daily. 1/6/23   Brittany Garner MD   polyethylene glycol (MIRALAX) 17 g packet Take 17 g by mouth Daily As Needed (constipation). 10/11/24   Abilio Puckett MD   rOPINIRole (REQUIP) 0.25 MG tablet Take 1 tablet by mouth every night at bedtime. 10/25/23   Brittany Garner MD   sennosides-docusate (PERICOLACE) 8.6-50 MG per tablet Take 1 tablet by mouth 2 (Two) Times a Day As Needed for Constipation. 10/11/24   Abilio Puckett MD   sertraline (ZOLOFT) 100 MG tablet Take 0.5 tablets by mouth Daily. 9/22/24   Brittany Garner MD        Social History:   Social History     Tobacco Use    Smoking status: Never     Passive exposure: Past    Smokeless tobacco: Never   Vaping Use    Vaping status: Never Used   Substance Use Topics    Alcohol use: No    Drug use: Never         Review of Systems:  Review of Systems   Constitutional:  Negative for chills, diaphoresis and fever.   HENT:  Negative for congestion, postnasal drip, rhinorrhea and sore throat.    Eyes:  Negative for photophobia.   Respiratory:  Positive for cough and shortness of breath. Negative for chest tightness and wheezing.    Cardiovascular:  Positive for leg swelling. Negative for chest pain and palpitations.   Gastrointestinal:  Positive for abdominal distention, abdominal pain and constipation. Negative for diarrhea, nausea and vomiting.   Genitourinary:  Negative for difficulty urinating, dysuria, flank pain, frequency, hematuria and urgency.   Musculoskeletal:  Negative for neck pain and neck stiffness.   Skin:  Negative for pallor and rash.   Neurological:  Negative  "for dizziness, syncope, weakness, numbness and headaches.   Hematological:  Negative for adenopathy. Does not bruise/bleed easily.   Psychiatric/Behavioral: Negative.          Physical Exam:  /65   Pulse 63   Temp 98.1 °F (36.7 °C) (Oral)   Resp 20   Ht 162.6 cm (64\")   Wt 84.4 kg (186 lb)   SpO2 97%   BMI 31.93 kg/m²     Physical Exam  Vitals and nursing note reviewed.   Constitutional:       General: She is not in acute distress.     Appearance: Normal appearance. She is not ill-appearing, toxic-appearing or diaphoretic.   HENT:      Head: Normocephalic and atraumatic.      Mouth/Throat:      Mouth: Mucous membranes are moist.   Eyes:      Pupils: Pupils are equal, round, and reactive to light.   Cardiovascular:      Rate and Rhythm: Normal rate and regular rhythm.      Pulses: Normal pulses.           Carotid pulses are 2+ on the right side and 2+ on the left side.       Radial pulses are 2+ on the right side and 2+ on the left side.        Femoral pulses are 2+ on the right side and 2+ on the left side.       Popliteal pulses are 2+ on the right side and 2+ on the left side.        Dorsalis pedis pulses are 2+ on the right side and 2+ on the left side.        Posterior tibial pulses are 2+ on the right side and 2+ on the left side.      Heart sounds: Normal heart sounds. No murmur heard.  Pulmonary:      Effort: Pulmonary effort is normal. No accessory muscle usage, respiratory distress or retractions.      Breath sounds: Normal breath sounds. No decreased breath sounds, wheezing, rhonchi or rales.   Chest:      Chest wall: No mass or tenderness.   Abdominal:      General: Abdomen is flat. There is distension.      Palpations: Abdomen is soft. There is no mass or pulsatile mass.      Tenderness: There is generalized abdominal tenderness. There is no right CVA tenderness, left CVA tenderness, guarding or rebound.      Comments: No rigidity    Patient has mild diffuse abdominal tenderness "   Musculoskeletal:         General: No swelling, tenderness or deformity.      Cervical back: Neck supple. No tenderness.      Right lower leg: No tenderness. Edema present.      Left lower leg: No tenderness. Edema present.      Comments: The patient has mild symmetric bilateral leg edema.  Most consistent with lymphedema.  It is nonpitting   Skin:     General: Skin is warm and dry.      Capillary Refill: Capillary refill takes less than 2 seconds.      Coloration: Skin is not jaundiced or pale.      Findings: No erythema.   Neurological:      General: No focal deficit present.      Mental Status: She is alert and oriented to person, place, and time. Mental status is at baseline.      Cranial Nerves: Cranial nerves 2-12 are intact. No cranial nerve deficit.      Sensory: Sensation is intact. No sensory deficit.      Motor: Motor function is intact. No weakness or pronator drift.      Coordination: Coordination is intact. Coordination normal.   Psychiatric:         Mood and Affect: Mood normal.         Behavior: Behavior normal.                  Procedures:  Procedures      Medical Decision Making:      Comorbidities that affect care:    Hypertension, arthritis, asthma, coronary disease, GERD, COPD    External Notes reviewed:    None      The following orders were placed and all results were independently analyzed by me:  Orders Placed This Encounter   Procedures    COVID-19, FLU A/B, RSV PCR 1 HR TAT - Swab, Nasopharynx    Blood Culture - Blood,    Blood Culture - Blood,    XR Chest 1 View    CT Chest Without Contrast Diagnostic    CT Abdomen Pelvis Without Contrast    [place] Draw    Comprehensive Metabolic Panel    BNP    High Sensitivity Troponin T    CBC Auto Differential    High Sensitivity Troponin T 1Hr    Magnesium    Lactic Acid, Plasma    NPO Diet NPO Type: Strict NPO    Undress & Gown    Continuous Pulse Oximetry    Vital Signs    Inpatient Hospitalist Consult    Oxygen Therapy- Nasal Cannula; Titrate  1-6 LPM Per SpO2; 90 - 95%    ECG 12 Lead ED Triage Standing Order; SOA    Insert Peripheral IV    CBC & Differential    Green Top (Gel)    Lavender Top    Gold Top - SST    Light Blue Top       Medications Given in the Emergency Department:  Medications   sodium chloride 0.9 % flush 10 mL (has no administration in time range)   vancomycin 1750 mg/500 mL 0.9% NS IVPB ([facility]) (has no administration in time range)   piperacillin-tazobactam (ZOSYN) IVPB 3.375 g IVPB in 100 mL NS (VTB) (has no administration in time range)   potassium chloride 10 mEq in 100 mL IVPB (10 mEq Intravenous New Bag 1/7/25 0556)   potassium chloride (MICRO-K/KLOR-CON) CR capsule (40 mEq Oral Given 1/7/25 0600)   magnesium sulfate 2g/50 mL (PREMIX) infusion (2 g Intravenous New Bag 1/7/25 0659)        ED Course:    ED Course as of 01/07/25 0704 Tue Jan 07, 2025 0417 EKG:    Rhythm: Sinus rhythm with occasional paced rhythm  Rate: 83  Intervals: Short GA and normal QT interval  Left bundle branch block  T-wave: T wave inversion in aVL,  ST Segment: The patient has non specific ST segments that are diffuse.    EKG Comparison: No EKG available for comparison    Interpreted by me   [SD]      ED Course User Index  [SD] Levar Barber DO       Labs:    Lab Results (last 24 hours)       Procedure Component Value Units Date/Time    CBC & Differential [[id number]]  (Abnormal) Collected: 01/07/25 0412    Specimen: Blood Updated: 01/07/25 0420    Narrative:      The following orders were created for panel order CBC & Differential.  Procedure                               Abnormality         Status                     ---------                               -----------         ------                     CBC Auto Differential[[id number]]        Abnormal            Final result                 Please view results for these tests on the individual orders.    Comprehensive Metabolic Panel [[id number]]  (Abnormal) Collected: 01/07/25 0412    Specimen: Blood  Updated: 01/07/25 0444     Glucose 99 mg/dL      BUN 17 mg/dL      Creatinine 0.85 mg/dL      Sodium 137 mmol/L      Potassium 2.8 mmol/L      Chloride 98 mmol/L      CO2 26.3 mmol/L      Calcium 9.3 mg/dL      Total Protein 6.7 g/dL      Albumin 3.6 g/dL      ALT (SGPT) 23 U/L      AST (SGOT) 22 U/L      Alkaline Phosphatase 70 U/L      Total Bilirubin 0.9 mg/dL      Globulin 3.1 gm/dL      A/G Ratio 1.2 g/dL      BUN/Creatinine Ratio 20.0     Anion Gap 12.7 mmol/L      eGFR 67.2 mL/min/1.73     Narrative:      GFR Categories in Chronic Kidney Disease (CKD)      GFR Category          GFR (mL/min/1.73)    Interpretation  G1                     90 or greater         Normal or high (1)  G2                      60-89                Mild decrease (1)  G3a                   45-59                Mild to moderate decrease  G3b                   30-44                Moderate to severe decrease  G4                    15-29                Severe decrease  G5                    14 or less           Kidney failure          (1)In the absence of evidence of kidney disease, neither GFR category G1 or G2 fulfill the criteria for CKD.    eGFR calculation 2021 CKD-EPI creatinine equation, which does not include race as a factor    BNP [[id number]]  (Abnormal) Collected: 01/07/25 0412    Specimen: Blood Updated: 01/07/25 0441     proBNP 4,957.0 pg/mL     Narrative:      This assay is used as an aid in the diagnosis of individuals suspected of having heart failure. It can be used as an aid in the diagnosis of acute decompensated heart failure (ADHF) in patients presenting with signs and symptoms of ADHF to the emergency department (ED). In addition, NT-proBNP of <300 pg/mL indicates ADHF is not likely.    Age Range Result Interpretation  NT-proBNP Concentration (pg/mL:      <50             Positive            >450                   Gray                 300-450                    Negative             <300    50-75           Positive             >900                  Gray                300-900                  Negative            <300      >75             Positive            >1800                  Gray                300-1800                  Negative            <300    High Sensitivity Troponin T [[id number]]  (Abnormal) Collected: 01/07/25 0412    Specimen: Blood Updated: 01/07/25 0458     HS Troponin T 74 ng/L     Narrative:      High Sensitive Troponin T Reference Range:  <14.0 ng/L- Negative Female for AMI  <22.0 ng/L- Negative Male for AMI  >=14 - Abnormal Female indicating possible myocardial injury.  >=22 - Abnormal Male indicating possible myocardial injury.   Clinicians would have to utilize clinical acumen, EKG, Troponin, and serial changes to determine if it is an Acute Myocardial Infarction or myocardial injury due to an underlying chronic condition.         CBC Auto Differential [[id number]]  (Abnormal) Collected: 01/07/25 0412    Specimen: Blood Updated: 01/07/25 0420     WBC 8.91 10*3/mm3      RBC 3.88 10*6/mm3      Hemoglobin 10.5 g/dL      Hematocrit 33.3 %      MCV 85.8 fL      MCH 27.1 pg      MCHC 31.5 g/dL      RDW 15.9 %      RDW-SD 49.5 fl      MPV 9.5 fL      Platelets 343 10*3/mm3      Neutrophil % 62.6 %      Lymphocyte % 27.5 %      Monocyte % 6.4 %      Eosinophil % 1.8 %      Basophil % 0.2 %      Immature Grans % 1.5 %      Neutrophils, Absolute 5.58 10*3/mm3      Lymphocytes, Absolute 2.45 10*3/mm3      Monocytes, Absolute 0.57 10*3/mm3      Eosinophils, Absolute 0.16 10*3/mm3      Basophils, Absolute 0.02 10*3/mm3      Immature Grans, Absolute 0.13 10*3/mm3      nRBC 0.0 /100 WBC     COVID-19, FLU A/B, RSV PCR 1 HR TAT - Swab, Nasopharynx [[id number]]  (Normal) Collected: 01/07/25 0427    Specimen: Swab from Nasopharynx Updated: 01/07/25 0510     COVID19 Not Detected     Influenza A PCR Not Detected     Influenza B PCR Not Detected     RSV, PCR Not Detected    Narrative:      Fact sheet for providers:  https://www.fda.gov/media/547213/download    Fact sheet for patients: https://www.fda.gov/media/981140/download    Test performed by PCR.    High Sensitivity Troponin T 1Hr [[id number]]  (Abnormal) Collected: 01/07/25 0532    Specimen: Blood Updated: 01/07/25 0617     HS Troponin T 77 ng/L      Troponin T Numeric Delta 3 ng/L      Troponin T % Delta 4 %     Narrative:      High Sensitive Troponin T Reference Range:  <14.0 ng/L- Negative Female for AMI  <22.0 ng/L- Negative Male for AMI  >=14 - Abnormal Female indicating possible myocardial injury.  >=22 - Abnormal Male indicating possible myocardial injury.   Clinicians would have to utilize clinical acumen, EKG, Troponin, and serial changes to determine if it is an Acute Myocardial Infarction or myocardial injury due to an underlying chronic condition.         Magnesium [[id number]]  (Abnormal) Collected: 01/07/25 0532    Specimen: Blood Updated: 01/07/25 0617     Magnesium 0.9 mg/dL     Blood Culture - Blood, Arm, Left [[id number]] Collected: 01/07/25 0655    Specimen: Blood from Arm, Left Updated: 01/07/25 0700    Blood Culture - Blood, Arm, Right [[id number]] Collected: 01/07/25 0655    Specimen: Blood from Arm, Right Updated: 01/07/25 0700    Lactic Acid, Plasma [[id number]] Collected: 01/07/25 0655    Specimen: Blood from Arm, Right Updated: 01/07/25 0700             Imaging:    CT Abdomen Pelvis Without Contrast    Result Date: 1/7/2025  CT ABDOMEN PELVIS WO CONTRAST Date of Exam: 1/7/2025 6:00 AM EST Indication: Diffuse abdominal pain Comparison: 12/28/2024 Technique: Axial CT images were obtained of the abdomen and pelvis without the administration of contrast. Reconstructed coronal and sagittal images were also obtained. Automated exposure control and iterative construction methods were used. Findings: Please see chest CT report dictated separately. There is atherosclerotic disease with no aortic aneurysm. Gallbladder is surgically absent. No biliary  obstruction. The unenhanced solid abdominal organs appear grossly normal. No renal or ureteral stones. No hydronephrosis. Urinary bladder appears normal. Solid pelvic organs are grossly normal. The appendix is not definitely seen, but there is no evidence of acute appendicitis. There is colonic diverticulosis without definite acute diverticulitis or colitis. No small bowel obstruction is identified. There is a moderate hiatal hernia. The stomach is otherwise normal. Duodenal diverticulum is present adjacent to the pancreatic head, unchanged. No adenopathy. There is a trace amount of dependent free fluid in the pelvis, diminished in volume from the prior study. There is lumbar degenerative disease with mild levoscoliosis. No clearly suspicious osseous lesions.     1.No acute findings identified in the abdomen or pelvis. 2.Colonic diverticulosis without definite acute diverticulitis. No bowel obstruction. 3.Moderate hiatal hernia. 4.Trace amount of dependent free fluid in the pelvis, diminished in volume from the prior study. 5.No renal or ureteral stones. No hydronephrosis. Electronically Signed: Amos Mathew MD  1/7/2025 6:25 AM EST  Workstation ID: [id number]    CT Chest Without Contrast Diagnostic    Result Date: 1/7/2025  CT CHEST WO CONTRAST DIAGNOSTIC Date of Exam: 1/7/2025 5:52 AM EST Indication: Persistent right upper lobe infiltrate. Comparison: 1/15/2024 Technique: Axial CT images were obtained of the chest without contrast administration.  Reconstructed coronal and sagittal images were also obtained. Automated exposure control and iterative construction methods were used. Findings: Please see abdomen pelvis CT report dictated separately. There is atherosclerotic disease and coronary artery disease. There is a tiny right pleural effusion. No left-sided pleural effusion or pericardial effusion. The heart is enlarged. Previous pacemaker has been removed. There appears to be an electronic device near the  cardiac apex, possibly in the right ventricle. Correlate with medical history. Calcified mediastinal and right hilar nodes are compatible with old granulomatous disease. No definite noncalcified adenopathy. Please note that the charlee are poorly characterized in the absence of contrast. There is a moderate size hiatal hernia. There has been interval worsening of irregular masslike right upper lobe consolidation, now measuring roughly 5.1 x 6.5 x 3.3 cm this previously measured about 4.4 x 5.6 x 1.1 cm when measured similarly. The lesion now involves the minor fissure. Previously seen associated bronchiectasis is less apparent now due to the increase in masslike consolidation. This is certainly worrisome for malignancy, and follow-up with a PET/CT is recommended. There is a new right upper lobe spiculated nodule measuring 1.7 x 1.3 cm. Scattered other densities are noted in the right lower lobe and to a lesser degree in the left lower lobe. These are new from prior and could be infectious or inflammatory, or they could potentially reflect areas of tumor if the larger area is in fact malignant. There are groundglass infiltrates in the right middle lobe that likely reflect pneumonia. No clearly suspicious osseous lesions.     1.Interval worsening of masslike consolidation in the right upper lobe, which now involves the minor fissure. This is certainly worrisome for malignancy, and follow-up with a PET/CT is recommended. Bronchoscopy may be beneficial. 2.New spiculated nodule in the right upper lobe. This could be infectious or neoplastic. 3.New groundglass infiltrates in the right middle lobe, likely pneumonia. 4.Additional densities in the right lower lobe and to a lesser degree in the left lower lobe, which could be infectious or inflammatory, or they could potentially reflect areas of tumor if the larger area is in fact malignant. 5.Tiny right pleural effusion. 6.Cardiomegaly, coronary artery disease, and  atherosclerotic disease. 7.Moderate size hiatal hernia. Electronically Signed: Amos Mathew MD  1/7/2025 6:15 AM EST  Workstation ID: [id number]    XR Chest 1 View    Result Date: 1/7/2025  XR CHEST 1 VW Date of Exam: 1/7/2025 4:08 AM EST Indication: SOA Triage Protocol Comparison: 12/28/2024 Findings: Heart remains enlarged. Left-sided loop recorder is noted. There is also a left coronary stent. Pulmonary vascularity is normal. Granulomatous calcifications are noted in the right paratracheal stripe and right hilum. The left lung is clear. There is persistent dense consolidation in the right upper lobe that is unchanged. Blunting of the right costophrenic angle suggest a trace amount of right pleural fluid. No pneumothorax.     1.Persistent dense consolidation in the right upper lobe. Continued follow-up recommended. 2.Cardiomegaly. 3.Probable trace right pleural effusion. Electronically Signed: Amos Mathew MD  1/7/2025 4:23 AM EST  Workstation ID: [id number]       Differential Diagnosis and Discussion:    Dyspnea: Differential diagnosis includes but is not limited to metabolic acidosis, neurological disorders, psychogenic, asthma, pneumothorax, upper airway obstruction, COPD, pneumonia, noncardiogenic pulmonary edema, interstitial lung disease, anemia, congestive heart failure, and pulmonary embolism    Labs were collected in the emergency department and all labs were reviewed and interpreted by me.  X-ray were performed in the emergency department and all X-ray impressions were independently interpreted by me.  An EKG was performed and the EKG was interpreted by me.    MDM  Number of Diagnoses or Management Options  Hypokalemia  Hypomagnesemia  Mass of right lung  Pneumonia of right lung due to infectious organism, unspecified part of lung  Diagnosis management comments: The patient's CBC was reviewed and shows no abnormalities of critical concern.  Of note, there is no anemia requiring a blood transfusion and  the platelet count is acceptable    The patient's Covid swab was negative   The patient's Influenza swab was negative   The patient's RSV swab was negative    EKG demonstrates sinus rhythm with occasional paced beat with a rate of 83    The patient's CMP was reviewed and shows no abnormalities of critical concern.  Of note, the patient's sodium was acceptable.  The patient's liver enzymes are unremarkable.  The patient's renal function including creatinine is preserved.  The patient has a normal anion gap.  Patient's potassium was low at 2.8.  I will place the patient's potassium with IV potassium 10 mill equivalents and then 40 mill equivalents of oral potassium    Patient's BNP is elevated at 4957.  This is an increase from the patient's previous BNP.    Patient's first high sensitivity troponin was elevated at 74      Chest x-ray demonstrates a persistent dense consolidation in the right upper lobe.  Patient has cardiomegaly and a trace right pleural effusion.    CT scan of the chest will be performed to reevaluate the persistent right upper lobe infiltrate.  CT scan of the abdomen pelvis will be performed due to the patient's persistent abdominal pain and lack of bowel movement for 2 weeks             Amount and/or Complexity of Data Reviewed  Clinical lab tests: reviewed  Tests in the radiology section of CPT®: reviewed  Tests in the medicine section of CPT®: reviewed  Decide to obtain previous medical records or to obtain history from someone other than the patient: yes  Discuss the patient with other providers: yes (07:04 EST  I discussed the case with the hospitalist.  We have discussed the patient's presenting symptoms, laboratory values, imaging and condition at the time of admission.  They will evaluate the patient in the emergency room and admit the patient to the hospital)             Social Determinants of Health:    Patient is independent, reliable, and has access to care.       Disposition and Care  Coordination:    Admit:   Through independent evaluation of the patient's history, physical, and imperical data, the patient meets criteria for inpatient admission to the hospital.        Final diagnoses:   Mass of right lung   Pneumonia of right lung due to infectious organism, unspecified part of lung   Hypokalemia   Hypomagnesemia        ED Disposition       ED Disposition   Decision to Admit    Condition   --    Comment   --               This medical record created using voice recognition software.             Levar Barber DO  01/09/25 [id number]

## 2025-01-07 NOTE — ANESTHESIA PREPROCEDURE EVALUATION
Anesthesia Evaluation     Patient summary reviewed and Nursing notes reviewed   history of anesthetic complications:  PONV  NPO Solid Status: > 8 hours  NPO Liquid Status: > 2 hours           Airway   Mallampati: II  TM distance: >3 FB  Neck ROM: full  Dental    (+) upper dentures and lower dentures    Pulmonary     breath sounds clear to auscultation  (+) pneumonia (hx of right upper lobe pneumonia, ED admit noted groundglass opacities in RML with qadditional densities RLL and LLL.) , COPD (daily Breo, nebs this am on floor), asthma,shortness of breath (SOB on exertion)  Cardiovascular   Exercise tolerance: poor (<4 METS)    ECG reviewed  Patient on routine beta blocker  Rhythm: regular  Rate: normal    (+) pacemaker (interrogated 11/27/24) pacemaker interrogated 1-3 months ago, hypertension, CAD, dysrhythmias (hx of complete AV block and SSS), hyperlipidemia      Neuro/Psych  (+) dizziness/light headedness (Vertigo, postural dizziness with presyncope)  GI/Hepatic/Renal/Endo    (+) obesity, GERD well controlled    Musculoskeletal     Abdominal    Substance History      OB/GYN          Other   arthritis,     ROS/Med Hx Other: EKG:  HEART RATE=83  bpm  RR Dczbesbi=903  ms  VA Interval=51  ms  P Horizontal Axis=  deg  P Front Axis=134  deg  QRSD Interval=160  ms  QT Dybqpznt=210  ms  XUdV=429  ms  QRS Axis=-32  deg  T Wave Axis=124  deg  - ABNORMAL ECG -  Sinus rhythm  Ventricular tachycardia, unsustained  Short VA interval  Consider right atrial enlargement  Left bundle branch block  ST elevation secondary to IVCD  Date and Time of Study:2025-01-07 03:59:39    Echo 10/10/24:  Normal left ventricular systolic function.  Pacemaker lead in the right ventricle.  No evidence of any vegetation.  Mild mitral regurgitation.    ED admit 1/7/25:  84 y.o. female presenting with past medical history COPD, coronary artery disease, permanent pacemaker never raised secondary to sick sinus syndrome, hypertension, recent bacteremia  with MSSA on cefazolin admitted to [facility] for pneumonia and lung mass.  Patient states she has had several bouts of pneumonia since October 2024.  Most recently she has developed chills, increasing shortness of breath nonproductive cough prompting her to come to the emergency department.  Patient is not taking medication to alleviate her symptoms.  Patient denies any knowledge of malignancy in the lung on exam.   In the Emergency Department patient was afebrile, tachypneic, white blood cell count within normal limits.  Initial troponin of 74, proBNP 4957.  Potassium of 2.8.  Magnesium 0.9.  Chest x-ray reviewed, CT chest without contrast showed interval worsening of masslike consolidation in the right upper lobe which now involves the minor fissure.  This is certainly worrisome for malignancy.  Follow-up with a PET/CT recommended.  Bronchoscopy may be beneficial.  New spiculated nodule in the right upper lobe.  This could be infectious or neoplastic.  New groundglass opacities in the right middle lobe likely pneumonia.  Additional densities in the right lower lobe and to a lesser degree in the left lower lobe which could be infectious or inflammatory or they could potentially reflect areas of tumor if the larger area is in fact malignant.  Tiny right pleural effusion.  Cardiomegaly.  Moderate size hiatal hernia.  CT abdomen pelvis without IV contrast showed no acute findings in abdomen or pelvis.  1/7/2025 EKG showed normal sinus rhythm heart rate 83 with pacemaker.                      Anesthesia Plan    ASA 4     general   total IV anesthesia  (Total IV Anesthesia    Patient understands anesthesia not responsible for dental damage.      Discussed risks with pt including aspiration, allergic reactions, apnea, advanced airway placement. Pt verbalized understanding. All questions answered.  )  intravenous induction     Anesthetic plan, risks, benefits, and alternatives have been provided, discussed and  informed consent has been obtained with: patient.  Pre-procedure education provided  Plan discussed with CRNA.        CODE STATUS:    Level Of Support Discussed With: Patient  Code Status (Patient has no pulse and is not breathing): CPR (Attempt to Resuscitate)  Medical Interventions (Patient has pulse or is breathing): Full Support

## 2025-01-07 NOTE — PROGRESS NOTES
"[facility] Clinical Pharmacy Services: Vancomycin Pharmacokinetic Initial Consult Note    Mallory Franco is a 84 y.o. female who is on day  of pharmacy to dose vancomycin for Pneumonia.    Consult Information  Consulting Provider: Tor  Planned Duration of Therapy: 7 days  Loading Dose  Given: 1750 mg iv x 1 this AM  PK/PD Target: -600 mg/L.hr   Other Antimicrobials: Piperacillin/Tazobactam    Imaging Reviewed?: Yes    Microbiology Data   blood cx ordered   MRSA PCR ordered   resp cx ordered    Vitals/Labs  Ht: 162.6 cm (64\"); Wt: 90.4 kg (199 lb 4.7 oz)  Temp (24hrs), Av.9 °F (36.6 °C), Min:97.6 °F (36.4 °C), Max:98.1 °F (36.7 °C)   Estimated Creatinine Clearance: 52.7 mL/min (by C-G formula based on SCr of 0.85 mg/dL).       Results from last 7 days   Lab Units 25  0412   CREATININE mg/dL 0.85   WBC 10*3/mm3 8.91     Assessment/Plan:    Vancomycin Dose:  1500 mg IV every 24 hours; which provides the following predicted parameters:  AUC24,ss: 508 mg/L.hr  Probability of AUC24 > 400: 76 %  Ctrough,ss: 15 mg/L  Probability of Ctrough,ss > 20: 25 %  Probability of nephrotoxicity (Lodise HIMA ): 10 %    Vanc Random planned for   Pharmacy will follow patient's kidney function and will adjust doses and obtain levels as necessary. Thank you for involving pharmacy in this patient's care. Please contact pharmacy with any questions or concerns.                           Gila Wagner  Clinical Pharmacist    " Per [facility] ok to call dad and review xray showed constipation- dad aware and will continue with [facility] plan.

## 2025-01-07 NOTE — CONSULTS
Pulmonary / Critical Care Consult Note      Patient Name: Mallory Franco  : 1940  MRN: [id number]  Primary Care Physician:  Amauri Kearney MD  Referring Physician: Levar Barber DO  Date of admission: 2025    Subjective   Subjective     Reason for Consult/ Chief Complaint:   Nonresolving pneumonia    HPI:  Mallory Franco is a 84 y.o. female with a history of chronic right upper/middle lobe collapse due to a large obstructing calcified right hilar lymph node comes in with pneumonia symptoms.  Reports over the last 3 months has been treated for pneumonia 3 times.  Each time x-ray shows persistent right upper lobe infiltrate.  She is completed antibiotics and not feeling any better.  She comes in with fevers, chills, shortness of breath, coughing and wheezing.  She is not hypoxemic on admission and does have some mild work of breathing.  No obvious fevers noted today.  Chest CT shows worsening opacification of the right upper/middle lobe.  No inflammatory appearing nodules well in the right upper lobe.  Did have a bronchoscopy by my partner over a year ago showing obstruction of this area due to this obstructing calcified lymph node.  Biopsies were negative at that time.  She is a never smoker.        Personal History     Past Medical History:   Diagnosis Date    Arthritis     Asthma     BPPV (benign paroxysmal positional vertigo), right     COPD (chronic obstructive pulmonary disease)     Coronary artery disease     Coronary artery disease     Diverticulitis     Ear itching     HX    Enlarged heart     Gastroesophageal reflux disease     Hyperlipidemia     Hypertension     Knee pain     Macular degeneration     PONV (postoperative nausea and vomiting)     Postural dizziness with presyncope 2023    Shingles     X2    SOB (shortness of breath) on exertion     Tinnitus of left ear     Urinary frequency     Vertigo        Past Surgical History:   Procedure Laterality Date    BREAST BIOPSY Bilateral      BRONCHOSCOPY Right 2023    Procedure: BRONCHOSCOPY WITH BIOPSIES, BRUSHINGS, AND BRONCHOALVEOLAR LAVAGE;  Surgeon: Aleksandr Houston DO;  Location: [facility] ENDOSCOPY;  Service: Pulmonary;  Laterality: Right;  RIGHT UPPER LOBE ENDOBRONCHIAL OCCLUSION    CARDIAC CATHETERIZATION      CARDIAC ELECTROPHYSIOLOGY PROCEDURE N/A 3/27/2023    Procedure: Pacemaker SC new-[employer], call Dr. Rosa first thing in the morning to get time for the procedure and notify rep;  Surgeon: Shane Rosa MD;  Location: [facility] CATH INVASIVE LOCATION;  Service: Cardiovascular;  Laterality: N/A;    CARDIAC ELECTROPHYSIOLOGY PROCEDURE N/A 10/14/2024    Procedure: Temporary Pacemaker;  Surgeon: Abilio Majano MD;  Location: [facility] CATH INVASIVE LOCATION;  Service: Cardiovascular;  Laterality: N/A;    CARDIAC PACEMAKER REMOVAL N/A 10/14/2024    Procedure: Lead Removal PPM - DC, Dual;  Surgeon: Abilio Majano MD;  Location: [facility] CATH INVASIVE LOCATION;  Service: Cardiovascular;  Laterality: N/A;    CARDIAC PACEMAKER REMOVAL N/A 10/14/2024    Procedure: PPM Generator Explant;  Surgeon: Abilio Majano MD;  Location: [facility] CATH INVASIVE LOCATION;  Service: Cardiovascular;  Laterality: N/A;     SECTION      CHOLECYSTECTOMY      COLONOSCOPY      CORONARY STENT PLACEMENT      EYE SURGERY      GALLBLADDER SURGERY      KNEE MINI REVISION Right 2021    Procedure: KNEE POLY INSERT EXCHANGE;  Surgeon: Ky Avila MD;  Location: [facility] OR;  Service: Orthopedics;  Laterality: Right;    KNEE SURGERY      PACEMAKER IMPLANTATION N/A 10/17/2024    Procedure: Implant or Replacement of Single Chamber Leadless Pacemaker, RV Only  medt micra;  Surgeon: Abilio Majano MD;  Location: [facility] CATH INVASIVE LOCATION;  Service: Cardiovascular;  Laterality: N/A;    REPLACEMENT TOTAL KNEE Right     REPLACEMENT TOTAL KNEE Left        Family History: family history includes Bone cancer in her  father and sister; Breast cancer in her mother and sister; Lung cancer in her brother. Otherwise pertinent FHx was reviewed and not pertinent to current issue.    Social History:  reports that she has never smoked. She has been exposed to tobacco smoke. She has never used smokeless tobacco. She reports that she does not drink alcohol and does not use drugs.    Home Medications:  Fluticasone Furoate-Vilanterol, albuterol sulfate HFA, amLODIPine, aspirin, atorvastatin, carvedilol, famotidine, ferrous sulfate, fluticasone, hydroCHLOROthiazide, magnesium oxide, meclizine, montelukast, pantoprazole, polyethylene glycol, rOPINIRole, sennosides-docusate, and sertraline    Allergies:  Allergies   Allergen Reactions    Codeine Nausea And Vomiting    Contrast Dye (Echo Or Unknown Ct/Mr) Hives    Morphine Nausea And Vomiting       Objective    Objective     Vitals:   Temp:  [97.6 °F (36.4 °C)-98.1 °F (36.7 °C)] 97.6 °F (36.4 °C)  Heart Rate:  [63-78] 63  Resp:  [20] 20  BP: (121-169)/(63-95) 169/95    Physical Exam:  Vital Signs Reviewed   General:  WDWN, Alert, NAD.    HEENT:  PERRL, EOMI.  OP, nares clear, no sinus tenderness  Neck:  Supple, no JVD, no thyromegaly  Lymph: no axillary, cervical, supraclavicular lymphadenopathy noted bilaterally  Chest:  good aeration, diminished right chest with right chest rhonchi, tympanic to percussion bilaterally, no work of breathing noted  CV: RRR, no MGR, pulses 2+, equal.  Abd:  Soft, NT, ND, + BS, no HSM  EXT:  no clubbing, no cyanosis, no edema, no joint tenderness  Neuro:  A&Ox3, CN grossly intact, no focal deficits.  Skin: No rashes or lesions noted      Result Review    Result Review:  I have personally reviewed the results from the time of this admission to 1/7/2025 07:48 EST and agree with these findings:  [x]  Laboratory  [x]  Microbiology  [x]  Radiology  [x]  EKG/Telemetry   []  Cardiology/Vascular   []  Pathology  []  Old records  []  Other:  Most notable findings include:        Lab 01/07/25  0412   WBC 8.91   HEMOGLOBIN 10.5*   HEMATOCRIT 33.3*   PLATELETS 343   SODIUM 137   POTASSIUM 2.8*   CHLORIDE 98   CO2 26.3   BUN 17   CREATININE 0.85   GLUCOSE 99   CALCIUM 9.3   TOTAL PROTEIN 6.7   ALBUMIN 3.6   GLOBULIN 3.1     CT Chest Without Contrast Diagnostic    Result Date: 1/7/2025  CT CHEST WO CONTRAST DIAGNOSTIC Date of Exam: 1/7/2025 5:52 AM EST Indication: Persistent right upper lobe infiltrate. Comparison: 1/15/2024 Technique: Axial CT images were obtained of the chest without contrast administration.  Reconstructed coronal and sagittal images were also obtained. Automated exposure control and iterative construction methods were used. Findings: Please see abdomen pelvis CT report dictated separately. There is atherosclerotic disease and coronary artery disease. There is a tiny right pleural effusion. No left-sided pleural effusion or pericardial effusion. The heart is enlarged. Previous pacemaker has been removed. There appears to be an electronic device near the cardiac apex, possibly in the right ventricle. Correlate with medical history. Calcified mediastinal and right hilar nodes are compatible with old granulomatous disease. No definite noncalcified adenopathy. Please note that the charlee are poorly characterized in the absence of contrast. There is a moderate size hiatal hernia. There has been interval worsening of irregular masslike right upper lobe consolidation, now measuring roughly 5.1 x 6.5 x 3.3 cm this previously measured about 4.4 x 5.6 x 1.1 cm when measured similarly. The lesion now involves the minor fissure. Previously seen associated bronchiectasis is less apparent now due to the increase in masslike consolidation. This is certainly worrisome for malignancy, and follow-up with a PET/CT is recommended. There is a new right upper lobe spiculated nodule measuring 1.7 x 1.3 cm. Scattered other densities are noted in the right lower lobe and to a lesser degree in the  left lower lobe. These are new from prior and could be infectious or inflammatory, or they could potentially reflect areas of tumor if the larger area is in fact malignant. There are groundglass infiltrates in the right middle lobe that likely reflect pneumonia. No clearly suspicious osseous lesions.     Impression: 1.Interval worsening of masslike consolidation in the right upper lobe, which now involves the minor fissure. This is certainly worrisome for malignancy, and follow-up with a PET/CT is recommended. Bronchoscopy may be beneficial. 2.New spiculated nodule in the right upper lobe. This could be infectious or neoplastic. 3.New groundglass infiltrates in the right middle lobe, likely pneumonia. 4.Additional densities in the right lower lobe and to a lesser degree in the left lower lobe, which could be infectious or inflammatory, or they could potentially reflect areas of tumor if the larger area is in fact malignant. 5.Tiny right pleural effusion. 6.Cardiomegaly, coronary artery disease, and atherosclerotic disease. 7.Moderate size hiatal hernia. Electronically Signed: Amos Mathew MD  1/7/2025 6:15 AM EST  Workstation ID: [id number]     Compared to previous chest CT, still obstructing calcified lymph node with obstruction of right middle and upper lobe.  There is more atelectatic/opacified right middle lobe.  New inflammatory nodules well in this area with a tiny pleural effusion.  Flu/COVID/RSV negative  NT BNP elevated  Procalcitonin normal    Previous office notes reviewed as well as Dr. Houston's bronchoscopy note from over a year ago reviewed commenting on the right middle and upper lobe being obstructed due to this calcified lymph node.  Biopsies and cultures were negative at that time.      Assessment & Plan   Assessment / Plan     Active Hospital Problems:  Active Hospital Problems    Diagnosis     **Pneumonia        Impression:  Question recurrent pneumonia, community-acquired  Right middle/upper  lobe atelectasis/masslike opacity due to obstruction via extrinsic impression from calcified lymph node  Granulomatous lung disease  Right upper lobe lung nodule, inflammatory versus malignant   COPD with exacerbation  Never smoker  Volume overload  Acute decompensated diastolic congestive heart failure  Hypokalemia  Hypomagnesemia  Class I obesity BMI 34.2    Plan:  I think the findings on chest CT could be expected due to this obstruction of the right middle and upper lobe due to this large calcified right hilar lymph node.  There is another calcified granulomas well noted in the mediastinum.  This does not meet criteria for fibrosing mediastinitis.  While there could be a malignancy mixed into this masslike opacity, it would be hard to delineate what area to biopsy without doing a PET/CT.  Recommend doing a PET/CT as an outpatient  I will take patient tomorrow for bronchoscopy to reevaluate this airway obstruction and try to do transbronchial biopsies of the area and see if the airway can free up.  It is possible she has a postobstructive pneumonia in this area.  Unfortunately there is very little we can do to actually remove/open up the right middle lobe  I have discussed the benefits vs risks of the procedure with the patient including pneumothorax, hemothorax, bleeding, hypoxia, required mechanical ventilation and death. The patient recognizes these findings, acknowledges these findings and is agreeable to the procedure.  N.p.o. after midnight  Empiric antibiotics with vancomycin and Zosyn.  Hopefully can de-escalate in 24 hours.  Check MRSA PCR to see if we can discontinue vancomycin  On day 1/5 of prednisone 40 mg daily  Ultras and urine antigen sent  Give 40 mg IV Lasix x 1 after corrected potassium and magnesium today  Follow-up with us as outpatient.  Will need PFTs as outpatient    VTE Prophylaxis:  Mechanical VTE prophylaxis orders are signed & held.        Code Status and Medical Interventions: CPR  (Attempt to Resuscitate); Full Support   Ordered at: 01/07/25 0741     Level Of Support Discussed With:    Patient     Code Status (Patient has no pulse and is not breathing):    CPR (Attempt to Resuscitate)     Medical Interventions (Patient has pulse or is breathing):    Full Support        Labs, imaging, microbiology, notes and medications personally reviewed  Discussed with primary    Thank you for involving me in the care of this patient.    Electronically signed by Mitch De La Torre MD, 01/07/25, 11:28 AM EST.

## 2025-01-07 NOTE — PLAN OF CARE
Goal Outcome Evaluation:  Plan of Care Reviewed With: family        Progress: no change  Outcome Evaluation: Patient admitted today with PNA. Patient is currently stable on room air but is having SOB with activity. She has received K and Mg replacements this shift and is getting IV lasix and ABX. Patient c/o back pain and states she uses Voltaren and a heating pad at home. Those have been restarted for the patient. Patient is to be NPO at midnight for a bronch tomorrow. VSS, will continue plan of care.

## 2025-01-07 NOTE — PROGRESS NOTES
Respiratory Therapist Broncho-Pulmonary Hygiene Progress Note      Patient Name:  Mallory Franco  YOB: 1940    Mallory Franco meets the qualification for Level 1 of the Bronco-Pulmonary Hygiene Protocol. This was based on my daily patient assessment and includes review of chest x-ray results, cough ability and quality, oxygenation, secretions or risk for secretion development and patient mobility.     Broncho-Pulmonary Hygiene Assessment:    Level of Movement: Actively changing positions without assistance  Alert/ oriented/ cooperative    Breath Sounds: Clear to slightly diminished    Cough: Strong, effective    Chest X-Ray: Mild consolidation and/or atelectasis.    Sputum Productions: None or small amount of thin or watery secretions with effective cough    History and Physical: Chronic condition    SpO2 to Oxygen Need: greater than 92% on room air or  less than 3L nasal canula    Current SpO2 is: 99 on room air    Based on this information, I have completed the following interventions: Teach/Instruct patient on cough and deep breathe      Electronically signed by Brandi Yanes RRT, 01/07/25, 9:56 AM EST.

## 2025-01-07 NOTE — ED NOTES
EMS states pt c/o difficulty breathing and is currently dx with pneumonia. Pt states she has been dx with pneumonia 3 times since October.

## 2025-01-08 ENCOUNTER — ANESTHESIA (OUTPATIENT)
Dept: GASTROENTEROLOGY | Facility: HOSPITAL | Age: 85
End: 2025-01-08
Payer: MEDICARE

## 2025-01-08 PROBLEM — J98.4 CALCIFIED GRANULOMA OF LUNG: Status: ACTIVE | Noted: 2025-01-08

## 2025-01-08 LAB
ACB CMPLX DNA BAL NAA+NON-PRB-NCNCRNG: NOT DETECTED
ANION GAP SERPL CALCULATED.3IONS-SCNC: 10.1 MMOL/L (ref 5–15)
BASOPHILS # BLD AUTO: 0.01 10*3/MM3 (ref 0–0.2)
BASOPHILS NFR BLD AUTO: 0.1 % (ref 0–1.5)
BLACTX-M ISLT/SPM QL: NORMAL
BLAIMP ISLT/SPM QL: NORMAL
BLAKPC ISLT/SPM QL: NORMAL
BLAOXA-48-LIKE ISLT/SPM QL: NORMAL
BLAVIM ISLT/SPM QL: NORMAL
BUN SERPL-MCNC: 18 MG/DL (ref 8–23)
BUN/CREAT SERPL: 18.9 (ref 7–25)
C PNEUM DNA NPH QL NAA+NON-PROBE: NOT DETECTED
CALCIUM SPEC-SCNC: 8.9 MG/DL (ref 8.6–10.5)
CHLORIDE SERPL-SCNC: 100 MMOL/L (ref 98–107)
CILIATED BAL QL: 12 %
CO2 SERPL-SCNC: 24.9 MMOL/L (ref 22–29)
CREAT SERPL-MCNC: 0.95 MG/DL (ref 0.57–1)
DEPRECATED RDW RBC AUTO: 50.9 FL (ref 37–54)
E CLOAC COMP DNA BAL NAA+NON-PRB-NCNCRNG: NOT DETECTED
E COLI DNA BAL NAA+NON-PRB-NCNCRNG: NOT DETECTED
EGFRCR SERPLBLD CKD-EPI 2021: 58.8 ML/MIN/1.73
EOSINOPHIL # BLD AUTO: 0 10*3/MM3 (ref 0–0.4)
EOSINOPHIL NFR BLD AUTO: 0 % (ref 0.3–6.2)
ERYTHROCYTE [DISTWIDTH] IN BLOOD BY AUTOMATED COUNT: 15.9 % (ref 12.3–15.4)
FLUAV SUBTYP SPEC NAA+PROBE: NOT DETECTED
FLUBV RNA ISLT QL NAA+PROBE: NOT DETECTED
GLUCOSE SERPL-MCNC: 134 MG/DL (ref 65–99)
GP B STREP DNA BAL NAA+NON-PRB-NCNCRNG: NOT DETECTED
HADV DNA SPEC NAA+PROBE: NOT DETECTED
HAEM INFLU DNA BAL NAA+NON-PRB-NCNCRNG: NOT DETECTED
HCOV RNA LOWER RESP QL NAA+NON-PROBE: NOT DETECTED
HCT VFR BLD AUTO: 30.8 % (ref 34–46.6)
HGB BLD-MCNC: 9.4 G/DL (ref 12–15.9)
HMPV RNA NPH QL NAA+NON-PROBE: NOT DETECTED
HPIV RNA LOWER RESP QL NAA+NON-PROBE: NOT DETECTED
IMM GRANULOCYTES # BLD AUTO: 0.05 10*3/MM3 (ref 0–0.05)
IMM GRANULOCYTES NFR BLD AUTO: 0.6 % (ref 0–0.5)
K AEROGENES DNA BAL NAA+NON-PRB-NCNCRNG: NOT DETECTED
K OXYTOCA DNA BAL NAA+NON-PRB-NCNCRNG: NOT DETECTED
K PNEU GRP DNA BAL NAA+NON-PRB-NCNCRNG: NOT DETECTED
L PNEUMO DNA LOWER RESP QL NAA+NON-PROBE: NOT DETECTED
LYMPHOCYTES # BLD AUTO: 0.77 10*3/MM3 (ref 0.7–3.1)
LYMPHOCYTES NFR BLD AUTO: 9.2 % (ref 19.6–45.3)
LYMPHOCYTES NFR FLD MANUAL: 9 %
M CATARRHALIS DNA BAL NAA+NON-PRB-NCNCRNG: NOT DETECTED
M PNEUMO IGG SER IA-ACNC: NOT DETECTED
MACROPHAGE FLUID %: 15 %
MAGNESIUM SERPL-MCNC: 2 MG/DL (ref 1.6–2.4)
MCH RBC QN AUTO: 26.6 PG (ref 26.6–33)
MCHC RBC AUTO-ENTMCNC: 30.5 G/DL (ref 31.5–35.7)
MCV RBC AUTO: 87 FL (ref 79–97)
MECA+MECC ISLT/SPM QL: NORMAL
MONOCYTES # BLD AUTO: 0.37 10*3/MM3 (ref 0.1–0.9)
MONOCYTES NFR BLD AUTO: 4.4 % (ref 5–12)
NDM GENE: NORMAL
NEUTROPHILS NFR BLD AUTO: 7.13 10*3/MM3 (ref 1.7–7)
NEUTROPHILS NFR BLD AUTO: 85.7 % (ref 42.7–76)
NEUTROPHILS NFR FLD MANUAL: 64 %
NRBC BLD AUTO-RTO: 0 /100 WBC (ref 0–0.2)
P AERUGINOSA DNA BAL NAA+NON-PRB-NCNCRNG: NOT DETECTED
PLATELET # BLD AUTO: 297 10*3/MM3 (ref 140–450)
PMV BLD AUTO: 9.9 FL (ref 6–12)
POTASSIUM SERPL-SCNC: 3.3 MMOL/L (ref 3.5–5.2)
PROTEUS SP DNA BAL NAA+NON-PRB-NCNCRNG: NOT DETECTED
RBC # BLD AUTO: 3.54 10*6/MM3 (ref 3.77–5.28)
RHINOVIRUS RNA SPEC NAA+PROBE: NOT DETECTED
RSV RNA NPH QL NAA+NON-PROBE: NOT DETECTED
S AUREUS DNA BAL NAA+NON-PRB-NCNCRNG: NOT DETECTED
S MARCESCENS DNA BAL NAA+NON-PRB-NCNCRNG: NOT DETECTED
S PNEUM DNA BAL NAA+NON-PRB-NCNCRNG: NOT DETECTED
S PYO DNA BAL NAA+NON-PRB-NCNCRNG: NOT DETECTED
SODIUM SERPL-SCNC: 135 MMOL/L (ref 136–145)
VISUAL PRESENCE OF BLOOD: NORMAL
WBC NRBC COR # BLD AUTO: 8.33 10*3/MM3 (ref 3.4–10.8)

## 2025-01-08 PROCEDURE — 88108 CYTOPATH CONCENTRATE TECH: CPT | Performed by: INTERNAL MEDICINE

## 2025-01-08 PROCEDURE — 97535 SELF CARE MNGMENT TRAINING: CPT

## 2025-01-08 PROCEDURE — 97165 OT EVAL LOW COMPLEX 30 MIN: CPT

## 2025-01-08 PROCEDURE — 31645 BRNCHSC W/THER ASPIR 1ST: CPT | Performed by: INTERNAL MEDICINE

## 2025-01-08 PROCEDURE — 87106 FUNGI IDENTIFICATION YEAST: CPT | Performed by: INTERNAL MEDICINE

## 2025-01-08 PROCEDURE — 87205 SMEAR GRAM STAIN: CPT | Performed by: INTERNAL MEDICINE

## 2025-01-08 PROCEDURE — 25010000002 LIDOCAINE HCL (PF) 4 % SOLUTION: Performed by: INTERNAL MEDICINE

## 2025-01-08 PROCEDURE — 25010000002 PROPOFOL 10 MG/ML EMULSION: Performed by: NURSE ANESTHETIST, CERTIFIED REGISTERED

## 2025-01-08 PROCEDURE — 94799 UNLISTED PULMONARY SVC/PX: CPT

## 2025-01-08 PROCEDURE — 0B9C8ZX DRAINAGE OF RIGHT UPPER LUNG LOBE, VIA NATURAL OR ARTIFICIAL OPENING ENDOSCOPIC, DIAGNOSTIC: ICD-10-PCS | Performed by: INTERNAL MEDICINE

## 2025-01-08 PROCEDURE — 87070 CULTURE OTHR SPECIMN AEROBIC: CPT | Performed by: INTERNAL MEDICINE

## 2025-01-08 PROCEDURE — 87206 SMEAR FLUORESCENT/ACID STAI: CPT | Performed by: INTERNAL MEDICINE

## 2025-01-08 PROCEDURE — 83735 ASSAY OF MAGNESIUM: CPT | Performed by: INTERNAL MEDICINE

## 2025-01-08 PROCEDURE — 99233 SBSQ HOSP IP/OBS HIGH 50: CPT | Performed by: INTERNAL MEDICINE

## 2025-01-08 PROCEDURE — 87102 FUNGUS ISOLATION CULTURE: CPT | Performed by: INTERNAL MEDICINE

## 2025-01-08 PROCEDURE — 88312 SPECIAL STAINS GROUP 1: CPT | Performed by: INTERNAL MEDICINE

## 2025-01-08 PROCEDURE — 89051 BODY FLUID CELL COUNT: CPT | Performed by: INTERNAL MEDICINE

## 2025-01-08 PROCEDURE — 25010000002 PIPERACILLIN SOD-TAZOBACTAM PER 1 G: Performed by: INTERNAL MEDICINE

## 2025-01-08 PROCEDURE — 87116 MYCOBACTERIA CULTURE: CPT | Performed by: INTERNAL MEDICINE

## 2025-01-08 PROCEDURE — 63710000001 PREDNISONE PER 1 MG: Performed by: INTERNAL MEDICINE

## 2025-01-08 PROCEDURE — 25010000002 FUROSEMIDE PER 20 MG: Performed by: INTERNAL MEDICINE

## 2025-01-08 PROCEDURE — 87071 CULTURE AEROBIC QUANT OTHER: CPT | Performed by: INTERNAL MEDICINE

## 2025-01-08 PROCEDURE — 94664 DEMO&/EVAL PT USE INHALER: CPT

## 2025-01-08 PROCEDURE — 25810000003 LACTATED RINGERS PER 1000 ML: Performed by: NURSE ANESTHETIST, CERTIFIED REGISTERED

## 2025-01-08 PROCEDURE — 80048 BASIC METABOLIC PNL TOTAL CA: CPT | Performed by: INTERNAL MEDICINE

## 2025-01-08 PROCEDURE — 25010000002 LIDOCAINE PF 2% 2 % SOLUTION: Performed by: NURSE ANESTHETIST, CERTIFIED REGISTERED

## 2025-01-08 PROCEDURE — 87633 RESP VIRUS 12-25 TARGETS: CPT | Performed by: INTERNAL MEDICINE

## 2025-01-08 PROCEDURE — 31624 DX BRONCHOSCOPE/LAVAGE: CPT | Performed by: INTERNAL MEDICINE

## 2025-01-08 PROCEDURE — 85025 COMPLETE CBC W/AUTO DIFF WBC: CPT | Performed by: INTERNAL MEDICINE

## 2025-01-08 RX ORDER — LIDOCAINE HYDROCHLORIDE 20 MG/ML
INJECTION, SOLUTION EPIDURAL; INFILTRATION; INTRACAUDAL; PERINEURAL AS NEEDED
Status: DISCONTINUED | OUTPATIENT
Start: 2025-01-08 | End: 2025-01-08 | Stop reason: SURG

## 2025-01-08 RX ORDER — POTASSIUM CHLORIDE 750 MG/1
40 CAPSULE, EXTENDED RELEASE ORAL 2 TIMES DAILY WITH MEALS
Status: COMPLETED | OUTPATIENT
Start: 2025-01-08 | End: 2025-01-08

## 2025-01-08 RX ORDER — SODIUM CHLORIDE, SODIUM LACTATE, POTASSIUM CHLORIDE, CALCIUM CHLORIDE 600; 310; 30; 20 MG/100ML; MG/100ML; MG/100ML; MG/100ML
INJECTION, SOLUTION INTRAVENOUS CONTINUOUS PRN
Status: DISCONTINUED | OUTPATIENT
Start: 2025-01-08 | End: 2025-01-08 | Stop reason: SURG

## 2025-01-08 RX ORDER — LIDOCAINE HYDROCHLORIDE 40 MG/ML
INJECTION, SOLUTION RETROBULBAR AS NEEDED
Status: DISCONTINUED | OUTPATIENT
Start: 2025-01-08 | End: 2025-01-08 | Stop reason: HOSPADM

## 2025-01-08 RX ORDER — EPHEDRINE SULFATE 50 MG/ML
INJECTION INTRAVENOUS AS NEEDED
Status: DISCONTINUED | OUTPATIENT
Start: 2025-01-08 | End: 2025-01-08 | Stop reason: SURG

## 2025-01-08 RX ORDER — ROPINIROLE 0.25 MG/1
0.25 TABLET, FILM COATED ORAL NIGHTLY
Status: DISCONTINUED | OUTPATIENT
Start: 2025-01-08 | End: 2025-01-09 | Stop reason: HOSPADM

## 2025-01-08 RX ORDER — PROPOFOL 10 MG/ML
VIAL (ML) INTRAVENOUS AS NEEDED
Status: DISCONTINUED | OUTPATIENT
Start: 2025-01-08 | End: 2025-01-08 | Stop reason: SURG

## 2025-01-08 RX ORDER — MAGNESIUM HYDROXIDE 1200 MG/15ML
LIQUID ORAL AS NEEDED
Status: DISCONTINUED | OUTPATIENT
Start: 2025-01-08 | End: 2025-01-08 | Stop reason: HOSPADM

## 2025-01-08 RX ORDER — ATORVASTATIN CALCIUM 40 MG/1
40 TABLET, FILM COATED ORAL DAILY
Status: DISCONTINUED | OUTPATIENT
Start: 2025-01-08 | End: 2025-01-09 | Stop reason: HOSPADM

## 2025-01-08 RX ORDER — IPRATROPIUM BROMIDE AND ALBUTEROL SULFATE 2.5; .5 MG/3ML; MG/3ML
3 SOLUTION RESPIRATORY (INHALATION) EVERY 6 HOURS PRN
Status: DISCONTINUED | OUTPATIENT
Start: 2025-01-08 | End: 2025-01-09 | Stop reason: HOSPADM

## 2025-01-08 RX ORDER — PHENYLEPHRINE HCL IN 0.9% NACL 1 MG/10 ML
SYRINGE (ML) INTRAVENOUS AS NEEDED
Status: DISCONTINUED | OUTPATIENT
Start: 2025-01-08 | End: 2025-01-08 | Stop reason: SURG

## 2025-01-08 RX ORDER — MONTELUKAST SODIUM 10 MG/1
10 TABLET ORAL NIGHTLY
Status: DISCONTINUED | OUTPATIENT
Start: 2025-01-08 | End: 2025-01-09 | Stop reason: HOSPADM

## 2025-01-08 RX ADMIN — LIDOCAINE HYDROCHLORIDE 100 MG: 20 INJECTION, SOLUTION INTRAVENOUS at 13:23

## 2025-01-08 RX ADMIN — EPHEDRINE SULFATE 10 MG: 50 INJECTION INTRAVENOUS at 13:46

## 2025-01-08 RX ADMIN — PIPERACILLIN AND TAZOBACTAM 3.38 G: 3; .375 INJECTION, POWDER, FOR SOLUTION INTRAVENOUS at 06:38

## 2025-01-08 RX ADMIN — BUDESONIDE 0.5 MG: 0.5 INHALANT RESPIRATORY (INHALATION) at 19:16

## 2025-01-08 RX ADMIN — AMOXICILLIN AND CLAVULANATE POTASSIUM 1 TABLET: 875; 125 TABLET, FILM COATED ORAL at 08:50

## 2025-01-08 RX ADMIN — ARFORMOTEROL TARTRATE 15 MCG: 15 SOLUTION RESPIRATORY (INHALATION) at 07:11

## 2025-01-08 RX ADMIN — ROPINIROLE HYDROCHLORIDE 0.25 MG: 0.25 TABLET, FILM COATED ORAL at 20:43

## 2025-01-08 RX ADMIN — PREDNISONE 40 MG: 20 TABLET ORAL at 08:50

## 2025-01-08 RX ADMIN — Medication 10 ML: at 08:50

## 2025-01-08 RX ADMIN — FUROSEMIDE 40 MG: 10 INJECTION, SOLUTION INTRAMUSCULAR; INTRAVENOUS at 08:50

## 2025-01-08 RX ADMIN — POTASSIUM CHLORIDE 40 MEQ: 750 CAPSULE, EXTENDED RELEASE ORAL at 08:50

## 2025-01-08 RX ADMIN — SODIUM CHLORIDE, POTASSIUM CHLORIDE, SODIUM LACTATE AND CALCIUM CHLORIDE: 600; 310; 30; 20 INJECTION, SOLUTION INTRAVENOUS at 12:59

## 2025-01-08 RX ADMIN — BUDESONIDE 0.5 MG: 0.5 INHALANT RESPIRATORY (INHALATION) at 07:11

## 2025-01-08 RX ADMIN — ACETAMINOPHEN 650 MG: 325 TABLET ORAL at 20:42

## 2025-01-08 RX ADMIN — IPRATROPIUM BROMIDE AND ALBUTEROL SULFATE 3 ML: 2.5; .5 SOLUTION RESPIRATORY (INHALATION) at 07:11

## 2025-01-08 RX ADMIN — Medication 100 MCG: at 13:37

## 2025-01-08 RX ADMIN — SERTRALINE HYDROCHLORIDE 50 MG: 50 TABLET ORAL at 08:50

## 2025-01-08 RX ADMIN — PROPOFOL 175 MCG/KG/MIN: 10 INJECTION, EMULSION INTRAVENOUS at 13:23

## 2025-01-08 RX ADMIN — MONTELUKAST 10 MG: 10 TABLET, FILM COATED ORAL at 20:43

## 2025-01-08 RX ADMIN — ARFORMOTEROL TARTRATE 15 MCG: 15 SOLUTION RESPIRATORY (INHALATION) at 19:16

## 2025-01-08 RX ADMIN — AMOXICILLIN AND CLAVULANATE POTASSIUM 1 TABLET: 875; 125 TABLET, FILM COATED ORAL at 20:43

## 2025-01-08 RX ADMIN — Medication 10 ML: at 20:43

## 2025-01-08 RX ADMIN — IPRATROPIUM BROMIDE AND ALBUTEROL SULFATE 3 ML: 2.5; .5 SOLUTION RESPIRATORY (INHALATION) at 00:30

## 2025-01-08 RX ADMIN — POTASSIUM CHLORIDE 40 MEQ: 750 CAPSULE, EXTENDED RELEASE ORAL at 18:13

## 2025-01-08 RX ADMIN — ATORVASTATIN CALCIUM 40 MG: 40 TABLET, FILM COATED ORAL at 08:50

## 2025-01-08 RX ADMIN — PROPOFOL 50 MG: 10 INJECTION, EMULSION INTRAVENOUS at 13:23

## 2025-01-08 RX ADMIN — SENNOSIDES AND DOCUSATE SODIUM 2 TABLET: 50; 8.6 TABLET ORAL at 20:43
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
## 2025-01-08 NOTE — PLAN OF CARE
Goal Outcome Evaluation:              Outcome Evaluation: Patient alert and oriented x4, VSS. Patient remains on room air but becomes SOB with movement and activity. Patient has remained NPO since midnight and is scheduled for a bronch today 1/8/25. Consent is signed and in patient chart. Patient denies any pain or needs at this time. Will continue to monitor plan of care.

## 2025-01-08 NOTE — PLAN OF CARE
Goal Outcome Evaluation:  Plan of Care Reviewed With: patient        Progress: improving  Outcome Evaluation: Patient went for a Bronch today; no complaints from patient this evening. IV lasix given, VSS, will continue plan of care.

## 2025-01-08 NOTE — OP NOTE
Procedure:  Bronchoscopy with clearance of airways, bronchoalveolar lavage, bronchial washings     Pre-Operative Diagnosis: Right upper lobe bronchus infiltrate, mucous plugging     Post-Operative Diagnosis: Same, aspiration of vomitus and airways during procedure     Timeout performed     Anesthesia: MAC anesthesia     Procedure Details: The patient was consented for the procedure with all risk and benefit of the procedure explained in detail.  She was given the opportunity to ask questions and all concerns were answered. The bronchoscope was inserted into the main airway via the oral cavity. An anatomical survey was done of the main airways and the subsegmental bronchus.      Findings: The vocal cords were normal in appearance and movement with abduction and adduction without difficulty. The trachea was normal in caliber and had no mucosal abnormalities. The left tracheobronchial tree appeared anatomically normal with no mucosal abnormalities. The right tracheobronchial tree appeared anatomically normal with exception of evidence of complete obstruction of anterior segment right upper lobe broncholith.  This broncholith was covered with mucosa but had pearly white appearance consistent with calcium.  I did attempt to float a brush past it which I was unable to.  This anterior segment of the right upper lobe is completely obstructed consistent with CT findings.  No other obvious endobronchial lesions were noted.  This was biopsied previously and benign.  There was mucous plugging right middle lobe bronchus with thick viscous cloudy and purulent secretions which were suctioned and removed.  Similar plugging was also noted in the right lower lobe bronchus.  During the procedure we noticed bilious secretions beginning to flow down from the trachea consistent with aspiration of vomitus during the case.  The upper airway was suctioned by anesthesia and I cleared out all secretions using the bronchoscope otherwise.  A  bronchoalveolar lavage was performed using 90 mL aliquots of normal saline  instilled into the right upper lobe bronchus then aspirated back. There was 34 mL cloudy fluid in return.  Bronchial washings were collected.     Findings:  Aspiration of vomitus and airways intraoperatively  Complete obstruction of anterior segment right upper lobe bronchus with calcified lymph node/granuloma  Mucous plugging right middle and lower lobe bronchi with thick viscous purulent secretions     Estimated Blood Loss: 0mL     Specimens:  Bronchoalveolar lavage right upper lobe bronchus  Bronchial washings     Complications: None; patient tolerated the procedure well.     Disposition: Stable to return to floor.  Follow-up test results.  Recommend outpatient PET/CT to further follow-up infiltrate as well as inflammatory nodule of the right upper lobe.  Continue aspiration precautions.     Patient tolerated the procedure well.    Electronically signed by Mitch De La Torre MD, 01/08/25, 1:40 PM EST.

## 2025-01-08 NOTE — PROGRESS NOTES
[facility]   Hospitalist Progress Note  Date: 2025  Patient Name: Mallory Franco  : 1940  MRN: [id number]  Date of admission: 2025      Subjective   Subjective     Chief Complaint: Follow up for shortness of breath    Summary: 83 y/o F with COPD, coronary artery disease, permanent pacemaker never raised secondary to sick sinus syndrome, hypertension, recent bacteremia with MSSA on cefazolin presented with shortness of breath, nonproductive cough, chills.  Has been treated for pneumonia multiple times over the past several months.  Chest x-ray right upper lobe infiltrate. Chest CT shows worsening opacification of the right upper/middle lobe. Bronchoscopy planned.     Interval Followup:   NAEON.  No fevers.  On room air this morning.  Feels less short of breath.  Not coughing as much.  No chest pain.  No hemoptysis.  No fever or chills.  N.p.o. for bronchoscopy    Objective   Objective     Vitals:   Temp:  [97.3 °F (36.3 °C)-98.3 °F (36.8 °C)] 97.8 °F (36.6 °C)  Heart Rate:  [56-67] 65  Resp:  [13-22] 18  BP: (114-170)/(60-89) 137/70  Physical Exam    Constitutional: Elderly female, conversant, NAD   Respiratory:  nonlabored respirations    Cardiovascular:  RRR, no edema   Gastrointestinal: soft, nondistended   Neurologic: Alert, speech clear   Skin: Extremities warm    Result Review    Result Review:  I have personally reviewed the following over the last 24 hours (07:00 to 07:00) and agree with the following findings  [x]  Laboratory  CBC          2024    12:26 2025    04:12 2025    04:38   CBC   WBC 10.40  8.91  8.33    RBC 3.70  3.88  3.54    Hemoglobin 10.3  10.5  9.4    Hematocrit 32.9  33.3  30.8    MCV 88.9  85.8  87.0    MCH 27.8  27.1  26.6    MCHC 31.3  31.5  30.5    RDW 15.9  15.9  15.9    Platelets 216  343  297      CMP          2024    12:26 2025    04:12 2025    04:38   CMP   Glucose 134  99  134    BUN 15  17  18    Creatinine 0.96  0.85  0.95    EGFR  58.1  67.2  58.8    Sodium 139  137  135    Potassium 3.3  2.8  3.3    Chloride 98  98  100    Calcium 9.9  9.3  8.9    Total Protein 7.2  6.7     Albumin 3.8  3.6     Globulin 3.4  3.1     Total Bilirubin 1.2  0.9     Alkaline Phosphatase 79  70     AST (SGOT) 19  22     ALT (SGPT) 12  23     Albumin/Globulin Ratio 1.1  1.2     BUN/Creatinine Ratio 15.6  20.0  18.9    Anion Gap 12.8  12.7  10.1          [x]  Microbiology  [x]  Radiology   [x]  EKG/Telemetry   []  Cardiology/Vascular   []  Pathology  []  Old records  [x]  Other:    Intake/Output Summary (Last 24 hours) at 1/8/2025 1454  Last data filed at 1/8/2025 1343  Gross per 24 hour   Intake 1120 ml   Output 2450 ml   Net -1330 ml         Assessment & Plan   Assessment / Plan     Assessment/Plan:  COPD with exacerbation  Right upper lobe consolidation  Granulomatous lung disease   Question CAP from unspecified organism  Acute decompensated diastolic congestive heart failure  Therapeutic drug monitoring  Hypokalemia  Hypomagnesemia /resolved  HLD  Anxiety depression  Class I obesity BMI 34.2       Pulmonology following, appreciate assistance  N.p.o. for bronchoscopy today  On room air.  Oxygen prn to maintain SpO2 >90%  Continue Brovana and Pulmicort nebs twice daily, DuoNebs every 6 hours as needed  Continue prednisone 40 mg daily  to complete 5 days  Afebrile.  White count normal.  Pro-Jacoby 0.04.  Cultures negative thus far.  Agree with de-escalating to Augmentin  proBNP 4957. 1.3 L net negative in last 24 hours.  Creatinine stable.  Continue IV Lasix 40 mg daily.  Trend renal function and electrolytes, monitor closely for renal toxicity  Continue electrolyte replacement per orders  Continue to hold amlodipine, Coreg, HCTZ  Restart Zoloft 50 mg daily  Restart Requip 0.25 mg nightly  Restart atorvastatin  Resume heart healthy diet post procedure  CBC, BMP in AM    Discussed with Dr De La Torre  Discussed plan with RN.    VTE Prophylaxis:  Mechanical VTE prophylaxis  orders are present.        CODE STATUS:   Level Of Support Discussed With: Patient  Code Status (Patient has no pulse and is not breathing): CPR (Attempt to Resuscitate)  Medical Interventions (Patient has pulse or is breathing): Full Support    Electronically signed by Johnathan Khoury DO, 01/08/25, 2:58 PM EST.

## 2025-01-08 NOTE — PLAN OF CARE
Goal Outcome Evaluation:  Plan of Care Reviewed With: patient        Progress: no change (first session)  Outcome Evaluation: Patient presents with limitations of balance and endurance/activity tolerance which are impacting ADL/ transfer independence. Skilled OT services are indicated to remediate/ compensate for deficits to maximize independence and safety with functional ADLs.    Anticipated Discharge Disposition (OT): skilled nursing facility

## 2025-01-08 NOTE — ANESTHESIA POSTPROCEDURE EVALUATION
Patient: Mallory Franco    Procedure Summary       Date: 01/08/25 Room / Location: [facility] ENDOSCOPY 3 / [facility] ENDOSCOPY    Anesthesia Start: 1259 Anesthesia Stop: 1355    Procedure: BRONCHOSCOPY with broncheoaqlveolar lavage and bronchial washings (Bronchus) Diagnosis:       Pneumonia of right middle lobe due to infectious organism      (Pneumonia of right middle lobe due to infectious organism [J18.9])    Surgeons: Mitch De La Torre MD Provider: Oriana Mejia CRNA    Anesthesia Type: general ASA Status: 4            Anesthesia Type: general    Vitals  Vitals Value Taken Time   /64 01/08/25 1408   Temp 36.9 °C (98.4 °F) 01/08/25 1407   Pulse 64 01/08/25 1411   Resp 20 01/08/25 1407   SpO2 93 % 01/08/25 1411   Vitals shown include unfiled device data.        Post Anesthesia Care and Evaluation    Post-procedure mental status: acceptable.  Pain management: satisfactory to patient    Airway patency: patent  Anesthetic complications: No anesthetic complications    Cardiovascular status: acceptable  Respiratory status: acceptable    Comments: Per chart review

## 2025-01-08 NOTE — PROGRESS NOTES
Pulmonary / Critical Care Progress Note      Patient Name: Mallory Franco  : 1940  MRN: [id number]  Attending:  Johnathan Khoury DO  Date of admission: 2025    Subjective   Subjective   Follow-up for infiltrate and possible pneumonia    Over past 24 hours:  Better today  Culture negative  No fevers or chills  Dyspnea and cough improved  No chest pain or hemoptysis  On room air  Agreeable today for bronchoscopy        Objective   Objective     Vitals:   Temp:  [97.3 °F (36.3 °C)-98.1 °F (36.7 °C)] 97.6 °F (36.4 °C)  Heart Rate:  [56-67] 62  Resp:  [15-18] 15  BP: (114-153)/(60-81) 153/81    Physical Exam   Vital Signs Reviewed   General:  WDWN, Alert, NAD.    HEENT:  PERRL, EOMI.  OP, nares clear  Chest:  good aeration, unchanged right chest rhonchi, tympanic to percussion bilaterally, no work of breathing noted  CV: RRR, no MGR, pulses 2+, equal.  Abd:  Soft, NT, ND, + BS, no HSM  EXT:  no clubbing, no cyanosis, no edema  Neuro:  A&Ox3, CN grossly intact, no focal deficits.  Skin: No rashes or lesions noted      Result Review    Result Review:  I have personally reviewed the results from the time of this admission to 2025 11:41 EST and agree with these findings:  [x]  Laboratory  [x]  Microbiology  [x]  Radiology  []  EKG/Telemetry   []  Cardiology/Vascular   []  Pathology  []  Old records  []  Other:  Most notable findings include:       Lab 25  0438 25  0412   WBC 8.33 8.91   HEMOGLOBIN 9.4* 10.5*   HEMATOCRIT 30.8* 33.3*   PLATELETS 297 343   SODIUM 135* 137   POTASSIUM 3.3* 2.8*   CHLORIDE 100 98   CO2 24.9 26.3   BUN 18 17   CREATININE 0.95 0.85   GLUCOSE 134* 99   CALCIUM 8.9 9.3   TOTAL PROTEIN  --  6.7   ALBUMIN  --  3.6   GLOBULIN  --  3.1     CT Chest Without Contrast Diagnostic    Result Date: 2025  CT CHEST WO CONTRAST DIAGNOSTIC Date of Exam: 2025 5:52 AM EST Indication: Persistent right upper lobe infiltrate. Comparison: 1/15/2024 Technique: Axial CT images were obtained  of the chest without contrast administration.  Reconstructed coronal and sagittal images were also obtained. Automated exposure control and iterative construction methods were used. Findings: Please see abdomen pelvis CT report dictated separately. There is atherosclerotic disease and coronary artery disease. There is a tiny right pleural effusion. No left-sided pleural effusion or pericardial effusion. The heart is enlarged. Previous pacemaker has been removed. There appears to be an electronic device near the cardiac apex, possibly in the right ventricle. Correlate with medical history. Calcified mediastinal and right hilar nodes are compatible with old granulomatous disease. No definite noncalcified adenopathy. Please note that the charlee are poorly characterized in the absence of contrast. There is a moderate size hiatal hernia. There has been interval worsening of irregular masslike right upper lobe consolidation, now measuring roughly 5.1 x 6.5 x 3.3 cm this previously measured about 4.4 x 5.6 x 1.1 cm when measured similarly. The lesion now involves the minor fissure. Previously seen associated bronchiectasis is less apparent now due to the increase in masslike consolidation. This is certainly worrisome for malignancy, and follow-up with a PET/CT is recommended. There is a new right upper lobe spiculated nodule measuring 1.7 x 1.3 cm. Scattered other densities are noted in the right lower lobe and to a lesser degree in the left lower lobe. These are new from prior and could be infectious or inflammatory, or they could potentially reflect areas of tumor if the larger area is in fact malignant. There are groundglass infiltrates in the right middle lobe that likely reflect pneumonia. No clearly suspicious osseous lesions.     Impression: 1.Interval worsening of masslike consolidation in the right upper lobe, which now involves the minor fissure. This is certainly worrisome for malignancy, and follow-up with a  PET/CT is recommended. Bronchoscopy may be beneficial. 2.New spiculated nodule in the right upper lobe. This could be infectious or neoplastic. 3.New groundglass infiltrates in the right middle lobe, likely pneumonia. 4.Additional densities in the right lower lobe and to a lesser degree in the left lower lobe, which could be infectious or inflammatory, or they could potentially reflect areas of tumor if the larger area is in fact malignant. 5.Tiny right pleural effusion. 6.Cardiomegaly, coronary artery disease, and atherosclerotic disease. 7.Moderate size hiatal hernia. Electronically Signed: Amos Mathew MD  1/7/2025 6:15 AM EST  Workstation ID: [id number]     Cultures so far negative  Compared to previous chest CT, still obstructing calcified lymph node with obstruction of right middle and upper lobe.  There is more atelectatic/opacified right middle lobe.  New inflammatory nodules well in this area with a tiny pleural effusion.  Flu/COVID/RSV negative  NT BNP elevated  Procalcitonin normal     Previous office notes reviewed as well as Dr. Houston's bronchoscopy note from over a year ago reviewed commenting on the right middle and upper lobe being obstructed due to this calcified lymph node.  Biopsies and cultures were negative at that time.       Assessment & Plan   Assessment / Plan     Active Hospital Problems:  Active Hospital Problems    Diagnosis     **Pneumonia        Impression:  Question recurrent pneumonia, community-acquired  Right middle/upper lobe atelectasis/masslike opacity due to obstruction via extrinsic impression from calcified lymph node  Granulomatous lung disease  Right upper lobe lung nodule, inflammatory versus malignant   COPD with exacerbation  Never smoker  Volume overload  Acute decompensated diastolic congestive heart failure  Hypokalemia  Hypomagnesemia  Class I obesity BMI 34.2     Plan:  I think the findings on chest CT could be expected due to this obstruction of the right middle  and upper lobe due to this large calcified right hilar lymph node.  There is another calcified granulomas well noted in the mediastinum.  This does not meet criteria for fibrosing mediastinitis.  While there could be a malignancy mixed into this masslike opacity, it would be hard to delineate what area to biopsy without doing a PET/CT.  Also that right upper lobe nodule could be inflammatory versus neoplastic.  I think we will need to do an outpatient PET/CT to delineate if there is any area which I need a biopsy versus just to follow-up.  Will do this as an outpatient.    Agreeable today for bronchoscopy to reevaluate this airway obstruction and try to do transbronchial biopsies of the area and see if the airway can free up.  It is possible she has a postobstructive pneumonia in this area.  Unfortunately there is very little we can do to actually remove/open up the right middle lobe  I have discussed the benefits vs risks of the procedure with the patient including pneumothorax, hemothorax, bleeding, hypoxia, required mechanical ventilation and death. The patient recognizes these findings, acknowledges these findings and is agreeable to the procedure.  Cultures so far negative.  De-escalate antibiotics to Augmentin.  Day 2/5  On day 2/5 of prednisone 40 mg daily  Ultras and urine antigen sent  Agree with IV diuretics.  Continue for now  Trend renal panel and electrolytes.  Replace potassium orally  Follow-up with us as outpatient.  Will need PFTs and ASAP PET/CT as outpatient     VTE Prophylaxis:  Mechanical VTE prophylaxis orders are present.    CODE STATUS:   Level Of Support Discussed With: Patient  Code Status (Patient has no pulse and is not breathing): CPR (Attempt to Resuscitate)  Medical Interventions (Patient has pulse or is breathing): Full Support      Labs, imaging, microbiology, notes and medications personally reviewed  Discussed with primary    Electronically signed by Mitch De La Torre MD, 01/08/25,  11:43 AM EST.

## 2025-01-09 ENCOUNTER — READMISSION MANAGEMENT (OUTPATIENT)
Dept: CALL CENTER | Facility: HOSPITAL | Age: 85
End: 2025-01-09
Payer: MEDICARE

## 2025-01-09 VITALS
WEIGHT: 199.3 LBS | TEMPERATURE: 97.3 F | HEIGHT: 64 IN | SYSTOLIC BLOOD PRESSURE: 138 MMHG | BODY MASS INDEX: 34.02 KG/M2 | DIASTOLIC BLOOD PRESSURE: 86 MMHG | RESPIRATION RATE: 18 BRPM | HEART RATE: 64 BPM | OXYGEN SATURATION: 97 %

## 2025-01-09 LAB
ANION GAP SERPL CALCULATED.3IONS-SCNC: 10.7 MMOL/L (ref 5–15)
BASOPHILS # BLD AUTO: 0.03 10*3/MM3 (ref 0–0.2)
BASOPHILS NFR BLD AUTO: 0.2 % (ref 0–1.5)
BUN SERPL-MCNC: 25 MG/DL (ref 8–23)
BUN/CREAT SERPL: 24.8 (ref 7–25)
CALCIUM SPEC-SCNC: 9.2 MG/DL (ref 8.6–10.5)
CHLORIDE SERPL-SCNC: 100 MMOL/L (ref 98–107)
CO2 SERPL-SCNC: 25.3 MMOL/L (ref 22–29)
CREAT SERPL-MCNC: 1.01 MG/DL (ref 0.57–1)
DEPRECATED RDW RBC AUTO: 53.2 FL (ref 37–54)
EGFRCR SERPLBLD CKD-EPI 2021: 54.7 ML/MIN/1.73
EOSINOPHIL # BLD AUTO: 0 10*3/MM3 (ref 0–0.4)
EOSINOPHIL NFR BLD AUTO: 0 % (ref 0.3–6.2)
ERYTHROCYTE [DISTWIDTH] IN BLOOD BY AUTOMATED COUNT: 16.5 % (ref 12.3–15.4)
GLUCOSE SERPL-MCNC: 140 MG/DL (ref 65–99)
HCT VFR BLD AUTO: 33.7 % (ref 34–46.6)
HGB BLD-MCNC: 10.2 G/DL (ref 12–15.9)
IMM GRANULOCYTES # BLD AUTO: 0.12 10*3/MM3 (ref 0–0.05)
IMM GRANULOCYTES NFR BLD AUTO: 0.7 % (ref 0–0.5)
LYMPHOCYTES # BLD AUTO: 1.43 10*3/MM3 (ref 0.7–3.1)
LYMPHOCYTES NFR BLD AUTO: 8.1 % (ref 19.6–45.3)
MAGNESIUM SERPL-MCNC: 1.6 MG/DL (ref 1.6–2.4)
MCH RBC QN AUTO: 26.8 PG (ref 26.6–33)
MCHC RBC AUTO-ENTMCNC: 30.3 G/DL (ref 31.5–35.7)
MCV RBC AUTO: 88.7 FL (ref 79–97)
MONOCYTES # BLD AUTO: 0.6 10*3/MM3 (ref 0.1–0.9)
MONOCYTES NFR BLD AUTO: 3.4 % (ref 5–12)
NEUTROPHILS NFR BLD AUTO: 15.49 10*3/MM3 (ref 1.7–7)
NEUTROPHILS NFR BLD AUTO: 87.6 % (ref 42.7–76)
NRBC BLD AUTO-RTO: 0 /100 WBC (ref 0–0.2)
PLATELET # BLD AUTO: 320 10*3/MM3 (ref 140–450)
PMV BLD AUTO: 10 FL (ref 6–12)
POTASSIUM SERPL-SCNC: 4.6 MMOL/L (ref 3.5–5.2)
RBC # BLD AUTO: 3.8 10*6/MM3 (ref 3.77–5.28)
SODIUM SERPL-SCNC: 136 MMOL/L (ref 136–145)
WBC NRBC COR # BLD AUTO: 17.67 10*3/MM3 (ref 3.4–10.8)

## 2025-01-09 PROCEDURE — 80048 BASIC METABOLIC PNL TOTAL CA: CPT | Performed by: INTERNAL MEDICINE

## 2025-01-09 PROCEDURE — 99232 SBSQ HOSP IP/OBS MODERATE 35: CPT | Performed by: INTERNAL MEDICINE

## 2025-01-09 PROCEDURE — 63710000001 PREDNISONE PER 1 MG: Performed by: INTERNAL MEDICINE

## 2025-01-09 PROCEDURE — 94664 DEMO&/EVAL PT USE INHALER: CPT

## 2025-01-09 PROCEDURE — 25010000002 MAGNESIUM SULFATE 4 GM/100ML SOLUTION: Performed by: INTERNAL MEDICINE

## 2025-01-09 PROCEDURE — 94799 UNLISTED PULMONARY SVC/PX: CPT

## 2025-01-09 PROCEDURE — 85025 COMPLETE CBC W/AUTO DIFF WBC: CPT | Performed by: INTERNAL MEDICINE

## 2025-01-09 PROCEDURE — 83735 ASSAY OF MAGNESIUM: CPT | Performed by: INTERNAL MEDICINE

## 2025-01-09 PROCEDURE — 99239 HOSP IP/OBS DSCHRG MGMT >30: CPT | Performed by: INTERNAL MEDICINE

## 2025-01-09 PROCEDURE — 25010000002 FUROSEMIDE PER 20 MG: Performed by: INTERNAL MEDICINE

## 2025-01-09 RX ORDER — MAGNESIUM SULFATE HEPTAHYDRATE 40 MG/ML
4 INJECTION, SOLUTION INTRAVENOUS ONCE
Status: COMPLETED | OUTPATIENT
Start: 2025-01-09 | End: 2025-01-09

## 2025-01-09 RX ORDER — PREDNISONE 20 MG/1
40 TABLET ORAL
Qty: 4 TABLET | Refills: 0 | Status: SHIPPED | OUTPATIENT
Start: 2025-01-10 | End: 2025-01-12

## 2025-01-09 RX ORDER — FUROSEMIDE 40 MG/1
20 TABLET ORAL DAILY
Qty: 5 TABLET | Refills: 0 | Status: SHIPPED | OUTPATIENT
Start: 2025-01-09 | End: 2025-01-19

## 2025-01-09 RX ORDER — IPRATROPIUM BROMIDE AND ALBUTEROL SULFATE 2.5; .5 MG/3ML; MG/3ML
3 SOLUTION RESPIRATORY (INHALATION) EVERY 6 HOURS PRN
Qty: 120 ML | Refills: 0 | Status: SHIPPED | OUTPATIENT
Start: 2025-01-09

## 2025-01-09 RX ADMIN — ARFORMOTEROL TARTRATE 15 MCG: 15 SOLUTION RESPIRATORY (INHALATION) at 06:41

## 2025-01-09 RX ADMIN — ATORVASTATIN CALCIUM 40 MG: 40 TABLET, FILM COATED ORAL at 08:35

## 2025-01-09 RX ADMIN — SERTRALINE HYDROCHLORIDE 50 MG: 50 TABLET ORAL at 08:36

## 2025-01-09 RX ADMIN — AMOXICILLIN AND CLAVULANATE POTASSIUM 1 TABLET: 875; 125 TABLET, FILM COATED ORAL at 08:35

## 2025-01-09 RX ADMIN — BUDESONIDE 0.5 MG: 0.5 INHALANT RESPIRATORY (INHALATION) at 06:41

## 2025-01-09 RX ADMIN — Medication 10 ML: at 08:36

## 2025-01-09 RX ADMIN — FUROSEMIDE 40 MG: 10 INJECTION, SOLUTION INTRAMUSCULAR; INTRAVENOUS at 08:36

## 2025-01-09 RX ADMIN — PREDNISONE 40 MG: 20 TABLET ORAL at 08:35

## 2025-01-09 RX ADMIN — IPRATROPIUM BROMIDE AND ALBUTEROL SULFATE 3 ML: 2.5; .5 SOLUTION RESPIRATORY (INHALATION) at 06:46

## 2025-01-09 RX ADMIN — MAGNESIUM SULFATE IN WATER FOR 4 G: 40 INJECTION INTRAVENOUS at 10:14

## 2025-01-09 NOTE — DISCHARGE SUMMARY
[facility]         HOSPITALIST  DISCHARGE SUMMARY    Patient Name: Mallory Franco  : 1940  MRN: [id number]    Date of Admission: 2025  Date of Discharge:  25  Primary Care Physician: Amauri Kearney MD    Consults       Date and Time Order Name Status Description    2025  7:43 AM Inpatient Pulmonology Consult Completed     2025  6:28 AM Inpatient Hospitalist Consult              Active and Resolved Hospital Problems:  COPD with exacerbation  Right upper lobe consolidation  Granulomatous lung disease   Question CAP from unspecified organism  Acute decompensated diastolic congestive heart failure  Therapeutic drug monitoring  Hypokalemia  Hypomagnesemia /resolved  HLD  Anxiety depression  Class I obesity BMI 34.2    Hospital Course     Hospital Course:  Mallory Franco is a 84 y.o. female  with COPD, coronary artery disease, permanent pacemaker  secondary to sick sinus syndrome, hypertension, recent bacteremia with MSSA on cefazolin presented with shortness of breath, nonproductive cough, chills.  Has been treated for pneumonia multiple times over the past months.  Vital stable on presentation.  Chest CT shows worsening opacification of the right upper/middle lobe.  Admitted on broad-spectrum antibiotics, steroids, bronchodilators.  Seen by pulmonology.  Underwent bronchoscopy with BAL and bronchial washings.  Microbiology returned negative.  De-escalated to Augmentin.  Area of concern could be malignancy however pulmonology recommended outpatient PET scan to help delineate which area to biopsy.  They will arrange this.  Remained on room air and hemodynamically stable.  Discharged home in stable condition    DISCHARGE Follow Up Recommendations for labs and diagnostics:   Outpatient PFTs and PET/CT    Day of Discharge     Vital Signs:  Temp:  [97.7 °F (36.5 °C)-98.4 °F (36.9 °C)] 97.7 °F (36.5 °C)  Heart Rate:  [59-67] 59  Resp:  [18-20] 18  BP: ()/(54-98) 129/67  Flow  (L/min) (Oxygen Therapy):  [4] 4  Physical Exam:   GENERAL: conversant and nontoxic.  HEART: RRR. No edema  LUNGS: nonlabored  ABDOMEN: Soft, nondistended  NEUROLOGIC: Alert, CN intact    Discharge Details        Discharge Medications        New Medications        Instructions Start Date   amoxicillin-clavulanate 875-125 MG per tablet  Commonly known as: AUGMENTIN   1 tablet, Oral, Every 12 Hours Scheduled      furosemide 40 MG tablet  Commonly known as: Lasix   20 mg, Oral, Daily      predniSONE 20 MG tablet  Commonly known as: DELTASONE   40 mg, Oral, Daily With Breakfast   Start Date: January 10, 2025            Changes to Medications        Instructions Start Date   ipratropium-albuterol 0.5-2.5 mg/3 ml nebulizer  Commonly known as: DUO-NEB  What changed:   when to take this  reasons to take this   3 mL, Nebulization, Every 6 Hours PRN             Continue These Medications        Instructions Start Date   albuterol sulfate  (90 Base) MCG/ACT inhaler  Commonly known as: PROVENTIL HFA;VENTOLIN HFA;PROAIR HFA   2 puffs, Every 4 Hours PRN      amLODIPine 5 MG tablet  Commonly known as: NORVASC   1 tablet, Daily      aspirin 81 MG EC tablet   81 mg, Daily      atorvastatin 40 MG tablet  Commonly known as: LIPITOR   40 mg, Daily      Breo Ellipta 100-25 MCG/ACT aerosol powder   Generic drug: Fluticasone Furoate-Vilanterol   1 puff, Daily      carvedilol 25 MG tablet  Commonly known as: COREG   25 mg, Oral, 2 Times Daily With Meals      famotidine 40 MG tablet  Commonly known as: PEPCID   40 mg, Every Night at Bedtime      fluticasone 50 MCG/ACT nasal spray  Commonly known as: FLONASE   1 spray, Every Morning      magnesium oxide 400 tablet tablet  Commonly known as: MAG-OX   400 mg, Oral, Daily      meclizine 25 MG tablet  Commonly known as: ANTIVERT   25 mg, 2 Times Daily PRN      montelukast 10 MG tablet  Commonly known as: SINGULAIR   10 mg, Nightly      pantoprazole 40 MG EC tablet  Commonly known as:  PROTONIX   40 mg, Daily      polyethylene glycol 17 g packet  Commonly known as: MIRALAX   17 g, Oral, Daily PRN      rOPINIRole 0.25 MG tablet  Commonly known as: REQUIP   0.25 mg, Every Night at Bedtime      sennosides-docusate 8.6-50 MG per tablet  Commonly known as: PERICOLACE   1 tablet, Oral, 2 Times Daily PRN      sertraline 100 MG tablet  Commonly known as: ZOLOFT   50 mg, Daily             Stop These Medications      hydroCHLOROthiazide 25 MG tablet              Allergies   Allergen Reactions    Codeine Nausea And Vomiting    Contrast Dye (Echo Or Unknown Ct/Mr) Hives    Morphine Nausea And Vomiting       Discharge Disposition:  Home or Self Care    Diet:  Hospital:  Diet Order   Procedures    Diet: Regular/House; Fluid Consistency: Thin (IDDSI 0)       Discharge Activity:   Activity Instructions       Activity as Tolerated              CODE STATUS:  Code Status and Medical Interventions: CPR (Attempt to Resuscitate); Full Support   Ordered at: 01/07/25 0741     Level Of Support Discussed With:    Patient     Code Status (Patient has no pulse and is not breathing):    CPR (Attempt to Resuscitate)     Medical Interventions (Patient has pulse or is breathing):    Full Support         Future Appointments   Date Time Provider Department Center   1/31/2025 10:45 AM Migue Nguyen MD [facility] [place] [facility]   7/17/2025 11:00 AM [facility] [place] DEVICE CHECK [facility] [place] [id number] None   7/17/2025 11:30 AM Bernabe Rosales APRN [facility] CD [id number] None       Additional Instructions for the Follow-ups that You Need to Schedule       Discharge Follow-up with Specified Provider: Dr De La Torre; 2 Weeks   As directed      To: Dr De La Torre   Follow Up: 2 Weeks                Pertinent  and/or Most Recent Results     PROCEDURES:   BRONCHOSCOPY with broncheoaqlveolar lavage and bronchial washings     LAB RESULTS:      Lab 01/09/25  0503 01/08/25  0438 01/07/25  0655 01/07/25  0532 01/07/25  0412   WBC 17.67* 8.33  --   --  8.91   HEMOGLOBIN  10.2* 9.4*  --   --  10.5*   HEMATOCRIT 33.7* 30.8*  --   --  33.3*   PLATELETS 320 297  --   --  343   NEUTROS ABS 15.49* 7.13*  --   --  5.58   IMMATURE GRANS (ABS) 0.12* 0.05  --   --  0.13*   LYMPHS ABS 1.43 0.77  --   --  2.45   MONOS ABS 0.60 0.37  --   --  0.57   EOS ABS 0.00 0.00  --   --  0.16   MCV 88.7 87.0  --   --  85.8   PROCALCITONIN  --   --   --  0.04  --    LACTATE  --   --  1.3  --   --          Lab 01/09/25  0503 01/08/25  0438 01/07/25  0532 01/07/25  0412   SODIUM 136 135*  --  137   POTASSIUM 4.6 3.3*  --  2.8*   CHLORIDE 100 100  --  98   CO2 25.3 24.9  --  26.3   ANION GAP 10.7 10.1  --  12.7   BUN 25* 18  --  17   CREATININE 1.01* 0.95  --  0.85   EGFR 54.7* 58.8*  --  67.2   GLUCOSE 140* 134*  --  99   CALCIUM 9.2 8.9  --  9.3   MAGNESIUM 1.6 2.0 0.9*  --    TSH  --   --  1.500  --          Lab 01/07/25  0412   TOTAL PROTEIN 6.7   ALBUMIN 3.6   GLOBULIN 3.1   ALT (SGPT) 23   AST (SGOT) 22   BILIRUBIN 0.9   ALK PHOS 70         Lab 01/07/25  1137 01/07/25  0532 01/07/25  0412   PROBNP  --   --  4,957.0*   HSTROP T 74* 77* 74*         Lab 01/07/25  0532   CHOLESTEROL 107   LDL CHOL 47   HDL CHOL 45   TRIGLYCERIDES 70             Brief Urine Lab Results  (Last result in the past 365 days)        Color   Clarity   Blood   Leuk Est   Nitrite   Protein   CREAT   Urine HCG        12/28/24 1253 Yellow   Clear   Negative   Negative   Negative   30 mg/dL (1+)                 Microbiology Results (last 10 days)       Procedure Component Value - Date/Time    Respiratory Culture - Wash, Bronchus [[id number]] Collected: 01/08/25 1335    Lab Status: Preliminary result Specimen: Wash from Bronchus Updated: 01/09/25 1023     Respiratory Culture Light growth (2+) The culture consists of normal respiratory maame. This is a preliminary report; final report to follow.     Gram Stain Rare (1+) Yeast      Rare (1+) WBCs seen    BAL Culture, Quantitative - Lavage, Lung, Right Upper Lobe [[id number]] Collected:  01/08/25 1329    Lab Status: Preliminary result Specimen: Lavage from Lung, Right Upper Lobe Updated: 01/09/25 1022     BAL Culture <10,000 CFU/mL The culture consists of normal respiratory maame. This is a preliminary report; final report to follow.     Gram Stain No organisms seen    Pneumonia Panel - Lavage, Lung, Right Upper Lobe [[id number]]  (Normal) Collected: 01/08/25 1329    Lab Status: Final result Specimen: Lavage from Lung, Right Upper Lobe Updated: 01/08/25 1535     Escherichia coli PCR Not Detected     Acinetobacter calcoaceticus-baumannii complex PCR Not Detected     Enterobacter cloacae PCR Not Detected     Klebsiella oxytoca PCR Not Detected     Klebsiella pneumoniae group PCR Not Detected     Klebsiella aerogenes PCR Not Detected     Moraxella catarrhalis PCR Not Detected     Proteus species PCR Not Detected     Pseudomonas aeroginosa PCR Not Detected     Serratia marcescens PCR Not Detected     Staphylococcus aureus PCR Not Detected     Streptococcus pyogenes PCR Not Detected     Haemophilus influenzae PCR Not Detected     Streptococcus agalactiae PCR Not Detected     Streptococcus pneumoniae PCR Not Detected     Chlamydophila pneumoniae PCR Not Detected     Legionella pneumophilia PCR Not Detected     Mycoplasma pneumo by PCR Not Detected     ADENOVIRUS, PCR Not Detected     CTX-M Gene N/A     IMP Gene N/A     KPC Gene N/A     mecA/C and MREJ Gene N/A     NDM Gene N/A     OXA-48-like Gene N/A     VIM Gene N/A     Coronavirus Not Detected     Human Metapneumovirus Not Detected     Human Rhinovirus/Enterovirus Not Detected     Influenza A PCR Not Detected     Influenza B PCR Not Detected     RSV, PCR Not Detected     Parainfluenza virus PCR Not Detected    Legionella Antigen, Urine - Urine, Urine, Clean Catch [[id number]]  (Normal) Collected: 01/07/25 1143    Lab Status: Final result Specimen: Urine, Clean Catch Updated: 01/07/25 1421     LEGIONELLA ANTIGEN, URINE Negative    S. Pneumo Ag Urine or  CSF - Urine, Urine, Clean Catch [[id number]]  (Normal) Collected: 01/07/25 1143    Lab Status: Final result Specimen: Urine, Clean Catch Updated: 01/07/25 1421     Strep Pneumo Ag Negative    MRSA Screen, PCR (Inpatient) - Swab, Nares [[id number]]  (Normal) Collected: 01/07/25 1015    Lab Status: Final result Specimen: Swab from Nares Updated: 01/07/25 1150     MRSA PCR No MRSA Detected    Narrative:      The negative predictive value of this diagnostic test is high and should only be used to consider de-escalating anti-MRSA therapy. A positive result may indicate colonization with MRSA and must be correlated clinically.    Respiratory Panel PCR w/COVID-19(SARS-CoV-2) KEO/ADAM/CHACHA/PAD/COR/DIGNA In-House, NP Swab in UTM/VTM, 2 HR TAT - Swab, Nasopharynx [[id number]]  (Normal) Collected: 01/07/25 1015    Lab Status: Final result Specimen: Swab from Nasopharynx Updated: 01/07/25 1131     ADENOVIRUS, PCR Not Detected     Coronavirus 229E Not Detected     Coronavirus HKU1 Not Detected     Coronavirus NL63 Not Detected     Coronavirus OC43 Not Detected     COVID19 Not Detected     Human Metapneumovirus Not Detected     Human Rhinovirus/Enterovirus Not Detected     Influenza A PCR Not Detected     Influenza B PCR Not Detected     Parainfluenza Virus 1 Not Detected     Parainfluenza Virus 2 Not Detected     Parainfluenza Virus 3 Not Detected     Parainfluenza Virus 4 Not Detected     RSV, PCR Not Detected     Bordetella pertussis pcr Not Detected     Bordetella parapertussis PCR Not Detected     Chlamydophila pneumoniae PCR Not Detected     Mycoplasma pneumo by PCR Not Detected    Narrative:      In the setting of a positive respiratory panel with a viral infection PLUS a negative procalcitonin without other underlying concern for bacterial infection, consider observing off antibiotics or discontinuation of antibiotics and continue supportive care. If the respiratory panel is positive for atypical bacterial infection (Bordetella  pertussis, Chlamydophila pneumoniae, or Mycoplasma pneumoniae), consider antibiotic de-escalation to target atypical bacterial infection.    Blood Culture - Blood, Arm, Left [[id number]]  (Normal) Collected: 01/07/25 0655    Lab Status: Preliminary result Specimen: Blood from Arm, Left Updated: 01/09/25 0716     Blood Culture No growth at 2 days    Narrative:      Less than seven (7) mL's of blood was collected.  Insufficient quantity may yield false negative results.    Blood Culture - Blood, Arm, Right [[id number]]  (Normal) Collected: 01/07/25 0655    Lab Status: Preliminary result Specimen: Blood from Arm, Right Updated: 01/09/25 0700     Blood Culture No growth at 2 days    Narrative:      Less than seven (7) mL's of blood was collected.  Insufficient quantity may yield false negative results.    COVID-19, FLU A/B, RSV PCR 1 HR TAT - Swab, Nasopharynx [[id number]]  (Normal) Collected: 01/07/25 0427    Lab Status: Final result Specimen: Swab from Nasopharynx Updated: 01/07/25 0510     COVID19 Not Detected     Influenza A PCR Not Detected     Influenza B PCR Not Detected     RSV, PCR Not Detected    Narrative:      Fact sheet for providers: https://www.fda.gov/media/330777/download    Fact sheet for patients: https://www.fda.gov/media/677438/download    Test performed by PCR.            CT Abdomen Pelvis Without Contrast    Result Date: 1/7/2025  1.No acute findings identified in the abdomen or pelvis. 2.Colonic diverticulosis without definite acute diverticulitis. No bowel obstruction. 3.Moderate hiatal hernia. 4.Trace amount of dependent free fluid in the pelvis, diminished in volume from the prior study. 5.No renal or ureteral stones. No hydronephrosis. Electronically Signed: Amos Mathew MD  1/7/2025 6:25 AM EST  Workstation ID: [id number]    CT Chest Without Contrast Diagnostic    Result Date: 1/7/2025  1.Interval worsening of masslike consolidation in the right upper lobe, which now involves the minor  fissure. This is certainly worrisome for malignancy, and follow-up with a PET/CT is recommended. Bronchoscopy may be beneficial. 2.New spiculated nodule in the right upper lobe. This could be infectious or neoplastic. 3.New groundglass infiltrates in the right middle lobe, likely pneumonia. 4.Additional densities in the right lower lobe and to a lesser degree in the left lower lobe, which could be infectious or inflammatory, or they could potentially reflect areas of tumor if the larger area is in fact malignant. 5.Tiny right pleural effusion. 6.Cardiomegaly, coronary artery disease, and atherosclerotic disease. 7.Moderate size hiatal hernia. Electronically Signed: Amos Mathew MD  1/7/2025 6:15 AM EST  Workstation ID: [id number]    XR Chest 1 View    Result Date: 1/7/2025  1.Persistent dense consolidation in the right upper lobe. Continued follow-up recommended. 2.Cardiomegaly. 3.Probable trace right pleural effusion. Electronically Signed: mAos Mathew MD  1/7/2025 4:23 AM EST  Workstation ID: [id number]              Results for orders placed during the hospital encounter of 10/07/24    Adult Transesophageal Echo (EPI) W/ Cont if Necessary Per Protocol    Interpretation Summary  Normal left ventricular systolic function.  Pacemaker lead in the right ventricle.  No evidence of any vegetation.  Mild mitral regurgitation.      Labs Pending at Discharge:  Pending Labs       Order Current Status    AFB Culture - Wash, Bronchus In process    Fungus Culture - Lavage, Lung, Right Upper Lobe In process    Fungus Culture - Wash, Bronchus In process    Non-gynecologic Cytology In process    BAL Culture, Quantitative - Lavage, Lung, Right Upper Lobe Preliminary result    Blood Culture - Blood, Arm, Left Preliminary result    Blood Culture - Blood, Arm, Right Preliminary result    Respiratory Culture - Wash, Bronchus Preliminary result              Time spent on Discharge including face to face service:  >30  minutes    Electronically signed by Johnathan Khoury DO, 01/09/25, 11:38 AM EST.

## 2025-01-09 NOTE — SIGNIFICANT NOTE
01/09/25 1227   OTHER   Discipline physical therapist   Rehab Time/Intention   Session Not Performed other (see comments)  (in process of hospital discharge)

## 2025-01-09 NOTE — PLAN OF CARE
Goal Outcome Evaluation:              Outcome Evaluation: Patient alert and oriented x4, VSS. Patient had a Bronch on dayshift today and tolerated well. Patient ready to d/c home today. No significant changes, will monitor plan of care.

## 2025-01-09 NOTE — PROGRESS NOTES
Pulmonary / Critical Care Progress Note      Patient Name: aMllory Franco  : 1940  MRN: [id number]  Attending:  Johnathan Khoury DO  Date of admission: 2025    Subjective   Subjective   Follow-up for infiltrate and possible pneumonia    Over past 24 hours:  Better today  Culture negative  No fevers or chills  Dyspnea and cough improved  Bronchoscopy confirmed obstruction of anterior segment of right upper lobe with large calcified lymph node.  Cultures negative  On room air      Objective   Objective     Vitals:   Temp:  [97.6 °F (36.4 °C)-98.4 °F (36.9 °C)] 97.7 °F (36.5 °C)  Heart Rate:  [59-67] 59  Resp:  [15-20] 18  BP: ()/(54-98) 129/67  Flow (L/min) (Oxygen Therapy):  [4] 4    Physical Exam   Vital Signs Reviewed   General:  WDWN, Alert, NAD.    HEENT:  PERRL, EOMI.  OP, nares clear  Chest:  good aeration, unchanged right chest rhonchi, tympanic to percussion bilaterally, no work of breathing noted  CV: RRR, no MGR, pulses 2+, equal.  Abd:  Soft, NT, ND, + BS, no HSM  EXT:  no clubbing, no cyanosis, no edema  Neuro:  A&Ox3, CN grossly intact, no focal deficits.  Skin: No rashes or lesions noted      Result Review    Result Review:  I have personally reviewed the results from the time of this admission to 2025 08:31 EST and agree with these findings:  [x]  Laboratory  [x]  Microbiology  [x]  Radiology  []  EKG/Telemetry   []  Cardiology/Vascular   []  Pathology  []  Old records  []  Other:  Most notable findings include:       Lab 25  0503 25  0438 25  0412   WBC 17.67* 8.33 8.91   HEMOGLOBIN 10.2* 9.4* 10.5*   HEMATOCRIT 33.7* 30.8* 33.3*   PLATELETS 320 297 343   SODIUM 136 135* 137   POTASSIUM 4.6 3.3* 2.8*   CHLORIDE 100 100 98   CO2 25.3 24.9 26.3   BUN 25* 18 17   CREATININE 1.01* 0.95 0.85   GLUCOSE 140* 134* 99   CALCIUM 9.2 8.9 9.3   TOTAL PROTEIN  --   --  6.7   ALBUMIN  --   --  3.6   GLOBULIN  --   --  3.1     CT Chest Without Contrast Diagnostic    Result Date:  1/7/2025  CT CHEST WO CONTRAST DIAGNOSTIC Date of Exam: 1/7/2025 5:52 AM EST Indication: Persistent right upper lobe infiltrate. Comparison: 1/15/2024 Technique: Axial CT images were obtained of the chest without contrast administration.  Reconstructed coronal and sagittal images were also obtained. Automated exposure control and iterative construction methods were used. Findings: Please see abdomen pelvis CT report dictated separately. There is atherosclerotic disease and coronary artery disease. There is a tiny right pleural effusion. No left-sided pleural effusion or pericardial effusion. The heart is enlarged. Previous pacemaker has been removed. There appears to be an electronic device near the cardiac apex, possibly in the right ventricle. Correlate with medical history. Calcified mediastinal and right hilar nodes are compatible with old granulomatous disease. No definite noncalcified adenopathy. Please note that the charlee are poorly characterized in the absence of contrast. There is a moderate size hiatal hernia. There has been interval worsening of irregular masslike right upper lobe consolidation, now measuring roughly 5.1 x 6.5 x 3.3 cm this previously measured about 4.4 x 5.6 x 1.1 cm when measured similarly. The lesion now involves the minor fissure. Previously seen associated bronchiectasis is less apparent now due to the increase in masslike consolidation. This is certainly worrisome for malignancy, and follow-up with a PET/CT is recommended. There is a new right upper lobe spiculated nodule measuring 1.7 x 1.3 cm. Scattered other densities are noted in the right lower lobe and to a lesser degree in the left lower lobe. These are new from prior and could be infectious or inflammatory, or they could potentially reflect areas of tumor if the larger area is in fact malignant. There are groundglass infiltrates in the right middle lobe that likely reflect pneumonia. No clearly suspicious osseous lesions.      Impression: 1.Interval worsening of masslike consolidation in the right upper lobe, which now involves the minor fissure. This is certainly worrisome for malignancy, and follow-up with a PET/CT is recommended. Bronchoscopy may be beneficial. 2.New spiculated nodule in the right upper lobe. This could be infectious or neoplastic. 3.New groundglass infiltrates in the right middle lobe, likely pneumonia. 4.Additional densities in the right lower lobe and to a lesser degree in the left lower lobe, which could be infectious or inflammatory, or they could potentially reflect areas of tumor if the larger area is in fact malignant. 5.Tiny right pleural effusion. 6.Cardiomegaly, coronary artery disease, and atherosclerotic disease. 7.Moderate size hiatal hernia. Electronically Signed: Amos Mathew MD  1/7/2025 6:15 AM EST  Workstation ID: [id number]     Cultures so far negative  Compared to previous chest CT, still obstructing calcified lymph node with obstruction of right middle and upper lobe.  There is more atelectatic/opacified right middle lobe.  New inflammatory nodules well in this area with a tiny pleural effusion.  Flu/COVID/RSV negative  NT BNP elevated  Procalcitonin normal     Previous office notes reviewed as well as Dr. Houston's bronchoscopy note from over a year ago reviewed commenting on the right middle and upper lobe being obstructed due to this calcified lymph node.  Biopsies and cultures were negative at that time.     BAL cultures negative      Assessment & Plan   Assessment / Plan     Active Hospital Problems:  Active Hospital Problems    Diagnosis     **Pneumonia     Calcified granuloma of lung        Impression:  Question recurrent pneumonia, community-acquired  Right middle/upper lobe atelectasis/masslike opacity due to obstruction via extrinsic impression from calcified lymph node  Granulomatous lung disease  Right upper lobe lung nodule, inflammatory versus malignant   COPD with  exacerbation  Never smoker  Volume overload  Acute decompensated diastolic congestive heart failure  Hypokalemia  Hypomagnesemia  Class I obesity BMI 34.2     Plan:  I think the findings on chest CT could be expected due to this obstruction of the right middle and upper lobe due to this large calcified right hilar lymph node.  There is another calcified granulomas well noted in the mediastinum.  This does not meet criteria for fibrosing mediastinitis.  Bronchoscopy was done showing complete obstruction of anterior segment right upper lobe due to large calcified granuloma.  Suspect that right upper lobe is just collapsed and this is not pneumonia.  I did remove mucous plugs and sent for culture which are negative  Discharge on Augmentin.  Day 3/5  Will get outpatient PET/CT and pulmonary follow-up.  If PET/CT shows any uptake in the nodule or any area around the collapsed part of the right upper lobe, will need a biopsy.  If negative would just follow imaging over time  On day 3/5 of prednisone 40 mg daily  Cultures and antigen so far negative  Continue diuretics  Trend renal panel and electrolytes.  Replace magnesium  Okay to discharge from my perspective  Follow-up with us as outpatient.  Will need PFTs and ASAP PET/CT as outpatient     VTE Prophylaxis:  Mechanical VTE prophylaxis orders are present.    CODE STATUS:   Level Of Support Discussed With: Patient  Code Status (Patient has no pulse and is not breathing): CPR (Attempt to Resuscitate)  Medical Interventions (Patient has pulse or is breathing): Full Support      Labs, imaging, microbiology, notes and medications personally reviewed  Discussed with primary    Electronically signed by Mitch De La Torre MD, 01/09/25, 12:35 PM EST.

## 2025-01-09 NOTE — PLAN OF CARE
Goal Outcome Evaluation:  Plan of Care Reviewed With: patient, family        Progress: improving  Outcome Evaluation: Patient is discharging home with f/u appts.

## 2025-01-10 LAB
BACTERIA SPEC AEROBE CULT: NORMAL
BACTERIA SPEC RESP CULT: NORMAL
CYTO UR: NORMAL
GRAM STN SPEC: NORMAL
LAB AP CASE REPORT: NORMAL
LAB AP CLINICAL INFORMATION: NORMAL
LAB AP SPECIAL STAINS: NORMAL
PATH REPORT.FINAL DX SPEC: NORMAL
PATH REPORT.GROSS SPEC: NORMAL

## 2025-01-10 NOTE — OUTREACH NOTE
Prep Survey      Flowsheet Row Responses   [facility] patient discharged from? [facility]   Is LACE score < 7 ? No   Eligibility Readm Mgmt   Discharge diagnosis Pneumonia   Does the patient have one of the following disease processes/diagnoses(primary or secondary)? Pneumonia   Prep survey completed? Yes            hCelo TREJO - Registered Nurse

## 2025-01-12 LAB
BACTERIA SPEC AEROBE CULT: NORMAL
BACTERIA SPEC AEROBE CULT: NORMAL

## 2025-01-14 ENCOUNTER — READMISSION MANAGEMENT (OUTPATIENT)
Dept: CALL CENTER | Facility: HOSPITAL | Age: 85
End: 2025-01-14
Payer: MEDICARE

## 2025-01-14 NOTE — OUTREACH NOTE
COPD/PN Week 1 Survey      Flowsheet Row Responses   [facility] patient discharged from? [facility]   Does the patient have one of the following disease processes/diagnoses(primary or secondary)? Pneumonia   Week 1 attempt successful? Yes   Call start time 1048   Call end time 1051   Discharge diagnosis Pneumonia   Person spoke with today (if not patient) and relationship Jaylin- DERIAN   Meds reviewed with patient/caregiver? Yes   Does the patient have all medications ordered at discharge? Yes   Prescription comments No concerns or questions   Is the patient taking all medications as directed (includes completed medication regime)? Yes   Does the patient have a primary care provider?  Yes   Comments regarding PCP Has an appt with pcp upcoming, 01/31 [place]   Has home health visited the patient within 72 hours of discharge? N/A   Psychosocial issues? No   Did the patient receive a copy of their discharge instructions? Yes   Nursing interventions Reviewed instructions with patient   What is the patient's perception of their health status since discharge? Improving   Nursing Interventions Nurse provided patient education   Is the patient/caregiver able to teach back the hierarchy of who to call/visit for symptoms/problems? PCP, Specialist, Home health nurse, Urgent Care, ED, 911 Yes   Is the patient able to teach back COPD zones? Yes   Patient reports what zone on this call? Green Zone   Green Zone Reports doing well, Breathing without shortness of breath, Usual activity and exercise level, Usual amounts of cough and phlegm/mucous, Slept well last night, Appetite is good   Green Zone interventions: Take daily medications   Week 1 call completed? Yes   Graduated Yes   Wrap up additional comments DIL reports patient is doing well. Still Weak but patient thinks its related to anxiety medication. No other concerns or questions noted.   Call end time 1051            Chelo TREJO - Registered Nurse

## 2025-01-15 LAB
FUNGUS WND CULT: ABNORMAL
FUNGUS WND CULT: ABNORMAL
MYCOBACTERIUM SPEC CULT: NORMAL
NIGHT BLUE STAIN TISS: NORMAL
NIGHT BLUE STAIN TISS: NORMAL

## 2025-01-19 LAB
FUNGUS WND CULT: ABNORMAL
FUNGUS WND CULT: ABNORMAL

## 2025-01-21 LAB
FUNGUS WND CULT: ABNORMAL
FUNGUS WND CULT: ABNORMAL

## 2025-01-22 LAB
MYCOBACTERIUM SPEC CULT: NORMAL
NIGHT BLUE STAIN TISS: NORMAL
NIGHT BLUE STAIN TISS: NORMAL

## 2025-01-29 LAB
MYCOBACTERIUM SPEC CULT: NORMAL
NIGHT BLUE STAIN TISS: NORMAL
NIGHT BLUE STAIN TISS: NORMAL

## 2025-01-30 ENCOUNTER — OFFICE VISIT (OUTPATIENT)
Dept: PULMONOLOGY | Facility: CLINIC | Age: 85
End: 2025-01-30
Payer: MEDICARE

## 2025-01-30 VITALS
BODY MASS INDEX: 29.88 KG/M2 | DIASTOLIC BLOOD PRESSURE: 73 MMHG | WEIGHT: 175 LBS | TEMPERATURE: 98.4 F | RESPIRATION RATE: 16 BRPM | HEART RATE: 61 BPM | HEIGHT: 64 IN | SYSTOLIC BLOOD PRESSURE: 131 MMHG | OXYGEN SATURATION: 94 %

## 2025-01-30 DIAGNOSIS — J15.5: Primary | ICD-10-CM

## 2025-01-30 DIAGNOSIS — J98.4 CALCIFIED GRANULOMA OF LUNG: ICD-10-CM

## 2025-01-30 DIAGNOSIS — J44.9 CHRONIC OBSTRUCTIVE PULMONARY DISEASE, UNSPECIFIED COPD TYPE: ICD-10-CM

## 2025-01-30 DIAGNOSIS — R91.8 ABNORMAL CT SCAN OF LUNG: ICD-10-CM

## 2025-01-30 NOTE — PROGRESS NOTES
Primary Care Provider  Amauri Kearney MD     Referring Provider  No ref. provider found     Chief Complaint  Hospital Follow Up Visit, Shortness of Breath, Cough, Pneumonia (Calcified granuloma of lung), and Fatigue    Subjective          Mallory Franco presents to [facility] PULMONARY & CRITICAL CARE MEDICINE    Mallory Franco is a 84 y.o. female for lung infiltrate.  Previously had a bronchoscopy with Dr. Houston in November 2023 showing partial obstruction of right upper lobe calcified granuloma.  She was hospitalized for pneumonia and CT showed worsening infiltrate of the right upper lobe with masslike opacity versus atelectasis.  The patient a bronchoscopy and the right upper lobe anterior segment was completely with a calcified granuloma.  This was covered with mucosa and this was not an obvious broncholith that I could attempt to excise.  Patient also aspirated during this procedure as she does have some heartburn and aspiration issues.  All cultures have been negative other than mouth maame.  She is doing well.  Denies any dyspnea or coughing.  Takes Prilosec and Protonix.  Does have some issues with heartburn and acid reflux, in particular if she eats a meal and then lays down.  No coughing or wheezing.  No chest pain or hemoptysis.  No nausea, fevers or chills.     Her history of smoking is   Tobacco Use: Low Risk  (1/30/2025)    Patient History     Smoking Tobacco Use: Never     Smokeless Tobacco Use: Never     Passive Exposure: Past     Family History   Problem Relation Age of Onset    Breast cancer Mother     Cancer Mother     Bone cancer Father     Breast cancer Sister     Bone cancer Sister     Cancer Sister     Hypertension Sister     Lung cancer Brother     Cancer Sister     Cancer Sister     Cancer Sister     Malig Hyperthermia Neg Hx         Social History     Socioeconomic History    Marital status:    Tobacco Use    Smoking status: Never     Passive exposure: Past     Smokeless tobacco: Never    Tobacco comments:     Lived with a chain smoker for many years   Vaping Use    Vaping status: Never Used   Substance and Sexual Activity    Alcohol use: No    Drug use: Never    Sexual activity: Not Currently     Partners: Male     Birth control/protection: None        Past Medical History:   Diagnosis Date    Anemia     Arthritis     Asthma     BPPV (benign paroxysmal positional vertigo), right     COPD (chronic obstructive pulmonary disease)     Coronary artery disease     Coronary artery disease     Diverticulitis     Ear itching     HX    Enlarged heart     Gastroesophageal reflux disease     Hyperlipidemia     Hypertension     Knee pain     Macular degeneration     Pneumonia     PONV (postoperative nausea and vomiting)     Postural dizziness with presyncope 03/16/2023    Shingles     X2    SOB (shortness of breath) on exertion     Tinnitus of left ear     Urinary frequency     Vertigo         Immunization History   Administered Date(s) Administered    COVID-19 (MODERNA) 1st,2nd,3rd Dose Monovalent 03/18/2021, 04/22/2021    COVID-19 (MODERNA) Monovalent Original Booster 12/06/2021    FLUAD TRI 65YR+ 10/02/2023    Fluzone High-Dose 65+yrs 10/15/2021         Allergies   Allergen Reactions    Codeine Nausea And Vomiting    Contrast Dye (Echo Or Unknown Ct/Mr) Hives    Morphine Nausea And Vomiting          Current Outpatient Medications:     albuterol sulfate  (90 Base) MCG/ACT inhaler, Inhale 2 puffs Every 4 (Four) Hours As Needed., Disp: , Rfl:     aspirin 81 MG EC tablet, Take 1 tablet by mouth Daily., Disp: , Rfl:     atorvastatin (LIPITOR) 40 MG tablet, Take 1 tablet by mouth Daily., Disp: , Rfl:     Breo Ellipta 100-25 MCG/ACT aerosol powder , Inhale 1 puff Daily., Disp: , Rfl:     carvedilol (COREG) 25 MG tablet, Take 1 tablet by mouth 2 (Two) Times a Day With Meals., Disp: 180 tablet, Rfl: 6    famotidine (PEPCID) 40 MG tablet, Take 1 tablet by mouth every night at  bedtime., Disp: , Rfl:     fluticasone (FLONASE) 50 MCG/ACT nasal spray, Administer 1 spray into the nostril(s) as directed by provider Every Morning., Disp: , Rfl:     furosemide (Lasix) 40 MG tablet, Take 0.5 tablets by mouth Daily for 10 doses., Disp: 5 tablet, Rfl: 0    ipratropium-albuterol (DUO-NEB) 0.5-2.5 mg/3 ml nebulizer, Take 3 mL by nebulization Every 6 (Six) Hours As Needed for Shortness of Air., Disp: 120 mL, Rfl: 0    meclizine (ANTIVERT) 25 MG tablet, Take 1 tablet by mouth 2 (Two) Times a Day As Needed., Disp: , Rfl:     montelukast (SINGULAIR) 10 MG tablet, Take 1 tablet by mouth Every Night., Disp: , Rfl:     pantoprazole (PROTONIX) 40 MG EC tablet, Take 1 tablet by mouth Daily., Disp: , Rfl:     polyethylene glycol (MIRALAX) 17 g packet, Take 17 g by mouth Daily As Needed (constipation)., Disp: , Rfl:     rOPINIRole (REQUIP) 0.25 MG tablet, Take 1 tablet by mouth every night at bedtime., Disp: , Rfl:     sennosides-docusate (PERICOLACE) 8.6-50 MG per tablet, Take 1 tablet by mouth 2 (Two) Times a Day As Needed for Constipation., Disp: , Rfl:     sertraline (ZOLOFT) 100 MG tablet, Take 0.5 tablets by mouth Daily., Disp: , Rfl:     magnesium oxide (MAG-OX) 400 tablet tablet, Take 1 tablet by mouth Daily. (Patient not taking: Reported on 1/30/2025), Disp: , Rfl:   No current facility-administered medications for this visit.    Facility-Administered Medications Ordered in Other Visits:     Chlorhexidine Gluconate Cloth 2 % pads, , Apply externally, BID, Francesco Grewal APRN    iopamidol (ISOVUE-370) 76 % injection, , , Code / Trauma / Sedation Medication, Shane Rosa MD, 15 mL at 03/27/23 1202     Objective   Vital Signs Reviewed  WDWN, Alert, NAD.    HEENT:  PERRL, EOMI.  OP, nares clear, no sinus tenderness  Neck:  Supple, no JVD, no thyromegaly  Lymph: no axillary, cervical, supraclavicular lymphadenopathy noted bilaterally  Chest:  good aeration, clear to auscultation bilaterally, tympanic to  "percussion bilaterally, no work of breathing noted  CV: RRR, no MGR, pulses 2+, equal.  Abd:  Soft, NT, ND, + BS, no HSM  EXT:  no clubbing, no cyanosis, no edema, no joint tenderness  Neuro:  A&Ox3, CN grossly intact, no focal deficits.  Skin: No rashes or lesions noted    Vital Signs:   /73 (BP Location: Right arm, Patient Position: Sitting, Cuff Size: Adult)   Pulse 61   Temp 98.4 °F (36.9 °C) (Tympanic)   Resp 16   Ht 162.6 cm (64\")   Wt 79.4 kg (175 lb)   SpO2 94% Comment: room air  BMI 30.04 kg/m²        Result Review :   The following data was reviewed by: Mitch De La Torre MD on 12/06/2023:  CMP          1/7/2025    04:12 1/8/2025    04:38 1/9/2025    05:03   CMP   Glucose 99  134  140    BUN 17  18  25    Creatinine 0.85  0.95  1.01    EGFR 67.2  58.8  54.7    Sodium 137  135  136    Potassium 2.8  3.3  4.6    Chloride 98  100  100    Calcium 9.3  8.9  9.2    Total Protein 6.7      Albumin 3.6      Globulin 3.1      Total Bilirubin 0.9      Alkaline Phosphatase 70      AST (SGOT) 22      ALT (SGPT) 23      Albumin/Globulin Ratio 1.2      BUN/Creatinine Ratio 20.0  18.9  24.8    Anion Gap 12.7  10.1  10.7      CBC w/diff          3/26/2023    04:06 3/28/2023    04:31 8/31/2023    10:48   CBC w/Diff   WBC 6.83  7.87  14.69    RBC 3.72  3.92  3.60    Hemoglobin 10.6  11.0  10.0    Hematocrit 32.9  34.5  32.4    MCV 88.4  88.0  90.0    MCH 28.5  28.1  27.8    MCHC 32.2  31.9  30.9    RDW 16.6  16.5  14.7    Platelets 281  272  247    Neutrophil Rel % 43.1  77.9  76.9    Immature Granulocyte Rel % 0.3  0.5  0.3    Lymphocyte Rel % 43.0  17.8  12.5    Monocyte Rel % 9.5  3.8  9.7    Eosinophil Rel % 3.8  0.0  0.3    Basophil Rel % 0.3  0.0  0.3    Personally reviewed pneumonia panel, BAL culture, AFB culture, fungus culture and cytology from 11/28/2023    Personally reviewed services last office notes.  Personal read hospital records and my notes earlier this month.  Personally reviewed my " bronchoscopy note where she has a large calcified granuloma obstructing the anterior segment of the right upper lobe bronchus..  Chest CT personally showing worsening collapse/masslike opacity of right upper lobe.  Dr. Houston's bronchoscopy note in the past personally reviewed as well as previous chest CTs for comparison.  Bronchoscopy cultures have all been negative.       Assessment and Plan    Diagnoses and all orders for this visit:    1. Pneumonia of right upper lobe due to Escherichia coli (Primary)  -     NM PET/CT Skull Base to Mid Thigh; Future    2. Abnormal CT scan of lung  -     NM PET/CT Skull Base to Mid Thigh; Future    3. Calcified granuloma of lung  -     NM PET/CT Skull Base to Mid Thigh; Future      Impression:  Question recurrent pneumonia, community-acquired  Right middle/upper lobe atelectasis/masslike opacity due to obstruction via extrinsic impression from calcified lymph node  Granulomatous lung disease  Right upper lobe lung nodule, inflammatory versus malignant   COPD  Never smoker  Compensated diastolic congestive heart failure  Class I obesity BMI 30.0    Plan:  I think the findings on chest CT could be expected due to this obstruction of the right middle and upper lobe due to this large calcified right hilar lymph node.  There is another calcified granulomas well noted in the mediastinum.  This does not meet criteria for fibrosing mediastinitis.  Bronchoscopy was done showing complete obstruction of anterior segment right upper lobe due to large calcified granuloma.  Suspect that right upper lobe is just collapsed and this is not pneumonia.  I did remove mucous plugs and sent for culture which are negative  Completed course of Augmentin and steroids  Will check PET/CT.  If PET/CT shows any uptake in the nodule or any area around the collapsed part of the right upper lobe, will need a biopsy.  If negative would just follow imaging over time  Can be cultures so far negative  Continue  Prilosec and Protonix.  Aspiration precautions discussed with patient.  Recommend patient stay upright 2 hours after eating  Continue oral Lasix  Breo plus albuterol as needed  Declines flu and pneumonia vaccines    Follow Up   Return in about 3 weeks (around 2/20/2025).  Patient was given instructions and counseling regarding her condition or for health maintenance advice. Please see specific information pulled into the AVS if appropriate.     Electronically signed by Mitch De La Torre MD, 01/30/25, 3:08 PM EST.

## 2025-01-30 NOTE — PROGRESS NOTES
[facility]  Cardiology progress Note    Patient Name: Mallory Franco  : 1940    CHIEF COMPLAINT  Hypertension        Subjective   Subjective     HISTORY OF PRESENT ILLNESS    Mallory Franco is a 84 y.o. female with hypertension and CAD.  No chest pain or shortness of breath.    REVIEW OF SYSTEMS    Constitutional:    No fever, no weight loss  Skin:     No rash  Otolaryngeal:    No difficulty swallowing  Cardiovascular: See HPI.  Pulmonary:    No cough, no sputum production    Personal History     Social History:    reports that she has never smoked. She has been exposed to tobacco smoke. She has never used smokeless tobacco. She reports that she does not drink alcohol and does not use drugs.    Home Medications:  Current Outpatient Medications on File Prior to Visit   Medication Sig    albuterol sulfate  (90 Base) MCG/ACT inhaler Inhale 2 puffs Every 4 (Four) Hours As Needed.    aspirin 81 MG EC tablet Take 1 tablet by mouth Daily.    atorvastatin (LIPITOR) 40 MG tablet Take 1 tablet by mouth Daily.    Breo Ellipta 100-25 MCG/ACT aerosol powder  Inhale 1 puff Daily.    carvedilol (COREG) 25 MG tablet Take 1 tablet by mouth 2 (Two) Times a Day With Meals.    famotidine (PEPCID) 40 MG tablet Take 1 tablet by mouth every night at bedtime.    fluticasone (FLONASE) 50 MCG/ACT nasal spray Administer 1 spray into the nostril(s) as directed by provider Every Morning.    furosemide (Lasix) 40 MG tablet Take 0.5 tablets by mouth Daily for 10 doses.    ipratropium-albuterol (DUO-NEB) 0.5-2.5 mg/3 ml nebulizer Take 3 mL by nebulization Every 6 (Six) Hours As Needed for Shortness of Air.    meclizine (ANTIVERT) 25 MG tablet Take 1 tablet by mouth 2 (Two) Times a Day As Needed.    montelukast (SINGULAIR) 10 MG tablet Take 1 tablet by mouth Every Night.    pantoprazole (PROTONIX) 40 MG EC tablet Take 1 tablet by mouth Daily.    polyethylene glycol (MIRALAX) 17 g packet Take 17 g by mouth Daily As Needed  (constipation).    rOPINIRole (REQUIP) 0.25 MG tablet Take 1 tablet by mouth every night at bedtime.    sennosides-docusate (PERICOLACE) 8.6-50 MG per tablet Take 1 tablet by mouth 2 (Two) Times a Day As Needed for Constipation.    sertraline (ZOLOFT) 100 MG tablet Take 0.5 tablets by mouth Daily.    [DISCONTINUED] magnesium oxide (MAG-OX) 400 tablet tablet Take 1 tablet by mouth Daily. (Patient not taking: Reported on 1/31/2025)     Current Facility-Administered Medications on File Prior to Visit   Medication    Chlorhexidine Gluconate Cloth 2 % pads    iopamidol (ISOVUE-370) 76 % injection       Past Medical History:   Diagnosis Date    Anemia     Arthritis     Asthma     BPPV (benign paroxysmal positional vertigo), right     COPD (chronic obstructive pulmonary disease)     Coronary artery disease     Coronary artery disease     Diverticulitis     Ear itching     HX    Enlarged heart     Gastroesophageal reflux disease     Hyperlipidemia     Hypertension     Knee pain     Macular degeneration     Pneumonia     PONV (postoperative nausea and vomiting)     Postural dizziness with presyncope 03/16/2023    Shingles     X2    SOB (shortness of breath) on exertion     Tinnitus of left ear     Urinary frequency     Vertigo        Allergies:  Allergies   Allergen Reactions    Codeine Nausea And Vomiting    Contrast Dye (Echo Or Unknown Ct/Mr) Hives    Morphine Nausea And Vomiting       Objective    Objective       Vitals:   Temp:  [98.4 °F (36.9 °C)] 98.4 °F (36.9 °C)  Heart Rate:  [60-61] 60  Resp:  [16] 16  BP: (131-138)/(66-73) 138/66  Body mass index is 30.04 kg/m².     PHYSICAL EXAM:    General Appearance:   well developed  well nourished  HENT:   oropharynx moist  lips not cyanotic  Neck:  thyroid not enlarged  supple  Respiratory:  no respiratory distress  normal breath sounds  no rales  Cardiovascular:  no jugular venous distention  regular rhythm  apical impulse normal  S1 normal, S2 normal  no S3, no S4   no  murmur  no rub, no thrill  carotid pulses normal; no bruit  pedal pulses normal  lower extremity edema: none    Skin:   warm, dry  Psychiatric:  judgement and insight appropriate  normal mood and affect        Result Review:  I have personally reviewed the available results from  [x]  Laboratory  [x]  EKG  [x]  Cardiology  [x]  Medications  [x]  Old records  []  Other:     Procedures  Lab Results   Component Value Date    CHOL 107 01/07/2025     Lab Results   Component Value Date    TRIG 70 01/07/2025     Lab Results   Component Value Date    HDL 45 01/07/2025     Lab Results   Component Value Date    LDL 47 01/07/2025     Lab Results   Component Value Date    VLDL 15 01/07/2025     Results for orders placed during the hospital encounter of 10/07/24    Adult Transesophageal Echo (EPI) W/ Cont if Necessary Per Protocol    Interpretation Summary  Normal left ventricular systolic function.  Pacemaker lead in the right ventricle.  No evidence of any vegetation.  Mild mitral regurgitation.     Impression/Plan:  1.  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  2.  Essential hypertension controlled: Continue carvedilol 25 mg twice a day.  3.  Mixed hyperlipidemia: Continue Lipitor 40 mg once daily.  Monitor lipid and hepatic profile.           Migue Nguyen MD   01/31/25   10:35 EST

## 2025-01-31 ENCOUNTER — OFFICE VISIT (OUTPATIENT)
Dept: CARDIOLOGY | Facility: CLINIC | Age: 85
End: 2025-01-31
Payer: MEDICARE

## 2025-01-31 VITALS
SYSTOLIC BLOOD PRESSURE: 138 MMHG | WEIGHT: 175 LBS | DIASTOLIC BLOOD PRESSURE: 66 MMHG | BODY MASS INDEX: 29.88 KG/M2 | HEIGHT: 64 IN | HEART RATE: 60 BPM

## 2025-01-31 DIAGNOSIS — I10 HYPERTENSION, ESSENTIAL: ICD-10-CM

## 2025-01-31 DIAGNOSIS — Z95.0 PRESENCE OF PERMANENT CARDIAC PACEMAKER: Primary | ICD-10-CM

## 2025-01-31 DIAGNOSIS — E78.2 HYPERLIPEMIA, MIXED: ICD-10-CM

## 2025-02-05 LAB
FUNGUS WND CULT: ABNORMAL
MYCOBACTERIUM SPEC CULT: NORMAL
NIGHT BLUE STAIN TISS: NORMAL
NIGHT BLUE STAIN TISS: NORMAL

## 2025-02-10 ENCOUNTER — HOSPITAL ENCOUNTER (OUTPATIENT)
Dept: PET IMAGING | Facility: HOSPITAL | Age: 85
Discharge: HOME | End: 2025-02-10
Payer: MEDICARE

## 2025-02-10 DIAGNOSIS — J15.5: ICD-10-CM

## 2025-02-10 DIAGNOSIS — R91.8 ABNORMAL CT SCAN OF LUNG: ICD-10-CM

## 2025-02-10 DIAGNOSIS — J98.4 CALCIFIED GRANULOMA OF LUNG: ICD-10-CM

## 2025-02-10 PROCEDURE — A9552 F18 FDG: HCPCS | Performed by: INTERNAL MEDICINE

## 2025-02-10 PROCEDURE — 78815 PET IMAGE W/CT SKULL-THIGH: CPT

## 2025-02-10 PROCEDURE — 34310000005 FLUDEOXYGLUCOSE F18 SOLUTION: Performed by: INTERNAL MEDICINE

## 2025-02-10 RX ADMIN — FLUDEOXYGLUCOSE F 18 1 DOSE: 200 INJECTION, SOLUTION INTRAVENOUS at 14:50

## 2025-02-11 NOTE — PROGRESS NOTES
Primary Care Provider  Amauri Kearney MD     Referring Provider  No ref. provider found     Chief Complaint  Follow-up (3 week. ), Wheezing, and COPD    Subjective          History of Presenting Illness  Patient is an 84-year-old female, patient of Dr. De La Torre's who presents for management of a lung infiltrates who presents for a follow-up visit today.  Patient's daughter-in-law is present with the patient in the office today.  Previously, patient had a bronchoscopy with Dr. Houston in November 2023 showing partial obstruction of right upper lobe and a calcified granuloma.  Patient was then hospitalized for pneumonia and a CT scan which showed worsening infiltrate of the right upper lobe with masslike opacity versus atelectasis.  Patient had a bronchoscopy during hospitalization in January 2025 by Dr. De La Torre and the right upper lobe anterior segment was completely occluded with a calcified granuloma.  This is covered with mucosa and this is not an obvious broncholith that could attempt to excise.  Patient also aspirated during the procedure as she does have some heartburn aspiration issues.  All cultures have been negative other than the mouth maame. Patient had a PET/CT scan completed on 2/10/2025.  Report states nodule in the apical segment of the right upper lobe now has the appearance of a vague density. It has a maximum SUV of 1.38 which is below mediastinal blood pool and favors a resolving infectious/inflammatory process. There is dense consolidation in the anterior segment of the right upper lobe. Along the peripheral margin, there is increased metabolic activity with a maximum SUV of 4.8. More centrally in the right suprahilar region, there is increased metabolic activity with a maximum SUV of 3.85. This raises concern of malignancy possibly from bronchoalveolar carcinoma. I would recommend further evaluation with bronchoscopy.  Patient did have a bronchoscopy procedure completed during hospitalization  in January 2025 and bronchoscopy was showing complete obstruction of anterior segment of the right upper lobe due to large calcified granuloma.  Mucous plugs were removed at that time and sent for culture which were negative.  Communicated with Dr. De La Torre prior to patient's office visit.  Per Dr De La Torre, on bronchoscopy he could not access this airway as it is obstructed by a broncholith.  Recommend CT-guided biopsy of the PET positive peripheral area.   Patient states that she does get short of breath that is worse with exertion, mild in severity, and improved with rest.  Patient states that she is taking Breo every day as prescribed use albuterol inhaler and DuoNeb nebulizer treatments as needed.  Patient is also taking Singulair and Flonase nasal spray for seasonal allergies.  Patient is taking Protonix and Pepcid for reflux. Patient denies fever, chills, night sweats, swollen glands in the head and neck, unintentional weight loss, hemoptysis, purulent sputum production, dysphagia, chest pain, palpitations, chest tightness, abdominal pain, nausea, vomiting, and diarrhea.  Patient also denies any myalgias, changes in sense of taste and/or smell, sore throat, any other coronavirus or flu-like symptoms.  Patient denies any leg swelling, orthopnea, paroxysmal nocturnal dyspnea.  Patient is able to perform activities of daily living.        Review of Systems     Family History   Problem Relation Age of Onset    Breast cancer Mother     Cancer Mother     Bone cancer Father     Breast cancer Sister     Bone cancer Sister     Cancer Sister     Hypertension Sister     Lung cancer Brother     Cancer Sister     Cancer Sister     Cancer Sister     Malig Hyperthermia Neg Hx         Social History     Socioeconomic History    Marital status:    Tobacco Use    Smoking status: Never     Passive exposure: Past    Smokeless tobacco: Never    Tobacco comments:     Lived with a chain smoker for many years   Vaping Use     Vaping status: Never Used   Substance and Sexual Activity    Alcohol use: No    Drug use: Never    Sexual activity: Not Currently     Partners: Male     Birth control/protection: None        Past Medical History:   Diagnosis Date    Anemia     Arthritis     Asthma     BPPV (benign paroxysmal positional vertigo), right     COPD (chronic obstructive pulmonary disease)     Coronary artery disease     Coronary artery disease     Diverticulitis     Ear itching     HX    Enlarged heart     Gastroesophageal reflux disease     Hyperlipidemia     Hypertension     Knee pain     Macular degeneration     Pneumonia     PONV (postoperative nausea and vomiting)     Postural dizziness with presyncope 03/16/2023    Shingles     X2    SOB (shortness of breath) on exertion     Tinnitus of left ear     Urinary frequency     Vertigo         Immunization History   Administered Date(s) Administered    COVID-19 (MODERNA) 1st,2nd,3rd Dose Monovalent 03/18/2021, 04/22/2021    COVID-19 (MODERNA) Monovalent Original Booster 12/06/2021    FLUAD TRI 65YR+ 10/02/2023    Fluad Quad 65+ 10/07/2024    Fluzone High-Dose 65+yrs 10/15/2021       Allergies   Allergen Reactions    Codeine Nausea And Vomiting    Contrast Dye (Echo Or Unknown Ct/Mr) Hives    Morphine Nausea And Vomiting          Current Outpatient Medications:     albuterol sulfate  (90 Base) MCG/ACT inhaler, Inhale 2 puffs Every 4 (Four) Hours As Needed., Disp: , Rfl:     aspirin 81 MG EC tablet, Take 1 tablet by mouth Daily., Disp: , Rfl:     atorvastatin (LIPITOR) 40 MG tablet, Take 1 tablet by mouth Daily., Disp: , Rfl:     Breo Ellipta 100-25 MCG/ACT aerosol powder , Inhale 1 puff Daily., Disp: , Rfl:     carvedilol (COREG) 25 MG tablet, Take 1 tablet by mouth 2 (Two) Times a Day With Meals., Disp: 180 tablet, Rfl: 6    famotidine (PEPCID) 40 MG tablet, Take 1 tablet by mouth every night at bedtime., Disp: , Rfl:     fluticasone (FLONASE) 50 MCG/ACT nasal spray, Administer 1  spray into the nostril(s) as directed by provider Every Morning., Disp: , Rfl:     furosemide (Lasix) 40 MG tablet, Take 0.5 tablets by mouth Daily for 10 doses., Disp: 5 tablet, Rfl: 0    ipratropium-albuterol (DUO-NEB) 0.5-2.5 mg/3 ml nebulizer, Take 3 mL by nebulization Every 6 (Six) Hours As Needed for Shortness of Air for up to 30 days., Disp: 120 mL, Rfl: 5    meclizine (ANTIVERT) 25 MG tablet, Take 1 tablet by mouth 2 (Two) Times a Day As Needed., Disp: , Rfl:     montelukast (SINGULAIR) 10 MG tablet, Take 1 tablet by mouth Every Night., Disp: , Rfl:     pantoprazole (PROTONIX) 40 MG EC tablet, Take 1 tablet by mouth Daily., Disp: , Rfl:     polyethylene glycol (MIRALAX) 17 g packet, Take 17 g by mouth Daily As Needed (constipation)., Disp: , Rfl:     rOPINIRole (REQUIP) 0.25 MG tablet, Take 1 tablet by mouth every night at bedtime., Disp: , Rfl:     sennosides-docusate (PERICOLACE) 8.6-50 MG per tablet, Take 1 tablet by mouth 2 (Two) Times a Day As Needed for Constipation., Disp: , Rfl:     sertraline (ZOLOFT) 100 MG tablet, Take 0.5 tablets by mouth Daily., Disp: , Rfl:     hydroCHLOROthiazide 25 MG tablet, , Disp: , Rfl:   No current facility-administered medications for this visit.    Facility-Administered Medications Ordered in Other Visits:     Chlorhexidine Gluconate Cloth 2 % pads, , Apply externally, BID, Francesco Grewal, YOVANA    iopamidol (ISOVUE-370) 76 % injection, , , Code / Trauma / Sedation Medication, Shane Rsoa MD, 15 mL at 03/27/23 1202     Objective     Physical Exam  Vital Signs:   WDWN, Alert, NAD.    HEENT:  PERRL, EOMI.  OP, nares clear, no sinus tenderness  Neck:  Supple, no JVD, no thyromegaly.  Lymph: no axillary, cervical, supraclavicular lymphadenopathy noted bilaterally  Chest:  good aeration, clear to auscultation bilaterally, tympanic to percussion bilaterally, no work of breathing noted  CV: RRR, no MGR, pulses 2+, equal.  Abd:  Soft, NT, ND, + BS, no HSM  EXT:  no clubbing,  "no cyanosis, no edema, no joint tenderness  Neuro:  A&Ox3, CN grossly intact, no focal deficits.  Skin: No rashes or lesions noted.    Pulse 61   Temp 97.5 °F (36.4 °C) (Oral)   Resp 16   Ht 162.6 cm (64.02\")   Wt 80.9 kg (178 lb 6.4 oz)   SpO2 94% Comment: room air  BMI 30.61 kg/m²         Result Review :   I have reviewed Dr. De La Torre's last office visit note.  I also reviewed PET/CT report dated from 2/10/2025.  See scanned report.    Procedures:      NM PET/CT Skull Base to Mid Thigh    Result Date: 2/10/2025  Impression: 1.The nodule in the apical segment of the right upper lobe now has the appearance of a vague density. It has a maximum SUV of 1.38 which is below mediastinal blood pool and favors a resolving infectious/inflammatory process. 2.There is dense consolidation in the anterior segment of the right upper lobe. Along the peripheral margin, there is increased metabolic activity with a maximum SUV of 4.8. More centrally in the right suprahilar region, there is increased metabolic activity with a maximum SUV of 3.85. This raises concern of malignancy possibly from bronchoalveolar carcinoma. I would recommend further evaluation with bronchoscopy. 3.Incidental CT findings as noted above. Electronically Signed: Ramu Mares MD  2/10/2025 4:29 PM EST  Workstation ID: [id number]        Assessment and Plan      Assessment:  1. Question recurrent pneumonia, community-acquired.  2. Right middle/upper lobe atelectasis/masslike opacity due to obstruction via extrinsic impression from calcified lymph node.  3. Granulomatous lung disease.  4. Right upper lobe lung nodule, inflammatory versus malignant.   5. COPD.  6. Compensated diastolic congestive heart failure.  7. GERD: patient is on a PPI and H2 blocker.   8. Abnormal PET/CT scan.  9.  Seasonal allergies.  10. Never smoker.        Plan:  1. PET/CT shows peripheral area of increased uptake. Communicated with Dr. De La Torre prior to patient's office visit.  Per  " Wil, on bronchoscopy he could not access this airway as it is obstructed by broncholith.  Recommend CT-guided biopsy of the PET positive peripheral area.  Will send patient for CT-guided biopsy.  I have discussed the risk of the procedure with the patient including pneumothorax, hemothorax, bleeding, hypoxia, and death.  The patient recommended these findings, acknowledged these findings and is agreeable to the procedure if needed.   Patient is advised to hold aspirin 5 days prior to procedure per protocol.  2.  Continue Breo as prescribed and rinse mouth out after each use.  3.  Continue albuterol inhaler and DuoNeb nebulizer treatments as needed.  4.  Continue Flonase and Singulair for seasonal allergies.  5.  For GERD,  patient to continue PPI and H2 blocker.   Patient is also advised to sleep with the head of the bed elevated and do not eat 3-4 hours prior to bedtime.  Patient is advised to remain upright 2 hours after eating.  6.  Vaccination status:  patient reports they are up-to-date with flu and COVID vaccines.  Patient declines pneumonia vaccinations.  Discussed with patient the benefits of vaccination including decreased risk of severe illness, hospitalization and death related to flu, pneumonia, and COVID-19.  Patient verbalized understanding.  Patient is advised to continue to follow CDC recommendations such as social distancing, wearing a mask, and washing hands for least 20 seconds.  7.  Smoking status: never smoker.  8.  Patient to call the office, 911, or go to the ER with new or worsening symptoms.  9.  Follow-up after CT-guided biopsy, sooner if needed.        I spent 40 minutes caring for Mallory on this date of service. This time includes time spent by me in the following activities:preparing for the visit, reviewing tests, obtaining and/or reviewing a separately obtained history, performing a medically appropriate examination and/or evaluation , counseling and educating the  patient/family/caregiver, ordering medications, tests, or procedures, referring and communicating with other health care professionals , documenting information in the medical record, independently interpreting results and communicating that information with the patient/family/caregiver, and care coordination    Follow Up   Return for follow up after CT guided biopsy.  Patient was given instructions and counseling regarding her condition or for health maintenance advice. Please see specific information pulled into the AVS if appropriate.

## 2025-02-12 LAB
MYCOBACTERIUM SPEC CULT: NORMAL
NIGHT BLUE STAIN TISS: NORMAL
NIGHT BLUE STAIN TISS: NORMAL

## 2025-02-18 ENCOUNTER — OFFICE VISIT (OUTPATIENT)
Dept: PULMONOLOGY | Facility: CLINIC | Age: 85
End: 2025-02-18
Payer: MEDICARE

## 2025-02-18 VITALS
BODY MASS INDEX: 30.46 KG/M2 | HEIGHT: 64 IN | WEIGHT: 178.4 LBS | RESPIRATION RATE: 16 BRPM | HEART RATE: 61 BPM | TEMPERATURE: 97.5 F | OXYGEN SATURATION: 94 %

## 2025-02-18 DIAGNOSIS — J30.2 SEASONAL ALLERGIES: ICD-10-CM

## 2025-02-18 DIAGNOSIS — R91.1 LUNG NODULE: Primary | ICD-10-CM

## 2025-02-18 DIAGNOSIS — R91.8 ABNORMAL CT SCAN OF LUNG: ICD-10-CM

## 2025-02-18 DIAGNOSIS — R91.8 OPACITY OF LUNG ON IMAGING STUDY: ICD-10-CM

## 2025-02-18 DIAGNOSIS — R94.8 ABNORMAL POSITRON EMISSION TOMOGRAPHY (PET) SCAN: ICD-10-CM

## 2025-02-18 DIAGNOSIS — J98.4 CALCIFIED GRANULOMA OF LUNG: ICD-10-CM

## 2025-02-18 DIAGNOSIS — J18.9 PNEUMONIA OF RIGHT UPPER LOBE DUE TO INFECTIOUS ORGANISM: Primary | ICD-10-CM

## 2025-02-18 DIAGNOSIS — Z79.01 ENCOUNTER FOR CURRENT LONG-TERM USE OF ANTICOAGULANTS: ICD-10-CM

## 2025-02-18 DIAGNOSIS — J44.9 CHRONIC OBSTRUCTIVE PULMONARY DISEASE, UNSPECIFIED COPD TYPE: ICD-10-CM

## 2025-02-18 RX ORDER — HYDROCHLOROTHIAZIDE 25 MG/1
TABLET ORAL
COMMUNITY
Start: 2025-02-09

## 2025-02-18 RX ORDER — IPRATROPIUM BROMIDE AND ALBUTEROL SULFATE 2.5; .5 MG/3ML; MG/3ML
3 SOLUTION RESPIRATORY (INHALATION) EVERY 6 HOURS PRN
Qty: 120 ML | Refills: 5 | Status: SHIPPED | OUTPATIENT
Start: 2025-02-18 | End: 2025-02-19 | Stop reason: SDUPTHER

## 2025-02-19 LAB
MYCOBACTERIUM SPEC CULT: NORMAL
NIGHT BLUE STAIN TISS: NORMAL
NIGHT BLUE STAIN TISS: NORMAL

## 2025-02-19 RX ORDER — IPRATROPIUM BROMIDE AND ALBUTEROL SULFATE 2.5; .5 MG/3ML; MG/3ML
3 SOLUTION RESPIRATORY (INHALATION) EVERY 6 HOURS PRN
Qty: 360 ML | Refills: 5 | Status: SHIPPED | OUTPATIENT
Start: 2025-02-19 | End: 2025-03-21

## 2025-02-27 RX ORDER — LORATADINE 10 MG/1
10 TABLET ORAL DAILY
COMMUNITY

## 2025-02-27 RX ORDER — FUROSEMIDE 20 MG/1
20 TABLET ORAL DAILY
COMMUNITY

## 2025-03-03 ENCOUNTER — LAB (OUTPATIENT)
Dept: LAB | Facility: HOSPITAL | Age: 85
End: 2025-03-03
Payer: MEDICARE

## 2025-03-03 DIAGNOSIS — R91.1 LUNG NODULE: ICD-10-CM

## 2025-03-03 DIAGNOSIS — Z79.01 ENCOUNTER FOR CURRENT LONG-TERM USE OF ANTICOAGULANTS: ICD-10-CM

## 2025-03-03 LAB
APTT PPP: 26.7 SECONDS (ref 24.2–34.2)
BASOPHILS # BLD AUTO: 0.03 10*3/MM3 (ref 0–0.2)
BASOPHILS NFR BLD AUTO: 0.3 % (ref 0–1.5)
DEPRECATED RDW RBC AUTO: 51.6 FL (ref 37–54)
EOSINOPHIL # BLD AUTO: 0.19 10*3/MM3 (ref 0–0.4)
EOSINOPHIL NFR BLD AUTO: 1.8 % (ref 0.3–6.2)
ERYTHROCYTE [DISTWIDTH] IN BLOOD BY AUTOMATED COUNT: 16.8 % (ref 12.3–15.4)
HCT VFR BLD AUTO: 37.1 % (ref 34–46.6)
HGB BLD-MCNC: 11.8 G/DL (ref 12–15.9)
IMM GRANULOCYTES # BLD AUTO: 0.03 10*3/MM3 (ref 0–0.05)
IMM GRANULOCYTES NFR BLD AUTO: 0.3 % (ref 0–0.5)
INR PPP: 1.13 (ref 0.86–1.15)
LYMPHOCYTES # BLD AUTO: 3.5 10*3/MM3 (ref 0.7–3.1)
LYMPHOCYTES NFR BLD AUTO: 33.9 % (ref 19.6–45.3)
MCH RBC QN AUTO: 27.7 PG (ref 26.6–33)
MCHC RBC AUTO-ENTMCNC: 31.8 G/DL (ref 31.5–35.7)
MCV RBC AUTO: 87.1 FL (ref 79–97)
MONOCYTES # BLD AUTO: 0.82 10*3/MM3 (ref 0.1–0.9)
MONOCYTES NFR BLD AUTO: 7.9 % (ref 5–12)
NEUTROPHILS NFR BLD AUTO: 5.76 10*3/MM3 (ref 1.7–7)
NEUTROPHILS NFR BLD AUTO: 55.8 % (ref 42.7–76)
NRBC BLD AUTO-RTO: 0 /100 WBC (ref 0–0.2)
PLATELET # BLD AUTO: 278 10*3/MM3 (ref 140–450)
PMV BLD AUTO: 11.2 FL (ref 6–12)
PROTHROMBIN TIME: 15 SECONDS (ref 11.8–14.9)
RBC # BLD AUTO: 4.26 10*6/MM3 (ref 3.77–5.28)
WBC NRBC COR # BLD AUTO: 10.33 10*3/MM3 (ref 3.4–10.8)

## 2025-03-03 PROCEDURE — 85025 COMPLETE CBC W/AUTO DIFF WBC: CPT

## 2025-03-03 PROCEDURE — 85610 PROTHROMBIN TIME: CPT

## 2025-03-03 PROCEDURE — 85730 THROMBOPLASTIN TIME PARTIAL: CPT

## 2025-03-03 PROCEDURE — 36415 COLL VENOUS BLD VENIPUNCTURE: CPT

## 2025-03-05 ENCOUNTER — HOSPITAL ENCOUNTER (OUTPATIENT)
Dept: CT IMAGING | Facility: HOSPITAL | Age: 85
Discharge: HOME | End: 2025-03-05
Payer: MEDICARE

## 2025-03-05 ENCOUNTER — HOSPITAL ENCOUNTER (OUTPATIENT)
Dept: GENERAL RADIOLOGY | Facility: HOSPITAL | Age: 85
Discharge: HOME | End: 2025-03-05
Payer: MEDICARE

## 2025-03-05 VITALS
OXYGEN SATURATION: 97 % | DIASTOLIC BLOOD PRESSURE: 65 MMHG | RESPIRATION RATE: 20 BRPM | SYSTOLIC BLOOD PRESSURE: 119 MMHG | HEART RATE: 64 BPM

## 2025-03-05 DIAGNOSIS — R94.8 ABNORMAL POSITRON EMISSION TOMOGRAPHY (PET) SCAN: ICD-10-CM

## 2025-03-05 DIAGNOSIS — R91.8 OPACITY OF LUNG ON IMAGING STUDY: ICD-10-CM

## 2025-03-05 PROCEDURE — 71045 X-RAY EXAM CHEST 1 VIEW: CPT

## 2025-03-05 PROCEDURE — 88312 SPECIAL STAINS GROUP 1: CPT | Performed by: NURSE PRACTITIONER

## 2025-03-05 PROCEDURE — 25010000002 FENTANYL CITRATE (PF) 50 MCG/ML SOLUTION: Performed by: RADIOLOGY

## 2025-03-05 PROCEDURE — 88305 TISSUE EXAM BY PATHOLOGIST: CPT | Performed by: NURSE PRACTITIONER

## 2025-03-05 RX ORDER — FENTANYL CITRATE 50 UG/ML
INJECTION, SOLUTION INTRAMUSCULAR; INTRAVENOUS AS NEEDED
Status: COMPLETED | OUTPATIENT
Start: 2025-03-05 | End: 2025-03-05

## 2025-03-05 RX ORDER — LIDOCAINE HYDROCHLORIDE 20 MG/ML
INJECTION, SOLUTION INFILTRATION; PERINEURAL
Status: DISPENSED
Start: 2025-03-05 | End: 2025-03-05

## 2025-03-05 RX ADMIN — FENTANYL CITRATE 25 MCG: 50 INJECTION, SOLUTION INTRAMUSCULAR; INTRAVENOUS at 09:04

## 2025-03-05 NOTE — H&P
[facility]   Interventional Radiology H&P    Patient Name: Mallory Franco  : 1940  MRN: [id number]  Primary Care Physician:  Amauri Kearney MD  Referring Physician: YOVANA Sims  Date of admission: 3/5/2025    Subjective   Subjective     HPI:  Mallory Franco is a 84 y.o. female with right upper lobe consolidation on CT    Review of Systems:   Constitutional no fever,  no weight loss       Otolaryngeal no difficulty swallowing   Cardiovascular Some discomfort at site of pacemaker removal   Pulmonary Positive for cough   Gastrointestinal no constipation, no diarrhea                         Personal History       Past Medical/Surgical History:   Past Medical History:   Diagnosis Date    Anemia     Arthritis     Asthma     BPPV (benign paroxysmal positional vertigo), right     COPD (chronic obstructive pulmonary disease)     Coronary artery disease     Coronary artery disease     Diverticulitis     Ear itching     HX    Enlarged heart     Gastroesophageal reflux disease     Hyperlipidemia     Hypertension     Knee pain     Macular degeneration     Pneumonia     PONV (postoperative nausea and vomiting)     Postural dizziness with presyncope 2023    Shingles     X2    SOB (shortness of breath) on exertion     Tinnitus of left ear     Urinary frequency     Vertigo      Past Surgical History:   Procedure Laterality Date    BREAST BIOPSY Bilateral     BRONCHOSCOPY Right 2023    Procedure: BRONCHOSCOPY WITH BIOPSIES, BRUSHINGS, AND BRONCHOALVEOLAR LAVAGE;  Surgeon: Aleksandr Houston DO;  Location: [facility] ENDOSCOPY;  Service: Pulmonary;  Laterality: Right;  RIGHT UPPER LOBE ENDOBRONCHIAL OCCLUSION    BRONCHOSCOPY N/A 2025    Procedure: BRONCHOSCOPY with broncheoaqlveolar lavage and bronchial washings;  Surgeon: Mitch De La Torre MD;  Location: [facility] ENDOSCOPY;  Service: Pulmonary;  Laterality: N/A;  calcified granuloma    CARDIAC CATHETERIZATION      CARDIAC  ELECTROPHYSIOLOGY PROCEDURE N/A 2023    Procedure: Pacemaker SC new-[employer], call Dr. Rosa first thing in the morning to get time for the procedure and notify rep;  Surgeon: Shane Rosa MD;  Location: [facility] CATH INVASIVE LOCATION;  Service: Cardiovascular;  Laterality: N/A;    CARDIAC ELECTROPHYSIOLOGY PROCEDURE N/A 10/14/2024    Procedure: Temporary Pacemaker;  Surgeon: Abilio Majano MD;  Location: [facility] CATH INVASIVE LOCATION;  Service: Cardiovascular;  Laterality: N/A;    CARDIAC PACEMAKER REMOVAL N/A 10/14/2024    Procedure: Lead Removal PPM - DC, Dual;  Surgeon: Abilio Majano MD;  Location: [facility] CATH INVASIVE LOCATION;  Service: Cardiovascular;  Laterality: N/A;    CARDIAC PACEMAKER REMOVAL N/A 10/14/2024    Procedure: PPM Generator Explant;  Surgeon: Abilio Majano MD;  Location: [facility] CATH INVASIVE LOCATION;  Service: Cardiovascular;  Laterality: N/A;     SECTION      CHOLECYSTECTOMY      COLONOSCOPY      CORONARY STENT PLACEMENT      EYE SURGERY      GALLBLADDER SURGERY      INSERT / REPLACE / REMOVE PACEMAKER      KNEE MINI REVISION Right 2021    Procedure: KNEE POLY INSERT EXCHANGE;  Surgeon: Ky Avila MD;  Location: [facility] OR;  Service: Orthopedics;  Laterality: Right;    KNEE SURGERY      LUNG BIOPSY      PACEMAKER IMPLANTATION N/A 10/17/2024    Procedure: Implant or Replacement of Single Chamber Leadless Pacemaker, RV Only  medt micra;  Surgeon: Abilio Majano MD;  Location: [facility] CATH INVASIVE LOCATION;  Service: Cardiovascular;  Laterality: N/A;    REPLACEMENT TOTAL KNEE Right     REPLACEMENT TOTAL KNEE Left        Social History:  reports that she has never smoked. She has been exposed to tobacco smoke. She has never used smokeless tobacco. She reports that she does not drink alcohol and does not use drugs.    Medications:  (Not in a hospital admission)    Current medications:    Current IV drips:  No current  "facility-administered medications for this encounter.      Allergies:  Allergies   Allergen Reactions    Codeine Nausea And Vomiting    Contrast Dye (Echo Or Unknown Ct/Mr) Hives    Morphine Nausea And Vomiting       Objective    Objective     Vitals:   Heart Rate:  [68] 68  Resp:  [20] 20  BP: (137)/(81) 137/81      Physical Exam:   Constitutional: Awake, alert, No acute distress    Respiratory: Clear to auscultation bilaterally, nonlabored respirations    Cardiovascular: RRR, no murmurs, rubs, or gallops,     ASA SCALE ASSESSMENT:  3-Severe systemic disease that results in functional limitation    MALLAMPATI CLASSIFICATION:  2-Able to visualize the soft palate, fauces, uvula. The anterior & posterior tonsilar pillars are hidden by the tongue.       Result Review        Result Review:     No results found for: \"NA\"    No results found for: \"K\"    No results found for: \"CL\"    No results found for: \"PLASMABICARB\"    No results found for: \"BUN\"    No results found for: \"CREATININE\"    No results found for: \"CALCIUM\"        No components found for: \"GLUCOSE.*\"  Results from last 7 days   Lab Units 03/03/25  1006   WBC 10*3/mm3 10.33   HEMOGLOBIN g/dL 11.8*   HEMATOCRIT % 37.1   PLATELETS 10*3/mm3 278      Results from last 7 days   Lab Units 03/03/25  1006   INR  1.13           Assessment / Plan     Assesment:   Right upper lobe opacity on CT      Plan:   CT guided biopsy of right upper lobe opacity    The risks and benefits of the procedure were discussed with the patient.    Electronically signed by Cesar Dia MD, 03/05/25, 8:42 AM EST.      "

## 2025-03-06 DIAGNOSIS — R93.89 ABNORMAL CT SCAN, CHEST: ICD-10-CM

## 2025-03-06 DIAGNOSIS — R91.1 LUNG NODULE: Primary | ICD-10-CM

## 2025-03-06 LAB
CYTO UR: NORMAL
LAB AP CASE REPORT: NORMAL
LAB AP CLINICAL INFORMATION: NORMAL
LAB AP SPECIAL STAINS: NORMAL
PATH REPORT.FINAL DX SPEC: NORMAL
PATH REPORT.GROSS SPEC: NORMAL

## 2025-03-07 PROCEDURE — 93296 REM INTERROG EVL PM/IDS: CPT | Performed by: INTERNAL MEDICINE

## 2025-03-07 PROCEDURE — 93294 REM INTERROG EVL PM/LDLS PM: CPT | Performed by: INTERNAL MEDICINE

## 2025-06-09 ENCOUNTER — HOSPITAL ENCOUNTER (OUTPATIENT)
Dept: CT IMAGING | Facility: HOSPITAL | Age: 85
Discharge: HOME | End: 2025-06-09
Admitting: NURSE PRACTITIONER
Payer: MEDICARE

## 2025-06-09 DIAGNOSIS — R93.89 ABNORMAL CT SCAN, CHEST: ICD-10-CM

## 2025-06-09 DIAGNOSIS — R91.1 LUNG NODULE: ICD-10-CM

## 2025-06-09 PROCEDURE — 71250 CT THORAX DX C-: CPT

## 2025-06-12 DIAGNOSIS — J18.9 PNEUMONIA OF RIGHT MIDDLE LOBE DUE TO INFECTIOUS ORGANISM: Primary | ICD-10-CM

## 2025-06-12 DIAGNOSIS — J44.9 CHRONIC OBSTRUCTIVE PULMONARY DISEASE, UNSPECIFIED COPD TYPE: ICD-10-CM

## 2025-06-12 DIAGNOSIS — R93.89 ABNORMAL CT SCAN, CHEST: ICD-10-CM

## 2025-06-19 PROCEDURE — 93296 REM INTERROG EVL PM/IDS: CPT | Performed by: INTERNAL MEDICINE

## 2025-06-19 PROCEDURE — 93294 REM INTERROG EVL PM/LDLS PM: CPT | Performed by: INTERNAL MEDICINE

## 2025-06-24 LAB
MDC_IDC_MSMT_BATTERY_REMAINING_LONGEVITY: 120 MO
MDC_IDC_MSMT_BATTERY_RRT_TRIGGER: 2.58
MDC_IDC_MSMT_BATTERY_VOLTAGE: 3.08
MDC_IDC_MSMT_LEADCHNL_RA_SENSING_INTR_AMPL: 0.9
MDC_IDC_MSMT_LEADCHNL_RV_DTM: NORMAL
MDC_IDC_MSMT_LEADCHNL_RV_IMPEDANCE_VALUE: 440
MDC_IDC_MSMT_LEADCHNL_RV_PACING_THRESHOLD_AMPLITUDE: 0.63
MDC_IDC_MSMT_LEADCHNL_RV_PACING_THRESHOLD_PULSEWIDTH: 0.2
MDC_IDC_PG_MFG: NORMAL
MDC_IDC_PG_MODEL: NORMAL
MDC_IDC_PG_SERIAL: NORMAL
MDC_IDC_PG_TYPE: NORMAL
MDC_IDC_SESS_DTM: NORMAL
MDC_IDC_SESS_TYPE: NORMAL
MDC_IDC_SET_BRADY_LOWRATE: 60
MDC_IDC_SET_BRADY_MAX_SENSOR_RATE: 120
MDC_IDC_SET_BRADY_MODE: NORMAL
MDC_IDC_SET_LEADCHNL_RV_PACING_AMPLITUDE: 1.13
MDC_IDC_SET_LEADCHNL_RV_PACING_PULSEWIDTH: 0.2
MDC_IDC_SET_LEADCHNL_RV_SENSING_SENSITIVITY: 0.9
MDC_IDC_STAT_BRADY_RV_PERCENT_PACED: 91.89

## 2025-07-13 NOTE — PROGRESS NOTES
Primary Care Provider  Johan Escudero MD     Referring Provider  No ref. provider found     Chief Complaint  Cough, Shortness of Breath, Wheezing, Follow-up (3 month. ), and COPD    Subjective          History of Presenting Illness  Patient is an 84-year-old female, patient of Dr. De La Torre's who presents for management of a lung infiltrates who presents for a follow-up visit today.      Previously, patient had a bronchoscopy with Dr. Houston in November 2023 showing partial obstruction of right upper lobe and a calcified granuloma.  Patient was then hospitalized for pneumonia and a CT scan which showed worsening infiltrate of the right upper lobe with masslike opacity versus atelectasis.  Patient had a bronchoscopy during hospitalization in January 2025 by Dr. De La Torre and the right upper lobe anterior segment was completely occluded with a calcified granuloma.  Mucous plugs were removed at that time and sent for culture which were negative. This was covered with mucosa and this is not an obvious broncholith that could attempt to excise.  Patient also aspirated during the procedure as she does have some heartburn aspiration issues.  All cultures have been negative other than the mouth maame. Patient had a PET/CT scan completed on 2/10/2025.   PET/CT shows peripheral area of increased uptake. On bronchoscopy airway could not be accessed as it is obstructed by broncholith and it was recommend  patient have a CT-guided biopsy of the PET positive peripheral area.  Patient had CT-guided biopsy and tissue pathology came back showing benign pulmonary parenchyma with fibrosis and chronic inflammation. AFB stain, negative for acid-fast microorganisms. GMS stain, negative for fungal forms.    Patient had a chest CT scan completed on 6/92025. Report states Stable appearance of the right upper lobe airspace consolidation which appears to extend towards the right hilum where there is a large centrally calcified right hilar  lymph node. This could represent an area of postobstructive consolidation. Prior percutaneous and endobronchial tissue sampling has been benign. Recommend imaging follow-up in the absence of additional planned intervention. Cardiomegaly. Mild peripheral reticulation and interstitial thickening within the lung bases which can be seen with chronic interstitial lung disease. Patient is scheduled to have a chest CT scan on 9/15/2025.     Patient states that she does get short of breath that is worse with exertion, mild in severity, and improved with rest.  Patient states that she has had a cough and has had 2 rounds of antibiotics and steroids and is now starting to feel better.  Patient states that her cough is slowly improving.  Patient states that she is taking Breo every day as prescribed and uses albuterol inhaler and DuoNeb nebulizer treatments as needed.  Patient states that she is still having using her albuterol inhaler more than twice a week.  Patient is also taking Singulair and Flonase nasal spray for seasonal allergies. Patient is taking Protonix and Pepcid for reflux.     Patient denies fever, chills, night sweats, swollen glands in the head and neck, unintentional weight loss, hemoptysis, purulent sputum production, dysphagia, chest pain, palpitations, chest tightness, abdominal pain, nausea, vomiting, and diarrhea.  Patient also denies any myalgias, changes in sense of taste and/or smell, sore throat, any other coronavirus or flu-like symptoms.  Patient denies any leg swelling, orthopnea, paroxysmal nocturnal dyspnea.  Patient is able to perform activities of daily living.        Review of Systems     Family History   Problem Relation Age of Onset    Breast cancer Mother     Cancer Mother     Bone cancer Father     Arthritis Father     Breast cancer Sister     Bone cancer Sister     Cancer Sister     Hypertension Sister     Lung cancer Brother     Cancer Sister     Cancer Sister     Cancer Sister      Asthma Daughter     Malig Hyperthermia Neg Hx         Social History     Socioeconomic History    Marital status:    Tobacco Use    Smoking status: Never     Passive exposure: Past    Smokeless tobacco: Never    Tobacco comments:     Lived with a chain smoker for many years   Vaping Use    Vaping status: Never Used   Substance and Sexual Activity    Alcohol use: No    Drug use: Never    Sexual activity: Not Currently     Partners: Male     Birth control/protection: None        Past Medical History:   Diagnosis Date    Anemia     Arthritis     Asthma     BPPV (benign paroxysmal positional vertigo), right     COPD (chronic obstructive pulmonary disease)     Coronary artery disease     Coronary artery disease     Diverticulitis     Ear itching     HX    Enlarged heart     Gastroesophageal reflux disease     Hyperlipidemia     Hypertension     Knee pain     Macular degeneration     Pneumonia     PONV (postoperative nausea and vomiting)     Postural dizziness with presyncope 03/16/2023    Shingles     X2    SOB (shortness of breath) on exertion     Tinnitus of left ear     Urinary frequency     Vertigo         Immunization History   Administered Date(s) Administered    COVID-19 (MODERNA) 1st,2nd,3rd Dose Monovalent 03/18/2021, 04/22/2021    COVID-19 (MODERNA) Monovalent Original Booster 12/06/2021    FLUAD TRI 65YR+ 10/02/2023    Fluad Quad 65+ 10/07/2024    Fluzone High-Dose 65+yrs 10/15/2021    Influenza Seasonal Injectable 12/05/2024    Pneumococcal Conjugate 13-Valent (PCV13) 10/27/2015    Pneumococcal Polysaccharide (PPSV23) 10/13/2005, 10/11/2022       Allergies   Allergen Reactions    Codeine Nausea And Vomiting    Contrast Dye (Echo Or Unknown Ct/Mr) Hives    Morphine Nausea And Vomiting          Current Outpatient Medications:     albuterol sulfate  (90 Base) MCG/ACT inhaler, Inhale 2 puffs Every 4 (Four) Hours As Needed., Disp: , Rfl:     aspirin 81 MG EC tablet, Take 1 tablet by mouth Daily.  Instructed to hold 45 days prior to lung biopsy, Disp: , Rfl:     atorvastatin (LIPITOR) 40 MG tablet, Take 1 tablet by mouth Daily., Disp: , Rfl:     carvedilol (COREG) 25 MG tablet, Take 1 tablet by mouth 2 (Two) Times a Day With Meals., Disp: 180 tablet, Rfl: 6    famotidine (PEPCID) 40 MG tablet, Take 1 tablet by mouth every night at bedtime., Disp: , Rfl:     fluticasone (FLONASE) 50 MCG/ACT nasal spray, Administer 2 sprays into the nostril(s) as directed by provider Every Morning., Disp: , Rfl:     furosemide (Lasix) 40 MG tablet, Take 0.5 tablets by mouth Daily for 10 doses. (Patient taking differently: Take 1 tablet by mouth Daily.), Disp: 5 tablet, Rfl: 0    ipratropium-albuterol (DUO-NEB) 0.5-2.5 mg/3 ml nebulizer, Take 3 mL by nebulization Every 6 (Six) Hours As Needed for Shortness of Air for up to 30 days., Disp: 360 mL, Rfl: 5    loratadine (CLARITIN) 10 MG tablet, Take 1 tablet by mouth Daily., Disp: , Rfl:     meclizine (ANTIVERT) 25 MG tablet, Take 1 tablet by mouth 2 (Two) Times a Day As Needed., Disp: , Rfl:     montelukast (SINGULAIR) 10 MG tablet, Take 1 tablet by mouth Every Night., Disp: , Rfl:     multivitamin with minerals (PRESERVISION AREDS PO), Take 1 tablet by mouth 2 (Two) Times a Day., Disp: , Rfl:     pantoprazole (PROTONIX) 40 MG EC tablet, Take 1 tablet by mouth 2 (Two) Times a Day., Disp: , Rfl:     rOPINIRole (REQUIP) 0.25 MG tablet, Take 1 tablet by mouth every night at bedtime., Disp: , Rfl:     sertraline (ZOLOFT) 50 MG tablet, Take 1 tablet by mouth Daily., Disp: , Rfl:     Fluticasone Furoate-Vilanterol (BREO ELLIPTA) 200-25 MCG/ACT inhaler, Inhale 1 puff Daily for 30 days. Rinse mouth out after each use., Disp: 30 each, Rfl: 5    hydroCHLOROthiazide 25 MG tablet, Take 1 tablet by mouth Daily. (Patient not taking: Reported on 7/21/2025), Disp: , Rfl:     polyethylene glycol (MIRALAX) 17 g packet, Take 17 g by mouth Daily As Needed (constipation). (Patient not taking: Reported  "on 7/21/2025), Disp: , Rfl:     sennosides-docusate (PERICOLACE) 8.6-50 MG per tablet, Take 1 tablet by mouth 2 (Two) Times a Day As Needed for Constipation. (Patient not taking: Reported on 7/21/2025), Disp: , Rfl:   No current facility-administered medications for this visit.    Facility-Administered Medications Ordered in Other Visits:     Chlorhexidine Gluconate Cloth 2 % pads, , Apply externally, BID, Francesco Grewal, YOVANA    iopamidol (ISOVUE-370) 76 % injection, , , Code / Trauma / Sedation Medication, Shane Rosa MD, 15 mL at 03/27/23 1202     Objective     Physical Exam  Vital Signs:   WDWN, Alert, NAD.    HEENT:  PERRL, EOMI.  OP, nares clear, no sinus tenderness  Chest:  good aeration, clear to auscultation bilaterally, tympanic to percussion bilaterally, no work of breathing noted  CV: RRR, no MGR, pulses 2+, equal.  EXT:  no clubbing, no cyanosis, no edema, no joint tenderness  Neuro:  A&Ox3, CN grossly intact, no focal deficits.  Skin: No rashes or lesions noted.    /76 (BP Location: Right arm, Patient Position: Sitting, Cuff Size: Large Adult)   Pulse 60   Temp 97.8 °F (36.6 °C) (Tympanic)   Resp 16   Ht 162.6 cm (64.02\")   Wt 84.4 kg (186 lb)   SpO2 95%   BMI 31.91 kg/m²         Result Review :   I have reviewed my last office visit note. I also reviewed CT guided biopsy results dated from 3/5/2025. I also reviewed chest CT scan report dated from 6/9/2025.  See scanned reports.    Procedures:      CT Chest Without Contrast Diagnostic  Result Date: 6/11/2025  Impression: 1.Stable appearance of the right upper lobe airspace consolidation which appears to extend towards the right hilum where there is a large centrally calcified right hilar lymph node. This could represent an area of postobstructive consolidation. Prior percutaneous and endobronchial tissue sampling has been benign. Recommend imaging follow-up in the absence of additional planned intervention. 2.Cardiomegaly. 3.Mild " peripheral reticulation and interstitial thickening within the lung bases which can be seen with chronic interstitial lung disease. Electronically Signed: Brent Zapien MD  6/11/2025 4:46 PM EDT  Workstation ID: [id number]        Assessment and Plan      Assessment:  1. COPD.  2. Question recurrent pneumonia, community-acquired.  3. Right middle/upper lobe atelectasis/masslike opacity due to obstruction via extrinsic impression from calcified lymph node.  4. Granulomatous lung disease.  5. Right upper lobe lung nodule. CT guided biopsy pathology report came back benign on 3/5/2025. Stable on chest CT scan dated from 6/9/2025.   6. Compensated diastolic congestive heart failure.  7. GERD: patient is on a PPI and H2 blocker.   8. Seasonal allergies.  9.  Cough.  10. Never smoker.          Plan:  1. Patient is scheduled to have a chest CT scan on 9/15/2025.   2.  Patient reported that she had to take 2 rounds of antibiotics and steroids.  Patient reports that her cough is slowly improving, however still has a cough.  Discussed with that patient that we can send her for bronchoscopy for further evaluation, however patient declines procedure at this time.  Will order a CBC, IgE level, and respiratory culture.    3.  Will increase Breo from 100 mcg to 200 mcg 1 puff once daily.  Patient is advised to rinse her mouth out after each use.  4.  Continue albuterol inhaler and DuoNeb nebulizer treatments as needed.  5.  Continue Flonase and Singulair for seasonal allergies.  6.  For GERD,  patient to continue PPI and H2 blocker.   Patient is also advised to sleep with the head of the bed elevated and do not eat 3-4 hours prior to bedtime.  Patient is advised to remain upright 2 hours after eating.  7.  Vaccination status:  patient reports they are up-to-date with flu and did receive 3 COVID vaccines.  Patient declines pneumonia vaccinations.    8.  Smoking status: never smoker.  9.  Patient to call the office, 911, or go to  the ER with new or worsening symptoms.  10.  Follow-up in September 2025, sooner if needed.           Follow Up   Return for end of September 2025.  Patient was given instructions and counseling regarding her condition or for health maintenance advice. Please see specific information pulled into the AVS if appropriate.

## 2025-07-17 ENCOUNTER — OFFICE VISIT (OUTPATIENT)
Age: 85
End: 2025-07-17
Payer: MEDICARE

## 2025-07-17 VITALS
BODY MASS INDEX: 31.76 KG/M2 | HEART RATE: 60 BPM | DIASTOLIC BLOOD PRESSURE: 70 MMHG | WEIGHT: 186 LBS | HEIGHT: 64 IN | SYSTOLIC BLOOD PRESSURE: 148 MMHG

## 2025-07-17 DIAGNOSIS — Z95.0 PRESENCE OF CARDIAC PACEMAKER: ICD-10-CM

## 2025-07-17 DIAGNOSIS — I44.2 AV BLOCK, COMPLETE: Primary | ICD-10-CM

## 2025-07-17 PROCEDURE — 99213 OFFICE O/P EST LOW 20 MIN: CPT

## 2025-07-17 PROCEDURE — 3077F SYST BP >= 140 MM HG: CPT

## 2025-07-17 PROCEDURE — 3078F DIAST BP <80 MM HG: CPT

## 2025-07-18 PROCEDURE — 93000 ELECTROCARDIOGRAM COMPLETE: CPT

## 2025-07-18 NOTE — PROGRESS NOTES
Date of Office Visit: 2025  Encounter Provider: YOVANA Bhatia  Place of Service: [facility] CARDIOLOGY  Patient Name: Mallory Franco  :1940    Chief Complaint   Patient presents with    Follow-up     6 months    AV Block, Complete    SSS   :     HPI: Mallory Franco is a 84 y.o. female who follows with Dr. Rosa. She has history of AV block---s/p dual chamber [place] PPM (3/2023), she also has CAD and history of PCI.      She had episode of syncope in . 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      She presented to ED in 2023 with near syncope. Telemetry showed periods of 2:1 AV block with symptoms.      She underwent dual chamber pacemaker placement with Dr. Rosa.      She was admitted in [place] on 10/08 for dyspnea, weakness, and fever. Blood cultures were positive, 1/2 for MSSA.       She was transferred to [facility] and underwent full system extraction and reimplant of micra leadless pacemaker.      I saw her in 2024. She was having some dyspnea and fatigue. Device testing and function was normal, we switched her device to VVI due to ongoing AF.      Her hemaglobin was around 8 which was felt to be cause of her symptoms. Cardiac status was stable.              She presents today for follow-up appointment.    Overall from a cardiac standpoint she is doing well.    She continues to have some occasional shortness of breath with exertion.  She also has pulmonary disease.    She was anemic in the past which was felt to be the cause of her shortness of breath but her recent hemoglobin is normalized.    Normal device as infection office today.  V paced 99%.    She has no complaints or concerns about her device.    She has upcoming follow-up with pulmonology          Past Medical History:   Diagnosis Date    Anemia     Arthritis     Asthma     BPPV (benign paroxysmal positional vertigo), right     COPD (chronic obstructive pulmonary  disease)     Coronary artery disease     Coronary artery disease     Diverticulitis     Ear itching     HX    Enlarged heart     Gastroesophageal reflux disease     Hyperlipidemia     Hypertension     Knee pain     Macular degeneration     Pneumonia     PONV (postoperative nausea and vomiting)     Postural dizziness with presyncope 03/16/2023    Shingles     X2    SOB (shortness of breath) on exertion     Tinnitus of left ear     Urinary frequency     Vertigo        Past Surgical History:   Procedure Laterality Date    BREAST BIOPSY Bilateral     BRONCHOSCOPY Right 11/28/2023    Procedure: BRONCHOSCOPY WITH BIOPSIES, BRUSHINGS, AND BRONCHOALVEOLAR LAVAGE;  Surgeon: Aleksandr Houston DO;  Location: [facility] ENDOSCOPY;  Service: Pulmonary;  Laterality: Right;  RIGHT UPPER LOBE ENDOBRONCHIAL OCCLUSION    BRONCHOSCOPY N/A 01/08/2025    Procedure: BRONCHOSCOPY with broncheoaqlveolar lavage and bronchial washings;  Surgeon: Mitch De La Torre MD;  Location: [facility] ENDOSCOPY;  Service: Pulmonary;  Laterality: N/A;  calcified granuloma    CARDIAC CATHETERIZATION      CARDIAC ELECTROPHYSIOLOGY PROCEDURE N/A 03/27/2023    Procedure: Pacemaker SC new-Siemens, call Dr. Rosa first thing in the morning to get time for the procedure and notify rep;  Surgeon: Shane Rosa MD;  Location: [facility] CATH INVASIVE LOCATION;  Service: Cardiovascular;  Laterality: N/A;    CARDIAC ELECTROPHYSIOLOGY PROCEDURE N/A 10/14/2024    Procedure: Temporary Pacemaker;  Surgeon: Abilio Majano MD;  Location: [facility] INVASIVE LOCATION;  Service: Cardiovascular;  Laterality: N/A;    CARDIAC PACEMAKER REMOVAL N/A 10/14/2024    Procedure: Lead Removal PPM - DC, Dual;  Surgeon: Abilio Majano MD;  Location: [facility] CATH INVASIVE LOCATION;  Service: Cardiovascular;  Laterality: N/A;    CARDIAC PACEMAKER REMOVAL N/A 10/14/2024    Procedure: PPM Generator Explant;  Surgeon: Abilio Majano MD;  Location: [facility]  INVASIVE LOCATION;  Service: Cardiovascular;  Laterality: N/A;     SECTION      CHOLECYSTECTOMY      COLONOSCOPY      CORONARY STENT PLACEMENT      DENTAL PROCEDURE      EYE SURGERY      GALLBLADDER SURGERY      INSERT / REPLACE / REMOVE PACEMAKER      JOINT REPLACEMENT      KNEE MINI REVISION Right 2021    Procedure: KNEE POLY INSERT EXCHANGE;  Surgeon: Ky Avila MD;  Location: [facility] MAIN OR;  Service: Orthopedics;  Laterality: Right;    KNEE SURGERY      LUNG BIOPSY      PACEMAKER IMPLANTATION N/A 10/17/2024    Procedure: Implant or Replacement of Single Chamber Leadless Pacemaker, RV Only  medt micra;  Surgeon: Abilio Majano MD;  Location: [facility] INVASIVE LOCATION;  Service: Cardiovascular;  Laterality: N/A;    REPLACEMENT TOTAL KNEE Right     REPLACEMENT TOTAL KNEE Left        Social History     Socioeconomic History    Marital status:    Tobacco Use    Smoking status: Never     Passive exposure: Past    Smokeless tobacco: Never    Tobacco comments:     Lived with a chain smoker for many years   Vaping Use    Vaping status: Never Used   Substance and Sexual Activity    Alcohol use: No    Drug use: Never    Sexual activity: Not Currently     Partners: Male     Birth control/protection: None       Family History   Problem Relation Age of Onset    Breast cancer Mother     Cancer Mother     Bone cancer Father     Arthritis Father     Breast cancer Sister     Bone cancer Sister     Cancer Sister     Hypertension Sister     Lung cancer Brother     Cancer Sister     Cancer Sister     Cancer Sister     Asthma Daughter     Malig Hyperthermia Neg Hx        Review of Systems   Constitutional: Negative for chills, fever and malaise/fatigue.   Cardiovascular:  Negative for chest pain, dyspnea on exertion, leg swelling, near-syncope, orthopnea, palpitations, paroxysmal nocturnal dyspnea and syncope.   Respiratory:  Negative for cough and shortness of breath.     Hematologic/Lymphatic: Negative.    Musculoskeletal:  Negative for joint pain, joint swelling and myalgias.   Gastrointestinal:  Negative for abdominal pain, diarrhea, melena, nausea and vomiting.   Genitourinary:  Negative for frequency and hematuria.   Neurological:  Negative for light-headedness, numbness, paresthesias and seizures.   Allergic/Immunologic: Negative.    All other systems reviewed and are negative.      Allergies   Allergen Reactions    Codeine Nausea And Vomiting    Contrast Dye (Echo Or Unknown Ct/Mr) Hives    Morphine Nausea And Vomiting         Current Outpatient Medications:     albuterol sulfate  (90 Base) MCG/ACT inhaler, Inhale 2 puffs Every 4 (Four) Hours As Needed., Disp: , Rfl:     aspirin 81 MG EC tablet, Take 1 tablet by mouth Daily. Instructed to hold 45 days prior to lung biopsy, Disp: , Rfl:     atorvastatin (LIPITOR) 40 MG tablet, Take 1 tablet by mouth Daily., Disp: , Rfl:     Breo Ellipta 100-25 MCG/ACT aerosol powder , Inhale 1 puff Daily., Disp: , Rfl:     carvedilol (COREG) 25 MG tablet, Take 1 tablet by mouth 2 (Two) Times a Day With Meals., Disp: 180 tablet, Rfl: 6    famotidine (PEPCID) 40 MG tablet, Take 1 tablet by mouth every night at bedtime., Disp: , Rfl:     fluticasone (FLONASE) 50 MCG/ACT nasal spray, Administer 2 sprays into the nostril(s) as directed by provider Every Morning., Disp: , Rfl:     furosemide (Lasix) 40 MG tablet, Take 0.5 tablets by mouth Daily for 10 doses. (Patient taking differently: Take 1 tablet by mouth Daily.), Disp: 5 tablet, Rfl: 0    hydroCHLOROthiazide 25 MG tablet, Take 1 tablet by mouth Daily., Disp: , Rfl:     loratadine (CLARITIN) 10 MG tablet, Take 1 tablet by mouth Daily., Disp: , Rfl:     meclizine (ANTIVERT) 25 MG tablet, Take 1 tablet by mouth 2 (Two) Times a Day As Needed., Disp: , Rfl:     montelukast (SINGULAIR) 10 MG tablet, Take 1 tablet by mouth Every Night., Disp: , Rfl:     multivitamin with minerals (PRESERVISION  "AREDS PO), Take 1 tablet by mouth 2 (Two) Times a Day., Disp: , Rfl:     pantoprazole (PROTONIX) 40 MG EC tablet, Take 1 tablet by mouth 2 (Two) Times a Day., Disp: , Rfl:     rOPINIRole (REQUIP) 0.25 MG tablet, Take 1 tablet by mouth every night at bedtime., Disp: , Rfl:     sertraline (ZOLOFT) 50 MG tablet, Take 1 tablet by mouth Daily., Disp: , Rfl:     ipratropium-albuterol (DUO-NEB) 0.5-2.5 mg/3 ml nebulizer, Take 3 mL by nebulization Every 6 (Six) Hours As Needed for Shortness of Air for up to 30 days., Disp: 360 mL, Rfl: 5    polyethylene glycol (MIRALAX) 17 g packet, Take 17 g by mouth Daily As Needed (constipation). (Patient not taking: Reported on 7/17/2025), Disp: , Rfl:     sennosides-docusate (PERICOLACE) 8.6-50 MG per tablet, Take 1 tablet by mouth 2 (Two) Times a Day As Needed for Constipation. (Patient not taking: Reported on 7/17/2025), Disp: , Rfl:   No current facility-administered medications for this visit.    Facility-Administered Medications Ordered in Other Visits:     Chlorhexidine Gluconate Cloth 2 % pads, , Apply externally, BID, Francesco Grewal APRN    iopamidol (ISOVUE-370) 76 % injection, , , Code / Trauma / Sedation Medication, Shane Rosa MD, 15 mL at 03/27/23 1202      Objective:     Vitals:    07/17/25 1106   BP: 148/70   BP Location: Right arm   Patient Position: Sitting   Cuff Size: Adult   Pulse: 60   Weight: 84.4 kg (186 lb)   Height: 162.6 cm (64\")     Body mass index is 31.93 kg/m².    PHYSICAL EXAM:    Vitals Reviewed.   General Appearance: No acute distress, well developed and well nourished.   Respiratory: No signs of respiratory distress. Respiration rhythm and depth normal.   Cardiovascular:  Heart Rate and Rhythm: Normal  Skin: Warm and dry.   Psychiatric: Patient alert and oriented to person, place, and time. Speech and behavior appropriate. Normal mood and affect.       ECG 12 Lead    Date/Time: 7/18/2025 8:36 AM  Performed by: Bernabe Rosales APRN    Authorized by: " Bernabe Rosales APRN  Comparison: compared with previous ECG   Similar to previous ECG  Rhythm: paced            Assessment:       Diagnosis Plan   1. AV block, complete        2. Presence of cardiac pacemaker               Plan:   1-2.  AV block status post dual-chamber pacemaker status post extraction and leadless implant-----she has done well since leadless pacemaker implant.  She continues to have some occasional shortness of breath with exertion but this is a chronic problem.  Overall her cardiac status is stable.  Normal device this infection office today, V paced 99%, she is dependent.          Follow-up with device check in 1 year.          I spent at least 30 minutes reviewing previous notes, labs, EKGs, device reports and/or time with the patient.         As always, it has been a pleasure to participate in your patient's care.      Sincerely,         YOVANA Bhatia

## 2025-07-21 ENCOUNTER — OFFICE VISIT (OUTPATIENT)
Dept: PULMONOLOGY | Facility: CLINIC | Age: 85
End: 2025-07-21
Payer: MEDICARE

## 2025-07-21 ENCOUNTER — LAB (OUTPATIENT)
Facility: HOSPITAL | Age: 85
End: 2025-07-21
Payer: MEDICARE

## 2025-07-21 VITALS
SYSTOLIC BLOOD PRESSURE: 124 MMHG | TEMPERATURE: 97.8 F | HEART RATE: 60 BPM | HEIGHT: 64 IN | DIASTOLIC BLOOD PRESSURE: 76 MMHG | BODY MASS INDEX: 31.76 KG/M2 | RESPIRATION RATE: 16 BRPM | WEIGHT: 186 LBS | OXYGEN SATURATION: 95 %

## 2025-07-21 DIAGNOSIS — K21.9 GASTROESOPHAGEAL REFLUX DISEASE, UNSPECIFIED WHETHER ESOPHAGITIS PRESENT: ICD-10-CM

## 2025-07-21 DIAGNOSIS — I50.30 DIASTOLIC CONGESTIVE HEART FAILURE, UNSPECIFIED HF CHRONICITY: ICD-10-CM

## 2025-07-21 DIAGNOSIS — J30.2 SEASONAL ALLERGIES: ICD-10-CM

## 2025-07-21 DIAGNOSIS — J44.9 CHRONIC OBSTRUCTIVE PULMONARY DISEASE, UNSPECIFIED COPD TYPE: ICD-10-CM

## 2025-07-21 DIAGNOSIS — R05.9 COUGH, UNSPECIFIED TYPE: ICD-10-CM

## 2025-07-21 DIAGNOSIS — J84.10 GRANULOMATOUS LUNG DISEASE: ICD-10-CM

## 2025-07-21 DIAGNOSIS — R91.1 LUNG NODULE: ICD-10-CM

## 2025-07-21 DIAGNOSIS — J30.2 SEASONAL ALLERGIES: Primary | ICD-10-CM

## 2025-07-21 LAB
BASOPHILS # BLD AUTO: 0.01 10*3/MM3 (ref 0–0.2)
BASOPHILS NFR BLD AUTO: 0.1 % (ref 0–1.5)
DEPRECATED RDW RBC AUTO: 55.3 FL (ref 37–54)
EOSINOPHIL # BLD AUTO: 0.17 10*3/MM3 (ref 0–0.4)
EOSINOPHIL NFR BLD AUTO: 2.3 % (ref 0.3–6.2)
ERYTHROCYTE [DISTWIDTH] IN BLOOD BY AUTOMATED COUNT: 17.1 % (ref 12.3–15.4)
HCT VFR BLD AUTO: 38.2 % (ref 34–46.6)
HGB BLD-MCNC: 11.9 G/DL (ref 12–15.9)
IMM GRANULOCYTES # BLD AUTO: 0.03 10*3/MM3 (ref 0–0.05)
IMM GRANULOCYTES NFR BLD AUTO: 0.4 % (ref 0–0.5)
LYMPHOCYTES # BLD AUTO: 2.09 10*3/MM3 (ref 0.7–3.1)
LYMPHOCYTES NFR BLD AUTO: 28 % (ref 19.6–45.3)
MCH RBC QN AUTO: 27.7 PG (ref 26.6–33)
MCHC RBC AUTO-ENTMCNC: 31.2 G/DL (ref 31.5–35.7)
MCV RBC AUTO: 89 FL (ref 79–97)
MONOCYTES # BLD AUTO: 0.53 10*3/MM3 (ref 0.1–0.9)
MONOCYTES NFR BLD AUTO: 7.1 % (ref 5–12)
NEUTROPHILS NFR BLD AUTO: 4.64 10*3/MM3 (ref 1.7–7)
NEUTROPHILS NFR BLD AUTO: 62.1 % (ref 42.7–76)
NRBC BLD AUTO-RTO: 0 /100 WBC (ref 0–0.2)
PLATELET # BLD AUTO: 219 10*3/MM3 (ref 140–450)
PMV BLD AUTO: 11 FL (ref 6–12)
RBC # BLD AUTO: 4.29 10*6/MM3 (ref 3.77–5.28)
WBC NRBC COR # BLD AUTO: 7.47 10*3/MM3 (ref 3.4–10.8)

## 2025-07-21 PROCEDURE — 1159F MED LIST DOCD IN RCRD: CPT | Performed by: NURSE PRACTITIONER

## 2025-07-21 PROCEDURE — 1160F RVW MEDS BY RX/DR IN RCRD: CPT | Performed by: NURSE PRACTITIONER

## 2025-07-21 PROCEDURE — G2211 COMPLEX E/M VISIT ADD ON: HCPCS | Performed by: NURSE PRACTITIONER

## 2025-07-21 PROCEDURE — 85025 COMPLETE CBC W/AUTO DIFF WBC: CPT

## 2025-07-21 PROCEDURE — 99214 OFFICE O/P EST MOD 30 MIN: CPT | Performed by: NURSE PRACTITIONER

## 2025-07-21 PROCEDURE — 3078F DIAST BP <80 MM HG: CPT | Performed by: NURSE PRACTITIONER

## 2025-07-21 PROCEDURE — 36415 COLL VENOUS BLD VENIPUNCTURE: CPT

## 2025-07-21 PROCEDURE — 3074F SYST BP LT 130 MM HG: CPT | Performed by: NURSE PRACTITIONER

## 2025-07-21 PROCEDURE — 82785 ASSAY OF IGE: CPT

## 2025-07-21 RX ORDER — FLUTICASONE FUROATE AND VILANTEROL 200; 25 UG/1; UG/1
1 POWDER RESPIRATORY (INHALATION)
Qty: 30 EACH | Refills: 5 | Status: SHIPPED | OUTPATIENT
Start: 2025-07-21 | End: 2025-08-20

## 2025-07-23 ENCOUNTER — LAB (OUTPATIENT)
Dept: LAB | Facility: HOSPITAL | Age: 85
End: 2025-07-23
Payer: MEDICARE

## 2025-07-23 DIAGNOSIS — J30.2 SEASONAL ALLERGIES: ICD-10-CM

## 2025-07-23 DIAGNOSIS — K21.9 GASTROESOPHAGEAL REFLUX DISEASE, UNSPECIFIED WHETHER ESOPHAGITIS PRESENT: ICD-10-CM

## 2025-07-23 DIAGNOSIS — I50.30 DIASTOLIC CONGESTIVE HEART FAILURE, UNSPECIFIED HF CHRONICITY: ICD-10-CM

## 2025-07-23 DIAGNOSIS — J84.10 GRANULOMATOUS LUNG DISEASE: ICD-10-CM

## 2025-07-23 DIAGNOSIS — R91.1 LUNG NODULE: ICD-10-CM

## 2025-07-23 PROCEDURE — 87205 SMEAR GRAM STAIN: CPT

## 2025-07-23 PROCEDURE — 87070 CULTURE OTHR SPECIMN AEROBIC: CPT

## 2025-07-24 LAB — IGE SERPL-ACNC: 246 IU/ML (ref 6–495)

## 2025-07-25 LAB
BACTERIA SPEC RESP CULT: NORMAL
GRAM STN SPEC: NORMAL

## 2025-08-08 ENCOUNTER — OFFICE VISIT (OUTPATIENT)
Dept: CARDIOLOGY | Facility: CLINIC | Age: 85
End: 2025-08-08
Payer: MEDICARE

## 2025-08-08 VITALS
BODY MASS INDEX: 30.73 KG/M2 | DIASTOLIC BLOOD PRESSURE: 77 MMHG | SYSTOLIC BLOOD PRESSURE: 122 MMHG | HEIGHT: 64 IN | WEIGHT: 180 LBS | HEART RATE: 64 BPM

## 2025-08-08 DIAGNOSIS — Z95.0 PRESENCE OF PERMANENT CARDIAC PACEMAKER: ICD-10-CM

## 2025-08-08 DIAGNOSIS — I49.5 SSS (SICK SINUS SYNDROME): ICD-10-CM

## 2025-08-08 DIAGNOSIS — I10 HYPERTENSION, ESSENTIAL: Primary | ICD-10-CM

## 2025-08-08 DIAGNOSIS — E78.2 HYPERLIPEMIA, MIXED: ICD-10-CM

## 2025-08-08 PROCEDURE — 99214 OFFICE O/P EST MOD 30 MIN: CPT | Performed by: SPECIALIST

## 2025-08-08 PROCEDURE — 3078F DIAST BP <80 MM HG: CPT | Performed by: SPECIALIST

## 2025-08-08 PROCEDURE — 1160F RVW MEDS BY RX/DR IN RCRD: CPT | Performed by: SPECIALIST

## 2025-08-08 PROCEDURE — 1159F MED LIST DOCD IN RCRD: CPT | Performed by: SPECIALIST

## 2025-08-08 PROCEDURE — 3074F SYST BP LT 130 MM HG: CPT | Performed by: SPECIALIST

## 2025-08-08 RX ORDER — FUROSEMIDE 20 MG/1
20 TABLET ORAL DAILY
Qty: 90 TABLET | Refills: 1 | Status: SHIPPED | OUTPATIENT
Start: 2025-08-08 | End: 2025-11-06

## 2025-08-08 RX ORDER — POTASSIUM CHLORIDE 750 MG/1
10 TABLET, EXTENDED RELEASE ORAL DAILY
COMMUNITY
Start: 2025-08-04

## (undated) DEVICE — PINNACLE INTRODUCER SHEATH: Brand: PINNACLE

## (undated) DEVICE — DIL COONS/TPR .038IN 22F 20CM

## (undated) DEVICE — STERILE PATIENT PROTECTIVE PAD FOR IMP® KNEE POSITIONERS & COHESIVE WRAP (10 / CASE): Brand: DE MAYO KNEE POSITIONER®

## (undated) DEVICE — BIDIRECTIONAL TORQUE WRENCH NO2

## (undated) DEVICE — Device

## (undated) DEVICE — SINGLE USE SUCTION VALVE MAJ-209: Brand: SINGLE USE SUCTION VALVE (STERILE)

## (undated) DEVICE — ANTIBACTERIAL UNDYED BRAIDED (POLYGLACTIN 910), SYNTHETIC ABSORBABLE SUTURE: Brand: COATED VICRYL

## (undated) DEVICE — WRENCH KT PM MEDTRONIC

## (undated) DEVICE — MEDI-VAC YANKAUER SUCTION HANDLE W/BULBOUS TIP: Brand: CARDINAL HEALTH

## (undated) DEVICE — CONN JET HYDRA H20 AUXILIARY DISP

## (undated) DEVICE — BLCK/BITE BLOX WO/DENTL/RIM W/STRAP 54F

## (undated) DEVICE — DEV ATOMIZATION MUCOSAL/NASALTRACH

## (undated) DEVICE — SINGLE USE BIOPSY VALVE MAJ-210: Brand: SINGLE USE BIOPSY VALVE (STERILE)

## (undated) DEVICE — GW AMPLTZ SUPERSTIFF SHT/TPR STR .035IN 145CM

## (undated) DEVICE — GLV SURG SENSICARE W/ALOE PF LF 8 STRL

## (undated) DEVICE — PENCL E/S ULTRAVAC TELESCP NOSE HOLSTR 10FT

## (undated) DEVICE — CONTAINER,SPEC,PNEUM TUBE,4OZ,STRL PATH: Brand: MEDLINE

## (undated) DEVICE — UNDERCAST PADDING: Brand: DEROYAL

## (undated) DEVICE — ST ACC MICROPUNCTURE .018 TRANSLSS/PLAT/TP 4F/10CM 21G/10CM

## (undated) DEVICE — PROXIMATE RH ROTATING HEAD SKIN STAPLERS (35 WIDE) CONTAINS 35 STAINLESS STEEL STAPLES: Brand: PROXIMATE

## (undated) DEVICE — TRAP FLD MINIVAC MEGADYNE 100ML

## (undated) DEVICE — STRAIGHT LEAD STYLET SOFT

## (undated) DEVICE — SYR LUER SLPTP 50ML

## (undated) DEVICE — TRAP,MUCUS SPECIMEN,40CC: Brand: MEDLINE

## (undated) DEVICE — SUT VIC 0 CT1 36IN J946H

## (undated) DEVICE — SUT SILK 0/0 CT2 18IN C027D

## (undated) DEVICE — STPLR SKIN VISISTAT WD 35CT

## (undated) DEVICE — GW AMPLATZ SUPERSTIFF 3MM J/TP .035IN 145CM

## (undated) DEVICE — LINER SURG CANSTR SXN S/RIGD 1500CC

## (undated) DEVICE — BNDG ELAS ELITE V/CLOSE 6IN 5YD LF STRL

## (undated) DEVICE — FIXATION TOOL

## (undated) DEVICE — INTRO SHEATH PRELUDE SNAP .038 8F 13CM W/SDPRT

## (undated) DEVICE — DISPOSABLE BIOPSY FORCEPS: Brand: DISPOSABLE BIOPSY FORCEPS

## (undated) DEVICE — INTRO SHEATH PRELUDE SNAP .038 6F 13CM W/SDPRT

## (undated) DEVICE — SOLIDIFIER LIQLOC PLS 1500CC BT

## (undated) DEVICE — PENCL SMOKE/EVAC MEGADYNE TELESCP 10FT

## (undated) DEVICE — GLV SURG SENSICARE W/ALOE PF LF 7.5 STRL

## (undated) DEVICE — PERCLOSE™ PROSTYLE™ SUTURE-MEDIATED CLOSURE AND REPAIR SYSTEM: Brand: PERCLOSE™ PROSTYLE™

## (undated) DEVICE — LOU EP: Brand: MEDLINE INDUSTRIES, INC.

## (undated) DEVICE — SHEATH INTRO MICRA HC 23F 55.7CM

## (undated) DEVICE — GLV SURG PREMIERPRO ORTHO LTX PF SZ7.5 BRN

## (undated) DEVICE — LOU PACE DEFIB: Brand: MEDLINE INDUSTRIES, INC.

## (undated) DEVICE — TOOL PINCHON 6056

## (undated) DEVICE — INTRO TEAR AWAY/LVD W/SD PRT 6F 13CM

## (undated) DEVICE — SUT VIC 1 CT1 36IN J947H

## (undated) DEVICE — SYS CLS SKIN PREMIERPRO EXOFINFUSION 22CM

## (undated) DEVICE — DRSNG SURG AQUACEL AG/ADVNTGE 9X15CM 3.5X6IN

## (undated) DEVICE — DISPOSABLE CYTOLOGY BRUSH: Brand: DISPOSABLE CYTOLOGY BRUSH

## (undated) DEVICE — PREP SOL POVIDONE/IODINE BT 4OZ

## (undated) DEVICE — PREMIUM WET SKIN PREP TRAY: Brand: MEDLINE INDUSTRIES, INC.

## (undated) DEVICE — CUP SPECI 4OZ LF STRL

## (undated) DEVICE — UNDYED BRAIDED (POLYGLACTIN 910), SYNTHETIC ABSORBABLE SUTURE: Brand: COATED VICRYL

## (undated) DEVICE — DUAL CUT SAGITTAL BLADE

## (undated) DEVICE — MAT FLR ABSORBENT LG 4FT 10 2.5FT

## (undated) DEVICE — PK KN TOTL 40

## (undated) DEVICE — 3M™ STERI-STRIP™ REINFORCED ADHESIVE SKIN CLOSURES, R1546, 1/4 IN X 4 IN (6 MM X 100 MM), 10 STRIPS/ENVELOPE: Brand: 3M™ STERI-STRIP™

## (undated) DEVICE — APPL DURAPREP IODOPHOR APL 26ML